# Patient Record
Sex: FEMALE | Race: WHITE | NOT HISPANIC OR LATINO | URBAN - NONMETROPOLITAN AREA
[De-identification: names, ages, dates, MRNs, and addresses within clinical notes are randomized per-mention and may not be internally consistent; named-entity substitution may affect disease eponyms.]

---

## 2017-04-17 ENCOUNTER — OFFICE (OUTPATIENT)
Dept: URBAN - NONMETROPOLITAN AREA CLINIC 13 | Facility: CLINIC | Age: 57
End: 2017-04-17
Payer: COMMERCIAL

## 2017-04-17 VITALS
DIASTOLIC BLOOD PRESSURE: 89 MMHG | SYSTOLIC BLOOD PRESSURE: 149 MMHG | WEIGHT: 153 LBS | HEIGHT: 61 IN | HEART RATE: 104 BPM

## 2017-04-17 VITALS — HEIGHT: 61 IN

## 2017-04-17 DIAGNOSIS — K21.9 GASTRO-ESOPHAGEAL REFLUX DISEASE WITHOUT ESOPHAGITIS: ICD-10-CM

## 2017-04-17 DIAGNOSIS — R14.3 FLATULENCE: ICD-10-CM

## 2017-04-17 DIAGNOSIS — R10.31 RIGHT LOWER QUADRANT PAIN: ICD-10-CM

## 2017-04-17 DIAGNOSIS — R10.11 RIGHT UPPER QUADRANT PAIN: ICD-10-CM

## 2017-04-17 DIAGNOSIS — R19.7 DIARRHEA, UNSPECIFIED: ICD-10-CM

## 2017-04-17 DIAGNOSIS — K51.90 ULCERATIVE COLITIS, UNSPECIFIED, WITHOUT COMPLICATIONS: ICD-10-CM

## 2017-04-17 DIAGNOSIS — R10.813 RIGHT LOWER QUADRANT ABDOMINAL TENDERNESS: ICD-10-CM

## 2017-04-17 DIAGNOSIS — R10.811 RIGHT UPPER QUADRANT ABDOMINAL TENDERNESS: ICD-10-CM

## 2017-04-17 DIAGNOSIS — R11.2 NAUSEA WITH VOMITING, UNSPECIFIED: ICD-10-CM

## 2017-04-17 DIAGNOSIS — R13.10 DYSPHAGIA, UNSPECIFIED: ICD-10-CM

## 2017-04-17 DIAGNOSIS — R14.0 ABDOMINAL DISTENSION (GASEOUS): ICD-10-CM

## 2017-04-17 LAB
AMYLASE: 45 U/L (ref 25–125)
CBC EMERALD: HEMATOCRIT: 39.1 % (ref 37–47)
CBC EMERALD: HEMOGLOBIN: 13.2 G/DL (ref 12–14)
CBC EMERALD: LYMPHS %: 28.7 % (ref 20.5–40.5)
CBC EMERALD: LYMPHS ABSOLUTE: 3.3 10*3U/L — HIGH (ref 1.3–2.9)
CBC EMERALD: MCH: 29.6 PG (ref 27–32)
CBC EMERALD: MCHC: 33.8 G/DL (ref 32–35)
CBC EMERALD: MCV: 87.7 FL (ref 84–100)
CBC EMERALD: MONOS %: 4.9 % (ref 2–10)
CBC EMERALD: MONOS ABSOLUTE: 0.6 10*3U/L (ref 0.3–3.8)
CBC EMERALD: MPV: 8.1 FL (ref 7.4–10.4)
CBC EMERALD: NEUT. %: 66.4 % — HIGH (ref 43–65)
CBC EMERALD: NEUT. ABSOLUTE: 7.6 10*3U/L — HIGH (ref 2.2–4.8)
CBC EMERALD: PLATELETS: 255 10*3U/L (ref 130–440)
CBC EMERALD: RBC: 4.5 10*6U/L (ref 4.2–5.4)
CBC EMERALD: RDW: 13.3 % (ref 11.5–15.5)
CBC EMERALD: WBC: 11.5 10*3U/L — HIGH (ref 4.5–10.5)
COMPLETE METABOLIC: ALBUMIN: 5.1 G/DL (ref 3.5–5.2)
COMPLETE METABOLIC: ALK. PHOS: 78 U/L (ref 40–150)
COMPLETE METABOLIC: ALT: 41 U/L (ref 0–55)
COMPLETE METABOLIC: AST: 42 U/L — HIGH (ref 5–34)
COMPLETE METABOLIC: BUN: 11 MG/DL (ref 7–26)
COMPLETE METABOLIC: CALCIUM: 9.8 MG/DL (ref 8.4–10.2)
COMPLETE METABOLIC: CHLORIDE: 102 MMOL/L (ref 98–107)
COMPLETE METABOLIC: CO2: 24 MEQ/L (ref 22–29)
COMPLETE METABOLIC: CREATININE: 0.7 MG/DL (ref 0.6–1.3)
COMPLETE METABOLIC: GLUCOSE: 112 MG/DL — HIGH (ref 70–99)
COMPLETE METABOLIC: POTASSIUM: 4.4 MMOL/L (ref 3.5–5.3)
COMPLETE METABOLIC: SODIUM: 139 MMOL/L (ref 136–145)
COMPLETE METABOLIC: TOTAL BILIRUBIN: 0.4 MG/DL (ref 0.2–1.2)
COMPLETE METABOLIC: TOTAL PROTEIN: 8 G/DL (ref 6.4–8.3)
LIPASE: 34 U/L (ref 8–78)

## 2017-04-17 PROCEDURE — 82150 ASSAY OF AMYLASE: CPT | Performed by: INTERNAL MEDICINE

## 2017-04-17 PROCEDURE — 99204 OFFICE O/P NEW MOD 45 MIN: CPT

## 2017-04-17 PROCEDURE — 85025 COMPLETE CBC W/AUTO DIFF WBC: CPT | Performed by: INTERNAL MEDICINE

## 2017-04-17 PROCEDURE — 36415 COLL VENOUS BLD VENIPUNCTURE: CPT | Performed by: INTERNAL MEDICINE

## 2017-04-17 PROCEDURE — 83690 ASSAY OF LIPASE: CPT | Performed by: INTERNAL MEDICINE

## 2017-04-17 PROCEDURE — 80053 COMPREHEN METABOLIC PANEL: CPT | Performed by: INTERNAL MEDICINE

## 2017-04-17 RX ORDER — POLYETHYLENE GLYCOL 3350, SODIUM SULFATE ANHYDROUS, SODIUM BICARBONATE, SODIUM CHLORIDE, POTASSIUM CHLORIDE 236; 22.74; 6.74; 5.86; 2.97 G/4L; G/4L; G/4L; G/4L; G/4L
POWDER, FOR SOLUTION ORAL
Qty: 1 | Refills: 0 | Status: COMPLETED
Start: 2017-04-17 | End: 2017-05-02

## 2017-04-17 RX ORDER — ONDANSETRON HYDROCHLORIDE 4 MG/1
TABLET, FILM COATED ORAL
Qty: 2 | Refills: 0 | Status: COMPLETED
Start: 2017-04-17 | End: 2017-05-02

## 2017-04-17 RX ORDER — BISACODYL 5 MG
TABLET, DELAYED RELEASE (ENTERIC COATED) ORAL
Qty: 8 | Refills: 0 | Status: COMPLETED
Start: 2017-04-17 | End: 2017-05-02

## 2017-04-27 ENCOUNTER — AMBULATORY SURGICAL CENTER (OUTPATIENT)
Dept: URBAN - NONMETROPOLITAN AREA SURGERY 2 | Facility: SURGERY | Age: 57
End: 2017-04-27
Payer: COMMERCIAL

## 2017-04-27 ENCOUNTER — OFFICE (OUTPATIENT)
Dept: URBAN - NONMETROPOLITAN AREA CLINIC 13 | Facility: CLINIC | Age: 57
End: 2017-04-27
Payer: COMMERCIAL

## 2017-04-27 VITALS
SYSTOLIC BLOOD PRESSURE: 116 MMHG | SYSTOLIC BLOOD PRESSURE: 138 MMHG | SYSTOLIC BLOOD PRESSURE: 152 MMHG | WEIGHT: 153 LBS | HEART RATE: 100 BPM | SYSTOLIC BLOOD PRESSURE: 128 MMHG | RESPIRATION RATE: 20 BRPM | DIASTOLIC BLOOD PRESSURE: 76 MMHG | DIASTOLIC BLOOD PRESSURE: 75 MMHG | DIASTOLIC BLOOD PRESSURE: 66 MMHG | DIASTOLIC BLOOD PRESSURE: 86 MMHG | OXYGEN SATURATION: 98 % | HEART RATE: 94 BPM | HEART RATE: 92 BPM | SYSTOLIC BLOOD PRESSURE: 135 MMHG | HEART RATE: 98 BPM | HEIGHT: 61 IN | OXYGEN SATURATION: 100 % | DIASTOLIC BLOOD PRESSURE: 108 MMHG | HEART RATE: 93 BPM | RESPIRATION RATE: 18 BRPM

## 2017-04-27 VITALS — HEIGHT: 61 IN

## 2017-04-27 DIAGNOSIS — R10.31 RIGHT LOWER QUADRANT PAIN: ICD-10-CM

## 2017-04-27 DIAGNOSIS — R13.10 DYSPHAGIA, UNSPECIFIED: ICD-10-CM

## 2017-04-27 DIAGNOSIS — K22.2 ESOPHAGEAL OBSTRUCTION: ICD-10-CM

## 2017-04-27 DIAGNOSIS — K29.30 CHRONIC SUPERFICIAL GASTRITIS WITHOUT BLEEDING: ICD-10-CM

## 2017-04-27 DIAGNOSIS — K51.90 ULCERATIVE COLITIS, UNSPECIFIED, WITHOUT COMPLICATIONS: ICD-10-CM

## 2017-04-27 DIAGNOSIS — K21.9 GASTRO-ESOPHAGEAL REFLUX DISEASE WITHOUT ESOPHAGITIS: ICD-10-CM

## 2017-04-27 DIAGNOSIS — R11.2 NAUSEA WITH VOMITING, UNSPECIFIED: ICD-10-CM

## 2017-04-27 DIAGNOSIS — R10.11 RIGHT UPPER QUADRANT PAIN: ICD-10-CM

## 2017-04-27 PROBLEM — K31.89 OTHER DISEASES OF STOMACH AND DUODENUM: Status: ACTIVE | Noted: 2017-04-27

## 2017-04-27 PROBLEM — Z79.899 LONG-TERM (CURRENT) USE OF OTHER MEDICATIONS (PPI): Status: ACTIVE | Noted: 2017-04-27

## 2017-04-27 PROCEDURE — 36415 COLL VENOUS BLD VENIPUNCTURE: CPT | Performed by: INTERNAL MEDICINE

## 2017-04-27 PROCEDURE — 00740: CPT | Mod: QS,QZ

## 2017-04-27 PROCEDURE — 43450 DILATE ESOPHAGUS 1/MULT PASS: CPT

## 2017-04-27 PROCEDURE — 74177 CT ABD & PELVIS W/CONTRAST: CPT | Mod: TC | Performed by: INTERNAL MEDICINE

## 2017-04-27 PROCEDURE — 43239 EGD BIOPSY SINGLE/MULTIPLE: CPT

## 2017-04-27 RX ORDER — OMEPRAZOLE 40 MG/1
CAPSULE, DELAYED RELEASE ORAL
Qty: 90 | Refills: 0 | Status: ACTIVE

## 2017-05-02 ENCOUNTER — AMBULATORY SURGICAL CENTER (OUTPATIENT)
Dept: URBAN - NONMETROPOLITAN AREA SURGERY 2 | Facility: SURGERY | Age: 57
End: 2017-05-02
Payer: COMMERCIAL

## 2017-05-02 VITALS
RESPIRATION RATE: 18 BRPM | OXYGEN SATURATION: 99 % | HEART RATE: 101 BPM | HEART RATE: 87 BPM | SYSTOLIC BLOOD PRESSURE: 93 MMHG | TEMPERATURE: 18 F | RESPIRATION RATE: 20 BRPM | DIASTOLIC BLOOD PRESSURE: 61 MMHG | SYSTOLIC BLOOD PRESSURE: 161 MMHG | WEIGHT: 153 LBS | DIASTOLIC BLOOD PRESSURE: 52 MMHG | DIASTOLIC BLOOD PRESSURE: 47 MMHG | HEART RATE: 96 BPM | DIASTOLIC BLOOD PRESSURE: 44 MMHG | OXYGEN SATURATION: 97 % | SYSTOLIC BLOOD PRESSURE: 128 MMHG | HEART RATE: 99 BPM | DIASTOLIC BLOOD PRESSURE: 87 MMHG | OXYGEN SATURATION: 93 % | SYSTOLIC BLOOD PRESSURE: 110 MMHG | HEIGHT: 61 IN | SYSTOLIC BLOOD PRESSURE: 137 MMHG | OXYGEN SATURATION: 94 % | HEART RATE: 105 BPM | SYSTOLIC BLOOD PRESSURE: 104 MMHG | SYSTOLIC BLOOD PRESSURE: 115 MMHG | DIASTOLIC BLOOD PRESSURE: 69 MMHG | SYSTOLIC BLOOD PRESSURE: 92 MMHG | HEART RATE: 56 BPM | HEART RATE: 77 BPM | DIASTOLIC BLOOD PRESSURE: 77 MMHG | OXYGEN SATURATION: 95 % | DIASTOLIC BLOOD PRESSURE: 79 MMHG

## 2017-05-02 DIAGNOSIS — K51.90 ULCERATIVE COLITIS, UNSPECIFIED, WITHOUT COMPLICATIONS: ICD-10-CM

## 2017-05-02 PROCEDURE — G0105 COLORECTAL SCRN; HI RISK IND: HCPCS

## 2017-05-02 RX ORDER — WHEAT DEXTRIN 3 G/3.8 G
POWDER (GRAM) ORAL
Qty: 2 | Refills: 5 | Status: ACTIVE
Start: 2017-05-02

## 2017-05-03 LAB — SURGICAL PROCEDURE: PDF REPORT: (no result)

## 2017-05-25 ENCOUNTER — OFFICE (OUTPATIENT)
Dept: URBAN - NONMETROPOLITAN AREA CLINIC 13 | Facility: CLINIC | Age: 57
End: 2017-05-25
Payer: COMMERCIAL

## 2017-05-25 VITALS
DIASTOLIC BLOOD PRESSURE: 86 MMHG | HEIGHT: 61 IN | SYSTOLIC BLOOD PRESSURE: 160 MMHG | HEART RATE: 100 BPM | WEIGHT: 158 LBS

## 2017-05-25 DIAGNOSIS — R13.10 DYSPHAGIA, UNSPECIFIED: ICD-10-CM

## 2017-05-25 DIAGNOSIS — K51.90 ULCERATIVE COLITIS, UNSPECIFIED, WITHOUT COMPLICATIONS: ICD-10-CM

## 2017-05-25 DIAGNOSIS — K21.9 GASTRO-ESOPHAGEAL REFLUX DISEASE WITHOUT ESOPHAGITIS: ICD-10-CM

## 2017-05-25 DIAGNOSIS — R11.0 NAUSEA: ICD-10-CM

## 2017-05-25 DIAGNOSIS — R74.0 NONSPECIFIC ELEVATION OF LEVELS OF TRANSAMINASE AND LACTIC A: ICD-10-CM

## 2017-05-25 DIAGNOSIS — R10.84 GENERALIZED ABDOMINAL PAIN: ICD-10-CM

## 2017-05-25 DIAGNOSIS — K76.0 FATTY (CHANGE OF) LIVER, NOT ELSEWHERE CLASSIFIED: ICD-10-CM

## 2017-05-25 DIAGNOSIS — R19.7 DIARRHEA, UNSPECIFIED: ICD-10-CM

## 2017-05-25 PROCEDURE — 99213 OFFICE O/P EST LOW 20 MIN: CPT

## 2017-05-25 RX ORDER — MESALAMINE 1.2 G/1
TABLET, DELAYED RELEASE ORAL
Qty: 120 | Refills: 0 | Status: ACTIVE
Start: 2017-05-25

## 2017-06-14 ENCOUNTER — OFFICE (OUTPATIENT)
Dept: URBAN - NONMETROPOLITAN AREA CLINIC 13 | Facility: CLINIC | Age: 57
End: 2017-06-14
Payer: COMMERCIAL

## 2017-06-14 VITALS
SYSTOLIC BLOOD PRESSURE: 139 MMHG | HEIGHT: 61 IN | DIASTOLIC BLOOD PRESSURE: 89 MMHG | WEIGHT: 168 LBS | HEART RATE: 96 BPM

## 2017-06-14 VITALS — HEIGHT: 61 IN

## 2017-06-14 DIAGNOSIS — K51.90 ULCERATIVE COLITIS, UNSPECIFIED, WITHOUT COMPLICATIONS: ICD-10-CM

## 2017-06-14 DIAGNOSIS — K50.90 CROHN'S DISEASE, UNSPECIFIED, WITHOUT COMPLICATIONS: ICD-10-CM

## 2017-06-14 DIAGNOSIS — R13.10 DYSPHAGIA, UNSPECIFIED: ICD-10-CM

## 2017-06-14 DIAGNOSIS — R10.84 GENERALIZED ABDOMINAL PAIN: ICD-10-CM

## 2017-06-14 DIAGNOSIS — K21.9 GASTRO-ESOPHAGEAL REFLUX DISEASE WITHOUT ESOPHAGITIS: ICD-10-CM

## 2017-06-14 PROCEDURE — 99214 OFFICE O/P EST MOD 30 MIN: CPT | Mod: 25

## 2017-06-14 PROCEDURE — 74177 CT ABD & PELVIS W/CONTRAST: CPT | Mod: TC

## 2017-07-26 ENCOUNTER — OFFICE (OUTPATIENT)
Dept: URBAN - NONMETROPOLITAN AREA CLINIC 13 | Facility: CLINIC | Age: 57
End: 2017-07-26
Payer: COMMERCIAL

## 2017-07-26 VITALS
HEART RATE: 93 BPM | SYSTOLIC BLOOD PRESSURE: 146 MMHG | HEIGHT: 61 IN | DIASTOLIC BLOOD PRESSURE: 89 MMHG | WEIGHT: 174 LBS

## 2017-07-26 DIAGNOSIS — R11.0 NAUSEA: ICD-10-CM

## 2017-07-26 DIAGNOSIS — R19.7 DIARRHEA, UNSPECIFIED: ICD-10-CM

## 2017-07-26 DIAGNOSIS — K58.9 IRRITABLE BOWEL SYNDROME WITHOUT DIARRHEA: ICD-10-CM

## 2017-07-26 DIAGNOSIS — K76.0 FATTY (CHANGE OF) LIVER, NOT ELSEWHERE CLASSIFIED: ICD-10-CM

## 2017-07-26 DIAGNOSIS — K21.9 GASTRO-ESOPHAGEAL REFLUX DISEASE WITHOUT ESOPHAGITIS: ICD-10-CM

## 2017-07-26 PROCEDURE — 99213 OFFICE O/P EST LOW 20 MIN: CPT

## 2017-07-26 RX ORDER — ONDANSETRON HYDROCHLORIDE 4 MG/1
TABLET, FILM COATED ORAL
Qty: 90 | Refills: 3 | Status: ACTIVE
Start: 2017-07-26

## 2017-08-28 ENCOUNTER — OFFICE (OUTPATIENT)
Dept: URBAN - NONMETROPOLITAN AREA CLINIC 13 | Facility: CLINIC | Age: 57
End: 2017-08-28
Payer: COMMERCIAL

## 2017-08-28 VITALS
SYSTOLIC BLOOD PRESSURE: 145 MMHG | WEIGHT: 177 LBS | HEART RATE: 109 BPM | HEIGHT: 61 IN | RESPIRATION RATE: 20 BRPM | DIASTOLIC BLOOD PRESSURE: 82 MMHG

## 2017-08-28 DIAGNOSIS — K58.9 IRRITABLE BOWEL SYNDROME WITHOUT DIARRHEA: ICD-10-CM

## 2017-08-28 DIAGNOSIS — K86.89 OTHER SPECIFIED DISEASES OF PANCREAS: ICD-10-CM

## 2017-08-28 DIAGNOSIS — R10.13 EPIGASTRIC PAIN: ICD-10-CM

## 2017-08-28 DIAGNOSIS — R10.84 GENERALIZED ABDOMINAL PAIN: ICD-10-CM

## 2017-08-28 DIAGNOSIS — R11.0 NAUSEA: ICD-10-CM

## 2017-08-28 DIAGNOSIS — K21.9 GASTRO-ESOPHAGEAL REFLUX DISEASE WITHOUT ESOPHAGITIS: ICD-10-CM

## 2017-08-28 PROCEDURE — 99213 OFFICE O/P EST LOW 20 MIN: CPT

## 2017-08-28 RX ORDER — PANCRELIPASE 40000; 136000; 218000 [USP'U]/1; [USP'U]/1; [USP'U]/1
CAPSULE, DELAYED RELEASE ORAL
Qty: 300 | Refills: 0 | Status: ACTIVE
Start: 2017-08-28

## 2023-03-06 LAB
ALANINE AMINOTRANSFERASE (SGPT) (U/L) IN SER/PLAS: 19 U/L (ref 7–45)
ALBUMIN (G/DL) IN SER/PLAS: 4.1 G/DL (ref 3.4–5)
ALKALINE PHOSPHATASE (U/L) IN SER/PLAS: 80 U/L (ref 33–136)
ASPARTATE AMINOTRANSFERASE (SGOT) (U/L) IN SER/PLAS: 26 U/L (ref 9–39)
BILIRUBIN DIRECT (MG/DL) IN SER/PLAS: 0.4 MG/DL (ref 0–0.3)
BILIRUBIN TOTAL (MG/DL) IN SER/PLAS: 1.2 MG/DL (ref 0–1.2)
C REACTIVE PROTEIN (MG/L) IN SER/PLAS: 0.22 MG/DL
ERYTHROCYTE DISTRIBUTION WIDTH (RATIO) BY AUTOMATED COUNT: 17.5 % (ref 11.5–14.5)
ERYTHROCYTE MEAN CORPUSCULAR HEMOGLOBIN CONCENTRATION (G/DL) BY AUTOMATED: 31.6 G/DL (ref 32–36)
ERYTHROCYTE MEAN CORPUSCULAR VOLUME (FL) BY AUTOMATED COUNT: 93 FL (ref 80–100)
ERYTHROCYTES (10*6/UL) IN BLOOD BY AUTOMATED COUNT: 4.13 X10E12/L (ref 4–5.2)
HEMATOCRIT (%) IN BLOOD BY AUTOMATED COUNT: 38.6 % (ref 36–46)
HEMOGLOBIN (G/DL) IN BLOOD: 12.2 G/DL (ref 12–16)
LEUKOCYTES (10*3/UL) IN BLOOD BY AUTOMATED COUNT: 5.6 X10E9/L (ref 4.4–11.3)
PLATELETS (10*3/UL) IN BLOOD AUTOMATED COUNT: 89 X10E9/L (ref 150–450)
PROTEIN TOTAL: 7 G/DL (ref 6.4–8.2)
SEDIMENTATION RATE, ERYTHROCYTE: 5 MM/H (ref 0–30)
URATE (MG/DL) IN SER/PLAS: 2.5 MG/DL (ref 2.3–6.7)

## 2023-03-10 LAB
ALANINE AMINOTRANSFERASE (SGPT) (U/L) IN SER/PLAS: 21 U/L (ref 7–45)
ALBUMIN (G/DL) IN SER/PLAS: 3.8 G/DL (ref 3.4–5)
ALKALINE PHOSPHATASE (U/L) IN SER/PLAS: 73 U/L (ref 33–136)
ANION GAP IN SER/PLAS: 12 MMOL/L (ref 10–20)
ASPARTATE AMINOTRANSFERASE (SGOT) (U/L) IN SER/PLAS: 29 U/L (ref 9–39)
BASOPHILS (10*3/UL) IN BLOOD BY AUTOMATED COUNT: NORMAL
BASOPHILS/100 LEUKOCYTES IN BLOOD BY AUTOMATED COUNT: NORMAL
BILIRUBIN DIRECT (MG/DL) IN SER/PLAS: 0.4 MG/DL (ref 0–0.3)
BILIRUBIN TOTAL (MG/DL) IN SER/PLAS: 1.3 MG/DL (ref 0–1.2)
CALCIUM (MG/DL) IN SER/PLAS: 8.9 MG/DL (ref 8.6–10.3)
CARBON DIOXIDE, TOTAL (MMOL/L) IN SER/PLAS: 32 MMOL/L (ref 21–32)
CHLORIDE (MMOL/L) IN SER/PLAS: 99 MMOL/L (ref 98–107)
CHOLESTEROL (MG/DL) IN SER/PLAS: 151 MG/DL (ref 0–199)
CHOLESTEROL IN HDL (MG/DL) IN SER/PLAS: 58.8 MG/DL
CHOLESTEROL/HDL RATIO: 2.6
CREATININE (MG/DL) IN SER/PLAS: 0.51 MG/DL (ref 0.5–1.05)
EOSINOPHILS (10*3/UL) IN BLOOD BY AUTOMATED COUNT: NORMAL
EOSINOPHILS/100 LEUKOCYTES IN BLOOD BY AUTOMATED COUNT: NORMAL
ERYTHROCYTE DISTRIBUTION WIDTH (RATIO) BY AUTOMATED COUNT: NORMAL
ERYTHROCYTE MEAN CORPUSCULAR HEMOGLOBIN CONCENTRATION (G/DL) BY AUTOMATED: NORMAL
ERYTHROCYTE MEAN CORPUSCULAR VOLUME (FL) BY AUTOMATED COUNT: NORMAL
ERYTHROCYTES (10*6/UL) IN BLOOD BY AUTOMATED COUNT: NORMAL
GFR FEMALE: >90 ML/MIN/1.73M2
GLUCOSE (MG/DL) IN SER/PLAS: 174 MG/DL (ref 74–99)
HEMATOCRIT (%) IN BLOOD BY AUTOMATED COUNT: NORMAL
HEMOGLOBIN (G/DL) IN BLOOD: NORMAL
IMMATURE GRANULOCYTES/100 LEUKOCYTES IN BLOOD BY AUTOMATED COUNT: NORMAL
LDL: 68 MG/DL (ref 0–99)
LEUKOCYTES (10*3/UL) IN BLOOD BY AUTOMATED COUNT: NORMAL
LYMPHOCYTES (10*3/UL) IN BLOOD BY AUTOMATED COUNT: NORMAL
LYMPHOCYTES/100 LEUKOCYTES IN BLOOD BY AUTOMATED COUNT: NORMAL
MANUAL DIFFERENTIAL Y/N: NORMAL
MONOCYTES (10*3/UL) IN BLOOD BY AUTOMATED COUNT: NORMAL
MONOCYTES/100 LEUKOCYTES IN BLOOD BY AUTOMATED COUNT: NORMAL
NEUTROPHILS (10*3/UL) IN BLOOD BY AUTOMATED COUNT: NORMAL
NEUTROPHILS/100 LEUKOCYTES IN BLOOD BY AUTOMATED COUNT: NORMAL
NRBC (PER 100 WBCS) BY AUTOMATED COUNT: NORMAL
PLATELETS (10*3/UL) IN BLOOD AUTOMATED COUNT: NORMAL
POTASSIUM (MMOL/L) IN SER/PLAS: 3 MMOL/L (ref 3.5–5.3)
PROTEIN TOTAL: 6.7 G/DL (ref 6.4–8.2)
SODIUM (MMOL/L) IN SER/PLAS: 140 MMOL/L (ref 136–145)
TRIGLYCERIDE (MG/DL) IN SER/PLAS: 123 MG/DL (ref 0–149)
UREA NITROGEN (MG/DL) IN SER/PLAS: 5 MG/DL (ref 6–23)
VLDL: 25 MG/DL (ref 0–40)

## 2023-03-11 LAB
HEPATITIS B VIRUS CORE IGM AB PRESENCE IN SER/PLAS BY IMMUNOASSY: NONREACTIVE
HEPATITIS B VIRUS SURFACE AB (MIU/ML) IN SERUM: <3.1 MIU/ML
HEPATITIS B VIRUS SURFACE AG PRESENCE IN SERUM: NONREACTIVE
HEPATITIS C VIRUS AB PRESENCE IN SERUM: NONREACTIVE

## 2023-03-13 LAB
NIL(NEG) CONTROL SPOT COUNT: NORMAL
PANEL A SPOT COUNT: 0
PANEL B SPOT COUNT: 0
POS CONTROL SPOT COUNT: NORMAL
T-SPOT. TB INTERPRETATION: NEGATIVE

## 2023-05-12 LAB
ALANINE AMINOTRANSFERASE (SGPT) (U/L) IN SER/PLAS: 10 U/L (ref 7–45)
ALBUMIN (G/DL) IN SER/PLAS: 3.7 G/DL (ref 3.4–5)
ALKALINE PHOSPHATASE (U/L) IN SER/PLAS: 110 U/L (ref 33–136)
ANION GAP IN SER/PLAS: 12 MMOL/L (ref 10–20)
ASPARTATE AMINOTRANSFERASE (SGOT) (U/L) IN SER/PLAS: 25 U/L (ref 9–39)
BILIRUBIN DIRECT (MG/DL) IN SER/PLAS: 0.4 MG/DL (ref 0–0.3)
BILIRUBIN TOTAL (MG/DL) IN SER/PLAS: 1.1 MG/DL (ref 0–1.2)
CALCIUM (MG/DL) IN SER/PLAS: 9.1 MG/DL (ref 8.6–10.3)
CARBON DIOXIDE, TOTAL (MMOL/L) IN SER/PLAS: 34 MMOL/L (ref 21–32)
CHLORIDE (MMOL/L) IN SER/PLAS: 98 MMOL/L (ref 98–107)
CREATININE (MG/DL) IN SER/PLAS: 0.66 MG/DL (ref 0.5–1.05)
ERYTHROCYTE DISTRIBUTION WIDTH (RATIO) BY AUTOMATED COUNT: 15.2 % (ref 11.5–14.5)
ERYTHROCYTE MEAN CORPUSCULAR HEMOGLOBIN CONCENTRATION (G/DL) BY AUTOMATED: 29.5 G/DL (ref 32–36)
ERYTHROCYTE MEAN CORPUSCULAR VOLUME (FL) BY AUTOMATED COUNT: 92 FL (ref 80–100)
ERYTHROCYTES (10*6/UL) IN BLOOD BY AUTOMATED COUNT: 3.43 X10E12/L (ref 4–5.2)
GFR FEMALE: >90 ML/MIN/1.73M2
GLUCOSE (MG/DL) IN SER/PLAS: 118 MG/DL (ref 74–99)
HEMATOCRIT (%) IN BLOOD BY AUTOMATED COUNT: 31.5 % (ref 36–46)
HEMOGLOBIN (G/DL) IN BLOOD: 9.3 G/DL (ref 12–16)
LEUKOCYTES (10*3/UL) IN BLOOD BY AUTOMATED COUNT: 4.1 X10E9/L (ref 4.4–11.3)
PLATELETS (10*3/UL) IN BLOOD AUTOMATED COUNT: 119 X10E9/L (ref 150–450)
POTASSIUM (MMOL/L) IN SER/PLAS: 2.7 MMOL/L (ref 3.5–5.3)
PROTEIN TOTAL: 7.5 G/DL (ref 6.4–8.2)
SODIUM (MMOL/L) IN SER/PLAS: 141 MMOL/L (ref 136–145)
THYROTROPIN (MIU/L) IN SER/PLAS BY DETECTION LIMIT <= 0.05 MIU/L: 1.53 MIU/L (ref 0.44–3.98)
UREA NITROGEN (MG/DL) IN SER/PLAS: 4 MG/DL (ref 6–23)

## 2023-06-22 LAB
BASOPHILS (10*3/UL) IN BLOOD BY AUTOMATED COUNT: 0.05 X10E9/L (ref 0–0.1)
BASOPHILS/100 LEUKOCYTES IN BLOOD BY AUTOMATED COUNT: 0.8 % (ref 0–2)
EOSINOPHILS (10*3/UL) IN BLOOD BY AUTOMATED COUNT: 0.15 X10E9/L (ref 0–0.7)
EOSINOPHILS/100 LEUKOCYTES IN BLOOD BY AUTOMATED COUNT: 2.5 % (ref 0–6)
ERYTHROCYTE DISTRIBUTION WIDTH (RATIO) BY AUTOMATED COUNT: 16.4 % (ref 11.5–14.5)
ERYTHROCYTE MEAN CORPUSCULAR HEMOGLOBIN CONCENTRATION (G/DL) BY AUTOMATED: 30.4 G/DL (ref 32–36)
ERYTHROCYTE MEAN CORPUSCULAR VOLUME (FL) BY AUTOMATED COUNT: 87 FL (ref 80–100)
ERYTHROCYTES (10*6/UL) IN BLOOD BY AUTOMATED COUNT: 3.71 X10E12/L (ref 4–5.2)
FERRITIN (UG/LL) IN SER/PLAS: 42 UG/L (ref 8–150)
HEMATOCRIT (%) IN BLOOD BY AUTOMATED COUNT: 32.2 % (ref 36–46)
HEMOGLOBIN (G/DL) IN BLOOD: 9.8 G/DL (ref 12–16)
HEMOGLOBIN (PG) IN RETICULOCYTES: 30 PG (ref 28–38)
IMMATURE GRANULOCYTES/100 LEUKOCYTES IN BLOOD BY AUTOMATED COUNT: 0.2 % (ref 0–0.9)
IMMATURE RETIC FRACTION: 10.4 % (ref 0–16)
IRON (UG/DL) IN SER/PLAS: 39 UG/DL (ref 35–150)
IRON BINDING CAPACITY (UG/DL) IN SER/PLAS: 320 UG/DL (ref 240–445)
IRON SATURATION (%) IN SER/PLAS: 12 % (ref 25–45)
LACTATE DEHYDROGENASE (U/L) IN SER/PLAS BY LAC->PYR RXN: 154 U/L (ref 84–246)
LEUKOCYTES (10*3/UL) IN BLOOD BY AUTOMATED COUNT: 5.9 X10E9/L (ref 4.4–11.3)
LYMPHOCYTES (10*3/UL) IN BLOOD BY AUTOMATED COUNT: 0.86 X10E9/L (ref 1.2–4.8)
LYMPHOCYTES/100 LEUKOCYTES IN BLOOD BY AUTOMATED COUNT: 14.6 % (ref 13–44)
MONOCYTES (10*3/UL) IN BLOOD BY AUTOMATED COUNT: 0.47 X10E9/L (ref 0.1–1)
MONOCYTES/100 LEUKOCYTES IN BLOOD BY AUTOMATED COUNT: 8 % (ref 2–10)
NEUTROPHILS (10*3/UL) IN BLOOD BY AUTOMATED COUNT: 4.37 X10E9/L (ref 1.2–7.7)
NEUTROPHILS/100 LEUKOCYTES IN BLOOD BY AUTOMATED COUNT: 73.9 % (ref 40–80)
PLATELETS (10*3/UL) IN BLOOD AUTOMATED COUNT: 91 X10E9/L (ref 150–450)
RBC MORPHOLOGY IN BLOOD: NORMAL
RETICULOCYTES (10*3/UL) IN BLOOD: 0.06 X10E12/L (ref 0.02–0.08)
RETICULOCYTES/100 ERYTHROCYTES IN BLOOD BY AUTOMATED COUNT: 1.6 % (ref 0.5–2)
SCHISTOCYTES (PRESENCE) IN BLOOD BY LIGHT MICROSCOPY: NORMAL

## 2023-06-23 LAB
COBALAMIN (VITAMIN B12) (PG/ML) IN SER/PLAS: 1055 PG/ML (ref 211–911)
FOLATE (NG/ML) IN SER/PLAS: >24 NG/ML
IGA (MG/DL) IN SER/PLAS: 451 MG/DL (ref 70–400)
IGG (MG/DL) IN SER/PLAS: 1050 MG/DL (ref 700–1600)
IGM (MG/DL) IN SER/PLAS: 227 MG/DL (ref 40–230)
IMMUNOGLOBULIN LIGHT CHAINS KAPPA/LAMBDA (MASS RATIO) IN SERUM: 1.88 (ref 0.26–1.65)
IMMUNOGLOBULIN LIGHT CHAINS.KAPPA (MG/DL) IN SERUM: 3.31 MG/DL (ref 0.33–1.94)
IMMUNOGLOBULIN LIGHT CHAINS.LAMBDA (MG/DL) IN SERUM: 1.76 MG/DL (ref 0.57–2.63)

## 2023-06-30 LAB
ALBUMIN ELP: 3.8 G/DL (ref 3.4–5)
ALPHA 1: 0.3 G/DL (ref 0.2–0.6)
ALPHA 2: 0.7 G/DL (ref 0.4–1.1)
BETA: 1 G/DL (ref 0.5–1.2)
GAMMA GLOBULIN: 1.2 G/DL (ref 0.5–1.4)
PATH REVIEW - SERUM IMMUNOFIXATION: NORMAL
PATH REVIEW-SERUM PROTEIN ELECTROPHORESIS: NORMAL
PROTEIN ELECTROPHORESIS INTERPRETATION: NORMAL
PROTEIN TOTAL: 7.1 G/DL (ref 6.4–8.2)
SERUM IMMUNOFIXATION INTERPRETATION: NORMAL

## 2023-07-06 LAB
ALANINE AMINOTRANSFERASE (SGPT) (U/L) IN SER/PLAS: 11 U/L (ref 7–45)
ALBUMIN (G/DL) IN SER/PLAS: 3.9 G/DL (ref 3.4–5)
ALKALINE PHOSPHATASE (U/L) IN SER/PLAS: 132 U/L (ref 33–136)
ANION GAP IN SER/PLAS: 9 MMOL/L (ref 10–20)
ASPARTATE AMINOTRANSFERASE (SGOT) (U/L) IN SER/PLAS: 24 U/L (ref 9–39)
BILIRUBIN TOTAL (MG/DL) IN SER/PLAS: 0.7 MG/DL (ref 0–1.2)
C REACTIVE PROTEIN (MG/L) IN SER/PLAS: 0.82 MG/DL
CALCIUM (MG/DL) IN SER/PLAS: 8.8 MG/DL (ref 8.6–10.3)
CARBON DIOXIDE, TOTAL (MMOL/L) IN SER/PLAS: 32 MMOL/L (ref 21–32)
CHLORIDE (MMOL/L) IN SER/PLAS: 102 MMOL/L (ref 98–107)
CREATININE (MG/DL) IN SER/PLAS: 0.59 MG/DL (ref 0.5–1.05)
ERYTHROCYTE DISTRIBUTION WIDTH (RATIO) BY AUTOMATED COUNT: 18.1 % (ref 11.5–14.5)
ERYTHROCYTE MEAN CORPUSCULAR HEMOGLOBIN CONCENTRATION (G/DL) BY AUTOMATED: 30.4 G/DL (ref 32–36)
ERYTHROCYTE MEAN CORPUSCULAR VOLUME (FL) BY AUTOMATED COUNT: 87 FL (ref 80–100)
ERYTHROCYTES (10*6/UL) IN BLOOD BY AUTOMATED COUNT: 3.58 X10E12/L (ref 4–5.2)
GFR FEMALE: >90 ML/MIN/1.73M2
GLUCOSE (MG/DL) IN SER/PLAS: 107 MG/DL (ref 74–99)
HEMATOCRIT (%) IN BLOOD BY AUTOMATED COUNT: 31.3 % (ref 36–46)
HEMOGLOBIN (G/DL) IN BLOOD: 9.5 G/DL (ref 12–16)
LEUKOCYTES (10*3/UL) IN BLOOD BY AUTOMATED COUNT: 3.4 X10E9/L (ref 4.4–11.3)
PLATELETS (10*3/UL) IN BLOOD AUTOMATED COUNT: 85 X10E9/L (ref 150–450)
POTASSIUM (MMOL/L) IN SER/PLAS: 3.9 MMOL/L (ref 3.5–5.3)
PROTEIN TOTAL: 6.9 G/DL (ref 6.4–8.2)
SEDIMENTATION RATE, ERYTHROCYTE: 19 MM/H (ref 0–30)
SODIUM (MMOL/L) IN SER/PLAS: 139 MMOL/L (ref 136–145)
URATE (MG/DL) IN SER/PLAS: 3.8 MG/DL (ref 2.3–6.7)
UREA NITROGEN (MG/DL) IN SER/PLAS: 5 MG/DL (ref 6–23)

## 2023-07-14 LAB
BASOPHILS (10*3/UL) IN BLOOD BY AUTOMATED COUNT: 0.02 X10E9/L (ref 0–0.1)
BASOPHILS/100 LEUKOCYTES IN BLOOD BY AUTOMATED COUNT: 0.7 % (ref 0–2)
EOSINOPHILS (10*3/UL) IN BLOOD BY AUTOMATED COUNT: 0.11 X10E9/L (ref 0–0.7)
EOSINOPHILS/100 LEUKOCYTES IN BLOOD BY AUTOMATED COUNT: 3.6 % (ref 0–6)
ERYTHROCYTE DISTRIBUTION WIDTH (RATIO) BY AUTOMATED COUNT: 19.6 % (ref 11.5–14.5)
ERYTHROCYTE MEAN CORPUSCULAR HEMOGLOBIN CONCENTRATION (G/DL) BY AUTOMATED: 30.5 G/DL (ref 32–36)
ERYTHROCYTE MEAN CORPUSCULAR VOLUME (FL) BY AUTOMATED COUNT: 89 FL (ref 80–100)
ERYTHROCYTES (10*6/UL) IN BLOOD BY AUTOMATED COUNT: 3.75 X10E12/L (ref 4–5.2)
FERRITIN (UG/LL) IN SER/PLAS: 586 UG/L (ref 8–150)
HEMATOCRIT (%) IN BLOOD BY AUTOMATED COUNT: 33.4 % (ref 36–46)
HEMOGLOBIN (G/DL) IN BLOOD: 10.2 G/DL (ref 12–16)
IMMATURE GRANULOCYTES/100 LEUKOCYTES IN BLOOD BY AUTOMATED COUNT: 0.3 % (ref 0–0.9)
IRON (UG/DL) IN SER/PLAS: 72 UG/DL (ref 35–150)
IRON BINDING CAPACITY (UG/DL) IN SER/PLAS: 243 UG/DL (ref 240–445)
IRON SATURATION (%) IN SER/PLAS: 30 % (ref 25–45)
LEUKOCYTES (10*3/UL) IN BLOOD BY AUTOMATED COUNT: 3 X10E9/L (ref 4.4–11.3)
LYMPHOCYTES (10*3/UL) IN BLOOD BY AUTOMATED COUNT: 1 X10E9/L (ref 1.2–4.8)
LYMPHOCYTES/100 LEUKOCYTES IN BLOOD BY AUTOMATED COUNT: 32.9 % (ref 13–44)
MONOCYTES (10*3/UL) IN BLOOD BY AUTOMATED COUNT: 0.31 X10E9/L (ref 0.1–1)
MONOCYTES/100 LEUKOCYTES IN BLOOD BY AUTOMATED COUNT: 10.2 % (ref 2–10)
NEUTROPHILS (10*3/UL) IN BLOOD BY AUTOMATED COUNT: 1.59 X10E9/L (ref 1.2–7.7)
NEUTROPHILS/100 LEUKOCYTES IN BLOOD BY AUTOMATED COUNT: 52.3 % (ref 40–80)
PLATELETS (10*3/UL) IN BLOOD AUTOMATED COUNT: 62 X10E9/L (ref 150–450)

## 2023-08-28 LAB
ALANINE AMINOTRANSFERASE (SGPT) (U/L) IN SER/PLAS: 21 U/L (ref 7–45)
ALBUMIN (G/DL) IN SER/PLAS: 4.3 G/DL (ref 3.4–5)
ALKALINE PHOSPHATASE (U/L) IN SER/PLAS: 101 U/L (ref 33–136)
ANION GAP IN SER/PLAS: 9 MMOL/L (ref 10–20)
ASPARTATE AMINOTRANSFERASE (SGOT) (U/L) IN SER/PLAS: 31 U/L (ref 9–39)
BASOPHILS (10*3/UL) IN BLOOD BY AUTOMATED COUNT: 0.05 X10E9/L (ref 0–0.1)
BASOPHILS/100 LEUKOCYTES IN BLOOD BY AUTOMATED COUNT: 1.2 % (ref 0–2)
BILIRUBIN DIRECT (MG/DL) IN SER/PLAS: 0.3 MG/DL (ref 0–0.3)
BILIRUBIN TOTAL (MG/DL) IN SER/PLAS: 1 MG/DL (ref 0–1.2)
CALCIUM (MG/DL) IN SER/PLAS: 9 MG/DL (ref 8.6–10.3)
CARBON DIOXIDE, TOTAL (MMOL/L) IN SER/PLAS: 33 MMOL/L (ref 21–32)
CHLORIDE (MMOL/L) IN SER/PLAS: 97 MMOL/L (ref 98–107)
CHOLESTEROL (MG/DL) IN SER/PLAS: 178 MG/DL (ref 0–199)
CHOLESTEROL IN HDL (MG/DL) IN SER/PLAS: 58.3 MG/DL
CHOLESTEROL/HDL RATIO: 3.1
CREATININE (MG/DL) IN SER/PLAS: 0.59 MG/DL (ref 0.5–1.05)
EOSINOPHILS (10*3/UL) IN BLOOD BY AUTOMATED COUNT: 0.2 X10E9/L (ref 0–0.7)
EOSINOPHILS/100 LEUKOCYTES IN BLOOD BY AUTOMATED COUNT: 4.6 % (ref 0–6)
ERYTHROCYTE DISTRIBUTION WIDTH (RATIO) BY AUTOMATED COUNT: 14.7 % (ref 11.5–14.5)
ERYTHROCYTE MEAN CORPUSCULAR HEMOGLOBIN CONCENTRATION (G/DL) BY AUTOMATED: 32.3 G/DL (ref 32–36)
ERYTHROCYTE MEAN CORPUSCULAR VOLUME (FL) BY AUTOMATED COUNT: 91 FL (ref 80–100)
ERYTHROCYTES (10*6/UL) IN BLOOD BY AUTOMATED COUNT: 4.21 X10E12/L (ref 4–5.2)
FERRITIN (UG/LL) IN SER/PLAS: 173 UG/L (ref 8–150)
GFR FEMALE: >90 ML/MIN/1.73M2
GLUCOSE (MG/DL) IN SER/PLAS: 83 MG/DL (ref 74–99)
HEMATOCRIT (%) IN BLOOD BY AUTOMATED COUNT: 38.4 % (ref 36–46)
HEMOGLOBIN (G/DL) IN BLOOD: 12.4 G/DL (ref 12–16)
IMMATURE GRANULOCYTES/100 LEUKOCYTES IN BLOOD BY AUTOMATED COUNT: 0.7 % (ref 0–0.9)
IRON (UG/DL) IN SER/PLAS: 113 UG/DL (ref 35–150)
IRON BINDING CAPACITY (UG/DL) IN SER/PLAS: 329 UG/DL (ref 240–445)
IRON SATURATION (%) IN SER/PLAS: 34 % (ref 25–45)
LDL: 89 MG/DL (ref 0–99)
LEUKOCYTES (10*3/UL) IN BLOOD BY AUTOMATED COUNT: 4.3 X10E9/L (ref 4.4–11.3)
LYMPHOCYTES (10*3/UL) IN BLOOD BY AUTOMATED COUNT: 1.26 X10E9/L (ref 1.2–4.8)
LYMPHOCYTES/100 LEUKOCYTES IN BLOOD BY AUTOMATED COUNT: 29.1 % (ref 13–44)
MONOCYTES (10*3/UL) IN BLOOD BY AUTOMATED COUNT: 0.41 X10E9/L (ref 0.1–1)
MONOCYTES/100 LEUKOCYTES IN BLOOD BY AUTOMATED COUNT: 9.5 % (ref 2–10)
NEUTROPHILS (10*3/UL) IN BLOOD BY AUTOMATED COUNT: 2.38 X10E9/L (ref 1.2–7.7)
NEUTROPHILS/100 LEUKOCYTES IN BLOOD BY AUTOMATED COUNT: 54.9 % (ref 40–80)
OVALOCYTES PRESENCE IN BLOOD BY LIGHT MICROSCOPY: NORMAL
PLATELETS (10*3/UL) IN BLOOD AUTOMATED COUNT: 80 X10E9/L (ref 150–450)
POTASSIUM (MMOL/L) IN SER/PLAS: 4.3 MMOL/L (ref 3.5–5.3)
PROTEIN TOTAL: 7.4 G/DL (ref 6.4–8.2)
RBC MORPHOLOGY IN BLOOD: NORMAL
SODIUM (MMOL/L) IN SER/PLAS: 135 MMOL/L (ref 136–145)
TRIGLYCERIDE (MG/DL) IN SER/PLAS: 153 MG/DL (ref 0–149)
UREA NITROGEN (MG/DL) IN SER/PLAS: 6 MG/DL (ref 6–23)
VLDL: 31 MG/DL (ref 0–40)

## 2023-08-29 LAB
HEPATITIS B VIRUS CORE AB (PRESENCE) IN SER/PLAS BY IMM: NONREACTIVE
HEPATITIS B VIRUS SURFACE AB (MIU/ML) IN SERUM: <3.1 MIU/ML
HEPATITIS B VIRUS SURFACE AG PRESENCE IN SERUM: NONREACTIVE
HEPATITIS C VIRUS AB PRESENCE IN SERUM: NONREACTIVE

## 2023-08-31 LAB
NIL(NEG) CONTROL SPOT COUNT: NORMAL
PANEL A SPOT COUNT: 0
PANEL B SPOT COUNT: 1
POS CONTROL SPOT COUNT: NORMAL
T-SPOT. TB INTERPRETATION: NEGATIVE

## 2023-09-21 PROBLEM — R77.9 HIGH SERUM PROTEIN LEVEL: Status: ACTIVE | Noted: 2023-09-21

## 2023-09-21 PROBLEM — E27.40 ADRENAL INSUFFICIENCY (MULTI): Status: ACTIVE | Noted: 2023-09-21

## 2023-09-21 PROBLEM — K29.30 CHRONIC SUPERFICIAL GASTRITIS WITHOUT BLEEDING: Status: ACTIVE | Noted: 2023-09-21

## 2023-09-21 PROBLEM — E11.9 TYPE 2 DIABETES MELLITUS WITHOUT COMPLICATIONS (MULTI): Status: ACTIVE | Noted: 2023-09-21

## 2023-09-21 PROBLEM — M54.50 LOW BACK PAIN: Status: ACTIVE | Noted: 2023-09-21

## 2023-09-21 PROBLEM — G89.29 CHRONIC NECK PAIN: Status: ACTIVE | Noted: 2023-09-21

## 2023-09-21 PROBLEM — M54.41 LUMBAGO WITH SCIATICA, RIGHT SIDE: Status: ACTIVE | Noted: 2023-09-21

## 2023-09-21 PROBLEM — Z79.899 MEDICATION MANAGEMENT: Status: ACTIVE | Noted: 2023-09-21

## 2023-09-21 PROBLEM — M43.10 SPONDYLOLISTHESIS: Status: ACTIVE | Noted: 2023-09-21

## 2023-09-21 PROBLEM — R42 DIZZINESS: Status: ACTIVE | Noted: 2023-09-21

## 2023-09-21 PROBLEM — M54.42 LUMBAGO WITH SCIATICA, LEFT SIDE: Status: ACTIVE | Noted: 2023-09-21

## 2023-09-21 PROBLEM — M79.18 MYOFASCIAL PAIN SYNDROME: Status: ACTIVE | Noted: 2023-09-21

## 2023-09-21 PROBLEM — J30.9 ALLERGIC RHINITIS: Status: ACTIVE | Noted: 2023-09-21

## 2023-09-21 PROBLEM — G47.09 OTHER INSOMNIA: Status: ACTIVE | Noted: 2023-09-21

## 2023-09-21 PROBLEM — I48.91 ATRIAL FIBRILLATION (MULTI): Status: ACTIVE | Noted: 2023-09-21

## 2023-09-21 PROBLEM — K31.84 GASTROPARESIS: Status: ACTIVE | Noted: 2023-09-21

## 2023-09-21 PROBLEM — K76.9 LIVER LESION: Status: ACTIVE | Noted: 2023-09-21

## 2023-09-21 PROBLEM — M62.81 MUSCLE WEAKNESS: Status: ACTIVE | Noted: 2023-09-21

## 2023-09-21 PROBLEM — G47.00 INSOMNIA: Status: ACTIVE | Noted: 2023-09-21

## 2023-09-21 PROBLEM — G60.9 IDIOPATHIC PERIPHERAL NEUROPATHY: Status: ACTIVE | Noted: 2023-09-21

## 2023-09-21 PROBLEM — M54.81 BILATERAL OCCIPITAL NEURALGIA: Status: ACTIVE | Noted: 2023-09-21

## 2023-09-21 PROBLEM — Z12.39 SCREENING FOR BREAST CANCER: Status: ACTIVE | Noted: 2023-09-21

## 2023-09-21 PROBLEM — J41.0 SIMPLE CHRONIC BRONCHITIS (MULTI): Status: ACTIVE | Noted: 2023-09-21

## 2023-09-21 PROBLEM — J45.30 MILD PERSISTENT ASTHMA WITHOUT COMPLICATION (HHS-HCC): Status: ACTIVE | Noted: 2023-09-21

## 2023-09-21 PROBLEM — R53.83 FATIGUE: Status: ACTIVE | Noted: 2023-09-21

## 2023-09-21 PROBLEM — I25.118 CORONARY ARTERY DISEASE OF NATIVE ARTERY OF NATIVE HEART WITH STABLE ANGINA PECTORIS (CMS-HCC): Status: ACTIVE | Noted: 2023-09-21

## 2023-09-21 PROBLEM — R26.89 BALANCE PROBLEM: Status: ACTIVE | Noted: 2023-09-21

## 2023-09-21 PROBLEM — G43.019 INTRACTABLE MIGRAINE WITHOUT AURA AND WITHOUT STATUS MIGRAINOSUS: Status: ACTIVE | Noted: 2023-09-21

## 2023-09-21 PROBLEM — D89.2 PARAPROTEINEMIA: Status: ACTIVE | Noted: 2023-09-21

## 2023-09-21 PROBLEM — K90.9 INTESTINAL MALABSORPTION, UNSPECIFIED (HHS-HCC): Status: ACTIVE | Noted: 2023-09-21

## 2023-09-21 PROBLEM — M48.062 LUMBAR STENOSIS WITH NEUROGENIC CLAUDICATION: Status: ACTIVE | Noted: 2023-09-21

## 2023-09-21 PROBLEM — I51.7 LEFT VENTRICULAR HYPERTROPHY: Status: ACTIVE | Noted: 2023-09-21

## 2023-09-21 PROBLEM — G89.29 OTHER CHRONIC PAIN: Status: ACTIVE | Noted: 2023-09-21

## 2023-09-21 PROBLEM — D50.9 IRON DEFICIENCY ANEMIA: Status: ACTIVE | Noted: 2023-09-21

## 2023-09-21 PROBLEM — H04.123 DRY EYES: Status: ACTIVE | Noted: 2023-09-21

## 2023-09-21 PROBLEM — K74.60 CIRRHOSIS OF LIVER WITHOUT ASCITES (MULTI): Status: ACTIVE | Noted: 2023-09-21

## 2023-09-21 PROBLEM — M06.9 RHEUMATOID ARTHRITIS INVOLVING MULTIPLE SITES (MULTI): Status: ACTIVE | Noted: 2023-09-21

## 2023-09-21 PROBLEM — E78.2 MIXED HYPERLIPIDEMIA: Status: ACTIVE | Noted: 2023-09-21

## 2023-09-21 PROBLEM — K51.90 ULCERATIVE COLITIS (MULTI): Status: ACTIVE | Noted: 2023-09-21

## 2023-09-21 PROBLEM — I10 ESSENTIAL HYPERTENSION: Status: ACTIVE | Noted: 2023-09-21

## 2023-09-21 PROBLEM — M79.7 FIBROMYALGIA: Status: ACTIVE | Noted: 2023-09-21

## 2023-09-21 PROBLEM — G62.9 PERIPHERAL NEUROPATHY: Status: ACTIVE | Noted: 2023-09-21

## 2023-09-21 PROBLEM — N95.2 ATROPHIC VAGINITIS: Status: ACTIVE | Noted: 2023-09-21

## 2023-09-21 PROBLEM — M25.562 LEFT KNEE PAIN: Status: ACTIVE | Noted: 2023-09-21

## 2023-09-21 PROBLEM — M17.12 ARTHRITIS OF LEFT KNEE: Status: ACTIVE | Noted: 2023-09-21

## 2023-09-21 PROBLEM — E55.9 VITAMIN D DEFICIENCY: Status: ACTIVE | Noted: 2023-09-21

## 2023-09-21 PROBLEM — I25.2 HISTORY OF MYOCARDIAL INFARCTION: Status: ACTIVE | Noted: 2023-09-21

## 2023-09-21 PROBLEM — R53.1 WEAKNESS: Status: ACTIVE | Noted: 2023-09-21

## 2023-09-21 PROBLEM — G43.909 MIGRAINE: Status: ACTIVE | Noted: 2023-09-21

## 2023-09-21 PROBLEM — F41.8 DEPRESSION WITH ANXIETY: Status: ACTIVE | Noted: 2023-09-21

## 2023-09-21 PROBLEM — E78.5 HYPERLIPIDEMIA: Status: ACTIVE | Noted: 2023-09-21

## 2023-09-21 PROBLEM — F11.20 OPIATE DEPENDENCE (MULTI): Status: ACTIVE | Noted: 2023-09-21

## 2023-09-21 PROBLEM — R41.3 MEMORY DEFICIT: Status: ACTIVE | Noted: 2023-09-21

## 2023-09-21 PROBLEM — G90.50 RSD (REFLEX SYMPATHETIC DYSTROPHY): Status: ACTIVE | Noted: 2023-09-21

## 2023-09-21 PROBLEM — R52 DIFFUSE PAIN: Status: ACTIVE | Noted: 2023-09-21

## 2023-09-21 PROBLEM — K86.1 CHRONIC PANCREATITIS (MULTI): Status: ACTIVE | Noted: 2023-09-21

## 2023-09-21 PROBLEM — G90.511 COMPLEX REGIONAL PAIN SYNDROME TYPE 1 OF RIGHT UPPER EXTREMITY: Status: ACTIVE | Noted: 2023-09-21

## 2023-09-21 PROBLEM — K21.9 GASTROESOPHAGEAL REFLUX DISEASE: Status: ACTIVE | Noted: 2023-09-21

## 2023-09-21 PROBLEM — E11.9 DIABETES (MULTI): Status: ACTIVE | Noted: 2023-09-21

## 2023-09-21 PROBLEM — M54.12 CERVICAL RADICULOPATHY: Status: ACTIVE | Noted: 2023-09-21

## 2023-09-21 PROBLEM — M79.10 MYALGIA: Status: ACTIVE | Noted: 2023-09-21

## 2023-09-21 PROBLEM — R11.2 NAUSEA AND VOMITING: Status: ACTIVE | Noted: 2023-09-21

## 2023-09-21 PROBLEM — E03.9 HYPOTHYROIDISM: Status: ACTIVE | Noted: 2023-09-21

## 2023-09-21 PROBLEM — A60.04 HERPES SIMPLEX VULVOVAGINITIS: Status: ACTIVE | Noted: 2023-09-21

## 2023-09-21 PROBLEM — F33.9 MAJOR DEPRESSION, RECURRENT, CHRONIC (CMS-HCC): Status: ACTIVE | Noted: 2023-09-21

## 2023-09-21 PROBLEM — D47.2 MONOCLONAL GAMMOPATHY: Status: ACTIVE | Noted: 2023-09-21

## 2023-09-21 PROBLEM — F41.9 ANXIETY: Status: ACTIVE | Noted: 2023-09-21

## 2023-09-21 PROBLEM — M54.2 CHRONIC NECK PAIN: Status: ACTIVE | Noted: 2023-09-21

## 2023-09-21 PROBLEM — G25.81 RESTLESS LEGS SYNDROME: Status: ACTIVE | Noted: 2023-09-21

## 2023-09-21 PROBLEM — B37.9 YEAST INFECTION: Status: ACTIVE | Noted: 2023-09-21

## 2023-09-21 PROBLEM — R25.1 TREMORS OF NERVOUS SYSTEM: Status: ACTIVE | Noted: 2023-09-21

## 2023-09-21 RX ORDER — LOSARTAN POTASSIUM 50 MG/1
50 TABLET ORAL DAILY
COMMUNITY
Start: 2023-08-16 | End: 2023-10-18 | Stop reason: SDUPTHER

## 2023-09-21 RX ORDER — TIOTROPIUM BROMIDE AND OLODATEROL 3.124; 2.736 UG/1; UG/1
SPRAY, METERED RESPIRATORY (INHALATION)
COMMUNITY
Start: 2023-04-25

## 2023-09-21 RX ORDER — DULOXETIN HYDROCHLORIDE 60 MG/1
60 CAPSULE, DELAYED RELEASE ORAL DAILY
COMMUNITY
End: 2023-10-23 | Stop reason: WASHOUT

## 2023-09-21 RX ORDER — PANTOPRAZOLE SODIUM 40 MG/1
40 TABLET, DELAYED RELEASE ORAL 2 TIMES DAILY
COMMUNITY
End: 2023-10-23 | Stop reason: WASHOUT

## 2023-09-21 RX ORDER — INSULIN DETEMIR 100 [IU]/ML
50 INJECTION, SOLUTION SUBCUTANEOUS NIGHTLY
COMMUNITY
End: 2023-10-23 | Stop reason: WASHOUT

## 2023-09-21 RX ORDER — BACLOFEN 10 MG/1
20 TABLET ORAL 2 TIMES DAILY
COMMUNITY

## 2023-09-21 RX ORDER — BUTALBITAL AND ACETAMINOPHEN 325; 50 MG/1; MG/1
TABLET ORAL
COMMUNITY
End: 2023-10-23 | Stop reason: WASHOUT

## 2023-09-21 RX ORDER — LEFLUNOMIDE 20 MG/1
TABLET ORAL
COMMUNITY
Start: 2022-08-03 | End: 2023-10-20 | Stop reason: SDUPTHER

## 2023-09-21 RX ORDER — LEVOTHYROXINE SODIUM 88 UG/1
TABLET ORAL
COMMUNITY
Start: 2021-02-08 | End: 2023-10-23 | Stop reason: SDUPTHER

## 2023-09-21 RX ORDER — CETIRIZINE HYDROCHLORIDE 10 MG/1
TABLET ORAL
COMMUNITY
End: 2023-10-23 | Stop reason: SDDI

## 2023-09-21 RX ORDER — AMITRIPTYLINE HYDROCHLORIDE 50 MG/1
50 TABLET, FILM COATED ORAL DAILY
COMMUNITY

## 2023-09-21 RX ORDER — LIDOCAINE 50 MG/G
PATCH TOPICAL
COMMUNITY
Start: 2020-05-04

## 2023-09-21 RX ORDER — BUMETANIDE 1 MG/1
1 TABLET ORAL DAILY PRN
COMMUNITY
End: 2023-12-27 | Stop reason: SDUPTHER

## 2023-09-21 RX ORDER — CLOPIDOGREL BISULFATE 75 MG/1
TABLET ORAL
COMMUNITY
End: 2023-12-05 | Stop reason: SDUPTHER

## 2023-09-21 RX ORDER — PYRIDOXINE HCL (VITAMIN B6) 100 MG
TABLET ORAL
COMMUNITY
End: 2024-01-08

## 2023-09-21 RX ORDER — ONDANSETRON 4 MG/1
TABLET, ORALLY DISINTEGRATING ORAL
COMMUNITY
Start: 2022-04-18 | End: 2023-10-23 | Stop reason: WASHOUT

## 2023-09-21 RX ORDER — LISINOPRIL 20 MG/1
20 TABLET ORAL DAILY
COMMUNITY
End: 2023-10-23 | Stop reason: WASHOUT

## 2023-09-21 RX ORDER — POTASSIUM CHLORIDE 20 MEQ/1
TABLET, EXTENDED RELEASE ORAL
COMMUNITY
End: 2023-10-23 | Stop reason: WASHOUT

## 2023-09-21 RX ORDER — ETODOLAC 400 MG/1
400 TABLET, FILM COATED ORAL 2 TIMES DAILY
COMMUNITY
End: 2023-10-23 | Stop reason: WASHOUT

## 2023-09-21 RX ORDER — NYSTATIN 100000 U/G
1 CREAM TOPICAL 2 TIMES DAILY
COMMUNITY
Start: 2020-02-14 | End: 2023-10-23 | Stop reason: WASHOUT

## 2023-09-21 RX ORDER — TRIAMCINOLONE ACETONIDE 1 MG/G
CREAM TOPICAL
COMMUNITY
Start: 2021-04-19 | End: 2024-04-10 | Stop reason: SDUPTHER

## 2023-09-21 RX ORDER — FOLIC ACID 1 MG/1
1 TABLET ORAL DAILY
COMMUNITY
Start: 2022-03-25 | End: 2024-01-08

## 2023-09-21 RX ORDER — SUMATRIPTAN SUCCINATE 25 MG/1
TABLET ORAL
COMMUNITY
End: 2024-03-21 | Stop reason: SDUPTHER

## 2023-09-21 RX ORDER — MORPHINE SULFATE 30 MG/1
30 CAPSULE, EXTENDED RELEASE ORAL DAILY
COMMUNITY
End: 2023-10-23 | Stop reason: WASHOUT

## 2023-09-21 RX ORDER — DOXYCYCLINE 100 MG/1
1 CAPSULE ORAL 2 TIMES DAILY
COMMUNITY
End: 2023-10-23 | Stop reason: WASHOUT

## 2023-09-21 RX ORDER — PANTOPRAZOLE SODIUM 40 MG/1
40 TABLET, DELAYED RELEASE ORAL
COMMUNITY
Start: 2023-09-06

## 2023-09-21 RX ORDER — FAMOTIDINE 20 MG/1
20 TABLET, FILM COATED ORAL 2 TIMES DAILY
COMMUNITY

## 2023-09-21 RX ORDER — DIPHENOXYLATE HYDROCHLORIDE AND ATROPINE SULFATE 2.5; .025 MG/1; MG/1
TABLET ORAL
COMMUNITY
End: 2023-10-23 | Stop reason: WASHOUT

## 2023-09-21 RX ORDER — GUAIFENESIN AND DEXTROMETHORPHAN HYDROBROMIDE 600; 30 MG/1; MG/1
TABLET, EXTENDED RELEASE ORAL
COMMUNITY
End: 2024-01-08

## 2023-09-21 RX ORDER — CARBOXYMETHYLCELLULOSE SODIUM 5 MG/ML
SOLUTION/ DROPS OPHTHALMIC
COMMUNITY
Start: 2023-05-05

## 2023-09-21 RX ORDER — ACETAMINOPHEN 325 MG/1
975 TABLET ORAL EVERY 4 HOURS PRN
COMMUNITY
Start: 2021-05-13

## 2023-09-21 RX ORDER — LOSARTAN POTASSIUM 25 MG/1
25 TABLET ORAL DAILY
COMMUNITY
End: 2023-10-18 | Stop reason: ALTCHOICE

## 2023-09-21 RX ORDER — LEFLUNOMIDE 10 MG/1
1 TABLET ORAL DAILY
COMMUNITY
End: 2023-10-20

## 2023-09-21 RX ORDER — QUETIAPINE FUMARATE 50 MG/1
TABLET, FILM COATED ORAL
COMMUNITY
End: 2023-10-23 | Stop reason: WASHOUT

## 2023-09-21 RX ORDER — PRUCALOPRIDE 2 MG/1
TABLET, FILM COATED ORAL
COMMUNITY
End: 2023-10-23 | Stop reason: WASHOUT

## 2023-09-21 RX ORDER — MINERAL OIL
ENEMA (ML) RECTAL
COMMUNITY
End: 2024-01-08

## 2023-09-21 RX ORDER — SELENIUM 50 MCG
TABLET ORAL
COMMUNITY
End: 2023-10-23 | Stop reason: WASHOUT

## 2023-09-21 RX ORDER — CARVEDILOL 6.25 MG/1
TABLET ORAL
COMMUNITY
Start: 2023-01-13 | End: 2023-10-18 | Stop reason: SDUPTHER

## 2023-09-21 RX ORDER — MORPHINE SULFATE 15 MG/1
1 TABLET, FILM COATED, EXTENDED RELEASE ORAL 2 TIMES DAILY
COMMUNITY
Start: 2022-04-13

## 2023-09-21 RX ORDER — MORPHINE SULFATE 30 MG/1
30 TABLET ORAL 2 TIMES DAILY
COMMUNITY

## 2023-09-21 RX ORDER — HYDROCORTISONE 10 MG/1
1 TABLET ORAL DAILY
COMMUNITY
Start: 2022-09-25 | End: 2023-10-23 | Stop reason: WASHOUT

## 2023-09-21 RX ORDER — MORPHINE SULFATE 30 MG/1
30 TABLET, FILM COATED, EXTENDED RELEASE ORAL 2 TIMES DAILY
COMMUNITY
End: 2023-10-23 | Stop reason: WASHOUT

## 2023-09-21 RX ORDER — LOVASTATIN 10 MG/1
10 TABLET ORAL DAILY
COMMUNITY
Start: 2020-03-01 | End: 2023-10-18 | Stop reason: SDUPTHER

## 2023-09-21 RX ORDER — LIDOCAINE 560 MG/1
PATCH PERCUTANEOUS; TOPICAL; TRANSDERMAL
COMMUNITY
Start: 2020-05-04 | End: 2023-10-23 | Stop reason: WASHOUT

## 2023-09-21 RX ORDER — POLYETHYLENE GLYCOL 3350, SODIUM SULFATE, SODIUM CHLORIDE, POTASSIUM CHLORIDE, ASCORBIC ACID, SODIUM ASCORBATE 140-9-5.2G
KIT ORAL
COMMUNITY
Start: 2023-07-14 | End: 2023-10-23 | Stop reason: WASHOUT

## 2023-09-21 RX ORDER — PROMETHAZINE HYDROCHLORIDE 12.5 MG/1
12.5 TABLET ORAL EVERY 6 HOURS
COMMUNITY
End: 2023-10-23 | Stop reason: WASHOUT

## 2023-09-21 RX ORDER — PANCRELIPASE 36000; 180000; 114000 [USP'U]/1; [USP'U]/1; [USP'U]/1
1 CAPSULE, DELAYED RELEASE PELLETS ORAL 2 TIMES DAILY
COMMUNITY
Start: 2019-12-20

## 2023-09-21 RX ORDER — FLUTICASONE FUROATE 27.5 UG/1
2 SPRAY, METERED NASAL NIGHTLY
COMMUNITY
Start: 2023-02-07 | End: 2023-12-11 | Stop reason: SDUPTHER

## 2023-09-21 RX ORDER — ALBUTEROL SULFATE 90 UG/1
2 AEROSOL, METERED RESPIRATORY (INHALATION) AS NEEDED
COMMUNITY
Start: 2020-02-15

## 2023-09-21 RX ORDER — BLOOD-GLUCOSE METER
EACH MISCELLANEOUS
COMMUNITY
Start: 2021-02-02

## 2023-09-21 RX ORDER — ROPINIROLE 0.25 MG/1
TABLET, FILM COATED ORAL
COMMUNITY
End: 2023-10-23 | Stop reason: WASHOUT

## 2023-09-21 RX ORDER — LEVOCETIRIZINE DIHYDROCHLORIDE 5 MG/1
TABLET, FILM COATED ORAL
COMMUNITY
Start: 2023-06-15

## 2023-09-21 RX ORDER — HYOSCYAMINE SULFATE 0.12 MG/1
0.12 TABLET, ORALLY DISINTEGRATING ORAL DAILY
COMMUNITY
End: 2023-10-23 | Stop reason: WASHOUT

## 2023-09-21 RX ORDER — ALLOPURINOL 100 MG/1
TABLET ORAL
COMMUNITY
Start: 2022-07-01 | End: 2023-10-04 | Stop reason: SDUPTHER

## 2023-09-21 RX ORDER — SUCRALFATE 1 G/1
1 TABLET ORAL
COMMUNITY
Start: 2020-05-22

## 2023-09-21 RX ORDER — METOLAZONE 2.5 MG/1
1 TABLET ORAL DAILY
COMMUNITY
End: 2023-10-23 | Stop reason: WASHOUT

## 2023-09-21 RX ORDER — FUROSEMIDE 40 MG/1
60 TABLET ORAL DAILY
COMMUNITY
End: 2024-01-08

## 2023-09-21 RX ORDER — HYDROXYCHLOROQUINE SULFATE 200 MG/1
TABLET, FILM COATED ORAL
COMMUNITY
Start: 2019-12-03 | End: 2023-10-06

## 2023-09-21 RX ORDER — HYDROCORTISONE 5 MG/1
TABLET ORAL
COMMUNITY
Start: 2022-04-15 | End: 2023-10-23 | Stop reason: SDUPTHER

## 2023-09-21 RX ORDER — FLUTICASONE PROPIONATE AND SALMETEROL XINAFOATE 115; 21 UG/1; UG/1
AEROSOL, METERED RESPIRATORY (INHALATION)
COMMUNITY
End: 2023-10-23 | Stop reason: WASHOUT

## 2023-09-21 RX ORDER — QUETIAPINE FUMARATE 100 MG/1
TABLET, FILM COATED ORAL
COMMUNITY
End: 2023-10-23 | Stop reason: WASHOUT

## 2023-09-21 RX ORDER — BUTALBITAL, ACETAMINOPHEN AND CAFFEINE 300; 40; 50 MG/1; MG/1; MG/1
1 CAPSULE ORAL EVERY 6 HOURS PRN
COMMUNITY
End: 2024-03-15 | Stop reason: SDUPTHER

## 2023-09-21 RX ORDER — POLYETHYLENE GLYCOL 3350 17 G/17G
POWDER, FOR SOLUTION ORAL
COMMUNITY

## 2023-09-21 RX ORDER — MICONAZOLE NITRATE 2 %
POWDER (GRAM) TOPICAL
COMMUNITY

## 2023-10-04 DIAGNOSIS — M10.09 IDIOPATHIC GOUT OF MULTIPLE SITES, UNSPECIFIED CHRONICITY: Primary | ICD-10-CM

## 2023-10-04 RX ORDER — ALLOPURINOL 100 MG/1
100 TABLET ORAL DAILY
Qty: 90 TABLET | Refills: 0 | Status: SHIPPED | OUTPATIENT
Start: 2023-10-04 | End: 2023-10-20 | Stop reason: SDUPTHER

## 2023-10-06 DIAGNOSIS — M05.79 RHEUMATOID ARTHRITIS INVOLVING MULTIPLE SITES WITH POSITIVE RHEUMATOID FACTOR (MULTI): Primary | ICD-10-CM

## 2023-10-06 RX ORDER — HYDROXYCHLOROQUINE SULFATE 200 MG/1
TABLET, FILM COATED ORAL DAILY
Qty: 135 TABLET | Refills: 0 | Status: SHIPPED | OUTPATIENT
Start: 2023-10-06 | End: 2023-10-20 | Stop reason: SDUPTHER

## 2023-10-11 ENCOUNTER — APPOINTMENT (OUTPATIENT)
Dept: PRIMARY CARE | Facility: CLINIC | Age: 63
End: 2023-10-11
Payer: MEDICAID

## 2023-10-16 ENCOUNTER — APPOINTMENT (OUTPATIENT)
Dept: RHEUMATOLOGY | Facility: CLINIC | Age: 63
End: 2023-10-16
Payer: MEDICAID

## 2023-10-18 DIAGNOSIS — I10 PRIMARY HYPERTENSION: Primary | ICD-10-CM

## 2023-10-18 RX ORDER — LOSARTAN POTASSIUM 50 MG/1
50 TABLET ORAL DAILY
Qty: 90 TABLET | Refills: 3 | Status: SHIPPED | OUTPATIENT
Start: 2023-10-18 | End: 2024-04-10 | Stop reason: SDUPTHER

## 2023-10-18 RX ORDER — CARVEDILOL 6.25 MG/1
TABLET ORAL
Qty: 180 TABLET | Refills: 3 | Status: SHIPPED | OUTPATIENT
Start: 2023-10-18 | End: 2024-01-08 | Stop reason: DRUGHIGH

## 2023-10-18 RX ORDER — LOVASTATIN 10 MG/1
10 TABLET ORAL DAILY
Qty: 90 TABLET | Refills: 3 | Status: SHIPPED | OUTPATIENT
Start: 2023-10-18 | End: 2024-01-08

## 2023-10-20 ENCOUNTER — OFFICE VISIT (OUTPATIENT)
Dept: RHEUMATOLOGY | Facility: CLINIC | Age: 63
End: 2023-10-20
Payer: MEDICAID

## 2023-10-20 ENCOUNTER — LAB (OUTPATIENT)
Dept: LAB | Facility: LAB | Age: 63
End: 2023-10-20
Payer: MEDICAID

## 2023-10-20 VITALS — SYSTOLIC BLOOD PRESSURE: 143 MMHG | WEIGHT: 148 LBS | BODY MASS INDEX: 29.21 KG/M2 | DIASTOLIC BLOOD PRESSURE: 75 MMHG

## 2023-10-20 DIAGNOSIS — M10.09 IDIOPATHIC GOUT OF MULTIPLE SITES, UNSPECIFIED CHRONICITY: ICD-10-CM

## 2023-10-20 DIAGNOSIS — M06.9 RHEUMATOID ARTHRITIS INVOLVING MULTIPLE SITES, UNSPECIFIED WHETHER RHEUMATOID FACTOR PRESENT (MULTI): ICD-10-CM

## 2023-10-20 DIAGNOSIS — M06.9 RHEUMATOID ARTHRITIS INVOLVING MULTIPLE SITES, UNSPECIFIED WHETHER RHEUMATOID FACTOR PRESENT (MULTI): Primary | ICD-10-CM

## 2023-10-20 DIAGNOSIS — M05.79 RHEUMATOID ARTHRITIS INVOLVING MULTIPLE SITES WITH POSITIVE RHEUMATOID FACTOR (MULTI): ICD-10-CM

## 2023-10-20 LAB
ALBUMIN SERPL BCP-MCNC: 4.2 G/DL (ref 3.4–5)
ALP SERPL-CCNC: 94 U/L (ref 33–136)
ALT SERPL W P-5'-P-CCNC: 17 U/L (ref 7–45)
AST SERPL W P-5'-P-CCNC: 27 U/L (ref 9–39)
BILIRUB DIRECT SERPL-MCNC: 0.3 MG/DL (ref 0–0.3)
BILIRUB SERPL-MCNC: 1 MG/DL (ref 0–1.2)
CRP SERPL-MCNC: 0.26 MG/DL
ERYTHROCYTE [DISTWIDTH] IN BLOOD BY AUTOMATED COUNT: 14.3 % (ref 11.5–14.5)
ERYTHROCYTE [SEDIMENTATION RATE] IN BLOOD BY WESTERGREN METHOD: 24 MM/H (ref 0–30)
HCT VFR BLD AUTO: 37.4 % (ref 36–46)
HGB BLD-MCNC: 11.8 G/DL (ref 12–16)
MCH RBC QN AUTO: 28.7 PG (ref 26–34)
MCHC RBC AUTO-ENTMCNC: 31.6 G/DL (ref 32–36)
MCV RBC AUTO: 91 FL (ref 80–100)
NRBC BLD-RTO: 0 /100 WBCS (ref 0–0)
PLATELET # BLD AUTO: 74 X10*3/UL (ref 150–450)
PMV BLD AUTO: 9.7 FL (ref 7.5–11.5)
PROT SERPL-MCNC: 7.1 G/DL (ref 6.4–8.2)
RBC # BLD AUTO: 4.11 X10*6/UL (ref 4–5.2)
RHEUMATOID FACT SER NEPH-ACNC: <10 IU/ML (ref 0–15)
URATE SERPL-MCNC: 2.2 MG/DL (ref 2.3–6.7)
WBC # BLD AUTO: 5.2 X10*3/UL (ref 4.4–11.3)

## 2023-10-20 PROCEDURE — 99213 OFFICE O/P EST LOW 20 MIN: CPT | Performed by: INTERNAL MEDICINE

## 2023-10-20 PROCEDURE — 80076 HEPATIC FUNCTION PANEL: CPT

## 2023-10-20 PROCEDURE — 85652 RBC SED RATE AUTOMATED: CPT

## 2023-10-20 PROCEDURE — 85027 COMPLETE CBC AUTOMATED: CPT

## 2023-10-20 PROCEDURE — 4010F ACE/ARB THERAPY RXD/TAKEN: CPT | Performed by: INTERNAL MEDICINE

## 2023-10-20 PROCEDURE — 3078F DIAST BP <80 MM HG: CPT | Performed by: INTERNAL MEDICINE

## 2023-10-20 PROCEDURE — 86431 RHEUMATOID FACTOR QUANT: CPT

## 2023-10-20 PROCEDURE — 86200 CCP ANTIBODY: CPT

## 2023-10-20 PROCEDURE — 86140 C-REACTIVE PROTEIN: CPT

## 2023-10-20 PROCEDURE — 84550 ASSAY OF BLOOD/URIC ACID: CPT

## 2023-10-20 PROCEDURE — 36415 COLL VENOUS BLD VENIPUNCTURE: CPT

## 2023-10-20 PROCEDURE — 3077F SYST BP >= 140 MM HG: CPT | Performed by: INTERNAL MEDICINE

## 2023-10-20 RX ORDER — HYDROXYCHLOROQUINE SULFATE 200 MG/1
300 TABLET, FILM COATED ORAL DAILY
Qty: 135 TABLET | Refills: 0 | Status: SHIPPED | OUTPATIENT
Start: 2023-10-20 | End: 2024-03-12

## 2023-10-20 RX ORDER — ALLOPURINOL 100 MG/1
100 TABLET ORAL DAILY
Qty: 90 TABLET | Refills: 0 | Status: SHIPPED | OUTPATIENT
Start: 2023-10-20 | End: 2024-03-21 | Stop reason: SDUPTHER

## 2023-10-20 RX ORDER — LEFLUNOMIDE 20 MG/1
20 TABLET ORAL DAILY
Qty: 90 TABLET | Refills: 0 | Status: SHIPPED | OUTPATIENT
Start: 2023-10-20 | End: 2024-01-10

## 2023-10-20 RX ORDER — ALLOPURINOL 100 MG/1
100 TABLET ORAL DAILY
Qty: 90 TABLET | Refills: 0 | Status: SHIPPED | OUTPATIENT
Start: 2023-10-20 | End: 2023-10-20 | Stop reason: SDUPTHER

## 2023-10-20 NOTE — PROGRESS NOTES
"Subjective   Patient ID: Daniela Saez is a 63 y.o. female who presents for Follow-up.    HPI  63-year-old female with history of seronegative RA, gout, psoriatic arthritis, left knee OA, FMS, RSD, chronic pain syndrome, hypothyroidism, adrenal insufficiency, diabetes, peripheral neuropathy, anxiety and depression presented for follow-up.    Patient stated that she was not feeling good.  She was tired and sleepy and has had pain in all the joints few weeks ago.  She increase prednisone to 25 mg daily.  She states she has no more sleepy and tired.  Joint pain has improved.  However left knee hurts upon getting up and walking.    She reports Skyrizi was denied.  She is working with pharmaceutical company to register for assistance program.    Immunosuppression: Leflunomide and hydroxychloroquine.    Past immunosuppressive therapy:  1. Humira. Discontinued due to sinus infection/bronchitis.  2. SSZ. Abd.pain and Nausea.  3. Enbrel. Nausea.  4. MTX. Nausea    Review of Systems   All other systems reviewed and are negative.    Objective .      4/24/2023     1:53 PM 4/25/2023    12:00 AM 5/17/2023    11:29 AM 5/17/2023    11:32 AM 6/8/2023    12:00 AM 6/29/2023     1:19 PM 10/20/2023    11:54 AM   Vitals   Systolic 132 140  151 118 151 143   Diastolic 61 66  84 62 71 75   Heart Rate 67 66   67 72    Temp  36.9 °C (98.4 °F)    36.8 °C (98.2 °F)    Resp      16    Height (in)  1.549 m (5' 1\") 1.549 m (5' 1\") 1.549 m (5' 1\") 1.041 m (3' 5\") 1.516 m (4' 11.69\")    Weight (lb) 132 133 132 133 123 130.51 148   BMI 26.05 kg/m2 25.13 kg/m2 24.94 kg/m2 25.13 kg/m2 51.44 kg/m2 25.76 kg/m2 29.21 kg/m2   BSA (m2) 1.59 m2 1.61 m2 1.61 m2 1.61 m2 1.27 m2 1.58 m2 1.68 m2   Visit Report       Report      Physical Exam.  Gen. AAO x3, NAD.  HEENT: No pallor or icterus, PERRLA, EOMI.   Skin: No rashes.  Heart: S1, S2/ RRR.   Lungs: CTA B.  Abdomen: Soft, NT/ND, BS regular.  MSK: No.swelling or tenderness of the  upper or lower extremity " joints left knee with patellofemoral crepitation.    Neuro: Sensation to touch intact.Strength 5/5 throughout.   Psych:Appropriate mood and behavior  EXT: No edema    Assessment/Plan   63-year-old female with history of seronegative RA, gout, psoriatic arthritis, left knee OA, FMS, RSD, chronic pain syndrome, hypothyroidism, adrenal insufficiency, diabetes, peripheral neuropathy, anxiety and depression presented for follow-up.    #1: Seropositive RA.  No swollen or tender joint.  She has left knee OA.  She is considering knee replacement surgery.  -Continue leflunomide 20 mg daily.  -Continue hydroxychloroquine 300 mg daily.  -Labs.    #2: Gout.  No flares.  -Continue allopurinol 100 mg daily.    Follow-up in 3 months.     This note was partially generated using the Dragon Voice recognition system. There may be some incorrect wording, spelling and/or spelling errors or punctuation errors that were not corrected prior to committing the note to the medical record.    Patient Active Problem List   Diagnosis    Adrenal insufficiency (CMS/HCC)    Anxiety    Arthritis of left knee    Atrial fibrillation (CMS/HCC)    Balance problem    Cervical radiculopathy    Chronic pancreatitis (CMS/HCC)    Chronic superficial gastritis without bleeding    Cirrhosis of liver without ascites (CMS/HCC)    Complex regional pain syndrome type 1 of right upper extremity    Coronary artery disease of native artery of native heart with stable angina pectoris (CMS/HCC)    Depression with anxiety    Diabetes (CMS/HCC)    Dry eyes    Essential hypertension    Fatigue    Gastroesophageal reflux disease    Gastroparesis    Herpes simplex vulvovaginitis    High serum protein level    Weakness    History of myocardial infarction    Hyperlipidemia    Mixed hyperlipidemia    Hypothyroidism    Insomnia    Other insomnia    Intestinal malabsorption, unspecified    Intractable migraine without aura and without status migrainosus    Iron deficiency anemia     Left knee pain    Left ventricular hypertrophy    Liver lesion    Low back pain    Lumbago with sciatica, left side    Lumbago with sciatica, right side    Lumbar stenosis with neurogenic claudication    Major depression, recurrent, chronic (CMS/HCC)    Memory deficit    Migraine    Mild persistent asthma without complication    Muscle weakness    Myalgia    Myofascial pain syndrome    Opiate dependence (CMS/HCC)    Monoclonal gammopathy    Paraproteinemia    Bilateral occipital neuralgia    Chronic neck pain    Diffuse pain    Fibromyalgia    Idiopathic peripheral neuropathy    Other chronic pain    Peripheral neuropathy    RSD (reflex sympathetic dystrophy)    Restless legs syndrome    Rheumatoid arthritis involving multiple sites (CMS/HCC)    Simple chronic bronchitis (CMS/HCC)    Spondylolisthesis    Tremors of nervous system    Type 2 diabetes mellitus without complications (CMS/HCC)    Ulcerative colitis (CMS/HCC)    Vitamin D deficiency    Allergic rhinitis    Atrophic vaginitis    Dizziness    Medication management    Nausea and vomiting    Screening for breast cancer    Yeast infection      Past Surgical History:   Procedure Laterality Date    CT GUIDED PERCUTANEOUS BIOPSY BONE DEEP  3/9/2022    CT GUIDED PERCUTANEOUS BIOPSY BONE DEEP 3/9/2022 [facility]    OTHER SURGICAL HISTORY  08/16/2019    Tonsillectomy with adenoidectomy    OTHER SURGICAL HISTORY  08/16/2019    Bunionectomy    OTHER SURGICAL HISTORY  08/16/2019    Hernia repair    OTHER SURGICAL HISTORY  08/16/2019    Cholecystectomy    OTHER SURGICAL HISTORY  08/16/2019    Uterine nerve ablation    OTHER SURGICAL HISTORY  08/16/2019    Ovarian torsion repair    OTHER SURGICAL HISTORY  08/16/2019    Tubal ligation      Social History     Tobacco Use    Smoking status: Not on file    Smokeless tobacco: Not on file   Substance Use Topics    Alcohol use: Not on file      Family History   Problem Relation Name Age of Onset    Heart disease Mother       Hypertension Mother      Arthritis Mother      Diabetes Mother      Hyperlipidemia Mother      Hypertension Father      Brain cancer Father      Diabetes Sister      Diabetes Brother        Allergies   Allergen Reactions    Metoclopramide Anaphylaxis and Other     Lockjaw    Fentanyl Unknown    Gabapentin Other and Unknown     SUICIDAL THOUGHTS    Levetiracetam Unknown    Metformin Unknown    Morphine-Naltrexone Unknown    Torsemide Unknown      Current Outpatient Medications   Medication Instructions    acetaminophen (TYLENOL) 975 mg, oral, Every 4 hours PRN    albuterol 90 mcg/actuation inhaler inhale 2 puffs by mouth four times a day    allopurinol (ZYLOPRIM) 100 mg, oral, Daily    amitriptyline (ELAVIL) 150 mg, oral, Daily    ascorbic acid, vitamin C, 1,000 mg ER tablet 1 tablet, oral, Daily    baclofen (Lioresal) 10 mg tablet 1 tablet as needed Orally Twice a day    blood sugar diagnostic (OneTouch Verio test strips) strip TEST 4 TIMES A DAY. MAY SUBSTITUTE WITH INSURANCE COVERED STRIPS.    bumetanide (Bumex) 1 mg tablet 1 tablet, oral, Daily PRN    butalbitaL-acetaminophen  mg tablet 1 tab(s) orally every 4 hours    butalbital-acetaminophen-caff (Fioricet) -40 mg capsule 1 capsule, oral, Every 6 hours PRN    carboxymethylcellulose (Refresh Tears) 0.5 % ophthalmic solution 1-2 drops each eye 3 times a day for 30 days    carvedilol (Coreg) 6.25 mg tablet 1 tab Orally Twice a day for 30 days    cetirizine (ZyrTEC) 10 mg tablet 1 tablet Orally Once a day    clopidogrel (Plavix) 75 mg tablet TAKE 1 TABLET BY MOUTH EVERY DAY for 90    dextromethorphan-guaifenesin (Mucinex DM)  mg 12 hr tablet      diclofenac sodium 1 % kit APPLY SPARINGLY TO AFFECTED AREA(S) ONCE DAILY    diphenoxylate-atropine (Lomotil) 2.5-0.025 mg tablet 1 tablet as needed Orally Four times a day    doxycycline (Vibramycin) 100 mg capsule 1 capsule, oral, 2 times daily    DULoxetine (CYMBALTA) 60 mg, oral, Daily    etodolac  (LODINE) 400 mg, oral, 2 times daily    famotidine (PEPCID) 20 mg, oral, 2 times daily    fexofenadine (Allegra) 180 mg tablet No dose, route, or frequency recorded.    fluticasone (Flonase Sensimist) 27.5 mcg/actuation nasal spray 2 sprays, Each Nostril, Nightly    fluticasone propion-salmeteroL (Advair) 115-21 mcg/actuation inhaler Rinse mouth with water after use to reduce aftertaste and incidence of candidiasis. Do not swallow.    folic acid (Folvite) 1 mg tablet 1 tablet, oral, Daily    furosemide (LASIX) 60 mg, oral, Daily    hydrocortisone (Cortef) 10 mg tablet 1 tablet, oral, Daily    hydrocortisone (Cortef) 5 mg tablet 1 tablet with food or milk Orally daily    hydroxychloroquine (PLAQUENIL) 300 mg, oral, Daily, Take 1 and 1/2 (one and one-half) tablets by mouth daily.    hyoscyamine (ANASPAZ) 0.125 mg, oral, Daily    L. acidophilus/Bifid. animalis 32 billion cell capsule USE AS DIRECTED.    lactobacillus acidophilus capsule 1 cap(s) orally once a day, As Needed    leflunomide (ARAVA) 20 mg, oral, Daily    Levemir U-100 Insulin 50 Units, subcutaneous, Nightly    levocetirizine (Xyzal) 5 mg tablet 1 tablet in the evening Orally Once a day for 30 day(s)    levothyroxine (Synthroid, Levoxyl) 88 mcg tablet TAKE 1 TABLET BY MOUTH ON AN EMPTY STOMACH MONDAY THROUGH SATURDAY AND 2 TABLETS ON SUNDAY for 90 days    lidocaine (Lidoderm) 5 % patch apply up to 3 patches to skin once a day, remove after 12 hours for 30 days    lidocaine 4 % patch APPLY ONE PATCH TOPICALLY TO CLEAN DRY SKIN. LEAVE ON FOR 12 HOURS    lisinopril 20 mg, oral, Daily    losartan (COZAAR) 50 mg, oral, Daily    lovastatin (MEVACOR) 10 mg, oral, Daily, WITH THE EVENING MEAL for 90<BR>    metOLazone (Zaroxolyn) 2.5 mg tablet 1 tablet, oral, Daily    miconazole (Micotin) 2 % powder      morphine (MSIR) 30 mg, oral, 2 times daily    morphine CR (MS Contin) 15 mg 12 hr tablet 1 tablet, oral, 3 times daily    morphine CR (MS CONTIN) 30 mg, oral, 2  times daily    morphine ER (ALYSON) 30 mg, oral, Daily    multivit-min/iron/FA/vit K/lut (MULTIVITAMIN WOMEN 50 PLUS ORAL) as directed Orally    nystatin (Mycostatin) cream 1 Application, Topical, 2 times daily    ondansetron ODT (Zofran-ODT) 4 mg disintegrating tablet 1 tablet on the tongue and allow to dissolve Orally every 8hrs as needed for nausea    oxyCODONE myristate (Xtampza ER) 9 mg 12 hr abuse-deterrent capsule 1 capsule, oral, Every 12 hours    pancrelipase, Lip-Prot-Amyl, (Creon) 36,000-114,000- 180,000 unit capsule,delayed release(DR/EC) capsule 1 capsule, oral, 2 times daily    pantoprazole (PROTONIX) 40 mg, oral, Daily RT    pantoprazole (PROTONIX) 40 mg, oral, 2 times daily    peg3350-sod sul-NaCl-KCl-asb-C (Plenvu) 140-9-5.2 gram powder in packet, sequential Take as directed.    polyethylene glycol (Miralax) 17 gram/dose powder      potassium chloride CR (Klor-Con M20) 20 mEq ER tablet      promethazine (PHENERGAN) 12.5 mg, oral, Every 6 hours    prucalopride (Motegrity) 2 mg tablet 1/2 tablet Orally Once a day as needed    pyridoxine (Vitamin B-6) 100 mg tablet      QUEtiapine (Seroquel) 100 mg tablet 1 tablet at bedtime Orally Once a day    QUEtiapine (Seroquel) 50 mg tablet 1 tab(s) orally once a day (at bedtime)    rOPINIRole (Requip) 0.25 mg tablet 1 tablet oral daily before sleeping<BR>    sucralfate (Carafate) 1 gram tablet 1 tablet on an empty stomach Orally Twice a day    SUMAtriptan (Imitrex) 25 mg tablet TAKE 1 TABLET BY MOUTH AS NEEDED FOR MIGRAINE. MAY REPEAT IN 2 HOURS IF NEEDED.    tiotropium-olodateroL (Stiolto Respimat) 2.5-2.5 mcg/actuation mist inhaler 2 puffs Inhalation Once a day for 30 days    triamcinolone (Kenalog) 0.1 % cream 1 application as needed Externally Twice a day as needed    VITAMIN B COMPLEX ORAL as directed Orally

## 2023-10-20 NOTE — PATIENT INSTRUCTIONS
Continue hydroxychloroquine and leflunomide.  Continue allopurinol.  Call me if any question.  Follow-up in 3 months.

## 2023-10-21 LAB — CCP IGG SERPL-ACNC: <1 U/ML

## 2023-10-23 ENCOUNTER — OFFICE VISIT (OUTPATIENT)
Dept: ENDOCRINOLOGY | Facility: CLINIC | Age: 63
End: 2023-10-23
Payer: MEDICAID

## 2023-10-23 VITALS
SYSTOLIC BLOOD PRESSURE: 178 MMHG | BODY MASS INDEX: 29.6 KG/M2 | DIASTOLIC BLOOD PRESSURE: 88 MMHG | HEART RATE: 92 BPM | WEIGHT: 150 LBS

## 2023-10-23 DIAGNOSIS — E03.9 HYPOTHYROIDISM, UNSPECIFIED TYPE: Primary | ICD-10-CM

## 2023-10-23 DIAGNOSIS — E11.9 TYPE 2 DIABETES MELLITUS WITHOUT COMPLICATION, UNSPECIFIED WHETHER LONG TERM INSULIN USE (MULTI): ICD-10-CM

## 2023-10-23 DIAGNOSIS — E27.40 ADRENAL INSUFFICIENCY (MULTI): ICD-10-CM

## 2023-10-23 LAB — POC HEMOGLOBIN A1C: 6 % (ref 4.2–6.5)

## 2023-10-23 PROCEDURE — 99214 OFFICE O/P EST MOD 30 MIN: CPT | Performed by: INTERNAL MEDICINE

## 2023-10-23 PROCEDURE — 3079F DIAST BP 80-89 MM HG: CPT | Performed by: INTERNAL MEDICINE

## 2023-10-23 PROCEDURE — 4010F ACE/ARB THERAPY RXD/TAKEN: CPT | Performed by: INTERNAL MEDICINE

## 2023-10-23 PROCEDURE — 3077F SYST BP >= 140 MM HG: CPT | Performed by: INTERNAL MEDICINE

## 2023-10-23 PROCEDURE — 83036 HEMOGLOBIN GLYCOSYLATED A1C: CPT | Performed by: INTERNAL MEDICINE

## 2023-10-23 RX ORDER — LEVOTHYROXINE SODIUM 88 UG/1
TABLET ORAL
Qty: 105 TABLET | Refills: 3 | Status: SHIPPED | OUTPATIENT
Start: 2023-10-23

## 2023-10-23 RX ORDER — HYDROCORTISONE 5 MG/1
5-10 TABLET ORAL 3 TIMES DAILY
Qty: 540 TABLET | Refills: 3 | Status: SHIPPED | OUTPATIENT
Start: 2023-10-23 | End: 2024-03-20 | Stop reason: WASHOUT

## 2023-10-23 ASSESSMENT — ENCOUNTER SYMPTOMS
FATIGUE: 1
ENDOCRINE COMMENTS: AS ABOVE
ARTHRALGIAS: 1
UNEXPECTED WEIGHT CHANGE: 0

## 2023-10-23 NOTE — PATIENT INSTRUCTIONS
RECOMMENDATIONS  Continue current program    Follow up 6 months  Draw BMP, A1c, TSH before next appointment if not already done.

## 2023-10-23 NOTE — PROGRESS NOTES
History Of Present Illness  Daniela Saez is a 63 y.o. female   Adrenal insufficiency  Hydrocortisone 10 mg QAM, 10 mg afternoon, 5 mg evening    She increased dose from 15 mg total daily dose, because she had decreased alertness    Interval diagnosis of psoriatic arthritis  Following with TORIE Griffin at [facility]  Trying to qualify for Skyrizi    Duration of type 2 diabetes mellitus:  8 years  Complications:  peripheral neuropathy    Patient is testing glucose occasionally    Last eye exam:  2021    Hypothyroidism   Levothyroxine 88 mcg Mon-Sat, 176 mcg on Sunday      Past Medical History  She has a past medical history of Gastroparesis.  TBI x2    Surgical History  She has a past surgical history that includes Other surgical history (08/16/2019); Other surgical history (08/16/2019); Other surgical history (08/16/2019); Other surgical history (08/16/2019); Other surgical history (08/16/2019); Other surgical history (08/16/2019); Other surgical history (08/16/2019); and CT guided percutaneous biopsy bone deep (3/9/2022).     Social History  She has no history on file for tobacco use, alcohol use, and drug use.    Family History  Family History   Problem Relation Name Age of Onset    Heart disease Mother      Hypertension Mother      Arthritis Mother      Diabetes Mother      Hyperlipidemia Mother      Hypertension Father      Brain cancer Father      Diabetes Sister      Diabetes Brother         Allergies  Metoclopramide, Fentanyl, Gabapentin, Levetiracetam, Metformin, Morphine-naltrexone, and Torsemide    Review of Systems   Constitutional:  Positive for fatigue. Negative for unexpected weight change.   Endocrine:        As above   Musculoskeletal:  Positive for arthralgias.         Last Recorded Vitals  There were no vitals taken for this visit.    Physical Exam  Constitutional:       General: She is not in acute distress.  HENT:      Head: Normocephalic.   Eyes:      Extraocular Movements: Extraocular  "movements intact.   Neck:      Thyroid: No thyromegaly.   Cardiovascular:      Pulses:           Radial pulses are 2+ on the right side and 2+ on the left side.   Lymphadenopathy:      Cervical: No cervical adenopathy.   Neurological:      Mental Status: She is alert.      Motor: No tremor.   Psychiatric:         Mood and Affect: Affect normal.          Relevant Results  Glucose (mg/dL)   Date Value   08/28/2023 83   07/24/2023 85   07/06/2023 107 (H)     Hemoglobin A1C (%)   Date Value   04/04/2023 4.8   01/17/2023 5.4   06/17/2021 6.6 (H)     Bicarbonate (mmol/L)   Date Value   08/28/2023 33 (H)   07/24/2023 34 (H)   07/06/2023 32     Urea Nitrogen (mg/dL)   Date Value   08/28/2023 6   07/24/2023 7   07/06/2023 5 (L)     Creatinine (mg/dL)   Date Value   08/28/2023 0.59   07/24/2023 0.67   07/06/2023 0.59     No components found for: \"[id number]\"  Lab Results   Component Value Date    CHOL 178 08/28/2023    CHOL 151 03/10/2023    CHOL 124 08/20/2021     Lab Results   Component Value Date    HDL 58.3 08/28/2023    HDL 58.8 03/10/2023    HDL 28.1 (A) 08/20/2021     No results found for: \"LDLCALC\"  Lab Results   Component Value Date    TRIG 153 (H) 08/28/2023    TRIG 123 03/10/2023    TRIG 153 (H) 08/20/2021     No components found for: \"CHOLHDL\"   Lab Results   Component Value Date    TSH 0.47 06/08/2023     No results found for: \"ALBUR\", \"[id number]\"       IMPRESSION  ADRENAL INSUFFICIENCY  Symptomatic response to increased hydrocortisone, total 25 mg/day  Noted BP and A1c are rising    TYPE 2 DIABETES MELLITUS  Controlled on no medication    HYPOTHYROIDISM      RECOMMENDATIONS  Continue current program    Follow up 6 months  Draw BMP, A1c, TSH before next appointment if not already done.     "

## 2023-11-10 ENCOUNTER — HOSPITAL ENCOUNTER (OUTPATIENT)
Dept: RADIOLOGY | Facility: HOSPITAL | Age: 63
Discharge: HOME | End: 2023-11-10
Payer: MEDICAID

## 2023-11-10 DIAGNOSIS — M48.062 SPINAL STENOSIS, LUMBAR REGION WITH NEUROGENIC CLAUDICATION: ICD-10-CM

## 2023-11-10 PROCEDURE — 72148 MRI LUMBAR SPINE W/O DYE: CPT

## 2023-11-10 PROCEDURE — 72131 CT LUMBAR SPINE W/O DYE: CPT

## 2023-11-10 PROCEDURE — 72131 CT LUMBAR SPINE W/O DYE: CPT | Performed by: RADIOLOGY

## 2023-11-10 PROCEDURE — 72148 MRI LUMBAR SPINE W/O DYE: CPT | Performed by: RADIOLOGY

## 2023-11-20 ENCOUNTER — APPOINTMENT (OUTPATIENT)
Dept: HEMATOLOGY/ONCOLOGY | Facility: HOSPITAL | Age: 63
End: 2023-11-20
Payer: MEDICAID

## 2023-11-27 ENCOUNTER — TELEPHONE (OUTPATIENT)
Dept: HEMATOLOGY/ONCOLOGY | Facility: HOSPITAL | Age: 63
End: 2023-11-27
Payer: MEDICAID

## 2023-11-27 ENCOUNTER — APPOINTMENT (OUTPATIENT)
Dept: HEMATOLOGY/ONCOLOGY | Facility: HOSPITAL | Age: 63
End: 2023-11-27
Payer: MEDICAID

## 2023-11-27 NOTE — TELEPHONE ENCOUNTER
Patient called to cancel appointment with YASSINE Browne today due to falling in her garage last night. Patient doesn't want to reschedule at this time, and will call back another day.

## 2023-12-05 ENCOUNTER — OFFICE VISIT (OUTPATIENT)
Dept: PRIMARY CARE | Facility: CLINIC | Age: 63
End: 2023-12-05
Payer: MEDICAID

## 2023-12-05 VITALS
HEIGHT: 61 IN | WEIGHT: 151 LBS | RESPIRATION RATE: 18 BRPM | BODY MASS INDEX: 28.51 KG/M2 | OXYGEN SATURATION: 94 % | SYSTOLIC BLOOD PRESSURE: 122 MMHG | DIASTOLIC BLOOD PRESSURE: 74 MMHG | HEART RATE: 76 BPM

## 2023-12-05 DIAGNOSIS — K74.60 CIRRHOSIS OF LIVER WITHOUT ASCITES, UNSPECIFIED HEPATIC CIRRHOSIS TYPE (MULTI): ICD-10-CM

## 2023-12-05 DIAGNOSIS — R53.1 WEAKNESS: ICD-10-CM

## 2023-12-05 DIAGNOSIS — E11.10 TYPE 2 DIABETES MELLITUS WITH KETOACIDOSIS WITHOUT COMA, WITHOUT LONG-TERM CURRENT USE OF INSULIN (MULTI): ICD-10-CM

## 2023-12-05 DIAGNOSIS — E03.9 HYPOTHYROIDISM, UNSPECIFIED TYPE: ICD-10-CM

## 2023-12-05 DIAGNOSIS — K86.1 CHRONIC PANCREATITIS, UNSPECIFIED PANCREATITIS TYPE (MULTI): ICD-10-CM

## 2023-12-05 DIAGNOSIS — K51.919 ULCERATIVE COLITIS WITH COMPLICATION, UNSPECIFIED LOCATION (MULTI): ICD-10-CM

## 2023-12-05 DIAGNOSIS — H04.123 DRY EYES: ICD-10-CM

## 2023-12-05 DIAGNOSIS — T14.8XXA WOUND DISCHARGE: ICD-10-CM

## 2023-12-05 DIAGNOSIS — B37.31 YEAST VAGINITIS: ICD-10-CM

## 2023-12-05 DIAGNOSIS — E78.5 HYPERLIPIDEMIA, UNSPECIFIED HYPERLIPIDEMIA TYPE: ICD-10-CM

## 2023-12-05 DIAGNOSIS — F11.282: ICD-10-CM

## 2023-12-05 DIAGNOSIS — S22.088A OTHER FRACTURE OF T11-T12 VERTEBRA, INITIAL ENCOUNTER FOR CLOSED FRACTURE (MULTI): ICD-10-CM

## 2023-12-05 DIAGNOSIS — Z78.0 POST-MENOPAUSAL: ICD-10-CM

## 2023-12-05 DIAGNOSIS — I10 ESSENTIAL HYPERTENSION: ICD-10-CM

## 2023-12-05 DIAGNOSIS — E11.69 TYPE 2 DIABETES MELLITUS WITH OTHER SPECIFIED COMPLICATION, WITHOUT LONG-TERM CURRENT USE OF INSULIN (MULTI): ICD-10-CM

## 2023-12-05 DIAGNOSIS — R26.2 AMBULATORY DYSFUNCTION: ICD-10-CM

## 2023-12-05 DIAGNOSIS — I48.0 PAROXYSMAL ATRIAL FIBRILLATION (MULTI): Primary | ICD-10-CM

## 2023-12-05 PROCEDURE — 3074F SYST BP LT 130 MM HG: CPT | Performed by: STUDENT IN AN ORGANIZED HEALTH CARE EDUCATION/TRAINING PROGRAM

## 2023-12-05 PROCEDURE — 99214 OFFICE O/P EST MOD 30 MIN: CPT | Performed by: STUDENT IN AN ORGANIZED HEALTH CARE EDUCATION/TRAINING PROGRAM

## 2023-12-05 PROCEDURE — 87075 CULTR BACTERIA EXCEPT BLOOD: CPT

## 2023-12-05 PROCEDURE — 1036F TOBACCO NON-USER: CPT | Performed by: STUDENT IN AN ORGANIZED HEALTH CARE EDUCATION/TRAINING PROGRAM

## 2023-12-05 PROCEDURE — 3078F DIAST BP <80 MM HG: CPT | Performed by: STUDENT IN AN ORGANIZED HEALTH CARE EDUCATION/TRAINING PROGRAM

## 2023-12-05 PROCEDURE — 4010F ACE/ARB THERAPY RXD/TAKEN: CPT | Performed by: STUDENT IN AN ORGANIZED HEALTH CARE EDUCATION/TRAINING PROGRAM

## 2023-12-05 PROCEDURE — 87070 CULTURE OTHR SPECIMN AEROBIC: CPT

## 2023-12-05 RX ORDER — CLOPIDOGREL BISULFATE 75 MG/1
TABLET ORAL
Qty: 90 TABLET | Refills: 1 | Status: SHIPPED | OUTPATIENT
Start: 2023-12-05 | End: 2024-05-08

## 2023-12-05 RX ORDER — FLUOCINONIDE 0.5 MG/G
CREAM TOPICAL 2 TIMES DAILY
COMMUNITY

## 2023-12-05 RX ORDER — NYSTATIN 100000 U/G
CREAM TOPICAL 2 TIMES DAILY
COMMUNITY
End: 2024-04-10 | Stop reason: SDUPTHER

## 2023-12-05 RX ORDER — MUPIROCIN 20 MG/G
OINTMENT TOPICAL
COMMUNITY

## 2023-12-05 RX ORDER — FLUCONAZOLE 150 MG/1
150 TABLET ORAL ONCE
Qty: 3 TABLET | Refills: 0 | Status: SHIPPED | OUTPATIENT
Start: 2023-12-05 | End: 2023-12-05

## 2023-12-05 RX ORDER — AZELASTINE 1 MG/ML
1 SPRAY, METERED NASAL 2 TIMES DAILY
COMMUNITY
End: 2024-01-08

## 2023-12-05 RX ORDER — ONDANSETRON 4 MG/1
4 TABLET, FILM COATED ORAL EVERY 8 HOURS PRN
COMMUNITY

## 2023-12-05 RX ORDER — DIPHENOXYLATE HYDROCHLORIDE AND ATROPINE SULFATE 2.5; .025 MG/1; MG/1
1 TABLET ORAL 4 TIMES DAILY PRN
COMMUNITY
End: 2024-01-08

## 2023-12-05 RX ORDER — KETOTIFEN FUMARATE 0.35 MG/ML
1 SOLUTION/ DROPS OPHTHALMIC 2 TIMES DAILY
Qty: 10 ML | Refills: 1 | Status: SHIPPED | OUTPATIENT
Start: 2023-12-05

## 2023-12-05 ASSESSMENT — PATIENT HEALTH QUESTIONNAIRE - PHQ9
1. LITTLE INTEREST OR PLEASURE IN DOING THINGS: NOT AT ALL
SUM OF ALL RESPONSES TO PHQ9 QUESTIONS 1 AND 2: 0
2. FEELING DOWN, DEPRESSED OR HOPELESS: NOT AT ALL

## 2023-12-05 NOTE — PROGRESS NOTES
History Of Present Illness  Daniela Saez is a 63 y.o. female presents for establishment of care.     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 Physical therapy     Dry eyes      Hernia surgery 1.5 year ago   Having trouble with smell and discharge from umbilical incision         Past Medical History  She has a past medical history of Gastroparesis.    Surgical History  She has a past surgical history that includes Other surgical history (08/16/2019); Other surgical history (08/16/2019); Other surgical history (08/16/2019); Other surgical history (08/16/2019); Other surgical history (08/16/2019); Other surgical history (08/16/2019); Other surgical history (08/16/2019); and CT guided percutaneous biopsy bone deep (3/9/2022).     Social History  She reports that she quit smoking about 19 years ago. Her smoking use included cigarettes. She has never used smokeless tobacco. She reports that she does not currently use alcohol. No history on file for drug use.    Family History  Family History   Problem Relation Name Age of Onset    Heart disease Mother      Hypertension Mother       Arthritis Mother      Diabetes Mother      Hyperlipidemia Mother      Hypertension Father      Brain cancer Father      Diabetes Sister      Diabetes Brother          Allergies  Metoclopramide, Fentanyl, Gabapentin, Levetiracetam, Metformin, Morphine-naltrexone, and Torsemide       Physical Exam  Vitals reviewed.   Constitutional:       Appearance: Normal appearance.   Cardiovascular:      Rate and Rhythm: Normal rate and regular rhythm.      Heart sounds: No murmur heard.  Pulmonary:      Effort: Pulmonary effort is normal. No respiratory distress.      Breath sounds: Normal breath sounds. No wheezing.   Musculoskeletal:      Cervical back: Neck supple.      Left lower leg: No edema.   Neurological:      Mental Status: She is alert.          Last Recorded Vitals  /74   Pulse 76   Resp 18   Wt 68.5 kg (151 lb)   SpO2 94%       Current Outpatient Medications:     acetaminophen (Tylenol) 325 mg tablet, Take 3 tablets (975 mg) by mouth every 4 hours if needed for mild pain (1 - 3)., Disp: , Rfl:     allopurinol (Zyloprim) 100 mg tablet, Take 1 tablet (100 mg) by mouth once daily., Disp: 90 tablet, Rfl: 0    amitriptyline (Elavil) 150 mg tablet, Take 0.5 tablets (75 mg) by mouth once daily., Disp: , Rfl:     azelastine (Astelin) 137 mcg (0.1 %) nasal spray, Administer 1 spray into each nostril 2 times a day. Use in each nostril as directed, Disp: , Rfl:     baclofen (Lioresal) 10 mg tablet, Take 2 tablets (20 mg) by mouth 2 times a day., Disp: , Rfl:     butalbital-acetaminophen-caff (Fioricet) -40 mg capsule, Take 1 capsule by mouth every 6 hours if needed for headaches., Disp: , Rfl:     carvedilol (Coreg) 6.25 mg tablet, 1 tab Orally Twice a day for 30 days, Disp: 180 tablet, Rfl: 3    diclofenac sodium 1 % kit, APPLY SPARINGLY TO AFFECTED AREA(S) ONCE DAILY, Disp: , Rfl:     diphenoxylate-atropine (Lomotil) 2.5-0.025 mg tablet, Take 1 tablet by mouth 4 times a day as needed for diarrhea., Disp: ,  Rfl:     famotidine (Pepcid) 20 mg tablet, Take 1 tablet (20 mg) by mouth 2 times a day., Disp: , Rfl:     fluocinonide (Lidex) 0.05 % cream, Apply topically 2 times a day., Disp: , Rfl:     hydrocortisone (Cortef) 5 mg tablet, Take 1-2 tablets (5-10 mg) by mouth 3 times a day., Disp: 540 tablet, Rfl: 3    hydroxychloroquine (Plaquenil) 200 mg tablet, Take 1.5 tablets (300 mg) by mouth once daily. Take 1 and 1/2 (one and one-half) tablets by mouth daily., Disp: 135 tablet, Rfl: 0    leflunomide (Arava) 20 mg tablet, Take 1 tablet (20 mg) by mouth once daily., Disp: 90 tablet, Rfl: 0    levocetirizine (Xyzal) 5 mg tablet, 1 tablet in the evening Orally Once a day for 30 day(s), Disp: , Rfl:     levothyroxine (Synthroid, Levoxyl) 88 mcg tablet, 1 tablet daily 6 days per week, 2 tablets one day per week, Disp: 105 tablet, Rfl: 3    lidocaine (Lidoderm) 5 % patch, apply up to 3 patches to skin once a day, remove after 12 hours for 30 days, Disp: , Rfl:     losartan (Cozaar) 50 mg tablet, Take 1 tablet (50 mg) by mouth once daily., Disp: 90 tablet, Rfl: 3    lovastatin (Mevacor) 10 mg tablet, Take 1 tablet (10 mg) by mouth once daily. WITH THE EVENING MEAL for 90, Disp: 90 tablet, Rfl: 3    miconazole (Micotin) 2 % powder,  , Disp: , Rfl:     morphine (MSIR) 30 mg tablet, Take 1 tablet (30 mg) by mouth 2 times a day., Disp: , Rfl:     morphine CR (MS Contin) 15 mg 12 hr tablet, Take 1 tablet (15 mg) by mouth 3 times a day., Disp: , Rfl:     mupirocin (Bactroban) 2 % ointment, Apply topically 3 times a day., Disp: , Rfl:     nystatin (Mycostatin) cream, Apply topically 2 times a day., Disp: , Rfl:     ondansetron (Zofran) 4 mg tablet, Take 1 tablet (4 mg) by mouth every 8 hours if needed for nausea or vomiting., Disp: , Rfl:     pancrelipase, Lip-Prot-Amyl, (Creon) 36,000-114,000- 180,000 unit capsule,delayed release(DR/EC) capsule, Take 1 capsule by mouth 2 times a day., Disp: , Rfl:     pantoprazole (ProtoNix) 40 mg EC  tablet, Take 1 tablet (40 mg) by mouth once daily., Disp: , Rfl:     polyethylene glycol (Miralax) 17 gram/dose powder,  , Disp: , Rfl:     pyridoxine (Vitamin B-6) 100 mg tablet,  , Disp: , Rfl:     sucralfate (Carafate) 1 gram tablet, 1 tablet on an empty stomach Orally Twice a day, Disp: , Rfl:     SUMAtriptan (Imitrex) 25 mg tablet, TAKE 1 TABLET BY MOUTH AS NEEDED FOR MIGRAINE. MAY REPEAT IN 2 HOURS IF NEEDED., Disp: , Rfl:     tiotropium-olodateroL (Stiolto Respimat) 2.5-2.5 mcg/actuation mist inhaler, 2 puffs Inhalation Once a day for 30 days, Disp: , Rfl:     triamcinolone (Kenalog) 0.1 % cream, 1 application as needed Externally Twice a day as needed, Disp: , Rfl:     albuterol 90 mcg/actuation inhaler, inhale 2 puffs by mouth four times a day, Disp: , Rfl:     atorvastatin (Lipitor) 40 mg tablet, Take 1 tablet (40 mg) by mouth once daily., Disp: 90 tablet, Rfl: 1    blood sugar diagnostic (OneTouch Verio test strips) strip, TEST 4 TIMES A DAY. MAY SUBSTITUTE WITH INSURANCE COVERED STRIPS., Disp: , Rfl:     bumetanide (Bumex) 1 mg tablet, Take 1 tablet (1 mg) by mouth once daily as needed (as needed)., Disp: 90 tablet, Rfl: 3    carboxymethylcellulose (Refresh Tears) 0.5 % ophthalmic solution, 1-2 drops each eye 3 times a day for 30 days, Disp: , Rfl:     clopidogrel (Plavix) 75 mg tablet, TAKE 1 TABLET BY MOUTH EVERY DAY for 90, Disp: 90 tablet, Rfl: 1    dextromethorphan-guaifenesin (Mucinex DM)  mg 12 hr tablet,  , Disp: , Rfl:     fexofenadine (Allegra) 180 mg tablet, , Disp: , Rfl:     fluticasone (Flonase Sensimist) 27.5 mcg/actuation nasal spray, Administer 2 sprays into each nostril once daily at bedtime., Disp: 10 g, Rfl: 1    folic acid (Folvite) 1 mg tablet, Take 1 tablet (1 mg) by mouth once daily., Disp: , Rfl:     furosemide (Lasix) 40 mg tablet, Take 1.5 tablets (60 mg) by mouth once daily., Disp: , Rfl:     ketotifen (Zaditor) 0.025 % (0.035 %) ophthalmic solution, Administer 1 drop  into both eyes 2 times a day., Disp: 10 mL, Rfl: 1    L. acidophilus/Bifid. animalis 32 billion cell capsule, USE AS DIRECTED., Disp: , Rfl:     loratadine (Claritin) 10 mg tablet, Take 1 tablet (10 mg) by mouth once daily., Disp: 30 tablet, Rfl: 11    oxyCODONE myristate (Xtampza ER) 9 mg 12 hr abuse-deterrent capsule, Take 1 capsule (9 mg) by mouth every 12 hours., Disp: , Rfl:     VITAMIN B COMPLEX ORAL, as directed Orally, Disp: , Rfl:           Assessment/Plan   Problem List Items Addressed This Visit             ICD-10-CM    Atrial fibrillation (CMS/[facility]) - Primary I48.91    Relevant Medications    clopidogrel (Plavix) 75 mg tablet    Diabetes (CMS/[facility]) E11.9    Relevant Orders    CT cardiac scoring wo IV contrast (Completed)    Dry eyes H04.123    Relevant Medications    ketotifen (Zaditor) 0.025 % (0.035 %) ophthalmic solution    Essential hypertension I10    Relevant Orders    CT cardiac scoring wo IV contrast (Completed)    Hypothyroidism E03.9    Weakness R53.1    Relevant Orders    Referral to Physical Therapy     Other Visit Diagnoses         Codes    Post-menopausal     Z78.0    Relevant Orders    XR DEXA bone density (Completed)    Ambulatory dysfunction     R26.2    Relevant Orders    Referral to Physical Therapy    Yeast vaginitis     B37.31    Wound discharge     T14.8XXA    Relevant Orders    Tissue/Wound Culture/Smear (Completed)        Past medical history of hypertension, CAD, A-fib, adrenal insufficiency, diabetes, MGUS, seronegative rheumatoid arthritis, psoriatic arthritis, thrombocytopenia, migraines, chronic sinus infections, ulcerative colitis, anxiety/depression, insomnia    HTN  Continue carvedilol 6.25 mg daily  Continue Lasix 60 mg daily  Continue losartan 50 mg daily  Physical exam was stable. Discussed maintaining diets such as DASH to help maintain normal Blood pressure. Encouraged regular exercise for a total of 120 min weekly. We will fu labs in 1-3 days. For now there will be no  change in medical management. All questions and concerns were addressed. Pt will follow up in 4-6 months or sooner if needed.     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  Followed by hematology  Currently observation  Concerns for autoimmune versus inflammatory versus infection versus malignancy causes s/p bone marrow biopsy 3/22 no evidence of plasma cell dyscrasia    Thrombocytopenia  Platelets at 76  currently under observation  Possibly secondary to liver disease    Ulcerative Colitis   EGD and colonoscopy completed 10/12/2023 Bleeding arteriovenous malformations (AVMs) of the distal esophagus and duodenal bulb    Compression fracture  CT angio chest 7/23  T11 compression fracture was seen of uncertain age  Hyperlipidemia  Continue statin therapy    Migraines   Currently on imitrex and fioracet PRN  Not happening monthly  But having multiple headaches daily due to chronic sinusitis   Currently using amitriptyline   Role of CRP nurtec and ubrevely    Levothyroxine 88 mcg 6 days a week with 2 tabs on Sunday   TSH 0.47 6/8/23    Anxiety/depression  Doing better   Mostly gets down due to her health              Marjorie Saha, DO

## 2023-12-06 ENCOUNTER — LAB REQUISITION (OUTPATIENT)
Dept: LAB | Facility: HOSPITAL | Age: 63
End: 2023-12-06
Payer: MEDICAID

## 2023-12-06 DIAGNOSIS — T14.8XXA OTHER INJURY OF UNSPECIFIED BODY REGION, INITIAL ENCOUNTER: ICD-10-CM

## 2023-12-11 ENCOUNTER — OFFICE VISIT (OUTPATIENT)
Dept: HEMATOLOGY/ONCOLOGY | Facility: HOSPITAL | Age: 63
End: 2023-12-11
Payer: MEDICAID

## 2023-12-11 ENCOUNTER — LAB (OUTPATIENT)
Dept: LAB | Facility: LAB | Age: 63
End: 2023-12-11
Payer: MEDICAID

## 2023-12-11 VITALS
HEART RATE: 87 BPM | TEMPERATURE: 96.8 F | RESPIRATION RATE: 17 BRPM | BODY MASS INDEX: 28.74 KG/M2 | SYSTOLIC BLOOD PRESSURE: 175 MMHG | WEIGHT: 152.1 LBS | OXYGEN SATURATION: 93 % | DIASTOLIC BLOOD PRESSURE: 81 MMHG

## 2023-12-11 DIAGNOSIS — K90.89 OTHER SPECIFIED INTESTINAL MALABSORPTION (HHS-HCC): ICD-10-CM

## 2023-12-11 DIAGNOSIS — D69.6 THROMBOCYTOPENIA (CMS-HCC): ICD-10-CM

## 2023-12-11 DIAGNOSIS — R53.82 CHRONIC FATIGUE: ICD-10-CM

## 2023-12-11 DIAGNOSIS — D50.8 OTHER IRON DEFICIENCY ANEMIA: ICD-10-CM

## 2023-12-11 DIAGNOSIS — H60.313 ACUTE DIFFUSE OTITIS EXTERNA OF BOTH EARS: ICD-10-CM

## 2023-12-11 DIAGNOSIS — K90.89 OTHER SPECIFIED INTESTINAL MALABSORPTION (HHS-HCC): Primary | ICD-10-CM

## 2023-12-11 DIAGNOSIS — J32.0 MAXILLARY SINUSITIS, UNSPECIFIED CHRONICITY: ICD-10-CM

## 2023-12-11 LAB
ALBUMIN SERPL BCP-MCNC: 4.2 G/DL (ref 3.4–5)
ALP SERPL-CCNC: 106 U/L (ref 33–136)
ALT SERPL W P-5'-P-CCNC: 21 U/L (ref 7–45)
ANION GAP SERPL CALC-SCNC: 9 MMOL/L (ref 10–20)
AST SERPL W P-5'-P-CCNC: 27 U/L (ref 9–39)
BASOPHILS # BLD AUTO: 0.04 X10*3/UL (ref 0–0.1)
BASOPHILS NFR BLD AUTO: 0.8 %
BILIRUB SERPL-MCNC: 1 MG/DL (ref 0–1.2)
BUN SERPL-MCNC: 8 MG/DL (ref 6–23)
CALCIUM SERPL-MCNC: 9 MG/DL (ref 8.6–10.3)
CHLORIDE SERPL-SCNC: 95 MMOL/L (ref 98–107)
CO2 SERPL-SCNC: 35 MMOL/L (ref 21–32)
CREAT SERPL-MCNC: 0.61 MG/DL (ref 0.5–1.05)
EOSINOPHIL # BLD AUTO: 0.22 X10*3/UL (ref 0–0.7)
EOSINOPHIL NFR BLD AUTO: 4.3 %
ERYTHROCYTE [DISTWIDTH] IN BLOOD BY AUTOMATED COUNT: 14.6 % (ref 11.5–14.5)
FERRITIN SERPL-MCNC: 124 NG/ML (ref 8–150)
GFR SERPL CREATININE-BSD FRML MDRD: >90 ML/MIN/1.73M*2
GLUCOSE SERPL-MCNC: 277 MG/DL (ref 74–99)
HCT VFR BLD AUTO: 38.6 % (ref 36–46)
HGB BLD-MCNC: 12.3 G/DL (ref 12–16)
HGB RETIC QN: 33 PG (ref 28–38)
IMM GRANULOCYTES # BLD AUTO: 0.01 X10*3/UL (ref 0–0.7)
IMM GRANULOCYTES NFR BLD AUTO: 0.2 % (ref 0–0.9)
IMMATURE RETIC FRACTION: 8.3 %
IRON SATN MFR SERPL: 33 % (ref 25–45)
IRON SERPL-MCNC: 118 UG/DL (ref 35–150)
LYMPHOCYTES # BLD AUTO: 0.72 X10*3/UL (ref 1.2–4.8)
LYMPHOCYTES NFR BLD AUTO: 14.1 %
MCH RBC QN AUTO: 28.5 PG (ref 26–34)
MCHC RBC AUTO-ENTMCNC: 31.9 G/DL (ref 32–36)
MCV RBC AUTO: 90 FL (ref 80–100)
MONOCYTES # BLD AUTO: 0.35 X10*3/UL (ref 0.1–1)
MONOCYTES NFR BLD AUTO: 6.9 %
NEUTROPHILS # BLD AUTO: 3.75 X10*3/UL (ref 1.2–7.7)
NEUTROPHILS NFR BLD AUTO: 73.7 %
NRBC BLD-RTO: 0 /100 WBCS (ref 0–0)
PLATELET # BLD AUTO: 68 X10*3/UL (ref 150–450)
POTASSIUM SERPL-SCNC: 3.9 MMOL/L (ref 3.5–5.3)
PROT SERPL-MCNC: 7.4 G/DL (ref 6.4–8.2)
RBC # BLD AUTO: 4.31 X10*6/UL (ref 4–5.2)
RETICS #: 0.11 X10*6/UL (ref 0.02–0.08)
RETICS/RBC NFR AUTO: 2.6 % (ref 0.5–2)
SODIUM SERPL-SCNC: 135 MMOL/L (ref 136–145)
TIBC SERPL-MCNC: 359 UG/DL (ref 240–445)
UIBC SERPL-MCNC: 241 UG/DL (ref 110–370)
WBC # BLD AUTO: 5.1 X10*3/UL (ref 4.4–11.3)

## 2023-12-11 PROCEDURE — 99215 OFFICE O/P EST HI 40 MIN: CPT

## 2023-12-11 PROCEDURE — 82668 ASSAY OF ERYTHROPOIETIN: CPT

## 2023-12-11 PROCEDURE — 83010 ASSAY OF HAPTOGLOBIN QUANT: CPT

## 2023-12-11 PROCEDURE — 84165 PROTEIN E-PHORESIS SERUM: CPT

## 2023-12-11 PROCEDURE — 80053 COMPREHEN METABOLIC PANEL: CPT

## 2023-12-11 PROCEDURE — 81450 HL NEO GSAP 5-50DNA/DNA&RNA: CPT

## 2023-12-11 PROCEDURE — 88184 FLOWCYTOMETRY/ TC 1 MARKER: CPT

## 2023-12-11 PROCEDURE — 83521 IG LIGHT CHAINS FREE EACH: CPT

## 2023-12-11 PROCEDURE — 82306 VITAMIN D 25 HYDROXY: CPT

## 2023-12-11 PROCEDURE — 88189 FLOWCYTOMETRY/READ 16 & >: CPT | Performed by: PATHOLOGY

## 2023-12-11 PROCEDURE — 82728 ASSAY OF FERRITIN: CPT

## 2023-12-11 PROCEDURE — 85025 COMPLETE CBC W/AUTO DIFF WBC: CPT

## 2023-12-11 PROCEDURE — 4010F ACE/ARB THERAPY RXD/TAKEN: CPT

## 2023-12-11 PROCEDURE — 88237 TISSUE CULTURE BONE MARROW: CPT

## 2023-12-11 PROCEDURE — 3077F SYST BP >= 140 MM HG: CPT

## 2023-12-11 PROCEDURE — 85045 AUTOMATED RETICULOCYTE COUNT: CPT

## 2023-12-11 PROCEDURE — 36415 COLL VENOUS BLD VENIPUNCTURE: CPT

## 2023-12-11 PROCEDURE — 1036F TOBACCO NON-USER: CPT

## 2023-12-11 PROCEDURE — 88185 FLOWCYTOMETRY/TC ADD-ON: CPT

## 2023-12-11 PROCEDURE — 82784 ASSAY IGA/IGD/IGG/IGM EACH: CPT

## 2023-12-11 PROCEDURE — 3079F DIAST BP 80-89 MM HG: CPT

## 2023-12-11 PROCEDURE — G0452 MOLECULAR PATHOLOGY INTERPR: HCPCS

## 2023-12-11 PROCEDURE — 83550 IRON BINDING TEST: CPT

## 2023-12-11 PROCEDURE — 84155 ASSAY OF PROTEIN SERUM: CPT

## 2023-12-11 PROCEDURE — 83540 ASSAY OF IRON: CPT

## 2023-12-11 PROCEDURE — 82607 VITAMIN B-12: CPT

## 2023-12-11 RX ORDER — NEOMYCIN SULFATE, POLYMYXIN B SULFATE, HYDROCORTISONE 3.5; 10000; 1 MG/ML; [USP'U]/ML; MG/ML
3 SOLUTION/ DROPS AURICULAR (OTIC) 4 TIMES DAILY
Qty: 6 ML | Refills: 0 | Status: SHIPPED | OUTPATIENT
Start: 2023-12-11 | End: 2023-12-21

## 2023-12-11 RX ORDER — FLUTICASONE FUROATE 27.5 UG/1
2 SPRAY, METERED NASAL NIGHTLY
Qty: 10 G | Refills: 1 | Status: SHIPPED | OUTPATIENT
Start: 2023-12-11 | End: 2024-03-20

## 2023-12-11 RX ORDER — LORATADINE 10 MG/1
10 TABLET ORAL DAILY
Qty: 30 TABLET | Refills: 11 | Status: SHIPPED | OUTPATIENT
Start: 2023-12-11 | End: 2024-01-08

## 2023-12-11 ASSESSMENT — COLUMBIA-SUICIDE SEVERITY RATING SCALE - C-SSRS
2. HAVE YOU ACTUALLY HAD ANY THOUGHTS OF KILLING YOURSELF?: NO
1. IN THE PAST MONTH, HAVE YOU WISHED YOU WERE DEAD OR WISHED YOU COULD GO TO SLEEP AND NOT WAKE UP?: NO
6. HAVE YOU EVER DONE ANYTHING, STARTED TO DO ANYTHING, OR PREPARED TO DO ANYTHING TO END YOUR LIFE?: NO

## 2023-12-11 ASSESSMENT — PATIENT HEALTH QUESTIONNAIRE - PHQ9
2. FEELING DOWN, DEPRESSED OR HOPELESS: NOT AT ALL
1. LITTLE INTEREST OR PLEASURE IN DOING THINGS: NOT AT ALL
SUM OF ALL RESPONSES TO PHQ9 QUESTIONS 1 & 2: 0

## 2023-12-11 ASSESSMENT — PAIN SCALES - GENERAL: PAINLEVEL: 7

## 2023-12-11 NOTE — PROGRESS NOTES
Patient Visit Information:     Visit Type: Follow up visit: New Provider    History of Present Illness:     Hematology History:   Patient is a 63-year-old white female referred for monoclonal gammopathy which so far appears to be MGUS (Dr. Costello agreed diagnosis of MGUS likely autoimmune disease  related), bone marrow biopsy with no evidence of myeloma 3/9/2022, found during neurosurgery evaluation for neuropathy.  She also has anemia with a history of iron infusions due to inadequate response on oral iron with iron deficiency unclear etiology,  pancytopenia at least in part due to her liver issues. Previously followed by Dr. OLYA Uriarte and Dr. Mei.     She has numerous medical issues including migraines, anxiety/depression/insomnia, chronic pain with RSD seen by pain management, type 2 diabetes although later off diabetic medications, adrenal insufficiency treated with hydrocortisone, rheumatoid vs  psoriatic arthritis, hypothyroidism, hyperlipidemia, hypertension, atrial fibrillation, gastroparesis/colitis/gastritis, neuropathy.   9/2022 she had a moderate-high-grade T11 burst compression fracture, surgery delayed due to thrombocytopenia, was seen by hematology-oncology at [facility] where she was going to have her  spine surgery done with thrombocytopenia due to cirrhosis recommending platelet transfusions preoperatively if needed, 3/14/2023 had spine surgery according to notes lumbar laminectomy complicated by infection with mental status changes transferred to  [place] with revision 4/2023.     EGD and colonoscopy completed 10/12/2023 Bleeding arteriovenous malformations (AVMs) of the distal esophagus and duodenal bulb. Small AVMs of the rectosigmoid colon, internal hemorrhoids. EGD with biopsy and hemostasis of bleeding AVMs of the esophagus and duodenum with argon plasma coagulation (APC). Colonoscopy with treatment of AVMs in the rectosigmoid colon with APC.    Follows Dr. Phillips, Endocrinologist   Follows   Abhinav Willis, Rheumatologist  Follows Dr. Gomez, Gastroenterologist    Follows Pain Management in [place]   Recommend she follow up with hepatology for fatty liver/cirrhosis and enlarged spleen  Follows Dermatology     ID Statement:      Daniela Saez is a 63 year old female       Chief Complaint: Follow up MGUS     Interval History:    Daniela Saez presents today for follow up of MGUS and thrombocytopenia.  Following evaluation patient has been sent to the laboratory.  Patient has a significant history as stated above with multiple comorbidities.  Bone marrow biopsy completed March 2022 with no findings of dysplasia.  Normal bone marrow and plasma cell findings.  She endorses fatigue.  She endorses maxillary and frontal sinus pressure, ear pain recently on antibiotics for recurrent sinusitis.  She is supposed to have maxillary sinus surgery.  No fevers, sore throat unless she has sinus issues, no shortness of breath, chest pain, abdominal pain, nausea, vomiting, constipation, she has IBS-d, no blood in stool, melena, dysuria or hematuria.  She does note occasional frequency and inability to urinate at times.  Stable appetite stable weight.  She does note bone pain, arthralgia.     Medical History:  -Autoimmune   -ACI 2016  -Acute Gout   -Acute blood loss  -asthma w/out complications with seasonal changes   -RA  -RSD  -SNHL  -Syncope and collapse   -Vitamin D def  -Psoriatic arthrosis  -MTHR: methylene   -thrombocytopenia  -recurrent maxillary sinusitis   -IBSD  -Fibromyalgia   -neuropathic pain  -lymphocytic colitis   -lactose intolerance   -insomnia   -hypothyroidism  -A-fib  -cervical spondylosis  -KL MGUS  -Gastritis  -gastroparesis  -HCC hepatic cirrhosis       -essential HTN  -JAMES  -CRPS   -Mixed hyperlipidemia   -Migraines  -Depression     Social History:  -Live with family in [place]   -Stay at home Mom, 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   -Brother- colectomy Gi bleeding   -No other genetic, hematologic, autoimmune, or cancer than stated     Review of Systems:   A review of systems has been completed and are negative for complaints except what is stated in the assessment, HPI, IH, and/or past medical history.      Allergies and Intolerances:  Allergies   Allergen Reactions    Metoclopramide Anaphylaxis and Other     Lockjaw    Fentanyl Unknown    Gabapentin Other and Unknown     SUICIDAL THOUGHTS    Levetiracetam Unknown    Metformin Unknown    Morphine-Naltrexone Unknown    Torsemide Unknown         Outpatient Medication Profile:     Current Outpatient Medications   Medication Instructions    acetaminophen (TYLENOL) 975 mg, oral, Every 4 hours PRN    albuterol 90 mcg/actuation inhaler inhale 2 puffs by mouth four times a day    allopurinol (ZYLOPRIM) 100 mg, oral, Daily    amitriptyline (ELAVIL) 75 mg, oral, Daily    azelastine (Astelin) 137 mcg (0.1 %) nasal spray 1 spray, Each Nostril, 2 times daily, Use in each nostril as directed    baclofen (Lioresal) 10 mg tablet Take 2 tablets (20 mg) by mouth 2 times a day.    blood sugar diagnostic (OneTouch Verio test strips) strip TEST 4 TIMES A DAY. MAY SUBSTITUTE WITH INSURANCE COVERED STRIPS.    bumetanide (Bumex) 1 mg tablet 1 tablet, oral, Daily PRN    butalbital-acetaminophen-caff (Fioricet) -40 mg capsule 1 capsule, oral, Every 6 hours PRN    carboxymethylcellulose (Refresh Tears) 0.5 % ophthalmic solution 1-2 drops each eye 3 times a day for 30 days    carvedilol (Coreg) 6.25 mg tablet 1 tab Orally Twice a day for 30 days    clopidogrel (Plavix) 75 mg tablet TAKE 1 TABLET BY MOUTH EVERY DAY for 90     dextromethorphan-guaifenesin (Mucinex DM)  mg 12 hr tablet      diclofenac sodium 1 % kit APPLY SPARINGLY TO AFFECTED AREA(S) ONCE DAILY    diphenoxylate-atropine (Lomotil) 2.5-0.025 mg tablet 1 tablet, oral, 4 times daily PRN    famotidine (PEPCID) 20 mg, oral, 2 times daily    fexofenadine (Allegra) 180 mg tablet No dose, route, or frequency recorded.    fluocinonide (Lidex) 0.05 % cream Topical, 2 times daily    fluticasone (Flonase Sensimist) 27.5 mcg/actuation nasal spray 2 sprays, Each Nostril, Nightly    folic acid (Folvite) 1 mg tablet 1 tablet, oral, Daily    furosemide (LASIX) 60 mg, oral, Daily    hydrocortisone (CORTEF) 5-10 mg, oral, 3 times daily    hydroxychloroquine (PLAQUENIL) 300 mg, oral, Daily, Take 1 and 1/2 (one and one-half) tablets by mouth daily.    ketotifen (Zaditor) 0.025 % (0.035 %) ophthalmic solution 1 drop, Both Eyes, 2 times daily    L. acidophilus/Bifid. animalis 32 billion cell capsule USE AS DIRECTED.    leflunomide (ARAVA) 20 mg, oral, Daily    levocetirizine (Xyzal) 5 mg tablet 1 tablet in the evening Orally Once a day for 30 day(s)    levothyroxine (Synthroid, Levoxyl) 88 mcg tablet 1 tablet daily 6 days per week, 2 tablets one day per week    lidocaine (Lidoderm) 5 % patch apply up to 3 patches to skin once a day, remove after 12 hours for 30 days    losartan (COZAAR) 50 mg, oral, Daily    lovastatin (MEVACOR) 10 mg, oral, Daily, WITH THE EVENING MEAL for 90<BR>    miconazole (Micotin) 2 % powder      morphine (MSIR) 30 mg, oral, 2 times daily    morphine CR (MS Contin) 15 mg 12 hr tablet 1 tablet, oral, 3 times daily    mupirocin (Bactroban) 2 % ointment Topical, 3 times daily RT    nystatin (Mycostatin) cream Topical, 2 times daily    ondansetron (ZOFRAN) 4 mg, oral, Every 8 hours PRN    oxyCODONE myristate (Xtampza ER) 9 mg 12 hr abuse-deterrent capsule 1 capsule, oral, Every 12 hours    pancrelipase, Lip-Prot-Amyl, (Creon) 36,000-114,000- 180,000 unit  capsule,delayed release(DR/EC) capsule 1 capsule, oral, 2 times daily    pantoprazole (PROTONIX) 40 mg, oral, Daily RT    polyethylene glycol (Miralax) 17 gram/dose powder      pyridoxine (Vitamin B-6) 100 mg tablet      sucralfate (Carafate) 1 gram tablet 1 tablet on an empty stomach Orally Twice a day    SUMAtriptan (Imitrex) 25 mg tablet TAKE 1 TABLET BY MOUTH AS NEEDED FOR MIGRAINE. MAY REPEAT IN 2 HOURS IF NEEDED.    tiotropium-olodateroL (Stiolto Respimat) 2.5-2.5 mcg/actuation mist inhaler 2 puffs Inhalation Once a day for 30 days    triamcinolone (Kenalog) 0.1 % cream 1 application as needed Externally Twice a day as needed    VITAMIN B COMPLEX ORAL as directed Orally      Past Medical History:   Diagnosis Date    Gastroparesis     Gastroparesis      Past Surgical History:   Procedure Laterality Date    CT GUIDED PERCUTANEOUS BIOPSY BONE DEEP  3/9/2022    CT GUIDED PERCUTANEOUS BIOPSY BONE DEEP 3/9/2022 [facility]    OTHER SURGICAL HISTORY  08/16/2019    Tonsillectomy with adenoidectomy    OTHER SURGICAL HISTORY  08/16/2019    Bunionectomy    OTHER SURGICAL HISTORY  08/16/2019    Hernia repair    OTHER SURGICAL HISTORY  08/16/2019    Cholecystectomy    OTHER SURGICAL HISTORY  08/16/2019    Uterine nerve ablation    OTHER SURGICAL HISTORY  08/16/2019    Ovarian torsion repair    OTHER SURGICAL HISTORY  08/16/2019    Tubal ligation         Performance:   ECOG Performance Status: 1   0 Fully active, able to carry on all pre-disease performance without restriction  1 Restricted in physically strenuous activity but ambulatory and able to carry out work of a light or sedentary nature, e.g., light house work, office work  2 Ambulatory and capable of all selfcare but unable to carry out any work activities; up and about more than 50% of waking hours  3 Capable of only limited selfcare; confined to bed or chair more than 50% of waking hours  4 Completely disabled; cannot carry on any selfcare; totally confined to  bed or chair  5 Dead     Vitals and Measurements:   Visit Vitals  /81 (BP Location: Left arm, Patient Position: Sitting, BP Cuff Size: Adult)   Pulse 87   Temp 36 °C (96.8 °F)   Resp 17      Vitals:    12/11/23 1346   Weight: 69 kg (152 lb 1.6 oz)         Physical Exam:      Constitutional: Patient is awake/alert/oriented  x3, No distress, Nourished, Hydrated, Alert and Cooperative   Eyes: PERRL, EOMI, clear sclera   ENMT: Mucous membranes moist, no oral lesions or sores.  Bilateral ears canals red with effusion, likely from sinusitis, maxillary and frontal tenderness with palpation.   Head/Neck: Neck supple, no apparent injury, thyroid  without mass or tenderness, No JVD, trachea midline, no bruits   Respiratory/Thorax: Patent airways, CTAB, chest symmetry, normal inspiratory and expiratory effort    Cardiovascular: Regular, rate and rhythm, Positive for murmur, no carotid bruit   Gastrointestinal: Non-distended, soft, non-tender, no masses or organomegaly appreciated    Genitourinary: deferred   Musculoskeletal: no pain on palpation of spine, normal strength for baseline    Extremities: Pulses two pulses, warm, dry. Atraumatic in appearance without tenderness or deformity. Extremities are without swelling or erythema. Full range of motion is noted to all joints. No sores or lesions.   Neurological: Alert and oriented x3, intact senses,  motor, response and reflexes, normal strength, cranial nerves normal   Breast: deferred   Lymphatic: No significant lymphadenopathy   Psychological: Appropriate mood and behavior   Skin: Dry, no lesions, no rashes      Lab Results:  -Pending     Radiology Results:  MRI PANC/[place]LAND WO/W IVCON 05/20/2022  Order: [id number]  IMPRESSION  Stable 1.2 cm pancreatic body cystic lesion, likely a sidebranch IPMN.  Cirrhosis with portal hypertension including small volume ascites.  No  OPTN 5 lesion.    CT SINUS STEREO WO IVCON 11/22/2023  Order: [id number]  IMPRESSION:  Extensive postop  changes with opacification of several residual left  posterior ethmoid air cells.    MRI Lumbar spine 11/10/2023  There is multilevel spondylosis. There are varying degrees of spinal  canal and neural foraminal narrowing as described above.    CT Angio Chest 07/24/2023  IMPRESSION:  No CT evidence of pulmonary embolism in the current exam.     No mass or pneumonia evident in either lung. Mild scattered stable  bilateral scarring.     Mild nonspecific stable mediastinal and bilateral hilar adenopathy,  most likely reactive..     Splenomegaly. Findings worrisome for cirrhosis of the liver. Mild  ascites along the anterior aspect of the left hepatic lobe.     Stable moderate T11 compression fracture. Progression of now moderate  loss of height with anterior wedging at T8, exact age uncertain but  still probably remote.    XR BONE SURVEY 03/02/2021  Narrative & Impression   MRN: [id number]  Patient Name: RUKHSANA SAEZ     STUDY:  OSSEOUS SURVEY, COMPLETE; ;  3/2/2021 2:57 pm     INDICATION:  MGUS - c/o increased bone pain.     COMPARISON:  None.     ACCESSION NUMBER(S):  [id number]     ORDERING CLINICIAN:  AYSE LEDEZMA     FINDINGS:  No acute fracture or dislocation is seen. No definite lytic or  blastic lesions are seen.     Degenerative changes are noted in the bilateral acromioclavicular  joints. Degenerative changes are seen in cervical spine, thoracic  spine and lumbar spine. The most severely affected level in the  lumbar spine is L4-5 with grade 1 anterolisthesis of L4 on L5.     Degenerative changes are noted in bilateral hips and SI joints.     Numerous metallic coils are seen in the abdomen and pelvis.     Degenerative changes are noted in the bilateral knees.     IMPRESSION:  No definite lytic or blastic lesions.     Degenerative changes, as described above.     Assessment and Plan:   1. IgG kappa light chain MGUS  Rukhsana Saez is a 63-year-old female that presents for follow-up of monoclonal gammopathy of  undetermined significance.  Upon review of her labs from August 2023 it appears she has IgG kappa light chain MGUS with elevation in the kappa light chain and ratio.  She has slight elevation in IgA.  She has no elevated M protein on SPEP.  New workup in process and I will call her with results.  We did discuss the importance of monitoring, slim crab criteria and understanding how kidney function, elevated calcium levels, and anemia are signs and symptoms.  Patient has many comorbidities and autoimmune disease.  Discussed her kappa light chain can be elevated in autoimmune versus inflammatory versus infection versus malignancy.  She had a bone marrow biopsy March 9, 2022, there was no evidence of plasma cell dyscrasia. Cytogenetics negative for intraepithelial lesions or malignancy. Cellular change associated with atrophy and acute inflammation.      2.  Iron deficiency anemia  Iron deficiency anemia noted 5 months ago in June 2023, since then there has not been any deficiency.  We will redraw her labs today and assess.  If needed we will give her IV iron Feraheme.  She is likely iron deficient due to multiple chronic disease and inflammation.    3.  Thrombocytopenia  Platelets from August 2023 were 80 show a decreased platelet count dating back to May 2023.  The lowest 62.  We will continue to monitor.  We discussed liver disease and ITP as probable cause.  Discussed the importance of monitoring platelet count, bleeding and practicing safety to prevent injury and bleeding.     4.  Recurrent sinus infections  Upon assessment is clear the patient has otitis externa bilateral and maxillary and frontal sinus tenderness with drainage.  Purulent mucus secretions in bilateral nares.  Patient just received Keflex and azithromycin previously.  Patient has an appointment in January 2024.  I prescribed her Flonase, Claritin, neomycin polymyxin hydrocortisone otic drops.    **I will call her with results of her labs to  [phone] her home number**    Follow-up:    RTC:  --3 months with labs one week before     Medications:  --Neomycin polymyxin hydrocortisone eardrops 3 drops bilateral ears 4/day   --Flonase, refilled prescription   --Loratadine 10 mg daily new prescription sent to pharmacy   --IV iron as needed  -Continue B-12 supplement three times a week     Imaging:  -no imaging ordered today    Referrals:  -ENT as scheduled for recurrent sinus infections  -Specialists as scheduled           Patient Instructions Summary:   Reviewed monoclonal gammopathy of undetermined significance.  Discussed monitoring lab values and significance.  Reviewed thrombocytopenia, the purpose of our platelets and monitoring for bleeding.  We reviewed slim crab criteria.  Patient's questions answered to her liking.  She will call with any questions or concerns.    Thank you for allowing me to care for you today. It was a pleasure meeting you.    Sincerely,  Arlet Monte, APRN-CNP

## 2023-12-12 LAB
25(OH)D3 SERPL-MCNC: 35 NG/ML (ref 30–100)
ALBUMIN: 4.2 G/DL (ref 3.4–5)
ALPHA 1 GLOBULIN: 0.3 G/DL (ref 0.2–0.6)
ALPHA 2 GLOBULIN: 0.7 G/DL (ref 0.4–1.1)
BACTERIA SPEC CULT: ABNORMAL
BETA GLOBULIN: 1.1 G/DL (ref 0.5–1.2)
GAMMA GLOBULIN: 1.1 G/DL (ref 0.5–1.4)
GRAM STN SPEC: ABNORMAL
GRAM STN SPEC: ABNORMAL
HAPTOGLOB SERPL-MCNC: 59 MG/DL (ref 37–246)
IGA SERPL-MCNC: 523 MG/DL (ref 70–400)
IGG SERPL-MCNC: 1090 MG/DL (ref 700–1600)
IGM SERPL-MCNC: 272 MG/DL (ref 40–230)
KAPPA LC SERPL-MCNC: 3.32 MG/DL (ref 0.33–1.94)
KAPPA LC/LAMBDA SER: 2.24 {RATIO} (ref 0.26–1.65)
LAMBDA LC SERPL-MCNC: 1.48 MG/DL (ref 0.57–2.63)
PATH REVIEW-SERUM PROTEIN ELECTROPHORESIS: NORMAL
PROT SERPL-MCNC: 7.5 G/DL (ref 6.4–8.2)
PROTEIN ELECTROPHORESIS COMMENT: NORMAL
VIT B12 SERPL-MCNC: 964 PG/ML (ref 211–911)

## 2023-12-13 LAB — EPO SERPL-ACNC: 25 MU/ML (ref 4–27)

## 2023-12-14 ENCOUNTER — HOSPITAL ENCOUNTER (OUTPATIENT)
Dept: RADIOLOGY | Facility: HOSPITAL | Age: 63
Discharge: HOME | End: 2023-12-14
Payer: MEDICAID

## 2023-12-14 DIAGNOSIS — Z78.0 POST-MENOPAUSAL: ICD-10-CM

## 2023-12-14 DIAGNOSIS — I10 ESSENTIAL HYPERTENSION: ICD-10-CM

## 2023-12-14 DIAGNOSIS — E11.69 TYPE 2 DIABETES MELLITUS WITH OTHER SPECIFIED COMPLICATION, WITHOUT LONG-TERM CURRENT USE OF INSULIN (MULTI): ICD-10-CM

## 2023-12-14 PROCEDURE — 75571 CT HRT W/O DYE W/CA TEST: CPT

## 2023-12-14 PROCEDURE — 77080 DXA BONE DENSITY AXIAL: CPT | Performed by: RADIOLOGY

## 2023-12-14 PROCEDURE — 77080 DXA BONE DENSITY AXIAL: CPT

## 2023-12-15 ENCOUNTER — TELEPHONE (OUTPATIENT)
Dept: HEMATOLOGY/ONCOLOGY | Facility: HOSPITAL | Age: 63
End: 2023-12-15
Payer: MEDICAID

## 2023-12-15 LAB
CELL COUNT (BLOOD): 5.1 X10*3/UL
CELL POPULATIONS: NORMAL
CYTOGENETICS/MOLECULAR TEST ORDERED: NORMAL
DIAGNOSIS: NORMAL
ELECTRONICALLY SIGNED BY: NORMAL
FLOW DIFFERENTIAL: NORMAL
FLOW TEST ORDERED: NORMAL
LAB TEST METHOD: NORMAL
MYELOID NGS RESULTS: NORMAL
NUMBER OF CELLS COLLECTED: NORMAL PER TUBE
PATH REPORT.TOTAL CANCER: NORMAL
RBC MORPH BLD: NORMAL
SIGNATURE COMMENT: NORMAL
SPECIMEN VIABILITY: NORMAL
WBC MORPH BLD: NORMAL

## 2023-12-15 NOTE — TELEPHONE ENCOUNTER
Reached out to patient regarding her upcoming appointment with YASSINE Browne, Let the patient know the providers clinic days have changed. Rescheduled patients appointment for Friday March 15th, 2024 @ 2:00 pm. Patient verbalized and agreed to appointment.

## 2023-12-18 LAB
CHROM ANALY OVERALL INTERP-IMP: NORMAL
ELECTRONICALLY SIGNED BY CYTOGENETICS: NORMAL

## 2023-12-21 DIAGNOSIS — E11.69 TYPE 2 DIABETES MELLITUS WITH OTHER SPECIFIED COMPLICATION, WITHOUT LONG-TERM CURRENT USE OF INSULIN (MULTI): Primary | ICD-10-CM

## 2023-12-21 RX ORDER — ATORVASTATIN CALCIUM 40 MG/1
40 TABLET, FILM COATED ORAL DAILY
Qty: 90 TABLET | Refills: 1 | Status: SHIPPED | OUTPATIENT
Start: 2023-12-21 | End: 2024-05-30

## 2023-12-27 DIAGNOSIS — I10 ESSENTIAL HYPERTENSION: Primary | ICD-10-CM

## 2023-12-28 RX ORDER — BUMETANIDE 1 MG/1
1 TABLET ORAL DAILY PRN
Qty: 90 TABLET | Refills: 3 | Status: SHIPPED | OUTPATIENT
Start: 2023-12-28 | End: 2024-01-08 | Stop reason: SDUPTHER

## 2023-12-29 ENCOUNTER — APPOINTMENT (OUTPATIENT)
Dept: PRIMARY CARE | Facility: CLINIC | Age: 63
End: 2023-12-29
Payer: MEDICAID

## 2023-12-31 PROBLEM — S22.088A: Status: ACTIVE | Noted: 2023-12-31

## 2023-12-31 PROBLEM — J41.0 SIMPLE CHRONIC BRONCHITIS (MULTI): Status: RESOLVED | Noted: 2023-09-21 | Resolved: 2023-12-31

## 2023-12-31 PROBLEM — E11.10 TYPE 2 DIABETES MELLITUS WITH KETOACIDOSIS WITHOUT COMA, WITHOUT LONG-TERM CURRENT USE OF INSULIN (MULTI): Status: ACTIVE | Noted: 2023-09-21

## 2024-01-07 ENCOUNTER — APPOINTMENT (OUTPATIENT)
Dept: CARDIOLOGY | Facility: HOSPITAL | Age: 64
End: 2024-01-07
Payer: MEDICAID

## 2024-01-07 ENCOUNTER — HOSPITAL ENCOUNTER (EMERGENCY)
Facility: HOSPITAL | Age: 64
Discharge: HOME | End: 2024-01-07
Attending: EMERGENCY MEDICINE
Payer: MEDICAID

## 2024-01-07 ENCOUNTER — APPOINTMENT (OUTPATIENT)
Dept: RADIOLOGY | Facility: HOSPITAL | Age: 64
End: 2024-01-07
Payer: MEDICAID

## 2024-01-07 VITALS
OXYGEN SATURATION: 95 % | TEMPERATURE: 97.4 F | DIASTOLIC BLOOD PRESSURE: 70 MMHG | HEART RATE: 80 BPM | BODY MASS INDEX: 28.64 KG/M2 | RESPIRATION RATE: 19 BRPM | WEIGHT: 155.65 LBS | SYSTOLIC BLOOD PRESSURE: 164 MMHG | HEIGHT: 62 IN

## 2024-01-07 DIAGNOSIS — I10 UNCONTROLLED HYPERTENSION: Primary | ICD-10-CM

## 2024-01-07 LAB
ALBUMIN SERPL BCP-MCNC: 4.1 G/DL (ref 3.4–5)
ALP SERPL-CCNC: 88 U/L (ref 33–136)
ALT SERPL W P-5'-P-CCNC: 14 U/L (ref 7–45)
ANION GAP SERPL CALC-SCNC: 12 MMOL/L (ref 10–20)
AST SERPL W P-5'-P-CCNC: 20 U/L (ref 9–39)
BASOPHILS # BLD AUTO: 0.04 X10*3/UL (ref 0–0.1)
BASOPHILS NFR BLD AUTO: 0.8 %
BILIRUB SERPL-MCNC: 1 MG/DL (ref 0–1.2)
BNP SERPL-MCNC: 45 PG/ML (ref 0–99)
BUN SERPL-MCNC: 9 MG/DL (ref 6–23)
CALCIUM SERPL-MCNC: 8.8 MG/DL (ref 8.6–10.3)
CARDIAC TROPONIN I PNL SERPL HS: 8 NG/L (ref 0–13)
CHLORIDE SERPL-SCNC: 98 MMOL/L (ref 98–107)
CO2 SERPL-SCNC: 33 MMOL/L (ref 21–32)
CREAT SERPL-MCNC: 0.56 MG/DL (ref 0.5–1.05)
EOSINOPHIL # BLD AUTO: 0.23 X10*3/UL (ref 0–0.7)
EOSINOPHIL NFR BLD AUTO: 4.5 %
ERYTHROCYTE [DISTWIDTH] IN BLOOD BY AUTOMATED COUNT: 15.1 % (ref 11.5–14.5)
GFR SERPL CREATININE-BSD FRML MDRD: >90 ML/MIN/1.73M*2
GLUCOSE SERPL-MCNC: 167 MG/DL (ref 74–99)
HCT VFR BLD AUTO: 36.9 % (ref 36–46)
HGB BLD-MCNC: 11.7 G/DL (ref 12–16)
HOLD SPECIMEN: NORMAL
HOLD SPECIMEN: NORMAL
IMM GRANULOCYTES # BLD AUTO: 0.06 X10*3/UL (ref 0–0.7)
IMM GRANULOCYTES NFR BLD AUTO: 1.2 % (ref 0–0.9)
INR PPP: 1.2 (ref 0.9–1.1)
LYMPHOCYTES # BLD AUTO: 0.68 X10*3/UL (ref 1.2–4.8)
LYMPHOCYTES NFR BLD AUTO: 13.4 %
MAGNESIUM SERPL-MCNC: 2 MG/DL (ref 1.6–2.4)
MCH RBC QN AUTO: 28.3 PG (ref 26–34)
MCHC RBC AUTO-ENTMCNC: 31.7 G/DL (ref 32–36)
MCV RBC AUTO: 89 FL (ref 80–100)
MONOCYTES # BLD AUTO: 0.3 X10*3/UL (ref 0.1–1)
MONOCYTES NFR BLD AUTO: 5.9 %
NEUTROPHILS # BLD AUTO: 3.75 X10*3/UL (ref 1.2–7.7)
NEUTROPHILS NFR BLD AUTO: 74.2 %
NRBC BLD-RTO: 0 /100 WBCS (ref 0–0)
PLATELET # BLD AUTO: 60 X10*3/UL (ref 150–450)
POTASSIUM SERPL-SCNC: 3.5 MMOL/L (ref 3.5–5.3)
PROT SERPL-MCNC: 7 G/DL (ref 6.4–8.2)
PROTHROMBIN TIME: 13.6 SECONDS (ref 9.8–12.8)
RBC # BLD AUTO: 4.14 X10*6/UL (ref 4–5.2)
SODIUM SERPL-SCNC: 139 MMOL/L (ref 136–145)
WBC # BLD AUTO: 5.1 X10*3/UL (ref 4.4–11.3)

## 2024-01-07 PROCEDURE — 71045 X-RAY EXAM CHEST 1 VIEW: CPT | Mod: FOREIGN READ | Performed by: RADIOLOGY

## 2024-01-07 PROCEDURE — 93005 ELECTROCARDIOGRAM TRACING: CPT

## 2024-01-07 PROCEDURE — 80053 COMPREHEN METABOLIC PANEL: CPT | Performed by: EMERGENCY MEDICINE

## 2024-01-07 PROCEDURE — 99283 EMERGENCY DEPT VISIT LOW MDM: CPT

## 2024-01-07 PROCEDURE — 85610 PROTHROMBIN TIME: CPT | Performed by: EMERGENCY MEDICINE

## 2024-01-07 PROCEDURE — 71045 X-RAY EXAM CHEST 1 VIEW: CPT | Mod: FR

## 2024-01-07 PROCEDURE — 36415 COLL VENOUS BLD VENIPUNCTURE: CPT | Performed by: EMERGENCY MEDICINE

## 2024-01-07 PROCEDURE — 85025 COMPLETE CBC W/AUTO DIFF WBC: CPT | Performed by: EMERGENCY MEDICINE

## 2024-01-07 PROCEDURE — 99284 EMERGENCY DEPT VISIT MOD MDM: CPT | Performed by: EMERGENCY MEDICINE

## 2024-01-07 PROCEDURE — 2500000001 HC RX 250 WO HCPCS SELF ADMINISTERED DRUGS (ALT 637 FOR MEDICARE OP): Mod: SE | Performed by: EMERGENCY MEDICINE

## 2024-01-07 PROCEDURE — 84484 ASSAY OF TROPONIN QUANT: CPT | Performed by: EMERGENCY MEDICINE

## 2024-01-07 PROCEDURE — 83735 ASSAY OF MAGNESIUM: CPT | Performed by: EMERGENCY MEDICINE

## 2024-01-07 PROCEDURE — 83880 ASSAY OF NATRIURETIC PEPTIDE: CPT | Performed by: EMERGENCY MEDICINE

## 2024-01-07 RX ORDER — CARVEDILOL 6.25 MG/1
6.25 TABLET ORAL 2 TIMES DAILY
Status: DISCONTINUED | OUTPATIENT
Start: 2024-01-07 | End: 2024-01-07 | Stop reason: HOSPADM

## 2024-01-07 RX ORDER — LOSARTAN POTASSIUM 50 MG/1
50 TABLET ORAL ONCE
Status: COMPLETED | OUTPATIENT
Start: 2024-01-07 | End: 2024-01-07

## 2024-01-07 RX ADMIN — CARVEDILOL 6.25 MG: 6.25 TABLET, FILM COATED ORAL at 11:07

## 2024-01-07 RX ADMIN — LOSARTAN POTASSIUM 50 MG: 50 TABLET, FILM COATED ORAL at 11:07

## 2024-01-07 ASSESSMENT — COLUMBIA-SUICIDE SEVERITY RATING SCALE - C-SSRS
2. HAVE YOU ACTUALLY HAD ANY THOUGHTS OF KILLING YOURSELF?: NO
6. HAVE YOU EVER DONE ANYTHING, STARTED TO DO ANYTHING, OR PREPARED TO DO ANYTHING TO END YOUR LIFE?: NO
1. IN THE PAST MONTH, HAVE YOU WISHED YOU WERE DEAD OR WISHED YOU COULD GO TO SLEEP AND NOT WAKE UP?: NO

## 2024-01-07 ASSESSMENT — PAIN SCALES - GENERAL
PAINLEVEL_OUTOF10: 0 - NO PAIN
PAINLEVEL_OUTOF10: 0 - NO PAIN

## 2024-01-07 ASSESSMENT — PAIN - FUNCTIONAL ASSESSMENT
PAIN_FUNCTIONAL_ASSESSMENT: 0-10
PAIN_FUNCTIONAL_ASSESSMENT: 0-10

## 2024-01-07 NOTE — ED PROVIDER NOTES
HPI   Chief Complaint   Patient presents with    Hypertension       Chief complaint elevated blood pressure  History of present illness this patient states that she has forgotten to take her 2 blood pressure pills the losartan 50 mg nightly as well as her Coreg 6.25 mg twice daily.  The patient mistakenly thought her cholesterol medication newly prescribed was in fact her antihypertensive medication intending to replace both other meds she now realizes that this was an incorrect assumption.  Patient states that she noticed her blood pressure was up and had a mild headache however the headache is very mild unassociated with any stroke type symptoms no eye issues, no facial numbness no facial droop no difficulty with verbal expression no weakness or numbness to the hand arm or legs no difficulty with balance no chest pain no chest pressure no shortness of breath no abdominal pain no nausea no vomiting no difficulty with gait the patient walks unassisted and lives at home.    Past medical history multiple medical problems including anxiety atrial fibrillation coronary artery disease and hypertension and hypercholesterolemia as well as others  The patient does not smoke cigarettes     physical exam: Initial blood pressure here 214/77    General: Vitals noted, no distress. Afebrile. Alert and oriented  x 4 .  Pupils equal and reactive bilaterally    EENT: TMs clear. Posterior oropharynx unremarkable. No meningismus. No LAD.     Cardiac: Regular, rate, rhythm, no murmurs rubs or gallops.     Pulmonary: Lungs clear bilaterally with good aeration. No adventitious breath sounds. No wheezes rales or rhonchi.     Abdomen: Soft, nonsurgical. Nontender. No peritoneal signs. Normoactive bowel sounds. No pulsatile masses.     Extremities: No peripheral edema. Negative Homans bilaterally, no cords.    Skin: No rash. Intact.     Neuro: No focal neurologic deficits, NIH score of 0. Cranial nerves normal as tested from II through XII.                                [place] Coma Scale Score: 15                  Patient History   Past Medical History:   Diagnosis Date    Anemia     Anxiety     Atrial fibrillation (CMS/HCC)     Coronary artery disease     Depression     Fibromyalgia     Gastroparesis     Gastroparesis    Gout     Hypertension     Hypothyroidism     Thrombocythemia      Past Surgical History:   Procedure Laterality Date    CT GUIDED PERCUTANEOUS BIOPSY BONE DEEP  3/9/2022    CT GUIDED PERCUTANEOUS BIOPSY BONE DEEP 3/9/2022 [facility]    OTHER SURGICAL HISTORY  2019    Tonsillectomy with adenoidectomy    OTHER SURGICAL HISTORY  2019    Bunionectomy    OTHER SURGICAL HISTORY  2019    Hernia repair    OTHER SURGICAL HISTORY  2019    Cholecystectomy    OTHER SURGICAL HISTORY  2019    Uterine nerve ablation    OTHER SURGICAL HISTORY  2019    Ovarian torsion repair    OTHER SURGICAL HISTORY  2019    Tubal ligation     Family History   Problem Relation Name Age of Onset    Heart disease Mother      Hypertension Mother      Arthritis Mother      Diabetes Mother      Hyperlipidemia Mother      Hypertension Father      Brain cancer Father      Diabetes Sister      Diabetes Brother       Social History     Tobacco Use    Smoking status: Former     Types: Cigarettes     Quit date: 2004     Years since quittin.5    Smokeless tobacco: Never   Substance Use Topics    Alcohol use: Not Currently    Drug use: Not on file       Physical Exam   ED Triage Vitals [24 1050]   Temp Heart Rate Resp BP   36.3 °C (97.4 °F) 89 17 (!) 214/77      SpO2 Temp src Heart Rate Source Patient Position   94 % -- -- --      BP Location FiO2 (%)     -- --       Physical Exam done    ED Course & [facility]   ED Course as of 24 1305   Sun 2024   1059 The patient states she has not had her blood pressure medication probably for a week thinking that her cholesterol medicine was in fact her blood pressure  medication.  We will provide her with Coreg 6.25 mg p.o. now as well as losartan 50 mg now we will continue to monitor this patient.  She is feeling better and has no headache at present [AG]   1100 ECG 12 lead  Twelve-lead EKG interpreted by me.  Ventricular rate 83 normal sinus rhythm poor R wave progression anteriorly axis no acute ST-T wave changes no STEMI [AG]   1126 XR chest 1 view  No acute changes  atelectasis left base [AG]   1149 Comprehensive metabolic panel(!)  Glucose elevated at 167 the remainder of the chemistries are unremarkable.  Magnesium is 2.2 which is normal INR 1.2 which is normal white blood count 5100 which is normal hemoglobin slightly low at 11.7. [AG]   1150 Troponin I, High Sensitivity [AG]   1150 Troponin I, High Sensitivity: 8  High-sensitivity troponin is normal at 8 [AG]   1245 B-Type Natriuretic Peptide  BNP is 45 which is unremarkable troponin is 8 which is normal [AG]   1246 Current blood pressure with 167/90.  The patient states she feels fine no dizziness no headache no other symptoms no chest discomfort the patient states she now understands how she is supposed to take her to blood pressure medications and asks me to call in to the pharmacy both of her blood pressure pills.  She is scheduled to see her physician tomorrow for follow-up and she is asymptomatic at this time and much improved [AG]      ED Course User Index  [AG] David Goodwin MD         Diagnoses as of 01/07/24 1305   Uncontrolled hypertension       Medical Decision Making  I considered serious life-threatening and not life-threatening causes for this patient's accelerated hypertension.  The patient readily admits she has not been taking her 2 blood pressure pills for the past week and has only a mild headache with a normal neurological exam and an EKG which is stable revealing sinus rhythm with no acute ST-T changes we will provide the patient with her usual doses of her blood pressure medications both Coreg  and losartan and continue to monitor her closely the patient is quite stable at this time workup is pending    Procedure  Procedures  none     David Goodwin MD  01/07/24 1147       David Goodwin MD  01/07/24 1151       David Goodwin MD  01/07/24 1251       David Goodwin MD  01/07/24 1301

## 2024-01-08 ENCOUNTER — OFFICE VISIT (OUTPATIENT)
Dept: CARDIOLOGY | Facility: CLINIC | Age: 64
End: 2024-01-08
Payer: MEDICAID

## 2024-01-08 VITALS
BODY MASS INDEX: 27.98 KG/M2 | OXYGEN SATURATION: 92 % | DIASTOLIC BLOOD PRESSURE: 106 MMHG | HEART RATE: 90 BPM | SYSTOLIC BLOOD PRESSURE: 192 MMHG | WEIGHT: 153 LBS

## 2024-01-08 DIAGNOSIS — I50.32 CHRONIC DIASTOLIC HEART FAILURE (MULTI): Primary | ICD-10-CM

## 2024-01-08 DIAGNOSIS — I10 PRIMARY HYPERTENSION: ICD-10-CM

## 2024-01-08 DIAGNOSIS — I10 ESSENTIAL HYPERTENSION: ICD-10-CM

## 2024-01-08 PROCEDURE — 99214 OFFICE O/P EST MOD 30 MIN: CPT | Performed by: INTERNAL MEDICINE

## 2024-01-08 PROCEDURE — 3077F SYST BP >= 140 MM HG: CPT | Performed by: INTERNAL MEDICINE

## 2024-01-08 PROCEDURE — 3080F DIAST BP >= 90 MM HG: CPT | Performed by: INTERNAL MEDICINE

## 2024-01-08 PROCEDURE — 1036F TOBACCO NON-USER: CPT | Performed by: INTERNAL MEDICINE

## 2024-01-08 PROCEDURE — 4010F ACE/ARB THERAPY RXD/TAKEN: CPT | Performed by: INTERNAL MEDICINE

## 2024-01-08 RX ORDER — CARVEDILOL 6.25 MG/1
TABLET ORAL
Qty: 180 TABLET | Refills: 3 | Status: SHIPPED | OUTPATIENT
Start: 2024-01-08 | End: 2024-01-10 | Stop reason: SDUPTHER

## 2024-01-08 RX ORDER — POTASSIUM CHLORIDE 20 MEQ/1
20 TABLET, EXTENDED RELEASE ORAL DAILY
Qty: 30 TABLET | Refills: 3 | Status: SHIPPED | OUTPATIENT
Start: 2024-01-08 | End: 2024-04-12 | Stop reason: SDUPTHER

## 2024-01-08 RX ORDER — BUMETANIDE 1 MG/1
1 TABLET ORAL DAILY
Qty: 90 TABLET | Refills: 3 | Status: SHIPPED | OUTPATIENT
Start: 2024-01-08 | End: 2025-01-07

## 2024-01-08 RX ORDER — CARVEDILOL 12.5 MG/1
TABLET ORAL
Qty: 60 TABLET | Refills: 11 | Status: SHIPPED | OUTPATIENT
Start: 2024-01-08 | End: 2024-01-08 | Stop reason: DRUGHIGH

## 2024-01-08 ASSESSMENT — PAIN SCALES - GENERAL: PAINLEVEL: 7

## 2024-01-08 NOTE — ASSESSMENT & PLAN NOTE
Heart failure continues to be well compensated and her chronic leg edema is more on the basis of venous insufficiency and cirrhosis.  Diuretic therapy with Bumex on a daily basis for hypertension and leg edema, potassium on a daily basis.  It turns out she is only taking her carvedilol 6.25 mg once daily which is why her blood pressure is poorly controlled so we will increase the dose to twice daily and follow-up in 3 months for reassessment

## 2024-01-08 NOTE — PATIENT INSTRUCTIONS
Chronic diastolic heart failure (CMS/HCC)  Heart failure continues to be well compensated and her chronic leg edema is more on the basis of venous insufficiency and cirrhosis

## 2024-01-08 NOTE — PROGRESS NOTES
Subjective      Chief Complaint   Patient presents with    high BP         Here to reassess her diastolic heart failure in the setting of cirrhosis and chronic leg edema.      Previous: Echocardiogram April 2022 with nonobstructive left ventricular septal hypertrophy and LVEF 60% and impaired left ventricular diastolic relaxation.  She is intolerant to metolazone previously used for leg edema and uses furosemide.    She had an equivocal history of previous myocardial infarction that was actually mentioned in the setting of an emergency room visit where her EKG was apparently abnormal but she does not have a definitive history of myocardial infarction no previous cardiac catheterization.  Prior EKG July 2021 did show mild QT prolongation with a QTc of 509.  She has chronic leg edema from her chronic cirrhosis         Review of Systems   All other systems reviewed and are negative.       Objective   Physical Exam  Constitutional:       Appearance: Normal appearance.   HENT:      Head: Normocephalic and atraumatic.   Eyes:      Pupils: Pupils are equal, round, and reactive to light.   Cardiovascular:      Rate and Rhythm: Normal rate and regular rhythm.      Pulses: Normal pulses.      Heart sounds: Normal heart sounds.   Pulmonary:      Effort: Pulmonary effort is normal.      Breath sounds: Normal breath sounds.   Abdominal:      General: Abdomen is flat. Bowel sounds are normal.      Palpations: Abdomen is soft.   Musculoskeletal:         General: Normal range of motion.      Cervical back: Normal range of motion.   Skin:     General: Skin is warm and dry.   Neurological:      General: No focal deficit present.   Psychiatric:         Mood and Affect: Mood normal.         Judgment: Judgment normal.          Lab Review:   Not applicable    Chronic diastolic heart failure (CMS/HCC)  Heart failure continues to be well compensated and her chronic leg edema is more on the basis of venous insufficiency and cirrhosis

## 2024-01-08 NOTE — ASSESSMENT & PLAN NOTE
Increase her carvedilol to 12.5 mg twice daily for better control of her hypertension.  So she is not on any diuretics because she was on furosemide analysis that was crossed while it is it is going and asked her because I do not understand how lady with leg edema and cirrhosis

## 2024-01-10 DIAGNOSIS — M06.9 RHEUMATOID ARTHRITIS INVOLVING MULTIPLE SITES, UNSPECIFIED WHETHER RHEUMATOID FACTOR PRESENT (MULTI): ICD-10-CM

## 2024-01-10 DIAGNOSIS — H60.313 ACUTE DIFFUSE OTITIS EXTERNA OF BOTH EARS: ICD-10-CM

## 2024-01-10 DIAGNOSIS — I10 PRIMARY HYPERTENSION: ICD-10-CM

## 2024-01-10 DIAGNOSIS — J32.0 MAXILLARY SINUSITIS, UNSPECIFIED CHRONICITY: ICD-10-CM

## 2024-01-10 LAB
ATRIAL RATE: 83 BPM
P AXIS: 54 DEGREES
P OFFSET: 190 MS
P ONSET: 131 MS
PR INTERVAL: 156 MS
Q ONSET: 209 MS
QRS COUNT: 13 BEATS
QRS DURATION: 88 MS
QT INTERVAL: 400 MS
QTC CALCULATION(BAZETT): 470 MS
QTC FREDERICIA: 446 MS
R AXIS: -22 DEGREES
T AXIS: 92 DEGREES
T OFFSET: 409 MS
VENTRICULAR RATE: 83 BPM

## 2024-01-10 RX ORDER — LORATADINE 10 MG/1
10 TABLET ORAL DAILY
Qty: 30 TABLET | Refills: 11 | Status: SHIPPED | OUTPATIENT
Start: 2024-01-10 | End: 2024-01-29 | Stop reason: WASHOUT

## 2024-01-10 RX ORDER — CARVEDILOL 6.25 MG/1
TABLET ORAL
Qty: 180 TABLET | Refills: 3 | Status: SHIPPED | OUTPATIENT
Start: 2024-01-10 | End: 2024-01-29 | Stop reason: SDUPTHER

## 2024-01-10 RX ORDER — LEFLUNOMIDE 20 MG/1
20 TABLET ORAL DAILY
Qty: 90 TABLET | Refills: 1 | Status: SHIPPED | OUTPATIENT
Start: 2024-01-10

## 2024-01-15 ENCOUNTER — APPOINTMENT (OUTPATIENT)
Dept: RHEUMATOLOGY | Facility: CLINIC | Age: 64
End: 2024-01-15
Payer: MEDICAID

## 2024-01-15 RX ORDER — CARVEDILOL 6.25 MG/1
TABLET ORAL
Qty: 180 TABLET | Refills: 3 | Status: SHIPPED | OUTPATIENT
Start: 2024-01-15

## 2024-01-18 ENCOUNTER — APPOINTMENT (OUTPATIENT)
Dept: RADIOLOGY | Facility: HOSPITAL | Age: 64
End: 2024-01-18
Payer: MEDICAID

## 2024-01-18 ENCOUNTER — HOSPITAL ENCOUNTER (EMERGENCY)
Facility: HOSPITAL | Age: 64
Discharge: HOME | End: 2024-01-18
Attending: FAMILY MEDICINE
Payer: MEDICAID

## 2024-01-18 VITALS
OXYGEN SATURATION: 98 % | RESPIRATION RATE: 18 BRPM | SYSTOLIC BLOOD PRESSURE: 162 MMHG | BODY MASS INDEX: 28.52 KG/M2 | HEIGHT: 62 IN | WEIGHT: 155 LBS | TEMPERATURE: 98.1 F | DIASTOLIC BLOOD PRESSURE: 71 MMHG | HEART RATE: 91 BPM

## 2024-01-18 DIAGNOSIS — J01.00 ACUTE MAXILLARY SINUSITIS, RECURRENCE NOT SPECIFIED: Primary | ICD-10-CM

## 2024-01-18 DIAGNOSIS — J20.9 ACUTE BRONCHITIS, UNSPECIFIED ORGANISM: ICD-10-CM

## 2024-01-18 LAB
FLUAV RNA RESP QL NAA+PROBE: NOT DETECTED
FLUBV RNA RESP QL NAA+PROBE: NOT DETECTED
SARS-COV-2 RNA RESP QL NAA+PROBE: NOT DETECTED

## 2024-01-18 PROCEDURE — 71046 X-RAY EXAM CHEST 2 VIEWS: CPT

## 2024-01-18 PROCEDURE — 71046 X-RAY EXAM CHEST 2 VIEWS: CPT | Performed by: RADIOLOGY

## 2024-01-18 PROCEDURE — 2500000002 HC RX 250 W HCPCS SELF ADMINISTERED DRUGS (ALT 637 FOR MEDICARE OP, ALT 636 FOR OP/ED): Mod: SE | Performed by: FAMILY MEDICINE

## 2024-01-18 PROCEDURE — 87636 SARSCOV2 & INF A&B AMP PRB: CPT | Performed by: FAMILY MEDICINE

## 2024-01-18 PROCEDURE — 2500000004 HC RX 250 GENERAL PHARMACY W/ HCPCS (ALT 636 FOR OP/ED): Mod: SE

## 2024-01-18 PROCEDURE — 99283 EMERGENCY DEPT VISIT LOW MDM: CPT | Performed by: FAMILY MEDICINE

## 2024-01-18 RX ORDER — IPRATROPIUM BROMIDE AND ALBUTEROL SULFATE 2.5; .5 MG/3ML; MG/3ML
3 SOLUTION RESPIRATORY (INHALATION)
Status: DISCONTINUED | OUTPATIENT
Start: 2024-01-18 | End: 2024-01-18 | Stop reason: HOSPADM

## 2024-01-18 RX ORDER — AMOXICILLIN AND CLAVULANATE POTASSIUM 875; 125 MG/1; MG/1
1 TABLET, FILM COATED ORAL EVERY 12 HOURS
Qty: 20 TABLET | Refills: 0 | Status: SHIPPED | OUTPATIENT
Start: 2024-01-18 | End: 2024-01-29 | Stop reason: WASHOUT

## 2024-01-18 RX ORDER — DEXAMETHASONE 4 MG/1
TABLET ORAL
Status: COMPLETED
Start: 2024-01-18 | End: 2024-01-18

## 2024-01-18 RX ORDER — DEXAMETHASONE 4 MG/1
10 TABLET ORAL ONCE
Status: COMPLETED | OUTPATIENT
Start: 2024-01-18 | End: 2024-01-18

## 2024-01-18 RX ORDER — GUAIFENESIN, PSEUDOEPHEDRINE HYDROCHLORIDE 600; 60 MG/1; MG/1
1 TABLET, EXTENDED RELEASE ORAL EVERY 12 HOURS
Qty: 60 TABLET | Refills: 0 | Status: SHIPPED | OUTPATIENT
Start: 2024-01-18 | End: 2024-04-10 | Stop reason: WASHOUT

## 2024-01-18 RX ORDER — ALBUTEROL SULFATE 90 UG/1
1-2 AEROSOL, METERED RESPIRATORY (INHALATION) EVERY 6 HOURS PRN
Qty: 18 G | Refills: 0 | Status: SHIPPED | OUTPATIENT
Start: 2024-01-18 | End: 2024-03-20 | Stop reason: WASHOUT

## 2024-01-18 RX ADMIN — DEXAMETHASONE 10 MG: 4 TABLET ORAL at 14:08

## 2024-01-18 RX ADMIN — IPRATROPIUM BROMIDE AND ALBUTEROL SULFATE 3 ML: .5; 3 SOLUTION RESPIRATORY (INHALATION) at 13:32

## 2024-01-18 ASSESSMENT — PAIN SCALES - GENERAL: PAINLEVEL_OUTOF10: 0 - NO PAIN

## 2024-01-18 ASSESSMENT — COLUMBIA-SUICIDE SEVERITY RATING SCALE - C-SSRS
6. HAVE YOU EVER DONE ANYTHING, STARTED TO DO ANYTHING, OR PREPARED TO DO ANYTHING TO END YOUR LIFE?: NO
1. IN THE PAST MONTH, HAVE YOU WISHED YOU WERE DEAD OR WISHED YOU COULD GO TO SLEEP AND NOT WAKE UP?: NO
2. HAVE YOU ACTUALLY HAD ANY THOUGHTS OF KILLING YOURSELF?: NO

## 2024-01-18 ASSESSMENT — PAIN - FUNCTIONAL ASSESSMENT: PAIN_FUNCTIONAL_ASSESSMENT: 0-10

## 2024-01-18 NOTE — DISCHARGE INSTRUCTIONS
Your COVID-19 test was negative chest x-ray showed no pneumonia and there was no evidence of otitis media or ear infection however he had sinusitis and cough and congestion bronchitis you been put on antibiotic, use antibiotic as prescribed.  Also you are recommended to use inhaler.  They started with dose given in the ER should work for next few days.  If any problem concern return to ER.  If any chest pain or short of breath please return to ER follow-up with your primary care physician.

## 2024-01-18 NOTE — ED PROVIDER NOTES
HPI   Chief Complaint   Patient presents with    URI       HPI  This is a 63-year-old female patient came to the emergency room with a complaint of upper respiratory infection symptoms for last several days symptoms started with upper respiratory symptoms congestion with sinus drainage she has been in cough and congestion off-and-on she also ear infection was treated with eardrops for ear symptoms resolved she still has sinus congestion stuffy nose now she has been in cough and coughing up some sputum.  She denies any chest pain or chest tightness or difficulty breathing.  Denies any pain or swelling legs.  Denies abdominal pain vomiting or diarrhea.  She has used inhaler in the past for allergy induced bronchospasm.  She thought she was having wheezing.  Denies chest tightness hemoptysis pain or swelling legs or difficulty breathing.    Family history: Reviewed  Social history: Reviewed, denies substance abuse.    Review of system: 10 review of system obtained review of system as described in HPI otherwise unremarkable                  [place] Coma Scale Score: 15                  Patient History   Past Medical History:   Diagnosis Date    Anemia     Anxiety     Atrial fibrillation (CMS/HCC)     Coronary artery disease     Depression     Fibromyalgia     Gastroparesis     Gastroparesis    Gout     Hypertension     Hypothyroidism     Thrombocythemia      Past Surgical History:   Procedure Laterality Date    CT GUIDED PERCUTANEOUS BIOPSY BONE DEEP  3/9/2022    CT GUIDED PERCUTANEOUS BIOPSY BONE DEEP 3/9/2022 [facility]    OTHER SURGICAL HISTORY  08/16/2019    Tonsillectomy with adenoidectomy    OTHER SURGICAL HISTORY  08/16/2019    Bunionectomy    OTHER SURGICAL HISTORY  08/16/2019    Hernia repair    OTHER SURGICAL HISTORY  08/16/2019    Cholecystectomy    OTHER SURGICAL HISTORY  08/16/2019    Uterine nerve ablation    OTHER SURGICAL HISTORY  08/16/2019    Ovarian torsion repair    OTHER SURGICAL HISTORY   2019    Tubal ligation     Family History   Problem Relation Name Age of Onset    Heart disease Mother      Hypertension Mother      Arthritis Mother      Diabetes Mother      Hyperlipidemia Mother      Hypertension Father      Brain cancer Father      Diabetes Sister      Diabetes Brother       Social History     Tobacco Use    Smoking status: Former     Types: Cigarettes     Quit date: 2004     Years since quittin.5    Smokeless tobacco: Never   Substance Use Topics    Alcohol use: Not Currently    Drug use: Never       Physical Exam   ED Triage Vitals [24 1155]   Temp Heart Rate Resp BP   36.7 °C (98.1 °F) 84 19 172/82      SpO2 Temp src Heart Rate Source Patient Position   97 % -- -- --      BP Location FiO2 (%)     -- --       Physical Exam    CONSTITUTIONAL: Mucinex DElderly female patient was awake and alert noted some upper congestion no tachypnea hypoxemia respite distress noted breathing comfortably.  No drooling stridor noted.  She did not look sick toxic distress  HENMT: Ear canal patent TM appeared gray following, prior throat without any edema x-ray discharge she will did have some postnasal drainage.  No drooling or stridor noted neck was supple     CARDIOVASCULAR: Regular rate and rhythm. No friction rub or murmur good peripheral pulses no peripheral edema. Nontender Homans sign negative. RESPIRATORY: Patient noted a few scattered expiratory wheezing no tachypnea hypoxemia respite distress noted chest wall retraction good skin perfusion did not have any significant cough and I examined her how she admitted some cough at home.  Denies any vomiting and no tachypnea hypoxemia respite distress noted good skin perfusion.  Calf ulcer nontender Homans' sign negative  GASTROINTESTINAL: Abdomen soft positive bowel sound nontender no guarding rebound surgery.      GENITOURINARY:  No costovertebral angle tenderness.     MUSCULOSKELETAL: Head was normocephalic atraumatic cervical thoracic  lumbar spine nontender.  Chest was nontender  Both upper extremity good motion nontender intact distal pulse intact sensation.     NEUROLOGICAL: Awake alert pleasant and cooperative. No motor or sensory deficit no arms selective noted. Intact neurovascular function and motor function. No facial drooping or drooling. Talking and breathing comfortably.  Cranial nerves II to XII grossly intact. No arms selective noted. No nystagmus.      SKIN: Skin normal color for race, warm, dry and intact. No evidence of trauma or lesions.     PSYCHIATRIC: Awake alert and without acute distress. No obvious depression, no suicidal thoughts or ideation. Appropriate mood. Talking and normal tone. HEME/LYMPH: No adenopathy or splenomegaly.        CRITICAL CARE    VITAL SIGNS:       heart rate 60 respiratory 19 blood pressure 152/71 pulse ox 92% on O2 through nasal cannula           DISPOSITION      ED Course & MDM   Diagnoses as of 01/18/24 1352   Acute maxillary sinusitis, recurrence not specified   Acute bronchitis, unspecified organism       Medical Decision Making  Complete history and physical as described above patient reported emergency room with upper airway congestion stuffy nose sinus congestion cough and coughing up some sputum without any chest pain abdominal pain vomiting or diarrhea.  Was recently treated with eardrops for ear infection she denies any respiratory symptoms off-and-on for almost 1 month.  Denies history of chronic asthma or chronic cardiopulmonary problems.  Denies any pain or swelling legs.  Denies any chest pain abdominal pain vomiting or diarrhea.    Upon examination she did not look sick toxic distress noted hyperattenuation throat with postnasal drainage no edema x-ray discharge intact canal patent TM appeared gray no history of otitis media neck was supple she did upper congestion.  On auscultation Few scattered expiratory wheezing but no other requires any tachypnea hypoxemia respite distress moving  air well and breathing comfortably no peripheral edema.  Heart regular rate and rhythm lungs auscultated abdomen soft nontender neck was supple no extremity dyspnea no CVA tenderness noted calf is nontender Homans' sign negative neurovascular examination within normal range.  Patient COVID-19 test influenza A influenza B was negative.  Chest x-ray showed no acute infiltrate effusion or pneumothorax.  Her vital signs are stable.  She was afebrile.    She was given DuoNeb aerosol treatment with marked improvement and subsequently also give her a dose of dexamethasone.  Patient is being discharged home with prescription for Augmentin to cover both sinusitis and bronchitis and respiratory infection symptoms in addition she has been put on inhaler and Mucinex D.  If any problem concern she was advised to return to ER follow-up primary care physician.  Discharged home in stable condition.  I answered all question she understood instruction well and discharged in stable condition.,  No chest pain or short of breath.      Procedure  Procedures     Son Roman MD  01/18/24 6521       Son Roman MD  01/18/24 3020

## 2024-01-22 ENCOUNTER — APPOINTMENT (OUTPATIENT)
Dept: RHEUMATOLOGY | Facility: CLINIC | Age: 64
End: 2024-01-22
Payer: MEDICAID

## 2024-01-29 ENCOUNTER — TELEPHONE (OUTPATIENT)
Dept: PRIMARY CARE | Facility: CLINIC | Age: 64
End: 2024-01-29

## 2024-01-29 ENCOUNTER — LAB (OUTPATIENT)
Dept: LAB | Facility: LAB | Age: 64
End: 2024-01-29
Payer: MEDICAID

## 2024-01-29 ENCOUNTER — OFFICE VISIT (OUTPATIENT)
Dept: PRIMARY CARE | Facility: CLINIC | Age: 64
End: 2024-01-29
Payer: MEDICAID

## 2024-01-29 VITALS
DIASTOLIC BLOOD PRESSURE: 64 MMHG | RESPIRATION RATE: 17 BRPM | HEART RATE: 90 BPM | BODY MASS INDEX: 28.66 KG/M2 | OXYGEN SATURATION: 93 % | SYSTOLIC BLOOD PRESSURE: 108 MMHG | WEIGHT: 151.8 LBS | HEIGHT: 61 IN

## 2024-01-29 DIAGNOSIS — E11.9 TYPE 2 DIABETES MELLITUS WITHOUT COMPLICATION, WITHOUT LONG-TERM CURRENT USE OF INSULIN (MULTI): ICD-10-CM

## 2024-01-29 DIAGNOSIS — G43.709 CHRONIC MIGRAINE WITHOUT AURA WITHOUT STATUS MIGRAINOSUS, NOT INTRACTABLE: ICD-10-CM

## 2024-01-29 DIAGNOSIS — M81.8 ADULT IDIOPATHIC GENERALIZED OSTEOPOROSIS: Primary | ICD-10-CM

## 2024-01-29 PROBLEM — J01.00 ACUTE MAXILLARY SINUSITIS: Status: RESOLVED | Noted: 2024-01-18 | Resolved: 2024-01-29

## 2024-01-29 PROCEDURE — 3078F DIAST BP <80 MM HG: CPT | Performed by: STUDENT IN AN ORGANIZED HEALTH CARE EDUCATION/TRAINING PROGRAM

## 2024-01-29 PROCEDURE — 83036 HEMOGLOBIN GLYCOSYLATED A1C: CPT

## 2024-01-29 PROCEDURE — 3074F SYST BP LT 130 MM HG: CPT | Performed by: STUDENT IN AN ORGANIZED HEALTH CARE EDUCATION/TRAINING PROGRAM

## 2024-01-29 PROCEDURE — 4010F ACE/ARB THERAPY RXD/TAKEN: CPT | Performed by: STUDENT IN AN ORGANIZED HEALTH CARE EDUCATION/TRAINING PROGRAM

## 2024-01-29 PROCEDURE — 3052F HG A1C>EQUAL 8.0%<EQUAL 9.0%: CPT | Performed by: STUDENT IN AN ORGANIZED HEALTH CARE EDUCATION/TRAINING PROGRAM

## 2024-01-29 PROCEDURE — 1036F TOBACCO NON-USER: CPT | Performed by: STUDENT IN AN ORGANIZED HEALTH CARE EDUCATION/TRAINING PROGRAM

## 2024-01-29 PROCEDURE — 36415 COLL VENOUS BLD VENIPUNCTURE: CPT

## 2024-01-29 PROCEDURE — 99215 OFFICE O/P EST HI 40 MIN: CPT | Performed by: STUDENT IN AN ORGANIZED HEALTH CARE EDUCATION/TRAINING PROGRAM

## 2024-01-29 RX ORDER — ALENDRONATE SODIUM 70 MG/1
70 TABLET ORAL
Qty: 12 TABLET | Refills: 1 | Status: SHIPPED | OUTPATIENT
Start: 2024-01-29 | End: 2024-04-10 | Stop reason: SDUPTHER

## 2024-01-29 RX ORDER — SEMAGLUTIDE 0.68 MG/ML
0.25 INJECTION, SOLUTION SUBCUTANEOUS
Qty: 3 ML | Refills: 1 | Status: SHIPPED | OUTPATIENT
Start: 2024-01-29 | End: 2024-01-29 | Stop reason: WASHOUT

## 2024-01-29 ASSESSMENT — PATIENT HEALTH QUESTIONNAIRE - PHQ9
2. FEELING DOWN, DEPRESSED OR HOPELESS: SEVERAL DAYS
SUM OF ALL RESPONSES TO PHQ9 QUESTIONS 1 AND 2: 1
10. IF YOU CHECKED OFF ANY PROBLEMS, HOW DIFFICULT HAVE THESE PROBLEMS MADE IT FOR YOU TO DO YOUR WORK, TAKE CARE OF THINGS AT HOME, OR GET ALONG WITH OTHER PEOPLE: SOMEWHAT DIFFICULT
1. LITTLE INTEREST OR PLEASURE IN DOING THINGS: NOT AT ALL

## 2024-01-29 NOTE — PROGRESS NOTES
History Of Present Illness  Daniela Saez is a 63 y.o. female presents to the office for 1 month follow up.    Interval Hx:  Two ED visits: HTN and URI  She has been experiencing   Continues to have mucous  Feels she need to improve her immune system       Prolia concerns     Patient feels that she needs to lose weight to be able to have surgery  Patient is adamant that she would like to try a GLP-1 agonist such as Ozempic to help her jumpstart her metabolism         Past Medical History  She has a past medical history of Anemia, Anxiety, Atrial fibrillation (CMS/HCC), Coronary artery disease, Depression, Fibromyalgia, Gastroparesis, Gout, Hypertension, Hypothyroidism, and Thrombocythemia.    Surgical History  She has a past surgical history that includes Other surgical history (08/16/2019); Other surgical history (08/16/2019); Other surgical history (08/16/2019); Other surgical history (08/16/2019); Other surgical history (08/16/2019); Other surgical history (08/16/2019); Other surgical history (08/16/2019); and CT guided percutaneous biopsy bone deep (3/9/2022).     Social History  She reports that she quit smoking about 19 years ago. Her smoking use included cigarettes. She has never used smokeless tobacco. She reports that she does not currently use alcohol. She reports that she does not use drugs.    Family History  Family History   Problem Relation Name Age of Onset    Heart disease Mother      Hypertension Mother      Arthritis Mother      Diabetes Mother      Hyperlipidemia Mother      Hypertension Father      Brain cancer Father      Diabetes Sister      Diabetes Brother          Allergies  Metoclopramide, Fentanyl, Gabapentin, Levetiracetam, Metformin, Morphine-naltrexone, and Torsemide       Physical Exam  Vitals reviewed.   Constitutional:       Appearance: Normal appearance.   Cardiovascular:      Rate and Rhythm: Normal rate and regular rhythm.      Heart sounds: No murmur heard.  Pulmonary:       Effort: Pulmonary effort is normal. No respiratory distress.      Breath sounds: Normal breath sounds. No wheezing.   Musculoskeletal:      Cervical back: Neck supple.      Left lower leg: No edema.   Neurological:      Mental Status: She is alert.          Last Recorded Vitals  /64   Pulse 90   Resp 17   Wt 68.9 kg (151 lb 12.8 oz)   SpO2 93%     Relevant Results    Current Outpatient Medications:     acetaminophen (Tylenol) 325 mg tablet, Take 3 tablets (975 mg) by mouth every 4 hours if needed for mild pain (1 - 3)., Disp: , Rfl:     albuterol 90 mcg/actuation inhaler, Inhale 1-2 puffs every 6 hours if needed for wheezing., Disp: 18 g, Rfl: 0    amitriptyline (Elavil) 50 mg tablet, Take 1 tablet (50 mg) by mouth once daily., Disp: , Rfl:     atorvastatin (Lipitor) 40 mg tablet, Take 1 tablet (40 mg) by mouth once daily., Disp: 90 tablet, Rfl: 1    baclofen (Lioresal) 10 mg tablet, Take 2 tablets (20 mg) by mouth 2 times a day., Disp: , Rfl:     blood sugar diagnostic (OneTouch Verio test strips) strip, TEST 4 TIMES A DAY. MAY SUBSTITUTE WITH INSURANCE COVERED STRIPS., Disp: , Rfl:     bumetanide (Bumex) 1 mg tablet, Take 1 tablet (1 mg) by mouth once daily., Disp: 90 tablet, Rfl: 3    butalbital-acetaminophen-caff (Fioricet) -40 mg capsule, Take 1 capsule by mouth every 6 hours if needed for headaches., Disp: , Rfl:     carboxymethylcellulose (Refresh Tears) 0.5 % ophthalmic solution, 1-2 drops each eye 3 times a day for 30 days, Disp: , Rfl:     carvedilol (Coreg) 6.25 mg tablet, TAKE ONE TABLET BY MOUTH TWICE DAILY, Disp: 180 tablet, Rfl: 3    clopidogrel (Plavix) 75 mg tablet, TAKE 1 TABLET BY MOUTH EVERY DAY for 90, Disp: 90 tablet, Rfl: 1    famotidine (Pepcid) 20 mg tablet, Take 1 tablet (20 mg) by mouth 2 times a day., Disp: , Rfl:     fluocinonide (Lidex) 0.05 % cream, Apply topically 2 times a day., Disp: , Rfl:     hydrocortisone (Cortef) 5 mg tablet, Take 1-2 tablets (5-10 mg) by mouth  3 times a day., Disp: 540 tablet, Rfl: 3    ketotifen (Zaditor) 0.025 % (0.035 %) ophthalmic solution, Administer 1 drop into both eyes 2 times a day., Disp: 10 mL, Rfl: 1    leflunomide (Arava) 20 mg tablet, TAKE ONE TABLET BY MOUTH EVERY DAY, Disp: 90 tablet, Rfl: 1    levocetirizine (Xyzal) 5 mg tablet, 1 tablet in the evening Orally Once a day for 30 day(s), Disp: , Rfl:     levothyroxine (Synthroid, Levoxyl) 88 mcg tablet, 1 tablet daily 6 days per week, 2 tablets one day per week, Disp: 105 tablet, Rfl: 3    lidocaine (Lidoderm) 5 % patch, apply up to 3 patches to skin once a day, remove after 12 hours for 30 days, Disp: , Rfl:     losartan (Cozaar) 50 mg tablet, Take 1 tablet (50 mg) by mouth once daily., Disp: 90 tablet, Rfl: 3    miconazole (Micotin) 2 % powder,  , Disp: , Rfl:     morphine (MSIR) 30 mg tablet, Take 1 tablet (30 mg) by mouth 2 times a day., Disp: , Rfl:     morphine CR (MS Contin) 15 mg 12 hr tablet, Take 1 tablet (15 mg) by mouth 3 times a day., Disp: , Rfl:     mupirocin (Bactroban) 2 % ointment, Apply topically 3 times a day., Disp: , Rfl:     nystatin (Mycostatin) cream, Apply topically 2 times a day., Disp: , Rfl:     ondansetron (Zofran) 4 mg tablet, Take 1 tablet (4 mg) by mouth every 8 hours if needed for nausea or vomiting., Disp: , Rfl:     pancrelipase, Lip-Prot-Amyl, (Creon) 36,000-114,000- 180,000 unit capsule,delayed release(DR/EC) capsule, Take 1 capsule by mouth 2 times a day., Disp: , Rfl:     pantoprazole (ProtoNix) 40 mg EC tablet, Take 1 tablet (40 mg) by mouth once daily., Disp: , Rfl:     polyethylene glycol (Miralax) 17 gram/dose powder,  , Disp: , Rfl:     potassium chloride CR (Klor-Con M20) 20 mEq ER tablet, Take 1 tablet (20 mEq) by mouth once daily. Do not crush or chew., Disp: 30 tablet, Rfl: 3    pseudoephedrine-guaifenesin (Mucinex D)  mg 12 hr tablet, Take 1 tablet by mouth every 12 hours. Do not crush, chew, or split., Disp: 60 tablet, Rfl: 0     sucralfate (Carafate) 1 gram tablet, 1 tablet on an empty stomach Orally Twice a day, Disp: , Rfl:     SUMAtriptan (Imitrex) 25 mg tablet, TAKE 1 TABLET BY MOUTH AS NEEDED FOR MIGRAINE. MAY REPEAT IN 2 HOURS IF NEEDED., Disp: , Rfl:     tiotropium-olodateroL (Stiolto Respimat) 2.5-2.5 mcg/actuation mist inhaler, 2 puffs Inhalation Once a day for 30 days, Disp: , Rfl:     triamcinolone (Kenalog) 0.1 % cream, 1 application as needed Externally Twice a day as needed, Disp: , Rfl:     albuterol 90 mcg/actuation inhaler, inhale 2 puffs by mouth four times a day, Disp: , Rfl:     allopurinol (Zyloprim) 100 mg tablet, Take 1 tablet (100 mg) by mouth once daily., Disp: 90 tablet, Rfl: 0    carvedilol (Coreg) 6.25 mg tablet, 1 tab Orally Twice a day for 30 days (Patient not taking: Reported on 1/29/2024), Disp: 180 tablet, Rfl: 3    fluticasone (Flonase Sensimist) 27.5 mcg/actuation nasal spray, Administer 2 sprays into each nostril once daily at bedtime., Disp: 10 g, Rfl: 1    hydroxychloroquine (Plaquenil) 200 mg tablet, Take 1.5 tablets (300 mg) by mouth once daily. Take 1 and 1/2 (one and one-half) tablets by mouth daily., Disp: 135 tablet, Rfl: 0    loratadine (Claritin) 10 mg tablet, TAKE ONE TABLET BY MOUTH ONCE daily (Patient not taking: Reported on 1/29/2024), Disp: 30 tablet, Rfl: 11    oxyCODONE myristate (Xtampza ER) 9 mg 12 hr abuse-deterrent capsule, Take 1 capsule (9 mg) by mouth every 12 hours., Disp: , Rfl:           Assessment/Plan   Past medical history of hypertension, CAD, A-fib, adrenal insufficiency, diabetes, MGUS, seronegative rheumatoid arthritis, psoriatic arthritis, thrombocytopenia, migraines, chronic sinus infections, ulcerative colitis, anxiety/depression, insomnia     HTN  Continue carvedilol 6.25 mg daily  Continue Lasix 60 mg daily  Continue losartan 50 mg daily  Physical exam was stable. Discussed maintaining diets such as DASH to help maintain normal Blood pressure. Encouraged regular  exercise for a total of 120 min weekly. We will fu labs in 1-3 days. For now there will be no change in medical management. All questions and concerns were addressed. Pt will follow up in 4-6 months or sooner if needed.      Osteoporosis  Reviewed different medications including oral and injectable  Patient would like to start alendronate 70 mg weekly    Abdominal pain  Ultrasound of the abdomen ordered  Patient continues to complain of abdominal pain  Concerns for persistent constipation due to patient's chronic opioid use versus acute pathology  Continue MiraLAX for bowel regiment     DM   A1C 6.0% 10/23 previous elevation up to 9.0% in 3/2020  Pt states she would like to start a GLP-1 agonist but can't due to gastroparesis   Reviewed red flag symptoms: including constipation due to her chronic opioid usage, recommend small meals       Adrenal insufficiency  Continue hydrocortisone 10 mg AM and midday, 5mg in the evening  Following with endocrinology      rheumatoid vs  psoriatic arthritis   Following with rheumatology  Continue Plaquenil 300 mg daily  Continue Leflunoide 20mg daily   Pain management: MS Contin 15 mg 3 3 times daily, Morphine 30 mg twice daily     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  Followed by hematology  Currently observation  Concerns for autoimmune versus inflammatory versus infection versus malignancy causes s/p bone marrow biopsy 3/22 no evidence of plasma cell dyscrasia     Thrombocytopenia  Platelets at 74  currently under observation  Possibly secondary to liver disease     Ulcerative Colitis   EGD and colonoscopy completed 10/12/2023 Bleeding arteriovenous malformations (AVMs) of the distal esophagus and duodenal bulb     Compression fracture  CT angio chest 7/23  T11 compression fracture was seen of uncertain age  Hyperlipidemia  Continue statin therapy     Migraines   Currently on imitrex and fioracet PRN  Not happening monthly  But having multiple headaches daily due to chronic sinusitis    Currently using amitriptyline   Role of CRP nurtec and catarino will trial nurtec 75 mg Q 48 hours      Levothyroxine 88 mcg 6 days a week with 2 tabs on Sunday   TSH 0.47 6/8/23     Anxiety/depression  Doing better   Mostly gets down due to her health   Will be following with psychiatry at [facility]   Previously tried Seroquel, Cymbalta, Buspar, amitriptyline   hydroxyzine which causes her neck to itch, trazodone in the past with reaction              Marjorie Saha, DO

## 2024-01-30 LAB
EST. AVERAGE GLUCOSE BLD GHB EST-MCNC: 206 MG/DL
HBA1C MFR BLD: 8.8 %

## 2024-01-31 ENCOUNTER — LAB (OUTPATIENT)
Dept: LAB | Facility: LAB | Age: 64
End: 2024-01-31
Payer: MEDICAID

## 2024-01-31 ENCOUNTER — OFFICE VISIT (OUTPATIENT)
Dept: RHEUMATOLOGY | Facility: CLINIC | Age: 64
End: 2024-01-31
Payer: MEDICAID

## 2024-01-31 VITALS — DIASTOLIC BLOOD PRESSURE: 60 MMHG | WEIGHT: 151 LBS | BODY MASS INDEX: 28.53 KG/M2 | SYSTOLIC BLOOD PRESSURE: 145 MMHG

## 2024-01-31 DIAGNOSIS — M05.79 RHEUMATOID ARTHRITIS INVOLVING MULTIPLE SITES WITH POSITIVE RHEUMATOID FACTOR (MULTI): ICD-10-CM

## 2024-01-31 DIAGNOSIS — M17.12 ARTHRITIS OF LEFT KNEE: ICD-10-CM

## 2024-01-31 DIAGNOSIS — M05.79 RHEUMATOID ARTHRITIS INVOLVING MULTIPLE SITES WITH POSITIVE RHEUMATOID FACTOR (MULTI): Primary | ICD-10-CM

## 2024-01-31 LAB
CRP SERPL-MCNC: 0.65 MG/DL
ERYTHROCYTE [SEDIMENTATION RATE] IN BLOOD BY WESTERGREN METHOD: 43 MM/H (ref 0–30)

## 2024-01-31 PROCEDURE — 20610 DRAIN/INJ JOINT/BURSA W/O US: CPT | Performed by: INTERNAL MEDICINE

## 2024-01-31 PROCEDURE — 36415 COLL VENOUS BLD VENIPUNCTURE: CPT

## 2024-01-31 PROCEDURE — 3078F DIAST BP <80 MM HG: CPT | Performed by: INTERNAL MEDICINE

## 2024-01-31 PROCEDURE — 99213 OFFICE O/P EST LOW 20 MIN: CPT | Performed by: INTERNAL MEDICINE

## 2024-01-31 PROCEDURE — 85652 RBC SED RATE AUTOMATED: CPT

## 2024-01-31 PROCEDURE — 1036F TOBACCO NON-USER: CPT | Performed by: INTERNAL MEDICINE

## 2024-01-31 PROCEDURE — 4010F ACE/ARB THERAPY RXD/TAKEN: CPT | Performed by: INTERNAL MEDICINE

## 2024-01-31 PROCEDURE — 3052F HG A1C>EQUAL 8.0%<EQUAL 9.0%: CPT | Performed by: INTERNAL MEDICINE

## 2024-01-31 PROCEDURE — 86140 C-REACTIVE PROTEIN: CPT

## 2024-01-31 PROCEDURE — 3077F SYST BP >= 140 MM HG: CPT | Performed by: INTERNAL MEDICINE

## 2024-01-31 RX ORDER — TRIAMCINOLONE ACETONIDE 40 MG/ML
40 INJECTION, SUSPENSION INTRA-ARTICULAR; INTRAMUSCULAR
Status: COMPLETED | OUTPATIENT
Start: 2024-01-31 | End: 2024-01-31

## 2024-01-31 RX ORDER — LIDOCAINE HYDROCHLORIDE 10 MG/ML
0.5 INJECTION INFILTRATION; PERINEURAL
Status: COMPLETED | OUTPATIENT
Start: 2024-01-31 | End: 2024-01-31

## 2024-01-31 RX ORDER — LIDOCAINE HYDROCHLORIDE 10 MG/ML
1 INJECTION INFILTRATION; PERINEURAL ONCE
Status: COMPLETED | OUTPATIENT
Start: 2024-01-31 | End: 2024-01-31

## 2024-01-31 RX ORDER — TRIAMCINOLONE ACETONIDE 40 MG/ML
40 INJECTION, SUSPENSION INTRA-ARTICULAR; INTRAMUSCULAR ONCE
Status: COMPLETED | OUTPATIENT
Start: 2024-01-31 | End: 2024-01-31

## 2024-01-31 RX ADMIN — LIDOCAINE HYDROCHLORIDE 10 MG: 10 INJECTION INFILTRATION; PERINEURAL at 16:52

## 2024-01-31 RX ADMIN — LIDOCAINE HYDROCHLORIDE 0.5 ML: 10 INJECTION INFILTRATION; PERINEURAL at 16:49

## 2024-01-31 RX ADMIN — TRIAMCINOLONE ACETONIDE 40 MG: 40 INJECTION, SUSPENSION INTRA-ARTICULAR; INTRAMUSCULAR at 16:54

## 2024-01-31 RX ADMIN — TRIAMCINOLONE ACETONIDE 40 MG: 40 INJECTION, SUSPENSION INTRA-ARTICULAR; INTRAMUSCULAR at 16:49

## 2024-01-31 NOTE — PROGRESS NOTES
Subjective  . Daniela Saez is a 63 y.o. female who presents for Arthritis, Rheumatoid Arthritis, and Follow-up.    Arthritis    . 63-year-old female with history of seronegative RA, gout, psoriatic arthritis, left knee OA, FMS, RSD, chronic pain syndrome, hypothyroidism, adrenal insufficiency, diabetes, peripheral neuropathy, anxiety and depression presented for follow-up.     She says that she recently has sinus infection and bronchitis.  She finished antibiotics few days ago.  She states she is hurting all over.  Worst pain in his lower back and left knee.  She has not noticed swelling of the left knee.  She has an appointment with spine specialist next week.      Immunosuppression: Leflunomide and hydroxychloroquine.     Past immunosuppressive therapy:  1. Humira. Discontinued due to sinus infection/bronchitis.  2. SSZ. Abd.pain and Nausea.  3. Enbrel. Nausea.  4. MTX. Nausea    Review of Systems   Musculoskeletal:  Positive for arthritis.   All other systems reviewed and are negative.    Objective     Blood pressure 145/60, weight 68.5 kg (151 lb).    Physical Exam.  Gen. AAO x3, NAD.  HEENT: No pallor or icterus, PERRLA, EOMI.   Skin: Scattered small dry spots on the upper extremities.  Heart: S1, S2/ RRR.   Lungs: CTA B.  Abdomen: Soft, NT/ND, BS regular.  MSK: No synovitis.  Left knee with patellofemoral crepitation and medial joint line tenderness.  Neuro: Sensation to touch intact.Strength 5/5 throughout.   Psych:Appropriate mood and behavior  EXT: No edema    Assessment/Plan  .  63-year-old female with history of seronegative RA, gout, psoriatic arthritis, left knee OA, FMS, RSD, chronic pain syndrome, hypothyroidism, adrenal insufficiency, diabetes, peripheral neuropathy, anxiety and depression presented for follow-up.       #1: Seropositive RA/psoriatic arthritis:  -She is awaiting [employer] approval.  -Continue leflunomide and hydroxychloroquine.  Annual eye exam discussed.  -Labs.    #2: Left knee  OA.  -Kenalog 40 mg mixed with 1 normal 1% lidocaine injected.    #3: Gout.  Continue allopurinol 100 mg daily.      Follow-up in 4 months.     This note was partially generated using the Dragon Voice recognition system. There may be some incorrect wording, spelling and/or spelling errors or punctuation errors that were not corrected prior to committing the note to the medical record.    Problem List Items Addressed This Visit       Rheumatoid arthritis involving multiple sites (CMS/[facility]) - Primary    Relevant Orders    C-reactive protein    Sedimentation Rate            Active Ambulatory Problems     Diagnosis Date Noted    Adrenal insufficiency (CMS/[facility]) 09/21/2023    Anxiety 09/21/2023    Arthritis of left knee 09/21/2023    Atrial fibrillation (CMS/HCC) 09/21/2023    Balance problem 09/21/2023    Cervical radiculopathy 09/21/2023    Chronic pancreatitis (CMS/HCC) 09/21/2023    Chronic superficial gastritis without bleeding 09/21/2023    Cirrhosis of liver without ascites (CMS/HCC) 09/21/2023    Complex regional pain syndrome type 1 of right upper extremity 09/21/2023    Coronary artery disease of native artery of native heart with stable angina pectoris (CMS/[facility]) 09/21/2023    Depression with anxiety 09/21/2023    Type 2 diabetes mellitus with ketoacidosis without coma, without long-term current use of insulin (CMS/HCC) 09/21/2023    Dry eyes 09/21/2023    Uncontrolled hypertension 09/21/2023    Fatigue 09/21/2023    Gastroesophageal reflux disease 09/21/2023    Gastroparesis 09/21/2023    Herpes simplex vulvovaginitis 09/21/2023    High serum protein level 09/21/2023    Weakness 09/21/2023    History of myocardial infarction 09/21/2023    Hyperlipidemia 09/21/2023    Mixed hyperlipidemia 09/21/2023    Hypothyroidism 09/21/2023    Insomnia 09/21/2023    Other insomnia 09/21/2023    Intestinal malabsorption, unspecified 09/21/2023    Intractable migraine without aura and without status migrainosus 09/21/2023     Iron deficiency anemia 09/21/2023    Left knee pain 09/21/2023    Left ventricular hypertrophy 09/21/2023    Liver lesion 09/21/2023    Low back pain 09/21/2023    Lumbago with sciatica, left side 09/21/2023    Lumbago with sciatica, right side 09/21/2023    Lumbar stenosis with neurogenic claudication 09/21/2023    Major depression, recurrent, chronic (CMS/HCC) 09/21/2023    Memory deficit 09/21/2023    Migraine 09/21/2023    Mild persistent asthma without complication 09/21/2023    Muscle weakness 09/21/2023    Myalgia 09/21/2023    Myofascial pain syndrome 09/21/2023    Opiate dependence (CMS/HCC) 09/21/2023    Monoclonal gammopathy 09/21/2023    Paraproteinemia 09/21/2023    Bilateral occipital neuralgia 09/21/2023    Chronic neck pain 09/21/2023    Diffuse pain 09/21/2023    Fibromyalgia 09/21/2023    Idiopathic peripheral neuropathy 09/21/2023    Other chronic pain 09/21/2023    Peripheral neuropathy 09/21/2023    RSD (reflex sympathetic dystrophy) 09/21/2023    Restless legs syndrome 09/21/2023    Rheumatoid arthritis involving multiple sites (CMS/[facility]) 09/21/2023    Spondylolisthesis 09/21/2023    Tremors of nervous system 09/21/2023    Type 2 diabetes mellitus without complications (CMS/[facility]) 09/21/2023    Ulcerative colitis (CMS/[facility]) 09/21/2023    Vitamin D deficiency 09/21/2023    Allergic rhinitis 09/21/2023    Atrophic vaginitis 09/21/2023    Dizziness 09/21/2023    Medication management 09/21/2023    Nausea and vomiting 09/21/2023    Screening for breast cancer 09/21/2023    Yeast infection 09/21/2023    Other fracture of t11-T12 vertebra, initial encounter for closed fracture (CMS/[facility]) 12/31/2023    Chronic diastolic heart failure (CMS/[facility]) 01/08/2024    Acute bronchitis 01/18/2024     Resolved Ambulatory Problems     Diagnosis Date Noted    Simple chronic bronchitis (CMS/[facility]) 09/21/2023    Acute maxillary sinusitis 01/18/2024     Past Medical History:   Diagnosis Date    Anemia     Coronary artery  disease     Depression     Gout     Hypertension     Thrombocythemia        Family History   Problem Relation Name Age of Onset    Heart disease Mother      Hypertension Mother      Arthritis Mother      Diabetes Mother      Hyperlipidemia Mother      Hypertension Father      Brain cancer Father      Diabetes Sister      Diabetes Brother         Past Surgical History:   Procedure Laterality Date    CT GUIDED PERCUTANEOUS BIOPSY BONE DEEP  3/9/2022    CT GUIDED PERCUTANEOUS BIOPSY BONE DEEP 3/9/2022 [facility]    OTHER SURGICAL HISTORY  2019    Tonsillectomy with adenoidectomy    OTHER SURGICAL HISTORY  2019    Bunionectomy    OTHER SURGICAL HISTORY  2019    Hernia repair    OTHER SURGICAL HISTORY  2019    Cholecystectomy    OTHER SURGICAL HISTORY  2019    Uterine nerve ablation    OTHER SURGICAL HISTORY  2019    Ovarian torsion repair    OTHER SURGICAL HISTORY  2019    Tubal ligation       Social History     Tobacco Use   Smoking Status Former    Types: Cigarettes    Quit date: 2004    Years since quittin.6   Smokeless Tobacco Never       Allergies  Metoclopramide, Fentanyl, Gabapentin, Levetiracetam, Metformin, Morphine-naltrexone, and Torsemide    Current Meds  Current Outpatient Medications   Medication Instructions    acetaminophen (TYLENOL) 975 mg, oral, Every 4 hours PRN    albuterol 90 mcg/actuation inhaler inhale 2 puffs by mouth four times a day    albuterol 90 mcg/actuation inhaler 1-2 puffs, inhalation, Every 6 hours PRN    alendronate (FOSAMAX) 70 mg, oral, Every 7 days, Take in the morning with a full glass of water, on an empty stomach, and do not take anything else by mouth or lie down for the next 30 min.    allopurinol (ZYLOPRIM) 100 mg, oral, Daily    amitriptyline (ELAVIL) 50 mg, oral, Daily    atorvastatin (LIPITOR) 40 mg, oral, Daily    baclofen (Lioresal) 10 mg tablet Take 2 tablets (20 mg) by mouth 2 times a day.    blood sugar diagnostic  (OneTouch Verio test strips) strip TEST 4 TIMES A DAY. MAY SUBSTITUTE WITH INSURANCE COVERED STRIPS.    bumetanide (BUMEX) 1 mg, oral, Daily    butalbital-acetaminophen-caff (Fioricet) -40 mg capsule 1 capsule, oral, Every 6 hours PRN    carboxymethylcellulose (Refresh Tears) 0.5 % ophthalmic solution 1-2 drops each eye 3 times a day for 30 days    carvedilol (Coreg) 6.25 mg tablet TAKE ONE TABLET BY MOUTH TWICE DAILY    clopidogrel (Plavix) 75 mg tablet TAKE 1 TABLET BY MOUTH EVERY DAY for 90    famotidine (PEPCID) 20 mg, oral, 2 times daily    fluocinonide (Lidex) 0.05 % cream Topical, 2 times daily    fluticasone (Flonase Sensimist) 27.5 mcg/actuation nasal spray 2 sprays, Each Nostril, Nightly    hydrocortisone (CORTEF) 5-10 mg, oral, 3 times daily    hydroxychloroquine (PLAQUENIL) 300 mg, oral, Daily, Take 1 and 1/2 (one and one-half) tablets by mouth daily.    ketotifen (Zaditor) 0.025 % (0.035 %) ophthalmic solution 1 drop, Both Eyes, 2 times daily    leflunomide (ARAVA) 20 mg, oral, Daily    levocetirizine (Xyzal) 5 mg tablet 1 tablet in the evening Orally Once a day for 30 day(s)    levothyroxine (Synthroid, Levoxyl) 88 mcg tablet 1 tablet daily 6 days per week, 2 tablets one day per week    lidocaine (Lidoderm) 5 % patch apply up to 3 patches to skin once a day, remove after 12 hours for 30 days    losartan (COZAAR) 50 mg, oral, Daily    miconazole (Micotin) 2 % powder      morphine (MSIR) 30 mg, oral, 2 times daily    morphine CR (MS Contin) 15 mg 12 hr tablet 1 tablet, oral, 3 times daily    mupirocin (Bactroban) 2 % ointment Topical, 3 times daily RT    nystatin (Mycostatin) cream Topical, 2 times daily    ondansetron (ZOFRAN) 4 mg, oral, Every 8 hours PRN    oxyCODONE myristate (Xtampza ER) 9 mg 12 hr abuse-deterrent capsule 1 capsule, oral, Every 12 hours    pancrelipase, Lip-Prot-Amyl, (Creon) 36,000-114,000- 180,000 unit capsule,delayed release(DR/EC) capsule 1 capsule, oral, 2 times daily     pantoprazole (PROTONIX) 40 mg, oral, Daily RT    polyethylene glycol (Miralax) 17 gram/dose powder      potassium chloride CR (Klor-Con M20) 20 mEq ER tablet 20 mEq, oral, Daily, Do not crush or chew.    pseudoephedrine-guaifenesin (Mucinex D)  mg 12 hr tablet 1 tablet, oral, Every 12 hours, Do not crush, chew, or split.    rimegepant (NURTEC) 75 mg, oral, Every other day    sucralfate (Carafate) 1 gram tablet 1 tablet on an empty stomach Orally Twice a day    SUMAtriptan (Imitrex) 25 mg tablet TAKE 1 TABLET BY MOUTH AS NEEDED FOR MIGRAINE. MAY REPEAT IN 2 HOURS IF NEEDED.    tiotropium-olodateroL (Stiolto Respimat) 2.5-2.5 mcg/actuation mist inhaler 2 puffs Inhalation Once a day for 30 days    triamcinolone (Kenalog) 0.1 % cream 1 application as needed Externally Twice a day as needed        Lab Results   Component Value Date    RF <10 10/20/2023    SEDRATE 24 10/20/2023    CRP 0.26 10/20/2023    URICACID 2.2 (L) 10/20/2023    CKTOTAL 61 09/12/2022        Large Joint Injection/Arthrocentesis: L knee on 1/31/2024 4:49 PM  Indications: pain  Details: 25 G needle, anterolateral approach  Medications: 40 mg triamcinolone acetonide 40 mg/mL; 0.5 mL lidocaine 10 mg/mL (1 %)  Outcome: tolerated well, no immediate complications  Consent was given by the patient. Immediately prior to procedure a time out was called to verify the correct patient, procedure, equipment, support staff and site/side marked as required. Patient was prepped and draped in the usual sterile fashion.          Lazaro La MD

## 2024-02-04 ENCOUNTER — TELEPHONE (OUTPATIENT)
Dept: PRIMARY CARE | Facility: CLINIC | Age: 64
End: 2024-02-04
Payer: MEDICAID

## 2024-02-05 ENCOUNTER — APPOINTMENT (OUTPATIENT)
Dept: CARDIOLOGY | Facility: CLINIC | Age: 64
End: 2024-02-05
Payer: MEDICAID

## 2024-02-06 NOTE — TELEPHONE ENCOUNTER
Pt called mali her BS was 225 on Saturday was over 500 decided to take her mothers insulin wanted advise for dosing of insulin, advised would need an appointment to discuss proper treatment of diabetes and that taking someone else's insulin was not appropriate.

## 2024-02-07 ENCOUNTER — APPOINTMENT (OUTPATIENT)
Dept: RADIOLOGY | Facility: HOSPITAL | Age: 64
End: 2024-02-07
Payer: MEDICAID

## 2024-02-08 ENCOUNTER — APPOINTMENT (OUTPATIENT)
Dept: RADIOLOGY | Facility: HOSPITAL | Age: 64
End: 2024-02-08
Payer: MEDICAID

## 2024-02-08 ENCOUNTER — APPOINTMENT (OUTPATIENT)
Dept: CARDIOLOGY | Facility: HOSPITAL | Age: 64
End: 2024-02-08
Payer: MEDICAID

## 2024-02-08 ENCOUNTER — HOSPITAL ENCOUNTER (EMERGENCY)
Facility: HOSPITAL | Age: 64
Discharge: HOME | End: 2024-02-08
Attending: EMERGENCY MEDICINE
Payer: MEDICAID

## 2024-02-08 ENCOUNTER — TELEPHONE (OUTPATIENT)
Dept: PRIMARY CARE | Facility: CLINIC | Age: 64
End: 2024-02-08

## 2024-02-08 VITALS
RESPIRATION RATE: 18 BRPM | HEIGHT: 61 IN | OXYGEN SATURATION: 99 % | DIASTOLIC BLOOD PRESSURE: 76 MMHG | SYSTOLIC BLOOD PRESSURE: 155 MMHG | BODY MASS INDEX: 29.05 KG/M2 | WEIGHT: 153.88 LBS | TEMPERATURE: 97.6 F | HEART RATE: 82 BPM

## 2024-02-08 DIAGNOSIS — L40.50 PSORIATIC ARTHRITIS (MULTI): ICD-10-CM

## 2024-02-08 DIAGNOSIS — R06.02 SHORTNESS OF BREATH: Primary | ICD-10-CM

## 2024-02-08 DIAGNOSIS — M60.80 OTHER MYOSITIS, UNSPECIFIED SITE: ICD-10-CM

## 2024-02-08 DIAGNOSIS — J40 BRONCHITIS: ICD-10-CM

## 2024-02-08 PROBLEM — M60.9 MYOSITIS: Status: ACTIVE | Noted: 2024-02-08

## 2024-02-08 LAB
ALBUMIN SERPL BCP-MCNC: 3.9 G/DL (ref 3.4–5)
ALP SERPL-CCNC: 113 U/L (ref 33–136)
ALT SERPL W P-5'-P-CCNC: 23 U/L (ref 7–45)
ANION GAP SERPL CALC-SCNC: 11 MMOL/L (ref 10–20)
APPEARANCE UR: CLEAR
AST SERPL W P-5'-P-CCNC: 37 U/L (ref 9–39)
ATRIAL RATE: 85 BPM
BASOPHILS # BLD AUTO: 0.03 X10*3/UL (ref 0–0.1)
BASOPHILS NFR BLD AUTO: 0.7 %
BILIRUB SERPL-MCNC: 0.8 MG/DL (ref 0–1.2)
BILIRUB UR STRIP.AUTO-MCNC: NEGATIVE MG/DL
BNP SERPL-MCNC: 31 PG/ML (ref 0–99)
BUN SERPL-MCNC: 10 MG/DL (ref 6–23)
CALCIUM SERPL-MCNC: 9 MG/DL (ref 8.6–10.3)
CARDIAC TROPONIN I PNL SERPL HS: 7 NG/L (ref 0–13)
CARDIAC TROPONIN I PNL SERPL HS: 8 NG/L (ref 0–13)
CHLORIDE SERPL-SCNC: 94 MMOL/L (ref 98–107)
CK SERPL-CCNC: 748 U/L (ref 0–215)
CO2 SERPL-SCNC: 34 MMOL/L (ref 21–32)
COLOR UR: YELLOW
CREAT SERPL-MCNC: 0.78 MG/DL (ref 0.5–1.05)
D DIMER PPP FEU-MCNC: 513 NG/ML FEU
EGFRCR SERPLBLD CKD-EPI 2021: 87 ML/MIN/1.73M*2
EOSINOPHIL # BLD AUTO: 0.17 X10*3/UL (ref 0–0.7)
EOSINOPHIL NFR BLD AUTO: 3.8 %
ERYTHROCYTE [DISTWIDTH] IN BLOOD BY AUTOMATED COUNT: 15.1 % (ref 11.5–14.5)
FLUAV RNA RESP QL NAA+PROBE: NOT DETECTED
FLUBV RNA RESP QL NAA+PROBE: NOT DETECTED
GLUCOSE SERPL-MCNC: 274 MG/DL (ref 74–99)
GLUCOSE UR STRIP.AUTO-MCNC: NEGATIVE MG/DL
HCT VFR BLD AUTO: 37.4 % (ref 36–46)
HGB BLD-MCNC: 11.9 G/DL (ref 12–16)
HOLD SPECIMEN: NORMAL
IMM GRANULOCYTES # BLD AUTO: 0.01 X10*3/UL (ref 0–0.7)
IMM GRANULOCYTES NFR BLD AUTO: 0.2 % (ref 0–0.9)
KETONES UR STRIP.AUTO-MCNC: NEGATIVE MG/DL
LACTATE SERPL-SCNC: 1.1 MMOL/L (ref 0.4–2)
LEUKOCYTE ESTERASE UR QL STRIP.AUTO: NEGATIVE
LYMPHOCYTES # BLD AUTO: 1.04 X10*3/UL (ref 1.2–4.8)
LYMPHOCYTES NFR BLD AUTO: 23.4 %
MCH RBC QN AUTO: 28.9 PG (ref 26–34)
MCHC RBC AUTO-ENTMCNC: 31.8 G/DL (ref 32–36)
MCV RBC AUTO: 91 FL (ref 80–100)
MONOCYTES # BLD AUTO: 0.4 X10*3/UL (ref 0.1–1)
MONOCYTES NFR BLD AUTO: 9 %
NEUTROPHILS # BLD AUTO: 2.8 X10*3/UL (ref 1.2–7.7)
NEUTROPHILS NFR BLD AUTO: 62.9 %
NITRITE UR QL STRIP.AUTO: NEGATIVE
NRBC BLD-RTO: 0 /100 WBCS (ref 0–0)
P AXIS: 57 DEGREES
P OFFSET: 188 MS
P ONSET: 106 MS
PH UR STRIP.AUTO: 6 [PH]
PLATELET # BLD AUTO: 60 X10*3/UL (ref 150–450)
POTASSIUM SERPL-SCNC: 3.4 MMOL/L (ref 3.5–5.3)
PR INTERVAL: 218 MS
PROT SERPL-MCNC: 6.6 G/DL (ref 6.4–8.2)
PROT UR STRIP.AUTO-MCNC: NEGATIVE MG/DL
Q ONSET: 215 MS
QRS COUNT: 14 BEATS
QRS DURATION: 84 MS
QT INTERVAL: 390 MS
QTC CALCULATION(BAZETT): 464 MS
QTC FREDERICIA: 438 MS
R AXIS: -75 DEGREES
RBC # BLD AUTO: 4.12 X10*6/UL (ref 4–5.2)
RBC # UR STRIP.AUTO: NEGATIVE /UL
RSV RNA RESP QL NAA+PROBE: NOT DETECTED
SARS-COV-2 RNA RESP QL NAA+PROBE: NOT DETECTED
SODIUM SERPL-SCNC: 136 MMOL/L (ref 136–145)
SP GR UR STRIP.AUTO: 1.01
T AXIS: 75 DEGREES
T OFFSET: 410 MS
UROBILINOGEN UR STRIP.AUTO-MCNC: <2 MG/DL
VENTRICULAR RATE: 85 BPM
WBC # BLD AUTO: 4.5 X10*3/UL (ref 4.4–11.3)

## 2024-02-08 PROCEDURE — 83605 ASSAY OF LACTIC ACID: CPT | Performed by: EMERGENCY MEDICINE

## 2024-02-08 PROCEDURE — 87086 URINE CULTURE/COLONY COUNT: CPT | Mod: CONLAB | Performed by: EMERGENCY MEDICINE

## 2024-02-08 PROCEDURE — 71045 X-RAY EXAM CHEST 1 VIEW: CPT

## 2024-02-08 PROCEDURE — 82550 ASSAY OF CK (CPK): CPT | Performed by: EMERGENCY MEDICINE

## 2024-02-08 PROCEDURE — 36415 COLL VENOUS BLD VENIPUNCTURE: CPT | Performed by: EMERGENCY MEDICINE

## 2024-02-08 PROCEDURE — 99284 EMERGENCY DEPT VISIT MOD MDM: CPT | Mod: 25

## 2024-02-08 PROCEDURE — 85025 COMPLETE CBC W/AUTO DIFF WBC: CPT | Performed by: EMERGENCY MEDICINE

## 2024-02-08 PROCEDURE — 81003 URINALYSIS AUTO W/O SCOPE: CPT | Performed by: EMERGENCY MEDICINE

## 2024-02-08 PROCEDURE — 85379 FIBRIN DEGRADATION QUANT: CPT | Performed by: EMERGENCY MEDICINE

## 2024-02-08 PROCEDURE — 99285 EMERGENCY DEPT VISIT HI MDM: CPT | Mod: 25 | Performed by: EMERGENCY MEDICINE

## 2024-02-08 PROCEDURE — 87040 BLOOD CULTURE FOR BACTERIA: CPT | Mod: CONLAB | Performed by: EMERGENCY MEDICINE

## 2024-02-08 PROCEDURE — 71045 X-RAY EXAM CHEST 1 VIEW: CPT | Performed by: RADIOLOGY

## 2024-02-08 PROCEDURE — 84075 ASSAY ALKALINE PHOSPHATASE: CPT | Performed by: EMERGENCY MEDICINE

## 2024-02-08 PROCEDURE — 84484 ASSAY OF TROPONIN QUANT: CPT | Performed by: EMERGENCY MEDICINE

## 2024-02-08 PROCEDURE — 93005 ELECTROCARDIOGRAM TRACING: CPT | Mod: 76

## 2024-02-08 PROCEDURE — 87637 SARSCOV2&INF A&B&RSV AMP PRB: CPT | Performed by: EMERGENCY MEDICINE

## 2024-02-08 PROCEDURE — 83880 ASSAY OF NATRIURETIC PEPTIDE: CPT | Performed by: EMERGENCY MEDICINE

## 2024-02-08 PROCEDURE — 93005 ELECTROCARDIOGRAM TRACING: CPT

## 2024-02-08 ASSESSMENT — PAIN SCALES - GENERAL: PAINLEVEL_OUTOF10: 0 - NO PAIN

## 2024-02-08 ASSESSMENT — COLUMBIA-SUICIDE SEVERITY RATING SCALE - C-SSRS
1. IN THE PAST MONTH, HAVE YOU WISHED YOU WERE DEAD OR WISHED YOU COULD GO TO SLEEP AND NOT WAKE UP?: NO
2. HAVE YOU ACTUALLY HAD ANY THOUGHTS OF KILLING YOURSELF?: NO
6. HAVE YOU EVER DONE ANYTHING, STARTED TO DO ANYTHING, OR PREPARED TO DO ANYTHING TO END YOUR LIFE?: NO

## 2024-02-08 ASSESSMENT — PAIN - FUNCTIONAL ASSESSMENT: PAIN_FUNCTIONAL_ASSESSMENT: 0-10

## 2024-02-08 NOTE — ED PROVIDER NOTES
[facility]  ED  Provider Note  2/8/2024 11:03 AM  AC02/AC02        History of Present Illness:   Daniela Saez is a 63 y.o. female presenting to the ED for chronic cough with bodyaches and pains, beginning 1 month ago.  The complaint has been worsening today, moderate body aches mild cough in severity, and worsened by nothing.  Patient denies any fever or chills.  She denies chest pain.  She has no change in her mild chronic pedal edema.  She denies any back pain or injury.  She has had some mild nausea but no vomiting.  She denies diarrhea.  She does not have a sore throat or ear pain.  She does not have a stiff neck.  She complains of bodyaches from head to toe.  She has had some chills recently as well.  She has history of chronic sinusitis and chronic bronchitis.  She is not a current smoker, she quit 19 years ago.      Review of Systems:   Pertinent positives and review of systems as noted above.  Remaining 10 review of systems is negative or noncontributory to today's episode of care.  Review of Systems       --------------------------------------------- PAST HISTORY ---------------------------------------------  Past Medical History:  has a past medical history of Anemia, Anxiety, Atrial fibrillation (CMS/HCC), Coronary artery disease, Depression, Fibromyalgia, Gastroparesis, Gout, Hypertension, Hypothyroidism, and Thrombocythemia.    Past Surgical History:  has a past surgical history that includes Other surgical history (08/16/2019); Other surgical history (08/16/2019); Other surgical history (08/16/2019); Other surgical history (08/16/2019); Other surgical history (08/16/2019); Other surgical history (08/16/2019); Other surgical history (08/16/2019); and CT guided percutaneous biopsy bone deep (3/9/2022).    Social History:  reports that she quit smoking about 19 years ago. Her smoking use included cigarettes. She has never used smokeless tobacco. She reports that she does not currently  use alcohol. She reports that she does not use drugs.    Family History: family history includes Arthritis in her mother; Brain cancer in her father; Diabetes in her brother, mother, and sister; Heart disease in her mother; Hyperlipidemia in her mother; Hypertension in her father and mother. Unless otherwise noted, family history is non contributory    Patient's Medications   New Prescriptions    No medications on file   Previous Medications    ACETAMINOPHEN (TYLENOL) 325 MG TABLET    Take 3 tablets (975 mg) by mouth every 4 hours if needed for mild pain (1 - 3).    ALBUTEROL 90 MCG/ACTUATION INHALER    inhale 2 puffs by mouth four times a day    ALBUTEROL 90 MCG/ACTUATION INHALER    Inhale 1-2 puffs every 6 hours if needed for wheezing.    ALENDRONATE (FOSAMAX) 70 MG TABLET    Take 1 tablet (70 mg) by mouth every 7 days. Take in the morning with a full glass of water, on an empty stomach, and do not take anything else by mouth or lie down for the next 30 min.    ALLOPURINOL (ZYLOPRIM) 100 MG TABLET    Take 1 tablet (100 mg) by mouth once daily.    AMITRIPTYLINE (ELAVIL) 50 MG TABLET    Take 1 tablet (50 mg) by mouth once daily.    ATORVASTATIN (LIPITOR) 40 MG TABLET    Take 1 tablet (40 mg) by mouth once daily.    BACLOFEN (LIORESAL) 10 MG TABLET    Take 2 tablets (20 mg) by mouth 2 times a day.    BLOOD SUGAR DIAGNOSTIC (ONETOUCH VERIO TEST STRIPS) STRIP    TEST 4 TIMES A DAY. MAY SUBSTITUTE WITH INSURANCE COVERED STRIPS.    BUMETANIDE (BUMEX) 1 MG TABLET    Take 1 tablet (1 mg) by mouth once daily.    BUTALBITAL-ACETAMINOPHEN-CAFF (FIORICET) -40 MG CAPSULE    Take 1 capsule by mouth every 6 hours if needed for headaches.    CARBOXYMETHYLCELLULOSE (REFRESH TEARS) 0.5 % OPHTHALMIC SOLUTION    1-2 drops each eye 3 times a day for 30 days    CARVEDILOL (COREG) 6.25 MG TABLET    TAKE ONE TABLET BY MOUTH TWICE DAILY    CLOPIDOGREL (PLAVIX) 75 MG TABLET    TAKE 1 TABLET BY MOUTH EVERY DAY for 90    FAMOTIDINE  (PEPCID) 20 MG TABLET    Take 1 tablet (20 mg) by mouth 2 times a day.    FLUOCINONIDE (LIDEX) 0.05 % CREAM    Apply topically 2 times a day.    FLUTICASONE (FLONASE SENSIMIST) 27.5 MCG/ACTUATION NASAL SPRAY    Administer 2 sprays into each nostril once daily at bedtime.    HYDROCORTISONE (CORTEF) 5 MG TABLET    Take 1-2 tablets (5-10 mg) by mouth 3 times a day.    HYDROXYCHLOROQUINE (PLAQUENIL) 200 MG TABLET    Take 1.5 tablets (300 mg) by mouth once daily. Take 1 and 1/2 (one and one-half) tablets by mouth daily.    KETOTIFEN (ZADITOR) 0.025 % (0.035 %) OPHTHALMIC SOLUTION    Administer 1 drop into both eyes 2 times a day.    LEFLUNOMIDE (ARAVA) 20 MG TABLET    TAKE ONE TABLET BY MOUTH EVERY DAY    LEVOCETIRIZINE (XYZAL) 5 MG TABLET    1 tablet in the evening Orally Once a day for 30 day(s)    LEVOTHYROXINE (SYNTHROID, LEVOXYL) 88 MCG TABLET    1 tablet daily 6 days per week, 2 tablets one day per week    LIDOCAINE (LIDODERM) 5 % PATCH    apply up to 3 patches to skin once a day, remove after 12 hours for 30 days    LOSARTAN (COZAAR) 50 MG TABLET    Take 1 tablet (50 mg) by mouth once daily.    MICONAZOLE (MICOTIN) 2 % POWDER         MORPHINE (MSIR) 30 MG TABLET    Take 1 tablet (30 mg) by mouth 2 times a day.    MORPHINE CR (MS CONTIN) 15 MG 12 HR TABLET    Take 1 tablet (15 mg) by mouth 3 times a day.    MUPIROCIN (BACTROBAN) 2 % OINTMENT    Apply topically 3 times a day.    NYSTATIN (MYCOSTATIN) CREAM    Apply topically 2 times a day.    ONDANSETRON (ZOFRAN) 4 MG TABLET    Take 1 tablet (4 mg) by mouth every 8 hours if needed for nausea or vomiting.    OXYCODONE MYRISTATE (XTAMPZA ER) 9 MG 12 HR ABUSE-DETERRENT CAPSULE    Take 1 capsule (9 mg) by mouth every 12 hours.    PANCRELIPASE, LIP-PROT-AMYL, (CREON) 36,000-114,000- 180,000 UNIT CAPSULE,DELAYED RELEASE(DR/EC) CAPSULE    Take 1 capsule by mouth 2 times a day.    PANTOPRAZOLE (PROTONIX) 40 MG EC TABLET    Take 1 tablet (40 mg) by mouth once daily.     POLYETHYLENE GLYCOL (MIRALAX) 17 GRAM/DOSE POWDER         POTASSIUM CHLORIDE CR (KLOR-CON M20) 20 MEQ ER TABLET    Take 1 tablet (20 mEq) by mouth once daily. Do not crush or chew.    PSEUDOEPHEDRINE-GUAIFENESIN (MUCINEX D)  MG 12 HR TABLET    Take 1 tablet by mouth every 12 hours. Do not crush, chew, or split.    RIMEGEPANT (NURTEC) 75 MG TABLET,DISINTEGRATING    Take 1 tablet (75 mg) by mouth every other day.    SUCRALFATE (CARAFATE) 1 GRAM TABLET    1 tablet on an empty stomach Orally Twice a day    SUMATRIPTAN (IMITREX) 25 MG TABLET    TAKE 1 TABLET BY MOUTH AS NEEDED FOR MIGRAINE. MAY REPEAT IN 2 HOURS IF NEEDED.    TIOTROPIUM-OLODATEROL (STIOLTO RESPIMAT) 2.5-2.5 MCG/ACTUATION MIST INHALER    2 puffs Inhalation Once a day for 30 days    TRIAMCINOLONE (KENALOG) 0.1 % CREAM    1 application as needed Externally Twice a day as needed   Modified Medications    No medications on file   Discontinued Medications    No medications on file      The patient’s home medications have been reviewed.    Allergies: Metoclopramide, Fentanyl, Gabapentin, Levetiracetam, Metformin, Morphine-naltrexone, and Torsemide    -------------------------------------------------- RESULTS -------------------------------------------------  All laboratory and radiology results have been personally reviewed by myself   LABS:  Labs Reviewed   CBC WITH AUTO DIFFERENTIAL - Abnormal       Result Value    WBC 4.5      nRBC 0.0      RBC 4.12      Hemoglobin 11.9 (*)     Hematocrit 37.4      MCV 91      MCH 28.9      MCHC 31.8 (*)     RDW 15.1 (*)     Platelets 60 (*)     Neutrophils % 62.9      Immature Granulocytes %, Automated 0.2      Lymphocytes % 23.4      Monocytes % 9.0      Eosinophils % 3.8      Basophils % 0.7      Neutrophils Absolute 2.80      Immature Granulocytes Absolute, Automated 0.01      Lymphocytes Absolute 1.04 (*)     Monocytes Absolute 0.40      Eosinophils Absolute 0.17      Basophils Absolute 0.03     COMPREHENSIVE  METABOLIC PANEL - Abnormal    Glucose 274 (*)     Sodium 136      Potassium 3.4 (*)     Chloride 94 (*)     Bicarbonate 34 (*)     Anion Gap 11      Urea Nitrogen 10      Creatinine 0.78      eGFR 87      Calcium 9.0      Albumin 3.9      Alkaline Phosphatase 113      Total Protein 6.6      AST 37      Bilirubin, Total 0.8      ALT 23     CREATINE KINASE - Abnormal    Creatine Kinase 748 (*)    D-DIMER, NON VTE - Abnormal    D-Dimer Non VTE, Quant (ng/mL FEU) 513 (*)     Narrative:     The D-Dimer assay is reported in ng/mL Fibrinogen Equivalent Units (FEU). The results of this assay should NOT be used for the exclusion of Deep Vein Thrombosis and/or Pulmonary Embolism.   B-TYPE NATRIURETIC PEPTIDE - Normal    BNP 31      Narrative:        <100 pg/mL - Heart failure unlikely  100-299 pg/mL - Intermediate probability of acute heart                  failure exacerbation. Correlate with clinical                  context and patient history.    >=300 pg/mL - Heart Failure likely. Correlate with clinical                  context and patient history.    BNP testing is performed using different testing methodology at [facility] than at other [facility]. Direct result comparisons should only be made within the same method.      TROPONIN I, HIGH SENSITIVITY - Normal    Troponin I, High Sensitivity 8      Narrative:     Less than 99th percentile of normal range cutoff-  Female and children under 18 years old <14 ng/L; Male <21 ng/L: Negative  Repeat testing should be performed if clinically indicated.     Female and children under 18 years old 14-50 ng/L; Male 21-50 ng/L:  Consistent with possible cardiac damage and possible increased clinical   risk. Serial measurements may help to assess extent of myocardial damage.     >50 ng/L: Consistent with cardiac damage, increased clinical risk and  myocardial infarction. Serial measurements may help assess extent of   myocardial damage.      NOTE: Children less  than 1 year old may have higher baseline troponin   levels and results should be interpreted in conjunction with the overall   clinical context.     NOTE: Troponin I testing is performed using a different   testing methodology at [facility] than at other   [facility]. Direct result comparisons should only   be made within the same method.   LACTATE - Normal    Lactate 1.1      Narrative:     Venipuncture immediately after or during the administration of Metamizole may lead to falsely low results. Testing should be performed immediately  prior to Metamizole dosing.   SARS-COV-2 AND INFLUENZA A/B PCR - Normal    Flu A Result Not Detected      Flu B Result Not Detected      Coronavirus 2019, PCR Not Detected      Narrative:     This assay has received FDA Emergency Use Authorization (EUA) and  is only authorized for the duration of time that circumstances exist to justify the authorization of the emergency use of in vitro diagnostic tests for the detection of SARS-CoV-2 virus and/or diagnosis of COVID-19 infection under section 564(b)(1) of the Act, 21 U.S.C. 360bbb-3(b)(1). Testing for SARS-CoV-2 is only recommended for patients who meet current clinical and/or epidemiological criteria as defined by federal, state, or local public health directives. This assay is an in vitro diagnostic nucleic acid amplification test for the qualitative detection of SARS-CoV-2, Influenza A, and Influenza B from nasopharyngeal specimens and has been validated for use at [facility]. Negative results do not preclude COVID-19 infections or Influenza A/B infections, and should not be used as the sole basis for diagnosis, treatment, or other management decisions. If Influenza A/B and RSV PCR results are negative, testing for Parainfluenza virus, Adenovirus and Metapneumovirus is routinely performed for [facility] pediatric oncology and intensive care inpatients, and is available on other patients by  placing an add-on request.    RSV PCR - Normal    RSV PCR Not Detected      Narrative:     This assay is an FDA-cleared, in vitro diagnostic nucleic acid amplification test for the detection of RSV from nasopharyngeal specimens, and has been validated for use at [facility]. Negative results do not preclude RSV infections, and should not be used as the sole basis for diagnosis, treatment, or other management decisions. If Influenza A/B and RSV PCR results are negative, testing for Parainfluenza virus, Adenovirus and Metapneumovirus is routinely performed for pediatric oncology and intensive care inpatients at [facility], and is available on other patients by placing an add-on request.       URINALYSIS WITH REFLEX CULTURE AND MICROSCOPIC - Normal    Color, Urine Yellow      Appearance, Urine Clear      Specific Gravity, Urine 1.006      pH, Urine 6.0      Protein, Urine NEGATIVE      Glucose, Urine NEGATIVE      Blood, Urine NEGATIVE      Ketones, Urine NEGATIVE      Bilirubin, Urine NEGATIVE      Urobilinogen, Urine <2.0      Nitrite, Urine NEGATIVE      Leukocyte Esterase, Urine NEGATIVE     SERIAL TROPONIN, 1 HOUR - Normal    Troponin I, High Sensitivity 7      Narrative:     Less than 99th percentile of normal range cutoff-  Female and children under 18 years old <14 ng/L; Male <21 ng/L: Negative  Repeat testing should be performed if clinically indicated.     Female and children under 18 years old 14-50 ng/L; Male 21-50 ng/L:  Consistent with possible cardiac damage and possible increased clinical   risk. Serial measurements may help to assess extent of myocardial damage.     >50 ng/L: Consistent with cardiac damage, increased clinical risk and  myocardial infarction. Serial measurements may help assess extent of   myocardial damage.      NOTE: Children less than 1 year old may have higher baseline troponin   levels and results should be interpreted in conjunction with the overall   clinical  context.     NOTE: Troponin I testing is performed using a different   testing methodology at [facility] than at other   [facility]. Direct result comparisons should only   be made within the same method.   BLOOD CULTURE   URINE CULTURE   GRAY TOP    Extra Tube Hold for add-ons.     TROPONIN SERIES- (INITIAL, 1 HR)    Narrative:     The following orders were created for panel order Troponin Series, (0, 1 HR).  Procedure                               Abnormality         Status                     ---------                               -----------         ------                     Troponin I, High Sensiti...[[id number]]                                                 Troponin, High Sensitivi...[[id number]]  Normal              Final result                 Please view results for these tests on the individual orders.   URINALYSIS WITH REFLEX CULTURE AND MICROSCOPIC    Narrative:     The following orders were created for panel order Urinalysis with Reflex Culture and Microscopic.  Procedure                               Abnormality         Status                     ---------                               -----------         ------                     Urinalysis with Reflex C...[[id number]]  Normal              Final result               Extra Urine Gray Tube[[id number]]                            In process                   Please view results for these tests on the individual orders.   EXTRA URINE GRAY TUBE     EKG shows a sinus rhythm at 85 bpm there is a first-degree AV block at 22 ms, inferior Q waves are noted, anterolateral infarct uncertain age is noted, no acute ST elevations.  Interpreted by KAILA Esparza MD    RADIOLOGY:  Interpreted by Radiologist.  XR chest 1 view   Final Result   1.  No evidence of acute cardiopulmonary process.                  MACRO:   None        Signed by: Evan Marks 2/8/2024 11:37 AM   Dictation workstation:   [id number]          Encounter Date: 01/07/24   ECG 12  "lead   Result Value    Ventricular Rate 83    Atrial Rate 83    RI Interval 156    QRS Duration 88    QT Interval 400    QTC Calculation(Bazett) 470    P Axis 54    R Axis -22    T Axis 92    QRS Count 13    Q Onset 209    P Onset 131    P Offset 190    T Offset 409    QTC Fredericia 446    Narrative    Normal sinus rhythm  Inferior infarct (cited on or before 07-JAN-2024)  Possible Anterolateral infarct (cited on or before 07-JAN-2024)  Abnormal ECG  When compared with ECG of 24-JUL-2023 13:11,  Previous ECG has undetermined rhythm, needs review  Left anterior fascicular block is no longer Present  Questionable change in initial forces of Lateral leads  T wave inversion now evident in Lateral leads  See ED provider note for full interpretation and clinical correlation  Confirmed by Shanda Hernandez ([id number]) on 1/10/2024 4:23:13 PM     ------------------------- NURSING NOTES AND VITALS REVIEWED ---------------------------   The nursing notes within the ED encounter and vital signs as below have been reviewed.   /80   Pulse 85   Temp 36.4 °C (97.6 °F)   Resp 17   Ht 1.549 m (5' 1\")   Wt 69.8 kg (153 lb 14.1 oz)   SpO2 93%   BMI 29.08 kg/m²   Oxygen Saturation Interpretation: Normal      ---------------------------------------------------PHYSICAL EXAM--------------------------------------  Physical Exam   Constitutional/General: Alert and oriented x3, well appearing, non toxic in NAD  Head: Normocephalic and atraumatic  Eyes: PERRL, EOMI, conjunctiva normal, sclera non icteric  Mouth: Oropharynx clear, handling secretions, no trismus, no asymmetry of the posterior oropharynx or uvular edema  Neck: Supple, full ROM, non tender to palpation in the midline, no stridor, no crepitus, no meningeal signs  Respiratory: Lungs clear to auscultation bilaterally, no wheezes, rales, or rhonchi. Not in respiratory distress  Cardiovascular:  Regular rate. Regular rhythm. No murmurs, gallops, or rubs. 2+ distal " pulses  Chest: No chest wall tenderness  GI:  Abdomen Soft, Non tender, Non distended.  +BS. No organomegaly, no palpable masses,  No rebound, guarding, or rigidity.   Musculoskeletal: Moves all extremities x 4. Warm and well perfused, no clubbing, cyanosis, or edema. Capillary refill <3 seconds  Integument: skin warm and dry. No rashes.   Lymphatic: no lymphadenopathy noted  Neurologic: GCS 15, no focal deficits, symmetric strength 5/5 in the upper and lower extremities bilaterally  Psychiatric: Normal Affect    Procedures    ------------------------------ ED COURSE/MEDICAL DECISION MAKING----------------------    Medical Decision Making:   I have discussed this case with Dr. Willis the patient's rheumatologist.  He suggested the patient could benefit of inpatient care with increased steroids.  Given her elevated CPK and possible myositis Dr. Mcginnis prefers the patient be transferred for rheumatology care.  I will consult the transfer center and arrange for this care.  She has been updated and is aware of the process currently in place.  She is considering going home because she does want to spend the night in the emergency department.  I spoke with  the on-call rheumatologist from [facility].  He suggested the patient was stable for outpatient management and could have a workup for the myositis done with Dr. Willis in the office.    Diagnoses as of 02/08/24 1452   Shortness of breath   Bronchitis   Psoriatic arthritis (CMS/[facility])   Other myositis, unspecified site      Counseling:   The emergency provider has spoken with the patient and discussed today’s results, in addition to providing specific details for the plan of care and counseling regarding the diagnosis and prognosis.  Questions are answered at this time and they are agreeable with the plan.      --------------------------------- IMPRESSION AND DISPOSITION ---------------------------------        IMPRESSION  1. Shortness of breath    2.  Bronchitis    3. Psoriatic arthritis (CMS/[facility])    4. Other myositis, unspecified site        DISPOSITION  Disposition: Discharge to home  Patient condition is stable      Billing Provider Critical Care Time: 0 minutes     Nils Esparza MD  02/09/24 0657       Nils Esparza MD  02/09/24 0658

## 2024-02-08 NOTE — TELEPHONE ENCOUNTER
Called home, spoke to her , he said she was sleeping. I told him that Dr Saha asked her to schedule appointment to be seen.  He said he would tell her

## 2024-02-08 NOTE — TELEPHONE ENCOUNTER
Pt left VM stating they are still very sick, PT asked for a appointment with , Pt sounded like they were having respiratory issues. Potential virtual visit?

## 2024-02-08 NOTE — DISCHARGE INSTRUCTIONS
Continue home medications.    Consult with your rheumatologist about cutting back on the prednisone's and the cholesterol drugs which may be causing the myositis.  Please call him in the morning for outpatient follow-up.    Return for worsening symptoms or concerns    Return for worsening symptoms or concerns.

## 2024-02-09 ENCOUNTER — TELEPHONE (OUTPATIENT)
Dept: ENDOCRINOLOGY | Facility: CLINIC | Age: 64
End: 2024-02-09
Payer: MEDICAID

## 2024-02-09 ENCOUNTER — HOSPITAL ENCOUNTER (OUTPATIENT)
Dept: RADIOLOGY | Facility: HOSPITAL | Age: 64
End: 2024-02-09
Payer: MEDICAID

## 2024-02-09 LAB — BACTERIA UR CULT: NORMAL

## 2024-02-09 NOTE — TELEPHONE ENCOUNTER
Pt called and stated she wanted to let Dr. Phillips know that she has gone down on her Hydrocortisone to 4 tablets daily since her last visit about a month ago. Asked if that was the 10/23/24 fuv and pt stated she has only been down to 4 tablets for a month.     Pt stated that since the steroids make her BS high she has started taking her mom's insulin. Asked pt about her BS numbers and pt stated she has only been testing on occasion and she doesn't recall the numbers. Asked pt what the name of the insulin is that she has been taking and after looking she stated Lispro and Lantus. Asked how much she takes and pt stated she takes Lispro 50 TID and Lantus 12 QPM.     Pt is asking if she could be prescribed the Lispro and Lantus at [facility] in [place].     Verified phone # [phone]    Provider sent message.

## 2024-02-13 ENCOUNTER — TELEPHONE (OUTPATIENT)
Dept: PRIMARY CARE | Facility: CLINIC | Age: 64
End: 2024-02-13
Payer: MEDICAID

## 2024-02-13 LAB — BACTERIA BLD CULT: NORMAL

## 2024-02-13 NOTE — TELEPHONE ENCOUNTER
Pt called asking to schedule a post ER visit with dr neslon.Pt was called back and left a VM stating to call the  to schedule and appointment.

## 2024-02-13 NOTE — TELEPHONE ENCOUNTER
Pt called asking to schedule a post ER visit with dr nelson.Pt was called back and left a VM stating to call the  to schedule and appointment.

## 2024-02-13 NOTE — TELEPHONE ENCOUNTER
Spoke with patient, offered first available and patient stated not sure if she could find transportation. Stated she will continue to rest and let muscles rest, if still having a problem will call back and schedule.

## 2024-02-19 ENCOUNTER — TELEPHONE (OUTPATIENT)
Dept: PRIMARY CARE | Facility: CLINIC | Age: 64
End: 2024-02-19
Payer: MEDICAID

## 2024-02-21 NOTE — PROGRESS NOTES
Patient Visit Information:     Visit Type: Follow up visit: New Provider    History of Present Illness:     Hematology History:   Patient is a 63-year-old white female referred for monoclonal gammopathy which so far appears to be MGUS (Dr. Costello agreed diagnosis of MGUS likely autoimmune disease  related), bone marrow biopsy with no evidence of myeloma 3/9/2022, found during neurosurgery evaluation for neuropathy.  She also has anemia with a history of iron infusions due to inadequate response on oral iron with iron deficiency unclear etiology,  pancytopenia at least in part due to her liver issues. Previously followed by Dr. OLYA Uriarte and Dr. Mei.     She has numerous medical issues including migraines, anxiety/depression/insomnia, chronic pain with RSD seen by pain management, type 2 diabetes although later off diabetic medications, adrenal insufficiency treated with hydrocortisone, rheumatoid vs  psoriatic arthritis, hypothyroidism, hyperlipidemia, hypertension, atrial fibrillation, gastroparesis/colitis/gastritis, neuropathy.   9/2022 she had a moderate-high-grade T11 burst compression fracture, surgery delayed due to thrombocytopenia, was seen by hematology-oncology at [facility] where she was going to have her  spine surgery done with thrombocytopenia due to cirrhosis recommending platelet transfusions preoperatively if needed, 3/14/2023 had spine surgery according to notes lumbar laminectomy complicated by infection with mental status changes transferred to  [facility] with revision 4/2023.     EGD and colonoscopy completed 10/12/2023 Bleeding arteriovenous malformations (AVMs) of the distal esophagus and duodenal bulb. Small AVMs of the rectosigmoid colon, internal hemorrhoids. EGD with biopsy and hemostasis of bleeding AVMs of the esophagus and duodenum with argon plasma coagulation (APC). Colonoscopy with treatment of AVMs in the rectosigmoid colon with APC.    Follows Dr. Phillips, Endocrinologist   Follows   Abhinav Willis, Rheumatologist  Follows Dr. Gomez, Gastroenterologist    Follows Pain Management in [place]   Recommend she follow up with hepatology for fatty liver/cirrhosis and enlarged spleen  Follows Dermatology     ID Statement:      Daniela Saez is a 63 year old female       Chief Complaint: Follow up MGUS     Interval History:    Daniela Saez presents today for follow up of MGUS and thrombocytopenia.  Following evaluation patient has been sent to the laboratory.  Patient has a significant history as stated above with multiple comorbidities.  Bone marrow biopsy completed March 2022 with no findings of dysplasia.  Normal bone marrow and plasma cell findings.  She endorses fatigue.  She endorses maxillary and frontal sinus pressure, ear pain recently on antibiotics for recurrent sinusitis.  She is supposed to have maxillary sinus surgery and continues to report sinus pressure.  Today she endorses strained and tender muscles in her arms.  Endorses pain that is general.  No fevers, sore throat unless she has sinus issues, no shortness of breath, chest pain, abdominal pain, nausea, vomiting, constipation, she has IBS-d, no blood in stool, melena, dysuria or hematuria.  She does note occasional frequency and inability to urinate at times.  Stable appetite stable weight.  She does note bone pain, arthralgia.     Medical History:  -Autoimmune   -ACI 2016  -Acute Gout   -Acute blood loss  -asthma w/out complications with seasonal changes   -RA  -RSD  -SNHL  -Syncope and collapse   -Vitamin D def  -Psoriatic arthrosis  -MTHR: methylene   -thrombocytopenia  -recurrent maxillary sinusitis   -IBSD  -Fibromyalgia   -neuropathic pain  -lymphocytic colitis   -lactose intolerance   -insomnia   -hypothyroidism  -A-fib  -cervical spondylosis  -KL MGUS  -Gastritis  -gastroparesis  -HCC hepatic cirrhosis       -essential HTN  -JAMES  -CRPS   -Mixed hyperlipidemia   -Migraines  -Depression     Social History:  -Live with  family in [place]   -Stay at home Mom, 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, 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   -Divorces, 5 adult children   -Former smoker quit  (1-1.5 pack/day 19 years)  -No alcohol   -No marijuana or drug use     Family History:  -Mom 88 years old living -cardiovascular, macular degenerative   -Dad 80 years old  Glioblastoma (15 months)   -Genetic cardiac issues Lost 3 year old great-nephew   -Lost nephew and niece to [facility]   -Brother- colectomy Gi bleeding   -No other genetic, hematologic, autoimmune, or cancer than stated     Review of Systems:   A review of systems has been completed and are negative for complaints except what is stated in the assessment, HPI, IH, and/or past medical history.      Allergies and Intolerances:  Allergies   Allergen Reactions    Metoclopramide Anaphylaxis and Other     Lockjaw    Fentanyl Unknown    Gabapentin Other and Unknown     SUICIDAL THOUGHTS    Levetiracetam Unknown    Metformin Unknown    Morphine-Naltrexone Unknown    Torsemide Unknown         Outpatient Medication Profile:     Current Outpatient Medications   Medication Instructions    acetaminophen (TYLENOL) 975 mg, oral, Every 4 hours PRN    albuterol 90 mcg/actuation inhaler inhale 2 puffs by mouth four times a day    albuterol 90 mcg/actuation inhaler 1-2 puffs, inhalation, Every 6 hours PRN    alendronate (FOSAMAX) 70 mg, oral, Every 7 days, Take in the morning with a full glass of water, on an empty stomach, and do not take anything else by mouth or lie down for the next 30 min.    allopurinol (ZYLOPRIM) 100 mg, oral, Daily    amitriptyline (ELAVIL) 50 mg, oral, Daily    atorvastatin (LIPITOR) 40 mg, oral, Daily    baclofen (Lioresal) 10 mg tablet Take 2 tablets (20 mg) by mouth 2 times a day.    blood sugar diagnostic (OneTouch Verio test strips) strip TEST 4 TIMES A DAY. MAY SUBSTITUTE WITH INSURANCE COVERED STRIPS.    bumetanide (BUMEX) 1 mg, oral, Daily    butalbital-acetaminophen-caff (Fioricet) -40  mg capsule 1 capsule, oral, Every 6 hours PRN    carboxymethylcellulose (Refresh Tears) 0.5 % ophthalmic solution 1-2 drops each eye 3 times a day for 30 days    carvedilol (Coreg) 6.25 mg tablet TAKE ONE TABLET BY MOUTH TWICE DAILY    clopidogrel (Plavix) 75 mg tablet TAKE 1 TABLET BY MOUTH EVERY DAY for 90    famotidine (PEPCID) 20 mg, oral, 2 times daily    fluocinonide (Lidex) 0.05 % cream Topical, 2 times daily    fluticasone (Flonase Sensimist) 27.5 mcg/actuation nasal spray 2 sprays, Each Nostril, Nightly    hydrocortisone (CORTEF) 5-10 mg, oral, 3 times daily    hydroxychloroquine (PLAQUENIL) 300 mg, oral, Daily, Take 1 and 1/2 (one and one-half) tablets by mouth daily.    ketotifen (Zaditor) 0.025 % (0.035 %) ophthalmic solution 1 drop, Both Eyes, 2 times daily    leflunomide (ARAVA) 20 mg, oral, Daily    levocetirizine (Xyzal) 5 mg tablet 1 tablet in the evening Orally Once a day for 30 day(s)    levothyroxine (Synthroid, Levoxyl) 88 mcg tablet 1 tablet daily 6 days per week, 2 tablets one day per week    lidocaine (Lidoderm) 5 % patch apply up to 3 patches to skin once a day, remove after 12 hours for 30 days    losartan (COZAAR) 50 mg, oral, Daily    miconazole (Micotin) 2 % powder      morphine (MSIR) 30 mg, oral, 2 times daily    morphine CR (MS Contin) 15 mg 12 hr tablet 1 tablet, oral, 3 times daily    mupirocin (Bactroban) 2 % ointment Topical, 3 times daily RT    nystatin (Mycostatin) cream Topical, 2 times daily    ondansetron (ZOFRAN) 4 mg, oral, Every 8 hours PRN    oxyCODONE myristate (Xtampza ER) 9 mg 12 hr abuse-deterrent capsule 1 capsule, oral, Every 12 hours    pancrelipase, Lip-Prot-Amyl, (Creon) 36,000-114,000- 180,000 unit capsule,delayed release(DR/EC) capsule 1 capsule, oral, 2 times daily    pantoprazole (PROTONIX) 40 mg, oral, Daily RT    polyethylene glycol (Miralax) 17 gram/dose powder      potassium chloride CR (Klor-Con M20) 20 mEq ER tablet 20 mEq, oral, Daily, Do not crush or  chew.    pseudoephedrine-guaifenesin (Mucinex D)  mg 12 hr tablet 1 tablet, oral, Every 12 hours, Do not crush, chew, or split.    rimegepant (NURTEC) 75 mg, oral, Every other day    sucralfate (Carafate) 1 gram tablet 1 tablet on an empty stomach Orally Twice a day    SUMAtriptan (Imitrex) 25 mg tablet TAKE 1 TABLET BY MOUTH AS NEEDED FOR MIGRAINE. MAY REPEAT IN 2 HOURS IF NEEDED.    tiotropium-olodateroL (Stiolto Respimat) 2.5-2.5 mcg/actuation mist inhaler 2 puffs Inhalation Once a day for 30 days    triamcinolone (Kenalog) 0.1 % cream 1 application as needed Externally Twice a day as needed      Past Medical History:   Diagnosis Date    Anemia     Anxiety     Atrial fibrillation (CMS/HCC)     Coronary artery disease     Depression     Fibromyalgia     Gastroparesis     Gastroparesis    Gout     Hypertension     Hypothyroidism     Thrombocythemia       Past Surgical History:   Procedure Laterality Date    CT GUIDED PERCUTANEOUS BIOPSY BONE DEEP  3/9/2022    CT GUIDED PERCUTANEOUS BIOPSY BONE DEEP 3/9/2022 [facility]    OTHER SURGICAL HISTORY  08/16/2019    Tonsillectomy with adenoidectomy    OTHER SURGICAL HISTORY  08/16/2019    Bunionectomy    OTHER SURGICAL HISTORY  08/16/2019    Hernia repair    OTHER SURGICAL HISTORY  08/16/2019    Cholecystectomy    OTHER SURGICAL HISTORY  08/16/2019    Uterine nerve ablation    OTHER SURGICAL HISTORY  08/16/2019    Ovarian torsion repair    OTHER SURGICAL HISTORY  08/16/2019    Tubal ligation         Performance:   ECOG Performance Status: 1   0 Fully active, able to carry on all pre-disease performance without restriction  1 Restricted in physically strenuous activity but ambulatory and able to carry out work of a light or sedentary nature, e.g., light house work, office work  2 Ambulatory and capable of all selfcare but unable to carry out any work activities; up and about more than 50% of waking hours  3 Capable of only limited selfcare; confined to bed or  "chair more than 50% of waking hours  4 Completely disabled; cannot carry on any selfcare; totally confined to bed or chair  5 Dead     Vitals and Measurements:       1/29/2024     3:23 PM 1/31/2024     2:37 PM 2/8/2024    11:05 AM 2/8/2024    11:15 AM 2/8/2024    11:30 AM 2/8/2024     4:31 PM 2/23/2024     1:53 PM   Vitals   Systolic 108 145 167 167 161 155 144   Diastolic 64 60 80 80 80 76 82   Heart Rate 90  85  81 82 87   Temp   36.4 °C (97.6 °F)    36.6 °C (97.9 °F)   Resp 17  17   18 18   Height (in) 1.549 m (5' 1\")  1.549 m (5' 1\")       Weight (lb) 151.8 151 153.88    149.8   BMI 28.68 kg/m2 28.53 kg/m2 29.08 kg/m2    28.3 kg/m2   BSA (m2) 1.72 m2 1.72 m2 1.73 m2    1.71 m2   Visit Report Report Report     Report      Physical Exam:      Constitutional: Patient is awake/alert/oriented  x3, No distress, Nourished, Hydrated, Alert and Cooperative   Eyes: PERRL, EOMI, clear sclera   ENMT: Mucous membranes moist, no oral lesions or sores.  Bilateral ears canals red with effusion, likely from sinusitis, maxillary and frontal tenderness with palpation.   Head/Neck: Neck supple, no apparent injury, thyroid  without mass or tenderness, No JVD, trachea midline, no bruits   Respiratory/Thorax: Patent airways, CTAB, chest symmetry, normal inspiratory and expiratory effort    Cardiovascular: Regular, rate and rhythm, Positive for murmur, no carotid bruit   Gastrointestinal: Non-distended, soft, non-tender, no masses or organomegaly appreciated    Genitourinary: deferred   Musculoskeletal: no pain on palpation of spine, normal strength for baseline    Extremities: Pulses two pulses, warm, dry. Atraumatic in appearance without tenderness or deformity. Extremities are without swelling or erythema. Full range of motion is noted to all joints. No sores or lesions.   Neurological: Alert and oriented x3, intact senses,  motor, response and reflexes, normal strength, cranial nerves normal   Breast: deferred   Lymphatic: No " "significant lymphadenopathy   Psychological: Appropriate mood and behavior   Skin: Dry, no lesions, no rashes      Lab Results:  Labs:  Lab Results   Component Value Date    WBC 8.0 02/23/2024    NEUTROABS 5.43 02/23/2024    IGABSOL 0.02 02/23/2024    LYMPHSABS 1.59 02/23/2024    MONOSABS 0.58 02/23/2024    EOSABS 0.29 02/23/2024    BASOSABS 0.06 02/23/2024    RBC 4.51 02/23/2024    MCV 90 02/23/2024    MCHC 31.7 (L) 02/23/2024    HGB 12.9 02/23/2024    HCT 40.7 02/23/2024    PLT 90 (L) 02/23/2024     Lab Results   Component Value Date    RETICCTPCT 2.5 (H) 02/23/2024      Lab Results   Component Value Date    CREATININE 0.78 02/23/2024    BUN 8 02/23/2024    EGFR 87 02/23/2024     (L) 02/23/2024    K 3.7 02/23/2024    CL 89 (L) 02/23/2024    CO2 36 (H) 02/23/2024      Lab Results   Component Value Date    ALT 26 02/23/2024    AST 34 02/23/2024    ALKPHOS 146 (H) 02/23/2024    BILITOT 1.0 02/23/2024      Lab Results   Component Value Date    TSH 0.47 06/08/2023     Lab Results   Component Value Date    TSH 0.47 06/08/2023    P6FGTJT 72 04/22/2020    B4TTZLY 6.2 04/22/2020     Lab Results   Component Value Date    IRON 89 02/23/2024    TIBC 411 02/23/2024    FERRITIN 89 02/23/2024      Lab Results   Component Value Date    AINZRXAK44 964 (H) 12/11/2023      Lab Results   Component Value Date    FOLATE >24.0 06/22/2023     Lab Results   Component Value Date    GUIDO NEGATIVE 10/06/2022    RF <10 10/20/2023    SEDRATE 18 02/23/2024      Lab Results   Component Value Date    CRP 1.27 (H) 02/23/2024      No results found for: \"ALLYN\"  Lab Results   Component Value Date     02/23/2024     Lab Results   Component Value Date    HAPTOGLOBIN 59 12/11/2023     Lab Results   Component Value Date    SPEP Normal. 12/11/2023     Lab Results   Component Value Date    IGG 1,090 12/11/2023     (H) 12/11/2023     (H) 12/11/2023       Radiology Results:  MRI [place] WO/W IVCON 05/20/2022  Order: " [id number]  IMPRESSION  Stable 1.2 cm pancreatic body cystic lesion, likely a sidebranch IPMN.  Cirrhosis with portal hypertension including small volume ascites.  No  OPTN 5 lesion.    CT SINUS STEREO WO IVCON 11/22/2023  Order: [id number]  IMPRESSION:  Extensive postop changes with opacification of several residual left  posterior ethmoid air cells.    MRI Lumbar spine 11/10/2023  There is multilevel spondylosis. There are varying degrees of spinal  canal and neural foraminal narrowing as described above.    CT Angio Chest 07/24/2023  IMPRESSION:  No CT evidence of pulmonary embolism in the current exam.     No mass or pneumonia evident in either lung. Mild scattered stable  bilateral scarring.     Mild nonspecific stable mediastinal and bilateral hilar adenopathy,  most likely reactive..     Splenomegaly. Findings worrisome for cirrhosis of the liver. Mild  ascites along the anterior aspect of the left hepatic lobe.     Stable moderate T11 compression fracture. Progression of now moderate  loss of height with anterior wedging at T8, exact age uncertain but  still probably remote.    XR BONE SURVEY 03/02/2021  Narrative & Impression   MRN: [id number]  Patient Name: RUKHSANA PALACIO     STUDY:  OSSEOUS SURVEY, COMPLETE; ;  3/2/2021 2:57 pm     INDICATION:  MGUS - c/o increased bone pain.     COMPARISON:  None.     ACCESSION NUMBER(S):  [id number]     ORDERING CLINICIAN:  AYSE LEDEZMA     FINDINGS:  No acute fracture or dislocation is seen. No definite lytic or  blastic lesions are seen.     Degenerative changes are noted in the bilateral acromioclavicular  joints. Degenerative changes are seen in cervical spine, thoracic  spine and lumbar spine. The most severely affected level in the  lumbar spine is L4-5 with grade 1 anterolisthesis of L4 on L5.     Degenerative changes are noted in bilateral hips and SI joints.     Numerous metallic coils are seen in the abdomen and pelvis.     Degenerative changes are noted in the  bilateral knees.     IMPRESSION:  No definite lytic or blastic lesions.     Degenerative changes, as described above.     Assessment and Plan:   1. IgG kappa light chain MGUS  Daniela Saez is a 63-year-old female that presents for follow-up of monoclonal gammopathy of undetermined significance.  She requested a sooner appointment due to generalized pain.  Upon review of her labs from August 2023 it appears she has IgG kappa light chain MGUS with elevation in the kappa light chain and ratio.  She has slight elevation in IgA.  She has no elevated M protein on SPEP.  New workup in process and I will call her with results.  We did discuss the importance of monitoring, slim crab criteria and understanding how kidney function, elevated calcium levels, and anemia are signs and symptoms.  Patient has many comorbidities and autoimmune disease.    She follows pain management next week as well.    Discussed her kappa light chain can be elevated in autoimmune versus inflammatory versus infection versus malignancy.  She had a bone marrow biopsy March 9, 2022, there was no evidence of plasma cell dyscrasia. Cytogenetics negative for intraepithelial lesions or malignancy. Cellular change associated with atrophy and acute inflammation.      2.  Iron deficiency anemia  Iron deficiency anemia noted 5 months ago in June 2023, since then there has not been any deficiency.  We will redraw her labs today and assess.  If needed we will give her IV iron Feraheme.  She is likely iron deficient due to multiple chronic disease and inflammation.    3.  Thrombocytopenia  Platelets from August 2023 were 80 show a decreased platelet count dating back to May 2023.  The lowest 62.  Today they are 90.  We will continue to monitor.  We discussed liver disease and ITP as probable cause.  Discussed the importance of monitoring platelet count, bleeding and practicing safety to prevent injury and bleeding.     4.  Recurrent sinus infections  Upon  assessment is clear the patient has otitis externa bilateral and maxillary and frontal sinus tenderness with drainage.  Purulent mucus secretions in bilateral nares.  Patient just received Keflex and azithromycin previously.  Patient has an appointment with ENT next week..     **I will call her with results of her labs to [phone] her home number**    Follow-up:    RTC:  --4 months with labs one week before     Medications:  -Continue B-12 supplement three times a week     Imaging:  -no imaging ordered today    Referrals:  -ENT as scheduled for recurrent sinus infections  -Specialists as scheduled   -[facility]     Patient Instructions Summary:   Reviewed monoclonal gammopathy of undetermined significance.  Discussed monitoring lab values and significance.  Reviewed thrombocytopenia, the purpose of our platelets and monitoring for bleeding.  We reviewed slim crab criteria.  Patient's questions answered to her liking.  She will call with any questions or concerns.    Thank you for allowing me to care for you today. It was a pleasure meeting you.    Sincerely,  Arlet Monte, APRN-CNP

## 2024-02-22 ENCOUNTER — TELEPHONE (OUTPATIENT)
Dept: HEMATOLOGY/ONCOLOGY | Facility: HOSPITAL | Age: 64
End: 2024-02-22
Payer: MEDICAID

## 2024-02-22 NOTE — TELEPHONE ENCOUNTER
Reached out to patient regarding her appointment for tomorrow with YASSINE Browne. Spoke with patient regarding the labs that were needed prior to her appointment tomorrow. Patient verbalized and agreed to appointment.

## 2024-02-23 ENCOUNTER — OFFICE VISIT (OUTPATIENT)
Dept: HEMATOLOGY/ONCOLOGY | Facility: HOSPITAL | Age: 64
End: 2024-02-23
Payer: MEDICAID

## 2024-02-23 ENCOUNTER — LAB (OUTPATIENT)
Dept: LAB | Facility: LAB | Age: 64
End: 2024-02-23
Payer: MEDICAID

## 2024-02-23 ENCOUNTER — HOSPITAL ENCOUNTER (OUTPATIENT)
Dept: RADIOLOGY | Facility: HOSPITAL | Age: 64
Discharge: HOME | End: 2024-02-23
Payer: MEDICAID

## 2024-02-23 VITALS
SYSTOLIC BLOOD PRESSURE: 144 MMHG | BODY MASS INDEX: 28.3 KG/M2 | DIASTOLIC BLOOD PRESSURE: 82 MMHG | RESPIRATION RATE: 18 BRPM | OXYGEN SATURATION: 98 % | TEMPERATURE: 97.9 F | WEIGHT: 149.8 LBS | HEART RATE: 87 BPM

## 2024-02-23 DIAGNOSIS — R21 RASH AND NONSPECIFIC SKIN ERUPTION: ICD-10-CM

## 2024-02-23 DIAGNOSIS — D50.8 OTHER IRON DEFICIENCY ANEMIA: ICD-10-CM

## 2024-02-23 DIAGNOSIS — J32.0 MAXILLARY SINUSITIS, UNSPECIFIED CHRONICITY: ICD-10-CM

## 2024-02-23 DIAGNOSIS — D69.6 THROMBOCYTOPENIA (CMS-HCC): ICD-10-CM

## 2024-02-23 DIAGNOSIS — K90.89 OTHER SPECIFIED INTESTINAL MALABSORPTION (HHS-HCC): ICD-10-CM

## 2024-02-23 DIAGNOSIS — K90.89 OTHER SPECIFIED INTESTINAL MALABSORPTION (HHS-HCC): Primary | ICD-10-CM

## 2024-02-23 DIAGNOSIS — E11.9 TYPE 2 DIABETES MELLITUS WITHOUT COMPLICATION, WITHOUT LONG-TERM CURRENT USE OF INSULIN (MULTI): ICD-10-CM

## 2024-02-23 LAB
ALBUMIN SERPL BCP-MCNC: 4.5 G/DL (ref 3.4–5)
ALP SERPL-CCNC: 146 U/L (ref 33–136)
ALT SERPL W P-5'-P-CCNC: 26 U/L (ref 7–45)
ANION GAP SERPL CALC-SCNC: 12 MMOL/L (ref 10–20)
AST SERPL W P-5'-P-CCNC: 34 U/L (ref 9–39)
BASOPHILS # BLD AUTO: 0.06 X10*3/UL (ref 0–0.1)
BASOPHILS NFR BLD AUTO: 0.8 %
BILIRUB SERPL-MCNC: 1 MG/DL (ref 0–1.2)
BUN SERPL-MCNC: 8 MG/DL (ref 6–23)
CALCIUM SERPL-MCNC: 9.4 MG/DL (ref 8.6–10.3)
CHLORIDE SERPL-SCNC: 89 MMOL/L (ref 98–107)
CO2 SERPL-SCNC: 36 MMOL/L (ref 21–32)
CREAT SERPL-MCNC: 0.78 MG/DL (ref 0.5–1.05)
CRP SERPL-MCNC: 1.27 MG/DL
EGFRCR SERPLBLD CKD-EPI 2021: 87 ML/MIN/1.73M*2
EOSINOPHIL # BLD AUTO: 0.29 X10*3/UL (ref 0–0.7)
EOSINOPHIL NFR BLD AUTO: 3.6 %
ERYTHROCYTE [DISTWIDTH] IN BLOOD BY AUTOMATED COUNT: 14.7 % (ref 11.5–14.5)
ERYTHROCYTE [SEDIMENTATION RATE] IN BLOOD BY WESTERGREN METHOD: 18 MM/H (ref 0–30)
FERRITIN SERPL-MCNC: 89 NG/ML (ref 8–150)
GLUCOSE SERPL-MCNC: 208 MG/DL (ref 74–99)
HCT VFR BLD AUTO: 40.7 % (ref 36–46)
HGB BLD-MCNC: 12.9 G/DL (ref 12–16)
HGB RETIC QN: 33 PG (ref 28–38)
IMM GRANULOCYTES # BLD AUTO: 0.02 X10*3/UL (ref 0–0.7)
IMM GRANULOCYTES NFR BLD AUTO: 0.3 % (ref 0–0.9)
IMMATURE RETIC FRACTION: 12.6 %
IRON SATN MFR SERPL: 22 % (ref 25–45)
IRON SERPL-MCNC: 89 UG/DL (ref 35–150)
LDH SERPL L TO P-CCNC: 220 U/L (ref 84–246)
LYMPHOCYTES # BLD AUTO: 1.59 X10*3/UL (ref 1.2–4.8)
LYMPHOCYTES NFR BLD AUTO: 19.9 %
MCH RBC QN AUTO: 28.6 PG (ref 26–34)
MCHC RBC AUTO-ENTMCNC: 31.7 G/DL (ref 32–36)
MCV RBC AUTO: 90 FL (ref 80–100)
MONOCYTES # BLD AUTO: 0.58 X10*3/UL (ref 0.1–1)
MONOCYTES NFR BLD AUTO: 7.3 %
NEUTROPHILS # BLD AUTO: 5.43 X10*3/UL (ref 1.2–7.7)
NEUTROPHILS NFR BLD AUTO: 68.1 %
NRBC BLD-RTO: 0 /100 WBCS (ref 0–0)
PLATELET # BLD AUTO: 90 X10*3/UL (ref 150–450)
POTASSIUM SERPL-SCNC: 3.7 MMOL/L (ref 3.5–5.3)
PROT SERPL-MCNC: 7.7 G/DL (ref 6.4–8.2)
RBC # BLD AUTO: 4.51 X10*6/UL (ref 4–5.2)
RETICS #: 0.11 X10*6/UL (ref 0.02–0.08)
RETICS/RBC NFR AUTO: 2.5 % (ref 0.5–2)
SODIUM SERPL-SCNC: 133 MMOL/L (ref 136–145)
TIBC SERPL-MCNC: 411 UG/DL (ref 240–445)
UIBC SERPL-MCNC: 322 UG/DL (ref 110–370)
WBC # BLD AUTO: 8 X10*3/UL (ref 4.4–11.3)

## 2024-02-23 PROCEDURE — 84155 ASSAY OF PROTEIN SERUM: CPT

## 2024-02-23 PROCEDURE — 1036F TOBACCO NON-USER: CPT

## 2024-02-23 PROCEDURE — 82525 ASSAY OF COPPER: CPT

## 2024-02-23 PROCEDURE — 83521 IG LIGHT CHAINS FREE EACH: CPT

## 2024-02-23 PROCEDURE — 82232 ASSAY OF BETA-2 PROTEIN: CPT

## 2024-02-23 PROCEDURE — 3079F DIAST BP 80-89 MM HG: CPT

## 2024-02-23 PROCEDURE — 82306 VITAMIN D 25 HYDROXY: CPT

## 2024-02-23 PROCEDURE — 99214 OFFICE O/P EST MOD 30 MIN: CPT | Mod: 25

## 2024-02-23 PROCEDURE — 86334 IMMUNOFIX E-PHORESIS SERUM: CPT

## 2024-02-23 PROCEDURE — 76705 ECHO EXAM OF ABDOMEN: CPT | Performed by: STUDENT IN AN ORGANIZED HEALTH CARE EDUCATION/TRAINING PROGRAM

## 2024-02-23 PROCEDURE — 85652 RBC SED RATE AUTOMATED: CPT

## 2024-02-23 PROCEDURE — 85045 AUTOMATED RETICULOCYTE COUNT: CPT

## 2024-02-23 PROCEDURE — 86320 SERUM IMMUNOELECTROPHORESIS: CPT

## 2024-02-23 PROCEDURE — 84591 ASSAY OF NOS VITAMIN: CPT

## 2024-02-23 PROCEDURE — 4010F ACE/ARB THERAPY RXD/TAKEN: CPT

## 2024-02-23 PROCEDURE — 82746 ASSAY OF FOLIC ACID SERUM: CPT

## 2024-02-23 PROCEDURE — 84165 PROTEIN E-PHORESIS SERUM: CPT

## 2024-02-23 PROCEDURE — 82728 ASSAY OF FERRITIN: CPT

## 2024-02-23 PROCEDURE — 99214 OFFICE O/P EST MOD 30 MIN: CPT

## 2024-02-23 PROCEDURE — 80053 COMPREHEN METABOLIC PANEL: CPT

## 2024-02-23 PROCEDURE — 83615 LACTATE (LD) (LDH) ENZYME: CPT

## 2024-02-23 PROCEDURE — 3077F SYST BP >= 140 MM HG: CPT

## 2024-02-23 PROCEDURE — 36415 COLL VENOUS BLD VENIPUNCTURE: CPT

## 2024-02-23 PROCEDURE — 83550 IRON BINDING TEST: CPT

## 2024-02-23 PROCEDURE — 82784 ASSAY IGA/IGD/IGG/IGM EACH: CPT

## 2024-02-23 PROCEDURE — 86140 C-REACTIVE PROTEIN: CPT

## 2024-02-23 PROCEDURE — 84207 ASSAY OF VITAMIN B-6: CPT

## 2024-02-23 PROCEDURE — 76705 ECHO EXAM OF ABDOMEN: CPT

## 2024-02-23 PROCEDURE — 3052F HG A1C>EQUAL 8.0%<EQUAL 9.0%: CPT

## 2024-02-23 PROCEDURE — 83540 ASSAY OF IRON: CPT

## 2024-02-23 PROCEDURE — 83010 ASSAY OF HAPTOGLOBIN QUANT: CPT

## 2024-02-23 PROCEDURE — 85025 COMPLETE CBC W/AUTO DIFF WBC: CPT

## 2024-02-23 PROCEDURE — 82607 VITAMIN B-12: CPT

## 2024-02-23 SDOH — ECONOMIC STABILITY: FOOD INSECURITY: WITHIN THE PAST 12 MONTHS, YOU WORRIED THAT YOUR FOOD WOULD RUN OUT BEFORE YOU GOT MONEY TO BUY MORE.: PATIENT DECLINED

## 2024-02-23 SDOH — ECONOMIC STABILITY: FOOD INSECURITY: WITHIN THE PAST 12 MONTHS, THE FOOD YOU BOUGHT JUST DIDN'T LAST AND YOU DIDN'T HAVE MONEY TO GET MORE.: PATIENT DECLINED

## 2024-02-23 SDOH — HEALTH STABILITY: PHYSICAL HEALTH: ON AVERAGE, HOW MANY MINUTES DO YOU ENGAGE IN EXERCISE AT THIS LEVEL?: PATIENT DECLINED

## 2024-02-23 SDOH — ECONOMIC STABILITY: GENERAL
WHICH OF THE FOLLOWING WOULD YOU LIKE TO GET CONNECTED TO IN ORDER TO RECEIVE A DISCOUNT OR FOR FREE? (CHOOSE ALL THAT APPLY): NONE OF THESE

## 2024-02-23 SDOH — ECONOMIC STABILITY: HOUSING INSECURITY

## 2024-02-23 SDOH — ECONOMIC STABILITY: GENERAL

## 2024-02-23 SDOH — ECONOMIC STABILITY: TRANSPORTATION INSECURITY
IN THE PAST 12 MONTHS, HAS LACK OF TRANSPORTATION KEPT YOU FROM MEETINGS, WORK, OR FROM GETTING THINGS NEEDED FOR DAILY LIVING?: PATIENT DECLINED

## 2024-02-23 SDOH — ECONOMIC STABILITY: TRANSPORTATION INSECURITY
IN THE PAST 12 MONTHS, HAS THE LACK OF TRANSPORTATION KEPT YOU FROM MEDICAL APPOINTMENTS OR FROM GETTING MEDICATIONS?: PATIENT DECLINED

## 2024-02-23 SDOH — HEALTH STABILITY: PHYSICAL HEALTH
ON AVERAGE, HOW MANY DAYS PER WEEK DO YOU ENGAGE IN MODERATE TO STRENUOUS EXERCISE (LIKE A BRISK WALK)?: PATIENT DECLINED

## 2024-02-23 SDOH — ECONOMIC STABILITY: HOUSING INSECURITY
IN THE LAST 12 MONTHS, WAS THERE A TIME WHEN YOU DID NOT HAVE A STEADY PLACE TO SLEEP OR SLEPT IN A SHELTER (INCLUDING NOW)?: PATIENT DECLINED

## 2024-02-23 SDOH — ECONOMIC STABILITY: INCOME INSECURITY: IN THE LAST 12 MONTHS, WAS THERE A TIME WHEN YOU WERE NOT ABLE TO PAY THE MORTGAGE OR RENT ON TIME?: PATIENT DECLINED

## 2024-02-23 ASSESSMENT — LIFESTYLE VARIABLES
AUDIT-C TOTAL SCORE: -1
SKIP TO QUESTIONS 9-10: 0
HOW MANY STANDARD DRINKS CONTAINING ALCOHOL DO YOU HAVE ON A TYPICAL DAY: PATIENT DECLINED
HOW OFTEN DO YOU HAVE A DRINK CONTAINING ALCOHOL: PATIENT DECLINED
HOW OFTEN DO YOU HAVE SIX OR MORE DRINKS ON ONE OCCASION: PATIENT DECLINED

## 2024-02-23 ASSESSMENT — SOCIAL DETERMINANTS OF HEALTH (SDOH)
WITHIN THE LAST YEAR, HAVE YOU BEEN AFRAID OF YOUR PARTNER OR EX-PARTNER?: NO
HOW HARD IS IT FOR YOU TO PAY FOR THE VERY BASICS LIKE FOOD, HOUSING, MEDICAL CARE, AND HEATING?: PATIENT DECLINED
WITHIN THE LAST YEAR, HAVE YOU BEEN HUMILIATED OR EMOTIONALLY ABUSED IN OTHER WAYS BY YOUR PARTNER OR EX-PARTNER?: NO
IN A TYPICAL WEEK, HOW MANY TIMES DO YOU TALK ON THE PHONE WITH FAMILY, FRIENDS, OR NEIGHBORS?: PATIENT DECLINED
WITHIN THE LAST YEAR, HAVE TO BEEN RAPED OR FORCED TO HAVE ANY KIND OF SEXUAL ACTIVITY BY YOUR PARTNER OR EX-PARTNER?: NO
HOW OFTEN DO YOU GET TOGETHER WITH FRIENDS OR RELATIVES?: PATIENT DECLINED
HOW OFTEN DO YOU ATTEND CHURCH OR RELIGIOUS SERVICES?: PATIENT DECLINED
IN THE PAST 12 MONTHS, HAS THE ELECTRIC, GAS, OIL, OR WATER COMPANY THREATENED TO SHUT OFF SERVICE IN YOUR HOME?: PATIENT DECLINED
HOW OFTEN DO YOU ATTENT MEETINGS OF THE CLUB OR ORGANIZATION YOU BELONG TO?: PATIENT DECLINED
WITHIN THE LAST YEAR, HAVE YOU BEEN KICKED, HIT, SLAPPED, OR OTHERWISE PHYSICALLY HURT BY YOUR PARTNER OR EX-PARTNER?: NO
ARE YOU MARRIED, WIDOWED, DIVORCED, SEPARATED, NEVER MARRIED, OR LIVING WITH A PARTNER?: PATIENT DECLINED
DO YOU BELONG TO ANY CLUBS OR ORGANIZATIONS SUCH AS CHURCH GROUPS UNIONS, FRATERNAL OR ATHLETIC GROUPS, OR SCHOOL GROUPS?: PATIENT DECLINED

## 2024-02-23 ASSESSMENT — PATIENT HEALTH QUESTIONNAIRE - PHQ9
2. FEELING DOWN, DEPRESSED OR HOPELESS: SEVERAL DAYS
SUM OF ALL RESPONSES TO PHQ9 QUESTIONS 1 & 2: 1
1. LITTLE INTEREST OR PLEASURE IN DOING THINGS: NOT AT ALL

## 2024-02-23 ASSESSMENT — PAIN SCALES - GENERAL: PAINLEVEL: 7

## 2024-02-24 LAB
25(OH)D3 SERPL-MCNC: 43 NG/ML (ref 30–100)
B2 MICROGLOB SERPL-MCNC: 3.3 MG/L (ref 0.7–2.2)
FOLATE SERPL-MCNC: 13.6 NG/ML
IGA SERPL-MCNC: 667 MG/DL (ref 70–400)
IGG SERPL-MCNC: 1080 MG/DL (ref 700–1600)
IGM SERPL-MCNC: 319 MG/DL (ref 40–230)
PROT SERPL-MCNC: 7.9 G/DL (ref 6.4–8.2)
VIT B12 SERPL-MCNC: 1133 PG/ML (ref 211–911)

## 2024-02-25 LAB
HAPTOGLOB SERPL-MCNC: 99 MG/DL (ref 37–246)
KAPPA LC SERPL-MCNC: 3.67 MG/DL (ref 0.33–1.94)
KAPPA LC/LAMBDA SER: 2.06 {RATIO} (ref 0.26–1.65)
LAMBDA LC SERPL-MCNC: 1.78 MG/DL (ref 0.57–2.63)

## 2024-02-27 ENCOUNTER — TELEPHONE (OUTPATIENT)
Dept: ENDOCRINOLOGY | Facility: CLINIC | Age: 64
End: 2024-02-27

## 2024-02-27 ENCOUNTER — APPOINTMENT (OUTPATIENT)
Dept: ENDOCRINOLOGY | Facility: CLINIC | Age: 64
End: 2024-02-27
Payer: MEDICAID

## 2024-02-27 LAB — COPPER SERPL-MCNC: 199.3 UG/DL (ref 80–155)

## 2024-02-27 NOTE — TELEPHONE ENCOUNTER
Pt was dropped off at wrong office at approximately 1:00 pm and asked to r/s the 2/27/24 follow up visit. Pt has been provided follow up visit on 3/20/24 and provided pt address pt repeated location back to confirm

## 2024-02-28 LAB — PYRIDOXAL PHOS SERPL-SCNC: 41.7 NMOL/L (ref 20–125)

## 2024-02-29 LAB
ALBUMIN: 4.2 G/DL (ref 3.4–5)
ALPHA 1 GLOBULIN: 0.4 G/DL (ref 0.2–0.6)
ALPHA 2 GLOBULIN: 0.8 G/DL (ref 0.4–1.1)
BETA GLOBULIN: 1.3 G/DL (ref 0.5–1.2)
GAMMA GLOBULIN: 1.1 G/DL (ref 0.5–1.4)
IMMUNOFIXATION COMMENT: ABNORMAL
PATH REVIEW - SERUM IMMUNOFIXATION: ABNORMAL
PATH REVIEW-SERUM PROTEIN ELECTROPHORESIS: ABNORMAL
PROTEIN ELECTROPHORESIS COMMENT: ABNORMAL

## 2024-03-01 LAB
NIACIN SERPL-MCNC: NORMAL NG/ML
NICOTINAMIDE SERPL-MCNC: 37 NG/ML
NICOTINURATE SERPL-MCNC: NORMAL NG/ML

## 2024-03-11 ENCOUNTER — APPOINTMENT (OUTPATIENT)
Dept: HEMATOLOGY/ONCOLOGY | Facility: HOSPITAL | Age: 64
End: 2024-03-11
Payer: MEDICAID

## 2024-03-12 DIAGNOSIS — M05.79 RHEUMATOID ARTHRITIS INVOLVING MULTIPLE SITES WITH POSITIVE RHEUMATOID FACTOR (MULTI): ICD-10-CM

## 2024-03-12 RX ORDER — HYDROXYCHLOROQUINE SULFATE 200 MG/1
TABLET, FILM COATED ORAL
Qty: 135 TABLET | Refills: 0 | Status: SHIPPED | OUTPATIENT
Start: 2024-03-12 | End: 2024-03-21 | Stop reason: SDUPTHER

## 2024-03-15 ENCOUNTER — OFFICE VISIT (OUTPATIENT)
Dept: HEMATOLOGY/ONCOLOGY | Facility: HOSPITAL | Age: 64
End: 2024-03-15
Payer: MEDICAID

## 2024-03-15 VITALS
WEIGHT: 152.12 LBS | TEMPERATURE: 97 F | SYSTOLIC BLOOD PRESSURE: 128 MMHG | RESPIRATION RATE: 17 BRPM | DIASTOLIC BLOOD PRESSURE: 74 MMHG | BODY MASS INDEX: 28.74 KG/M2 | OXYGEN SATURATION: 97 % | HEART RATE: 81 BPM

## 2024-03-15 DIAGNOSIS — D50.8 OTHER IRON DEFICIENCY ANEMIA: ICD-10-CM

## 2024-03-15 DIAGNOSIS — D47.2 MGUS (MONOCLONAL GAMMOPATHY OF UNKNOWN SIGNIFICANCE): Primary | ICD-10-CM

## 2024-03-15 DIAGNOSIS — D69.6 THROMBOCYTOPENIA (CMS-HCC): ICD-10-CM

## 2024-03-15 PROCEDURE — 3074F SYST BP LT 130 MM HG: CPT

## 2024-03-15 PROCEDURE — 99214 OFFICE O/P EST MOD 30 MIN: CPT

## 2024-03-15 PROCEDURE — 1036F TOBACCO NON-USER: CPT

## 2024-03-15 PROCEDURE — 4010F ACE/ARB THERAPY RXD/TAKEN: CPT

## 2024-03-15 PROCEDURE — 3052F HG A1C>EQUAL 8.0%<EQUAL 9.0%: CPT

## 2024-03-15 PROCEDURE — 3078F DIAST BP <80 MM HG: CPT

## 2024-03-15 RX ORDER — BUTALBITAL, ASPIRIN, AND CAFFEINE 325; 50; 40 MG/1; MG/1; MG/1
1 CAPSULE ORAL EVERY 6 HOURS PRN
COMMUNITY

## 2024-03-15 RX ORDER — DIPHENOXYLATE HYDROCHLORIDE AND ATROPINE SULFATE 2.5; .025 MG/1; MG/1
1 TABLET ORAL 4 TIMES DAILY
COMMUNITY

## 2024-03-15 ASSESSMENT — PAIN SCALES - GENERAL: PAINLEVEL: 6

## 2024-03-15 NOTE — PROGRESS NOTES
Patient Visit Information:     Visit Type: Follow up visit: New Provider    History of Present Illness:     Hematology History:   Patient is a 63-year-old white female referred for monoclonal gammopathy which so far appears to be MGUS (Dr. Costello agreed diagnosis of MGUS likely autoimmune disease  related), bone marrow biopsy with no evidence of myeloma 3/9/2022, found during neurosurgery evaluation for neuropathy.  She also has anemia with a history of iron infusions due to inadequate response on oral iron with iron deficiency unclear etiology,  pancytopenia at least in part due to her liver issues. Previously followed by Dr. OLYA Uriarte and Dr. Mei.     She has numerous medical issues including migraines, anxiety/depression/insomnia, chronic pain with RSD seen by pain management, type 2 diabetes although later off diabetic medications, adrenal insufficiency treated with hydrocortisone, rheumatoid vs  psoriatic arthritis, hypothyroidism, hyperlipidemia, hypertension, atrial fibrillation, gastroparesis/colitis/gastritis, neuropathy.   9/2022 she had a moderate-high-grade T11 burst compression fracture, surgery delayed due to thrombocytopenia, was seen by hematology-oncology at [facility] where she was going to have her  spine surgery done with thrombocytopenia due to cirrhosis recommending platelet transfusions preoperatively if needed, 3/14/2023 had spine surgery according to notes lumbar laminectomy complicated by infection with mental status changes transferred to  [place] with revision 4/2023.     EGD and colonoscopy completed 10/12/2023 Bleeding arteriovenous malformations (AVMs) of the distal esophagus and duodenal bulb. Small AVMs of the rectosigmoid colon, internal hemorrhoids. EGD with biopsy and hemostasis of bleeding AVMs of the esophagus and duodenum with argon plasma coagulation (APC). Colonoscopy with treatment of AVMs in the rectosigmoid colon with APC.    Follows Dr. Phillips, Endocrinologist   Follows   Abhinav Willis, Rheumatologist  Follows Dr. Gomez, Gastroenterologist    Follows Pain Management in [place]   Recommend she follow up with hepatology for fatty liver/cirrhosis and enlarged spleen  Follows Dermatology     ID Statement:      Daniela Saez is a 63 year old female       Chief Complaint: Follow up MGUS     Interval History:    Daniela Saez presents today for follow up of MGUS and thrombocytopenia. Patient has a significant history as stated above with multiple comorbidities.  Bone marrow biopsy completed March 2022 with no findings of dysplasia.  Normal bone marrow and plasma cell findings.  She endorses fatigue intermittently.  She endorses maxillary and frontal sinus pressure chronic, ear pain recently on antibiotics for recurrent sinusitis, following ENT frequently.  She plans to have maxillary sinus surgery and continues to report sinus pressure. Endorses joint and back pain.  No fevers, sore throat unless she has sinus issues, no shortness of breath, chest pain, abdominal pain, nausea, vomiting, constipation, she has IBS-d, no blood in stool, melena, dysuria or hematuria.  She does note occasional frequency and inability to urinate at times.  Stable appetite- stable weight.  She does note bone pain, arthralgia.     Medical History:  -Autoimmune   -ACI 2016  -Acute Gout   -Acute blood loss  -asthma w/out complications with seasonal changes   -RA  -RSD  -SNHL  -Syncope and collapse   -Vitamin D def  -Psoriatic arthrosis  -MTHR: methylene   -thrombocytopenia  -recurrent maxillary sinusitis   -IBSD  -Fibromyalgia   -neuropathic pain  -lymphocytic colitis   -lactose intolerance   -insomnia   -hypothyroidism  -A-fib  -cervical spondylosis  -KL MGUS  -Gastritis  -gastroparesis  -HCC hepatic cirrhosis       -essential HTN  -JAMES  -CRPS   -Mixed hyperlipidemia   -Migraines  -Depression     Social History:  -Live with family in [place]   -Stay at home Mom, 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, 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    -Divorces, 5 adult children   -Former smoker quit  (1-1.5 pack/day 19 years)  -No alcohol   -No marijuana or drug use     Family History:  -Mom 88 years old living -cardiovascular, macular degenerative   -Dad 80 years old  Glioblastoma (15 months)   -Genetic cardiac issues Lost 3 year old great-nephew   -Lost nephew and niece to [facility]   -Brother- colectomy Gi bleeding   -No other genetic, hematologic, autoimmune, or cancer than stated     Review of Systems:   A review of systems has been completed and are negative for complaints except what is stated in the assessment, HPI, IH, and/or past medical history.      Allergies and Intolerances:  Allergies   Allergen Reactions    Metoclopramide Anaphylaxis and Other     Lockjaw    Fentanyl Unknown    Gabapentin Other and Unknown     SUICIDAL THOUGHTS    Levetiracetam Unknown    Metformin Unknown    Morphine-Naltrexone Unknown    Torsemide Unknown         Outpatient Medication Profile:     Current Outpatient Medications   Medication Instructions    acetaminophen (TYLENOL) 975 mg, oral, Every 4 hours PRN    albuterol 90 mcg/actuation inhaler inhale 2 puffs by mouth four times a day    albuterol 90 mcg/actuation inhaler 1-2 puffs, inhalation, Every 6 hours PRN    alendronate (FOSAMAX) 70 mg, oral, Every 7 days, Take in the morning with a full glass of water, on an empty stomach, and do not take anything else by mouth or lie down for the next 30 min.    allopurinol (ZYLOPRIM) 100 mg, oral, Daily    amitriptyline (ELAVIL) 50 mg, oral, Daily    atorvastatin (LIPITOR) 40 mg, oral, Daily    baclofen (Lioresal) 10 mg tablet Take 2 tablets (20 mg) by mouth 2 times a day.    blood sugar diagnostic (OneTouch Verio test strips) strip TEST 4 TIMES A DAY. MAY SUBSTITUTE WITH INSURANCE COVERED STRIPS.    bumetanide (BUMEX) 1 mg, oral, Daily    butalbital-acetaminophen-caff (Fioricet) -40 mg capsule 1 capsule, oral, Every 6 hours PRN    carboxymethylcellulose (Refresh  Tears) 0.5 % ophthalmic solution 1-2 drops each eye 3 times a day for 30 days    carvedilol (Coreg) 6.25 mg tablet TAKE ONE TABLET BY MOUTH TWICE DAILY    clopidogrel (Plavix) 75 mg tablet TAKE 1 TABLET BY MOUTH EVERY DAY for 90    famotidine (PEPCID) 20 mg, oral, 2 times daily    fluocinonide (Lidex) 0.05 % cream Topical, 2 times daily    fluticasone (Flonase Sensimist) 27.5 mcg/actuation nasal spray 2 sprays, Each Nostril, Nightly    hydrocortisone (CORTEF) 5-10 mg, oral, 3 times daily    hydroxychloroquine (Plaquenil) 200 mg tablet take ONE & ONE-HALF tablet BY MOUTH EVERY DAY    ketotifen (Zaditor) 0.025 % (0.035 %) ophthalmic solution 1 drop, Both Eyes, 2 times daily    leflunomide (ARAVA) 20 mg, oral, Daily    levocetirizine (Xyzal) 5 mg tablet 1 tablet in the evening Orally Once a day for 30 day(s)    levothyroxine (Synthroid, Levoxyl) 88 mcg tablet 1 tablet daily 6 days per week, 2 tablets one day per week    lidocaine (Lidoderm) 5 % patch apply up to 3 patches to skin once a day, remove after 12 hours for 30 days    losartan (COZAAR) 50 mg, oral, Daily    miconazole (Micotin) 2 % powder      morphine (MSIR) 30 mg, oral, 2 times daily    morphine CR (MS Contin) 15 mg 12 hr tablet 1 tablet, oral, 3 times daily    mupirocin (Bactroban) 2 % ointment Topical, 3 times daily RT    nystatin (Mycostatin) cream Topical, 2 times daily    ondansetron (ZOFRAN) 4 mg, oral, Every 8 hours PRN    oxyCODONE myristate (Xtampza ER) 9 mg 12 hr abuse-deterrent capsule 1 capsule, oral, Every 12 hours    pancrelipase, Lip-Prot-Amyl, (Creon) 36,000-114,000- 180,000 unit capsule,delayed release(DR/EC) capsule 1 capsule, oral, 2 times daily    pantoprazole (PROTONIX) 40 mg, oral, Daily RT    polyethylene glycol (Miralax) 17 gram/dose powder      potassium chloride CR (Klor-Con M20) 20 mEq ER tablet 20 mEq, oral, Daily, Do not crush or chew.    pseudoephedrine-guaifenesin (Mucinex D)  mg 12 hr tablet 1 tablet, oral, Every 12  hours, Do not crush, chew, or split.    rimegepant (NURTEC) 75 mg, oral, Every other day    sucralfate (Carafate) 1 gram tablet 1 tablet on an empty stomach Orally Twice a day    SUMAtriptan (Imitrex) 25 mg tablet TAKE 1 TABLET BY MOUTH AS NEEDED FOR MIGRAINE. MAY REPEAT IN 2 HOURS IF NEEDED.    tiotropium-olodateroL (Stiolto Respimat) 2.5-2.5 mcg/actuation mist inhaler 2 puffs Inhalation Once a day for 30 days    triamcinolone (Kenalog) 0.1 % cream 1 application as needed Externally Twice a day as needed      Past Medical History:   Diagnosis Date    Anemia     Anxiety     Atrial fibrillation (CMS/HCC)     Coronary artery disease     Depression     Fibromyalgia     Gastroparesis     Gastroparesis    Gout     Hypertension     Hypothyroidism     Thrombocythemia       Past Surgical History:   Procedure Laterality Date    CT GUIDED PERCUTANEOUS BIOPSY BONE DEEP  3/9/2022    CT GUIDED PERCUTANEOUS BIOPSY BONE DEEP 3/9/2022 [facility]    OTHER SURGICAL HISTORY  08/16/2019    Tonsillectomy with adenoidectomy    OTHER SURGICAL HISTORY  08/16/2019    Bunionectomy    OTHER SURGICAL HISTORY  08/16/2019    Hernia repair    OTHER SURGICAL HISTORY  08/16/2019    Cholecystectomy    OTHER SURGICAL HISTORY  08/16/2019    Uterine nerve ablation    OTHER SURGICAL HISTORY  08/16/2019    Ovarian torsion repair    OTHER SURGICAL HISTORY  08/16/2019    Tubal ligation         Performance:   ECOG Performance Status: 1   0 Fully active, able to carry on all pre-disease performance without restriction  1 Restricted in physically strenuous activity but ambulatory and able to carry out work of a light or sedentary nature, e.g., light house work, office work  2 Ambulatory and capable of all selfcare but unable to carry out any work activities; up and about more than 50% of waking hours  3 Capable of only limited selfcare; confined to bed or chair more than 50% of waking hours  4 Completely disabled; cannot carry on any selfcare; totally  "confined to bed or chair  5 Dead     Vitals and Measurements:       1/29/2024     3:23 PM 1/31/2024     2:37 PM 2/8/2024    11:05 AM 2/8/2024    11:15 AM 2/8/2024    11:30 AM 2/8/2024     4:31 PM 2/23/2024     1:53 PM   Vitals   Systolic 108 145 167 167 161 155 144   Diastolic 64 60 80 80 80 76 82   Heart Rate 90  85  81 82 87   Temp   36.4 °C (97.6 °F)    36.6 °C (97.9 °F)   Resp 17  17   18 18   Height (in) 1.549 m (5' 1\")  1.549 m (5' 1\")       Weight (lb) 151.8 151 153.88    149.8   BMI 28.68 kg/m2 28.53 kg/m2 29.08 kg/m2    28.3 kg/m2   BSA (m2) 1.72 m2 1.72 m2 1.73 m2    1.71 m2   Visit Report Report Report     Report      Physical Exam:      Constitutional: Patient is awake/alert/oriented  x3, No distress, Nourished, Hydrated, Alert and Cooperative   Eyes: PERRL, EOMI, clear sclera   ENMT: Mucous membranes moist, no oral lesions or sores.     Head/Neck: Neck supple, no apparent injury, thyroid  without mass or tenderness, No JVD, trachea midline, no bruits   Respiratory/Thorax: Patent airways, CTAB, chest symmetry, normal inspiratory and expiratory effort    Cardiovascular: Regular, rate and rhythm, Positive for murmur, no carotid bruit   Gastrointestinal: Non-distended, soft, non-tender, no masses or organomegaly appreciated    Genitourinary: deferred   Musculoskeletal: no pain on palpation of spine, normal strength for baseline    Extremities: Pulses palpable, warm, dry. Atraumatic in appearance without tenderness or deformity. Extremities are without swelling or erythema. Full range of motion is noted to all joints. No sores or lesions.   Neurological: Alert and oriented x3, intact senses,  motor, response and reflexes, normal strength, cranial nerves normal   Breast: deferred   Lymphatic: No significant lymphadenopathy   Psychological: Appropriate mood and behavior   Skin: Dry, no lesions, no rashes      Lab Results:  Labs:  Lab Results   Component Value Date    WBC 8.0 02/23/2024    NEUTROABS 5.43 " "02/23/2024    IGABSOL 0.02 02/23/2024    LYMPHSABS 1.59 02/23/2024    MONOSABS 0.58 02/23/2024    EOSABS 0.29 02/23/2024    BASOSABS 0.06 02/23/2024    RBC 4.51 02/23/2024    MCV 90 02/23/2024    MCHC 31.7 (L) 02/23/2024    HGB 12.9 02/23/2024    HCT 40.7 02/23/2024    PLT 90 (L) 02/23/2024     Lab Results   Component Value Date    RETICCTPCT 2.5 (H) 02/23/2024      Lab Results   Component Value Date    CREATININE 0.78 02/23/2024    BUN 8 02/23/2024    EGFR 87 02/23/2024     (L) 02/23/2024    K 3.7 02/23/2024    CL 89 (L) 02/23/2024    CO2 36 (H) 02/23/2024      Lab Results   Component Value Date    ALT 26 02/23/2024    AST 34 02/23/2024    ALKPHOS 146 (H) 02/23/2024    BILITOT 1.0 02/23/2024      Lab Results   Component Value Date    TSH 0.47 06/08/2023     Lab Results   Component Value Date    TSH 0.47 06/08/2023    C6YOTRF 72 04/22/2020    S5TOEAG 6.2 04/22/2020     Lab Results   Component Value Date    IRON 89 02/23/2024    TIBC 411 02/23/2024    FERRITIN 89 02/23/2024      Lab Results   Component Value Date    WQQOOYTY72 1,133 (H) 02/23/2024      Lab Results   Component Value Date    FOLATE 13.6 02/23/2024     Lab Results   Component Value Date    GUIDO NEGATIVE 10/06/2022    RF <10 10/20/2023    SEDRATE 18 02/23/2024      Lab Results   Component Value Date    CRP 1.27 (H) 02/23/2024      No results found for: \"ALLYN\"  Lab Results   Component Value Date     02/23/2024     Lab Results   Component Value Date    HAPTOGLOBIN 99 02/23/2024     Lab Results   Component Value Date    SPEP Increased level of beta globulins. 02/23/2024     Lab Results   Component Value Date    IGG 1,080 02/23/2024     (H) 02/23/2024     (H) 02/23/2024       Radiology Results:  CT ABDOMEN PELVIS W IV CONTRAST  3/26/2022  IMPRESSION:  1.  Cirrhotic hepatic morphology with sequela of portal hypertension  including splenomegaly, intra-abdominal collateral vessel formation,  and intra-abdominal ascites.  2. Diffuse wall " thickening of the small bowel, colon, and upper  gastrointestinal tract which is nonspecific and may be related to  portal enteropathy/colopathy. Clinically exclude other causes of  diffuse bowel inflammation.  3. Duodenal diverticulum.  4. Coronary atherosclerotic calcifications.  5. Regions of apparent anterior peritoneal nodularity are favored to  be chronic given postsurgical changes along the anterior peritoneum  and collateral vessel formation.  MRI PANC/[place] WO/W IVCON 05/20/2022  Order: [id number]  IMPRESSION  Stable 1.2 cm pancreatic body cystic lesion, likely a sidebranch IPMN.  Cirrhosis with portal hypertension including small volume ascites.  No  OPTN 5 lesion.    CT SINUS STEREO WO IVCON 11/22/2023  Order: [id number]  IMPRESSION:  Extensive postop changes with opacification of several residual left  posterior ethmoid air cells.    MRI Lumbar spine 11/10/2023  There is multilevel spondylosis. There are varying degrees of spinal  canal and neural foraminal narrowing as described above.    CT Angio Chest 07/24/2023  IMPRESSION:  No CT evidence of pulmonary embolism in the current exam.     No mass or pneumonia evident in either lung. Mild scattered stable  bilateral scarring.     Mild nonspecific stable mediastinal and bilateral hilar adenopathy,  most likely reactive..     Splenomegaly. Findings worrisome for cirrhosis of the liver. Mild  ascites along the anterior aspect of the left hepatic lobe.     Stable moderate T11 compression fracture. Progression of now moderate  loss of height with anterior wedging at T8, exact age uncertain but  still probably remote.    XR BONE SURVEY 03/02/2021  Narrative & Impression   MRN: [id number]  Patient Name: RUKHSANA PALACIO     STUDY:  OSSEOUS SURVEY, COMPLETE; ;  3/2/2021 2:57 pm     INDICATION:  MGUS - c/o increased bone pain.     COMPARISON:  None.     ACCESSION NUMBER(S):  [id number]     ORDERING CLINICIAN:  AYSE LEDEZMA     FINDINGS:  No acute fracture or dislocation  is seen. No definite lytic or  blastic lesions are seen.     Degenerative changes are noted in the bilateral acromioclavicular  joints. Degenerative changes are seen in cervical spine, thoracic  spine and lumbar spine. The most severely affected level in the  lumbar spine is L4-5 with grade 1 anterolisthesis of L4 on L5.     Degenerative changes are noted in bilateral hips and SI joints.     Numerous metallic coils are seen in the abdomen and pelvis.     Degenerative changes are noted in the bilateral knees.     IMPRESSION:  No definite lytic or blastic lesions.     Degenerative changes, as described above.     Assessment and Plan:   1. IgG kappa light chain MGUS  Daniela Saez is a 63-year-old female that presents for follow-up of monoclonal gammopathy of undetermined significance.  Upon review of her labs from August 2023 it appears she has IgG kappa light chain MGUS with elevation in the kappa light chain and ratio.  She has slight elevation in IgA.  She has no elevated M protein on SPEP.  New workup 2/2024 reveal similar findings. We did discuss the importance of monitoring, slim crab criteria and understanding how kidney function, elevated calcium levels, and anemia are signs and symptoms.  Patient has many comorbidities and autoimmune disease.    She follows pain management, orthopedics, endocrinology, primary care, and rheumatology.     Discussed her kappa light chain can be elevated in autoimmune versus inflammatory versus infection versus malignancy.  She had a bone marrow biopsy March 9, 2022, there was no evidence of plasma cell dyscrasia. Cytogenetics negative for intraepithelial lesions or malignancy. Cellular change associated with atrophy and acute inflammation. Favoring autoimmune, chronic infection, liver disease, and inflammatory conditions.      2.  Iron deficiency anemia  Iron deficiency anemia noted in June 2023, since then there has not been any deficiency. B12, B6, folate, and copper all  assessed as this is what she requested and was previously assessed.     3.  Thrombocytopenia  Platelets from August 2023 were 80 show a decreased platelet count dating back to May 2023.  The lowest 62.  Most current result is 90.  We will continue to monitor.  We discussed that liver disease and ITP are potential causes. According to CT abdomen from 3/2022 she has cirrhotic hepatic morphology with sequela of portal hypertension. Discussed the importance of monitoring platelet count, bleeding and practicing safety to prevent injury and bleeding. She is in process of rheumatology diagnosis of possible psoriatic arthritis. US of abdomen to assess current status liver and spleen.    4.  Recurrent sinus infections  Upon assessment is clear the patient has otitis externa bilateral and maxillary and frontal sinus tenderness with drainage.  Purulent mucus secretions in bilateral nares.  Patient just received Keflex and azithromycin previously.  Patient has an appointment with ENT next week.    5.  Joint and back Pain  Multiple complaints of joint pain and discomfort. She addresses it at every visit and expressed today it causing disturbance in quality of life. Most recent x-rays show degenerative disc disease. On 12/14/2023 Dexa shows osteopetrosis of left femoral neck and total hip and lumbar spine osteopenia.    I have advised her to continue to see her specialists and consider evidence-based holistic medicine. Provided her [employer] and provided their information.     Follow-up:    RTC:  --4 months with labs one week before     Medications:  -NA    Imaging:  -US to assess liver and spleen    Referrals:  -ENT as scheduled for recurrent sinus infections  -Specialists as scheduled   -[facility]     Patient Instructions Summary:   Reviewed lab results.  Discussed monitoring lab values and significance.  Patient's questions answered to her liking.  She will call with any questions or concerns.    Thank you  for allowing me to care for you today.    Sincerely,  Arlet Monte, APRN-CNP

## 2024-03-20 ENCOUNTER — OFFICE VISIT (OUTPATIENT)
Dept: ENDOCRINOLOGY | Facility: CLINIC | Age: 64
End: 2024-03-20
Payer: MEDICAID

## 2024-03-20 ENCOUNTER — APPOINTMENT (OUTPATIENT)
Dept: ORTHOPEDIC SURGERY | Facility: CLINIC | Age: 64
End: 2024-03-20
Payer: MEDICAID

## 2024-03-20 VITALS
BODY MASS INDEX: 29.29 KG/M2 | HEART RATE: 92 BPM | DIASTOLIC BLOOD PRESSURE: 84 MMHG | SYSTOLIC BLOOD PRESSURE: 137 MMHG | WEIGHT: 155 LBS

## 2024-03-20 DIAGNOSIS — E27.40 ADRENAL INSUFFICIENCY (MULTI): ICD-10-CM

## 2024-03-20 DIAGNOSIS — E11.9 TYPE 2 DIABETES MELLITUS WITHOUT COMPLICATION, UNSPECIFIED WHETHER LONG TERM INSULIN USE (MULTI): Primary | ICD-10-CM

## 2024-03-20 LAB — POC HEMOGLOBIN A1C: 8.9 % (ref 4.2–6.5)

## 2024-03-20 PROCEDURE — 3052F HG A1C>EQUAL 8.0%<EQUAL 9.0%: CPT | Performed by: INTERNAL MEDICINE

## 2024-03-20 PROCEDURE — 99214 OFFICE O/P EST MOD 30 MIN: CPT | Performed by: INTERNAL MEDICINE

## 2024-03-20 PROCEDURE — 3079F DIAST BP 80-89 MM HG: CPT | Performed by: INTERNAL MEDICINE

## 2024-03-20 PROCEDURE — 83036 HEMOGLOBIN GLYCOSYLATED A1C: CPT | Performed by: INTERNAL MEDICINE

## 2024-03-20 PROCEDURE — 4010F ACE/ARB THERAPY RXD/TAKEN: CPT | Performed by: INTERNAL MEDICINE

## 2024-03-20 PROCEDURE — 1036F TOBACCO NON-USER: CPT | Performed by: INTERNAL MEDICINE

## 2024-03-20 PROCEDURE — 3075F SYST BP GE 130 - 139MM HG: CPT | Performed by: INTERNAL MEDICINE

## 2024-03-20 RX ORDER — BLOOD-GLUCOSE SENSOR
EACH MISCELLANEOUS
Qty: 9 EACH | Refills: 3 | Status: SHIPPED | OUTPATIENT
Start: 2024-03-20

## 2024-03-20 RX ORDER — INSULIN ASPART 100 [IU]/ML
2-6 INJECTION, SOLUTION INTRAVENOUS; SUBCUTANEOUS
Qty: 15 ML | Refills: 1 | Status: SHIPPED | OUTPATIENT
Start: 2024-03-20

## 2024-03-20 RX ORDER — INSULIN ASPART 100 [IU]/ML
INJECTION, SOLUTION INTRAVENOUS; SUBCUTANEOUS
COMMUNITY
End: 2024-03-20 | Stop reason: SDUPTHER

## 2024-03-20 RX ORDER — BLOOD-GLUCOSE,RECEIVER,CONT
EACH MISCELLANEOUS
Qty: 1 EACH | Refills: 0 | Status: SHIPPED | OUTPATIENT
Start: 2024-03-20

## 2024-03-20 RX ORDER — INSULIN GLARGINE 100 [IU]/ML
12 INJECTION, SOLUTION SUBCUTANEOUS NIGHTLY
COMMUNITY
End: 2024-03-20 | Stop reason: SDUPTHER

## 2024-03-20 RX ORDER — HYDROCORTISONE 5 MG/1
5-10 TABLET ORAL 2 TIMES DAILY
Qty: 360 TABLET | Refills: 3 | Status: SHIPPED | OUTPATIENT
Start: 2024-03-20 | End: 2025-03-20

## 2024-03-20 RX ORDER — PEN NEEDLE, DIABETIC 32GX 5/32"
NEEDLE, DISPOSABLE MISCELLANEOUS
Qty: 400 EACH | Refills: 1 | Status: SHIPPED | OUTPATIENT
Start: 2024-03-20

## 2024-03-20 RX ORDER — HYDROCORTISONE 5 MG/1
10 TABLET ORAL DAILY
COMMUNITY
End: 2024-03-20 | Stop reason: SDUPTHER

## 2024-03-20 RX ORDER — INSULIN GLARGINE 100 [IU]/ML
12 INJECTION, SOLUTION SUBCUTANEOUS NIGHTLY
Qty: 15 ML | Refills: 1 | Status: SHIPPED | OUTPATIENT
Start: 2024-03-20

## 2024-03-20 ASSESSMENT — ENCOUNTER SYMPTOMS
DIARRHEA: 0
FEVER: 0
VOMITING: 0
POLYDIPSIA: 0
UNEXPECTED WEIGHT CHANGE: 1
WEAKNESS: 1
NECK PAIN: 1
COUGH: 1
FATIGUE: 1
BACK PAIN: 1
CONSTIPATION: 0
ABDOMINAL PAIN: 0
FREQUENCY: 0
WHEEZING: 1
NAUSEA: 0
ARTHRALGIAS: 1
SHORTNESS OF BREATH: 0
TREMORS: 1
HEADACHES: 0
APPETITE CHANGE: 0

## 2024-03-20 NOTE — PROGRESS NOTES
History Of Present Illness  Daniela Saez is a 63 y.o. female     Duration of type 2 diabetes mellitus:  8 years  Complications:  peripheral neuropathy    Hyperglycemia noted 1 month ago, to 500 mg/dl  She started taking her mother's insulin  Lantus 12 units at bedtime  Novolog 1 unit per 50 mg/dl    She states she overate when her hydrocortisone 25 mg/day    Patient is testing glucose 2-3 times daily  Records not provided    Last eye exam:  2021  Scheduled    Adrenal insufficiency  Hydrocortisone 10 mg QAM only    Hypothyroidism   Levothyroxine 88 mcg Mon-Sat, 176 mcg on Sunday    Past Medical History  She has a past medical history of Anemia, Anxiety, Atrial fibrillation (CMS/HCC), Coronary artery disease, Depression, Fibromyalgia, Gastroparesis, Gout, Hypertension, Hypothyroidism, and Thrombocythemia.    Surgical History  She has a past surgical history that includes Other surgical history (08/16/2019); Other surgical history (08/16/2019); Other surgical history (08/16/2019); Other surgical history (08/16/2019); Other surgical history (08/16/2019); Other surgical history (08/16/2019); Other surgical history (08/16/2019); and CT guided percutaneous biopsy bone deep (3/9/2022).     Social History  She reports that she quit smoking about 19 years ago. Her smoking use included cigarettes. She has never used smokeless tobacco. She reports that she does not currently use alcohol. She reports that she does not use drugs.    Family History  Family History   Problem Relation Name Age of Onset    Heart disease Mother      Hypertension Mother      Arthritis Mother      Diabetes Mother      Hyperlipidemia Mother      Hypertension Father      Brain cancer Father      Diabetes Sister      Diabetes Brother         Medications  Current Outpatient Medications   Medication Instructions    acetaminophen (TYLENOL) 975 mg, oral, Every 4 hours PRN    albuterol 90 mcg/actuation inhaler Inhale 2 puffs if needed for shortness of breath.     alendronate (FOSAMAX) 70 mg, oral, Every 7 days, Take in the morning with a full glass of water, on an empty stomach, and do not take anything else by mouth or lie down for the next 30 min.    allopurinol (ZYLOPRIM) 100 mg, oral, Daily    amitriptyline (ELAVIL) 50 mg, oral, Daily    atorvastatin (LIPITOR) 40 mg, oral, Daily    baclofen (Lioresal) 10 mg tablet Take 2 tablets (20 mg) by mouth 2 times a day.    blood sugar diagnostic (OneTouch Verio test strips) strip TEST 4 TIMES A DAY. MAY SUBSTITUTE WITH INSURANCE COVERED STRIPS.    bumetanide (BUMEX) 1 mg, oral, Daily    butalbital-aspirin-caffeine (Fiorinal) -40 mg capsule 1 capsule, oral, Every 6 hours PRN    carboxymethylcellulose (Refresh Tears) 0.5 % ophthalmic solution 1-2 drops each eye 3 times a day for 30 days    carvedilol (Coreg) 6.25 mg tablet TAKE ONE TABLET BY MOUTH TWICE DAILY    clopidogrel (Plavix) 75 mg tablet TAKE 1 TABLET BY MOUTH EVERY DAY for 90    diphenoxylate-atropine (LomotiL) 2.5-0.025 mg tablet 1 tablet, oral, 4 times daily    famotidine (PEPCID) 20 mg, oral, 2 times daily    fluocinonide (Lidex) 0.05 % cream Topical, 2 times daily    fluticasone (Flonase Sensimist) 27.5 mcg/actuation nasal spray 2 sprays, Each Nostril, Nightly    hydroxychloroquine (Plaquenil) 200 mg tablet take ONE & ONE-HALF tablet BY MOUTH EVERY DAY    insulin aspart (NovoLOG Flexpen U-100 Insulin) 100 unit/mL (3 mL) pen subcutaneous, 3 times daily before meals, Take as directed per insulin instructions.    ketotifen (Zaditor) 0.025 % (0.035 %) ophthalmic solution 1 drop, Both Eyes, 2 times daily    Lantus Solostar U-100 Insulin 12 Units, subcutaneous, Nightly, Take as directed per insulin instructions.    leflunomide (ARAVA) 20 mg, oral, Daily    levocetirizine (Xyzal) 5 mg tablet 1 tablet in the evening Orally Once a day for 30 day(s)    levothyroxine (Synthroid, Levoxyl) 88 mcg tablet 1 tablet daily 6 days per week, 2 tablets one day per week    lidocaine  (Lidoderm) 5 % patch apply up to 3 patches to skin once a day, remove after 12 hours for 30 days    losartan (COZAAR) 50 mg, oral, Daily    miconazole (Micotin) 2 % powder      morphine (MSIR) 30 mg, oral, 2 times daily    morphine CR (MS Contin) 15 mg 12 hr tablet 1 tablet, oral, 2 times daily    MORPHINE SULFATE ER PO 30 mg, oral, 2 times daily    mupirocin (Bactroban) 2 % ointment Topical, 3 times daily RT    nystatin (Mycostatin) cream Topical, 2 times daily    ondansetron (ZOFRAN) 4 mg, oral, Every 8 hours PRN    pancrelipase, Lip-Prot-Amyl, (Creon) 36,000-114,000- 180,000 unit capsule,delayed release(DR/EC) capsule 1 capsule, oral, 2 times daily    pantoprazole (PROTONIX) 40 mg, oral, Daily RT    polyethylene glycol (Miralax) 17 gram/dose powder      potassium chloride CR (Klor-Con M20) 20 mEq ER tablet 20 mEq, oral, Daily, Do not crush or chew.    pseudoephedrine-guaifenesin (Mucinex D)  mg 12 hr tablet 1 tablet, oral, Every 12 hours, Do not crush, chew, or split.    sucralfate (Carafate) 1 gram tablet Take 1 tablet (1 g) by mouth 4 times a day before meals.    SUMAtriptan (Imitrex) 25 mg tablet TAKE 1 TABLET BY MOUTH AS NEEDED FOR MIGRAINE. MAY REPEAT IN 2 HOURS IF NEEDED.    tiotropium-olodateroL (Stiolto Respimat) 2.5-2.5 mcg/actuation mist inhaler 2 puffs Inhalation Once a day for 30 days    triamcinolone (Kenalog) 0.1 % cream 1 application as needed Externally Twice a day as needed       Allergies  Metoclopramide, Fentanyl, Gabapentin, Levetiracetam, Metformin, Morphine-naltrexone, and Torsemide    Review of Systems   Constitutional:  Positive for fatigue and unexpected weight change. Negative for appetite change and fever.   HENT:  Positive for hearing loss and tinnitus.         Denies dry mouth   Eyes:  Positive for visual disturbance.   Respiratory:  Positive for cough and wheezing. Negative for shortness of breath.    Cardiovascular:  Negative for chest pain.   Gastrointestinal:  Negative for  abdominal pain, constipation, diarrhea, nausea and vomiting.   Endocrine: Negative for polydipsia and polyuria.   Genitourinary:  Negative for frequency.   Musculoskeletal:  Positive for arthralgias, back pain and neck pain.   Skin:  Negative for rash.   Neurological:  Positive for tremors and weakness. Negative for headaches.   Psychiatric/Behavioral:          Memory loss           Last Recorded Vitals  Blood pressure 137/84, pulse 92, weight 70.3 kg (155 lb).    Physical Exam  Constitutional:       General: She is not in acute distress.  HENT:      Head: Normocephalic.      Mouth/Throat:      Mouth: Mucous membranes are moist.   Eyes:      Extraocular Movements: Extraocular movements intact.   Neck:      Thyroid: No thyromegaly.   Cardiovascular:      Pulses:           Radial pulses are 2+ on the right side and 2+ on the left side.   Musculoskeletal:      Right lower leg: No edema.      Left lower leg: No edema.   Lymphadenopathy:      Cervical: No cervical adenopathy.   Neurological:      Mental Status: She is alert.      Motor: No tremor.   Psychiatric:         Mood and Affect: Affect normal.          Relevant Results  Glucose (mg/dL)   Date Value   02/23/2024 208 (H)   02/08/2024 274 (H)   01/07/2024 167 (H)     POC HEMOGLOBIN A1c (%)   Date Value   10/23/2023 6.0     Hemoglobin A1C (%)   Date Value   01/29/2024 8.8 (H)   04/04/2023 4.8   01/17/2023 5.4   06/17/2021 6.6 (H)     Bicarbonate (mmol/L)   Date Value   02/23/2024 36 (H)   02/08/2024 34 (H)   01/07/2024 33 (H)     Urea Nitrogen (mg/dL)   Date Value   02/23/2024 8   02/08/2024 10   01/07/2024 9     Creatinine (mg/dL)   Date Value   02/23/2024 0.78   02/08/2024 0.78   01/07/2024 0.56     Lab Results   Component Value Date    CHOL 178 08/28/2023    CHOL 151 03/10/2023    CHOL 124 08/20/2021     Lab Results   Component Value Date    HDL 58.3 08/28/2023    HDL 58.8 03/10/2023    HDL 28.1 (A) 08/20/2021     Lab Results   Component Value Date    TRIG 153 (H)  08/28/2023    TRIG 123 03/10/2023    TRIG 153 (H) 08/20/2021     Lab Results   Component Value Date    TSH 0.47 06/08/2023         IMPRESSION  TYPE 2 DIABETES MELLITUS WITH HYPERGLYCEMIA  Rapid A1c 8.9%  Patient has resumed insulin use    ADRENAL INSUFFICIENCY  Patient decreased her replacement steroid      RECOMMENDATIONS  Change hydrocortisone to 5 mg twice daily    Lantus 12 units at bedtime  Novolog before meals, 1 unit for every 50 over glucose 101 mg/dl    DexCom G7  Review DexCom at all appointments  Otherwise, please bring written glucose record (2 weeks' worth) to all appointments.     I do not recommend Ozempic due to history of pancreatic disease..    Follow up 3 months

## 2024-03-20 NOTE — LETTER
March 20, 2024     Marjorie Saha DO  [address]  [address]  [facility] [id number]    Patient: Daniela Saez   YOB: 1960   Date of Visit: 3/20/2024       Dear Dr. Marjorie Saha DO:    Thank you for referring Daniela Saez to me for evaluation. Below are my notes for this consultation.  If you have questions, please do not hesitate to call me. I look forward to following your patient along with you.       Sincerely,     Tony Phillips MD      CC: No Recipients  ______________________________________________________________________________________    History Of Present Illness  Daniela Saez is a 63 y.o. female     Duration of type 2 diabetes mellitus:  8 years  Complications:  peripheral neuropathy    Hyperglycemia noted 1 month ago, to 500 mg/dl  She started taking her mother's insulin  Lantus 12 units at bedtime  Novolog 1 unit per 50 mg/dl    She states she overate when her hydrocortisone 25 mg/day    Patient is testing glucose 2-3 times daily  Records not provided    Last eye exam:  2021  Scheduled    Adrenal insufficiency  Hydrocortisone 10 mg QAM only    Hypothyroidism   Levothyroxine 88 mcg Mon-Sat, 176 mcg on Sunday    Past Medical History  She has a past medical history of Anemia, Anxiety, Atrial fibrillation (CMS/HCC), Coronary artery disease, Depression, Fibromyalgia, Gastroparesis, Gout, Hypertension, Hypothyroidism, and Thrombocythemia.    Surgical History  She has a past surgical history that includes Other surgical history (08/16/2019); Other surgical history (08/16/2019); Other surgical history (08/16/2019); Other surgical history (08/16/2019); Other surgical history (08/16/2019); Other surgical history (08/16/2019); Other surgical history (08/16/2019); and CT guided percutaneous biopsy bone deep (3/9/2022).     Social History  She reports that she quit smoking about 19 years ago. Her smoking use included cigarettes. She has never used smokeless tobacco. She reports that she does not  currently use alcohol. She reports that she does not use drugs.    Family History  Family History   Problem Relation Name Age of Onset   • Heart disease Mother     • Hypertension Mother     • Arthritis Mother     • Diabetes Mother     • Hyperlipidemia Mother     • Hypertension Father     • Brain cancer Father     • Diabetes Sister     • Diabetes Brother         Medications  Current Outpatient Medications   Medication Instructions   • acetaminophen (TYLENOL) 975 mg, oral, Every 4 hours PRN   • albuterol 90 mcg/actuation inhaler Inhale 2 puffs if needed for shortness of breath.   • alendronate (FOSAMAX) 70 mg, oral, Every 7 days, Take in the morning with a full glass of water, on an empty stomach, and do not take anything else by mouth or lie down for the next 30 min.   • allopurinol (ZYLOPRIM) 100 mg, oral, Daily   • amitriptyline (ELAVIL) 50 mg, oral, Daily   • atorvastatin (LIPITOR) 40 mg, oral, Daily   • baclofen (Lioresal) 10 mg tablet Take 2 tablets (20 mg) by mouth 2 times a day.   • blood sugar diagnostic (OneTouch Verio test strips) strip TEST 4 TIMES A DAY. MAY SUBSTITUTE WITH INSURANCE COVERED STRIPS.   • bumetanide (BUMEX) 1 mg, oral, Daily   • butalbital-aspirin-caffeine (Fiorinal) -40 mg capsule 1 capsule, oral, Every 6 hours PRN   • carboxymethylcellulose (Refresh Tears) 0.5 % ophthalmic solution 1-2 drops each eye 3 times a day for 30 days   • carvedilol (Coreg) 6.25 mg tablet TAKE ONE TABLET BY MOUTH TWICE DAILY   • clopidogrel (Plavix) 75 mg tablet TAKE 1 TABLET BY MOUTH EVERY DAY for 90   • diphenoxylate-atropine (LomotiL) 2.5-0.025 mg tablet 1 tablet, oral, 4 times daily   • famotidine (PEPCID) 20 mg, oral, 2 times daily   • fluocinonide (Lidex) 0.05 % cream Topical, 2 times daily   • fluticasone (Flonase Sensimist) 27.5 mcg/actuation nasal spray 2 sprays, Each Nostril, Nightly   • hydroxychloroquine (Plaquenil) 200 mg tablet take ONE & ONE-HALF tablet BY MOUTH EVERY DAY   • insulin aspart  (NovoLOG Flexpen U-100 Insulin) 100 unit/mL (3 mL) pen subcutaneous, 3 times daily before meals, Take as directed per insulin instructions.   • ketotifen (Zaditor) 0.025 % (0.035 %) ophthalmic solution 1 drop, Both Eyes, 2 times daily   • Lantus Solostar U-100 Insulin 12 Units, subcutaneous, Nightly, Take as directed per insulin instructions.   • leflunomide (ARAVA) 20 mg, oral, Daily   • levocetirizine (Xyzal) 5 mg tablet 1 tablet in the evening Orally Once a day for 30 day(s)   • levothyroxine (Synthroid, Levoxyl) 88 mcg tablet 1 tablet daily 6 days per week, 2 tablets one day per week   • lidocaine (Lidoderm) 5 % patch apply up to 3 patches to skin once a day, remove after 12 hours for 30 days   • losartan (COZAAR) 50 mg, oral, Daily   • miconazole (Micotin) 2 % powder     • morphine (MSIR) 30 mg, oral, 2 times daily   • morphine CR (MS Contin) 15 mg 12 hr tablet 1 tablet, oral, 2 times daily   • MORPHINE SULFATE ER PO 30 mg, oral, 2 times daily   • mupirocin (Bactroban) 2 % ointment Topical, 3 times daily RT   • nystatin (Mycostatin) cream Topical, 2 times daily   • ondansetron (ZOFRAN) 4 mg, oral, Every 8 hours PRN   • pancrelipase, Lip-Prot-Amyl, (Creon) 36,000-114,000- 180,000 unit capsule,delayed release(DR/EC) capsule 1 capsule, oral, 2 times daily   • pantoprazole (PROTONIX) 40 mg, oral, Daily RT   • polyethylene glycol (Miralax) 17 gram/dose powder     • potassium chloride CR (Klor-Con M20) 20 mEq ER tablet 20 mEq, oral, Daily, Do not crush or chew.   • pseudoephedrine-guaifenesin (Mucinex D)  mg 12 hr tablet 1 tablet, oral, Every 12 hours, Do not crush, chew, or split.   • sucralfate (Carafate) 1 gram tablet Take 1 tablet (1 g) by mouth 4 times a day before meals.   • SUMAtriptan (Imitrex) 25 mg tablet TAKE 1 TABLET BY MOUTH AS NEEDED FOR MIGRAINE. MAY REPEAT IN 2 HOURS IF NEEDED.   • tiotropium-olodateroL (Stiolto Respimat) 2.5-2.5 mcg/actuation mist inhaler 2 puffs Inhalation Once a day for 30  days   • triamcinolone (Kenalog) 0.1 % cream 1 application as needed Externally Twice a day as needed       Allergies  Metoclopramide, Fentanyl, Gabapentin, Levetiracetam, Metformin, Morphine-naltrexone, and Torsemide    Review of Systems   Constitutional:  Positive for fatigue and unexpected weight change. Negative for appetite change and fever.   HENT:  Positive for hearing loss and tinnitus.         Denies dry mouth   Eyes:  Positive for visual disturbance.   Respiratory:  Positive for cough and wheezing. Negative for shortness of breath.    Cardiovascular:  Negative for chest pain.   Gastrointestinal:  Negative for abdominal pain, constipation, diarrhea, nausea and vomiting.   Endocrine: Negative for polydipsia and polyuria.   Genitourinary:  Negative for frequency.   Musculoskeletal:  Positive for arthralgias, back pain and neck pain.   Skin:  Negative for rash.   Neurological:  Positive for tremors and weakness. Negative for headaches.   Psychiatric/Behavioral:          Memory loss           Last Recorded Vitals  Blood pressure 137/84, pulse 92, weight 70.3 kg (155 lb).    Physical Exam  Constitutional:       General: She is not in acute distress.  HENT:      Head: Normocephalic.      Mouth/Throat:      Mouth: Mucous membranes are moist.   Eyes:      Extraocular Movements: Extraocular movements intact.   Neck:      Thyroid: No thyromegaly.   Cardiovascular:      Pulses:           Radial pulses are 2+ on the right side and 2+ on the left side.   Musculoskeletal:      Right lower leg: No edema.      Left lower leg: No edema.   Lymphadenopathy:      Cervical: No cervical adenopathy.   Neurological:      Mental Status: She is alert.      Motor: No tremor.   Psychiatric:         Mood and Affect: Affect normal.          Relevant Results  Glucose (mg/dL)   Date Value   02/23/2024 208 (H)   02/08/2024 274 (H)   01/07/2024 167 (H)     POC HEMOGLOBIN A1c (%)   Date Value   10/23/2023 6.0     Hemoglobin A1C (%)   Date  Value   01/29/2024 8.8 (H)   04/04/2023 4.8   01/17/2023 5.4   06/17/2021 6.6 (H)     Bicarbonate (mmol/L)   Date Value   02/23/2024 36 (H)   02/08/2024 34 (H)   01/07/2024 33 (H)     Urea Nitrogen (mg/dL)   Date Value   02/23/2024 8   02/08/2024 10   01/07/2024 9     Creatinine (mg/dL)   Date Value   02/23/2024 0.78   02/08/2024 0.78   01/07/2024 0.56     Lab Results   Component Value Date    CHOL 178 08/28/2023    CHOL 151 03/10/2023    CHOL 124 08/20/2021     Lab Results   Component Value Date    HDL 58.3 08/28/2023    HDL 58.8 03/10/2023    HDL 28.1 (A) 08/20/2021     Lab Results   Component Value Date    TRIG 153 (H) 08/28/2023    TRIG 123 03/10/2023    TRIG 153 (H) 08/20/2021     Lab Results   Component Value Date    TSH 0.47 06/08/2023         IMPRESSION  TYPE 2 DIABETES MELLITUS WITH HYPERGLYCEMIA  Rapid A1c 8.9%  Patient has resumed insulin use    ADRENAL INSUFFICIENCY  Patient decreased her replacement steroid      RECOMMENDATIONS  Change hydrocortisone to 5 mg twice daily    Lantus 12 units at bedtime  Novolog before meals, 1 unit for every 50 over glucose 101 mg/dl    DexCom G7  Review DexCom at all appointments  Otherwise, please bring written glucose record (2 weeks' worth) to all appointments.     I do not recommend Ozempic due to history of pancreatic disease..    Follow up 3 months

## 2024-03-20 NOTE — PATIENT INSTRUCTIONS
A1c 8.9%    RECOMMENDATIONS  Change hydrocortisone to 5 mg twice daily    Lantus 12 units at bedtime  Novolog before meals, 1 unit for every 50 over glucose 101 mg/dl    DexCom G7  Review DexCom at all appointments  Otherwise, please bring written glucose record (2 weeks' worth) to all appointments.     I do not recommend Ozempic due to history of pancreatic disease..    Follow up 3 months

## 2024-03-21 ENCOUNTER — TELEPHONE (OUTPATIENT)
Dept: PRIMARY CARE | Facility: CLINIC | Age: 64
End: 2024-03-21
Payer: MEDICAID

## 2024-03-21 DIAGNOSIS — M05.79 RHEUMATOID ARTHRITIS INVOLVING MULTIPLE SITES WITH POSITIVE RHEUMATOID FACTOR (MULTI): ICD-10-CM

## 2024-03-21 DIAGNOSIS — M10.09 IDIOPATHIC GOUT OF MULTIPLE SITES, UNSPECIFIED CHRONICITY: ICD-10-CM

## 2024-03-21 DIAGNOSIS — G43.709 CHRONIC MIGRAINE WITHOUT AURA WITHOUT STATUS MIGRAINOSUS, NOT INTRACTABLE: Primary | ICD-10-CM

## 2024-03-21 RX ORDER — HYDROXYCHLOROQUINE SULFATE 200 MG/1
TABLET, FILM COATED ORAL
Qty: 135 TABLET | Refills: 0 | Status: SHIPPED | OUTPATIENT
Start: 2024-03-21

## 2024-03-21 RX ORDER — SUMATRIPTAN SUCCINATE 25 MG/1
25 TABLET ORAL AS NEEDED
Qty: 9 TABLET | Refills: 3 | Status: SHIPPED | OUTPATIENT
Start: 2024-03-21

## 2024-03-21 RX ORDER — ALLOPURINOL 100 MG/1
100 TABLET ORAL DAILY
Qty: 90 TABLET | Refills: 0 | Status: SHIPPED | OUTPATIENT
Start: 2024-03-21 | End: 2024-06-19

## 2024-03-21 NOTE — TELEPHONE ENCOUNTER
Patient called for refills on    Hydroxychloroquine 200 mg #135 take 1 and 1/2 tablets by mouth daily    Allopurinol 100mg #90 one tablet daily    [facility]  [phone]

## 2024-04-01 ENCOUNTER — APPOINTMENT (OUTPATIENT)
Dept: PRIMARY CARE | Facility: CLINIC | Age: 64
End: 2024-04-01
Payer: MEDICAID

## 2024-04-10 ENCOUNTER — LAB (OUTPATIENT)
Dept: LAB | Facility: LAB | Age: 64
End: 2024-04-10
Payer: MEDICAID

## 2024-04-10 ENCOUNTER — OFFICE VISIT (OUTPATIENT)
Dept: PRIMARY CARE | Facility: CLINIC | Age: 64
End: 2024-04-10
Payer: MEDICAID

## 2024-04-10 VITALS
HEIGHT: 61 IN | OXYGEN SATURATION: 95 % | HEART RATE: 83 BPM | SYSTOLIC BLOOD PRESSURE: 96 MMHG | RESPIRATION RATE: 17 BRPM | BODY MASS INDEX: 28.85 KG/M2 | DIASTOLIC BLOOD PRESSURE: 50 MMHG | WEIGHT: 152.8 LBS

## 2024-04-10 DIAGNOSIS — R93.89 ABNORMAL ULTRASOUND: ICD-10-CM

## 2024-04-10 DIAGNOSIS — M81.8 ADULT IDIOPATHIC GENERALIZED OSTEOPOROSIS: ICD-10-CM

## 2024-04-10 DIAGNOSIS — B37.2 SKIN CANDIDIASIS: ICD-10-CM

## 2024-04-10 DIAGNOSIS — R74.8 ABNORMAL CK: ICD-10-CM

## 2024-04-10 DIAGNOSIS — I10 PRIMARY HYPERTENSION: ICD-10-CM

## 2024-04-10 DIAGNOSIS — R74.8 ABNORMAL CK: Primary | ICD-10-CM

## 2024-04-10 LAB
APPEARANCE UR: CLEAR
BILIRUB UR STRIP.AUTO-MCNC: NEGATIVE MG/DL
CK SERPL-CCNC: 68 U/L (ref 0–215)
COLOR UR: YELLOW
GLUCOSE UR STRIP.AUTO-MCNC: NEGATIVE MG/DL
KETONES UR STRIP.AUTO-MCNC: NEGATIVE MG/DL
LEUKOCYTE ESTERASE UR QL STRIP.AUTO: ABNORMAL
NITRITE UR QL STRIP.AUTO: NEGATIVE
PH UR STRIP.AUTO: 6 [PH]
PROT UR STRIP.AUTO-MCNC: NEGATIVE MG/DL
RBC # UR STRIP.AUTO: NEGATIVE /UL
RBC #/AREA URNS AUTO: NORMAL /HPF
SP GR UR STRIP.AUTO: 1.01
SQUAMOUS #/AREA URNS AUTO: NORMAL /HPF
TRANS CELLS #/AREA UR COMP ASSIST: NORMAL /HPF
UROBILINOGEN UR STRIP.AUTO-MCNC: 2 MG/DL
WBC #/AREA URNS AUTO: NORMAL /HPF

## 2024-04-10 PROCEDURE — 3074F SYST BP LT 130 MM HG: CPT | Performed by: STUDENT IN AN ORGANIZED HEALTH CARE EDUCATION/TRAINING PROGRAM

## 2024-04-10 PROCEDURE — 3078F DIAST BP <80 MM HG: CPT | Performed by: STUDENT IN AN ORGANIZED HEALTH CARE EDUCATION/TRAINING PROGRAM

## 2024-04-10 PROCEDURE — 99214 OFFICE O/P EST MOD 30 MIN: CPT | Performed by: STUDENT IN AN ORGANIZED HEALTH CARE EDUCATION/TRAINING PROGRAM

## 2024-04-10 PROCEDURE — 4010F ACE/ARB THERAPY RXD/TAKEN: CPT | Performed by: STUDENT IN AN ORGANIZED HEALTH CARE EDUCATION/TRAINING PROGRAM

## 2024-04-10 PROCEDURE — 81001 URINALYSIS AUTO W/SCOPE: CPT

## 2024-04-10 PROCEDURE — 82550 ASSAY OF CK (CPK): CPT

## 2024-04-10 PROCEDURE — 3052F HG A1C>EQUAL 8.0%<EQUAL 9.0%: CPT | Performed by: STUDENT IN AN ORGANIZED HEALTH CARE EDUCATION/TRAINING PROGRAM

## 2024-04-10 PROCEDURE — 36415 COLL VENOUS BLD VENIPUNCTURE: CPT

## 2024-04-10 RX ORDER — NYSTATIN 100000 U/G
CREAM TOPICAL 2 TIMES DAILY
Qty: 30 G | Refills: 2 | Status: SHIPPED | OUTPATIENT
Start: 2024-04-10

## 2024-04-10 RX ORDER — TRIAMCINOLONE ACETONIDE 1 MG/G
CREAM TOPICAL
Qty: 45 G | Refills: 1 | Status: SHIPPED | OUTPATIENT
Start: 2024-04-10

## 2024-04-10 RX ORDER — ALENDRONATE SODIUM 70 MG/1
70 TABLET ORAL
Qty: 12 TABLET | Refills: 3 | Status: SHIPPED | OUTPATIENT
Start: 2024-04-10 | End: 2025-03-12

## 2024-04-10 RX ORDER — LOSARTAN POTASSIUM 25 MG/1
25 TABLET ORAL DAILY
Qty: 90 TABLET | Refills: 0 | Status: SHIPPED | OUTPATIENT
Start: 2024-04-10

## 2024-04-10 ASSESSMENT — PATIENT HEALTH QUESTIONNAIRE - PHQ9
1. LITTLE INTEREST OR PLEASURE IN DOING THINGS: NOT AT ALL
2. FEELING DOWN, DEPRESSED OR HOPELESS: NOT AT ALL
SUM OF ALL RESPONSES TO PHQ9 QUESTIONS 1 AND 2: 0

## 2024-04-10 NOTE — PROGRESS NOTES
Subjective   Patient ID: Daniela Saez is a 64 y.o. female who presents for Follow-up (Pt is here for a 2 month follow up.).        Diabetes  Her hydrocortisone has been changed to 5 mg twice daily  Patient self restarted her insulin and now has been placed on 12 units of Lantus at bedtime  A continuous glucose monitoring system of Dexcom 7 has been ordered through her endocrinologist  A1c was 8.9%  She is down to 10 units at bedtime due to lows in the AM       Sinusitis  Dx with Asthma  Following with ENT and allergist   Currently on Medrol dose pack, zpack and diflucan started 4/9/24  Continue albuterol           Objective   Physical Exam  Vitals reviewed.   Constitutional:       Appearance: Normal appearance.   Cardiovascular:      Rate and Rhythm: Normal rate and regular rhythm.      Heart sounds: Murmur heard.   Pulmonary:      Effort: Pulmonary effort is normal. No respiratory distress.      Breath sounds: Normal breath sounds. No wheezing.   Musculoskeletal:      Cervical back: Neck supple.      Left lower leg: No edema.   Neurological:      Mental Status: She is alert.       Assessment/Plan   Problem List Items Addressed This Visit    None  Visit Diagnoses         Codes    Abnormal CK    -  Primary R74.8    Relevant Orders    Creatine Kinase (Completed)    Urinalysis with Reflex Microscopic (Completed)    Skin candidiasis     B37.2    Relevant Medications    triamcinolone (Kenalog) 0.1 % cream    nystatin (Mycostatin) cream    Adult idiopathic generalized osteoporosis     M81.8    Relevant Medications    alendronate (Fosamax) 70 mg tablet    Primary hypertension     I10    Relevant Medications    losartan (Cozaar) 25 mg tablet    Abnormal ultrasound     R93.89    Relevant Orders    Referral to General Surgery          Past medical history of hypertension, CAD, A-fib, adrenal insufficiency, diabetes, MGUS, seronegative rheumatoid arthritis, psoriatic arthritis, thrombocytopenia, migraines, chronic sinus  infections, ulcerative colitis, anxiety/depression, insomnia    Skin Candidiasis  Nystatin refilled     Abnormal CK levels   Repeat CL levels and UA        HTN  Continue carvedilol 6.25 mg daily  Continue Lasix 60 mg daily  Continue losartan 50 mg daily  Physical exam was stable. Discussed maintaining diets such as DASH to help maintain normal Blood pressure. Encouraged regular exercise for a total of 120 min weekly. We will fu labs in 1-3 days. For now there will be no change in medical management. All questions and concerns were addressed. Pt will follow up in 4-6 months or sooner if needed.      Osteoporosis  Reviewed different medications including oral and injectable  Patient would like to start alendronate 70 mg weekly     Abdominal pain  Ultrasound of the abdomen ordered  Patient continues to complain of abdominal pain  Concerns for persistent constipation due to patient's chronic opioid use versus acute pathology  Continue MiraLAX for bowel regiment     DM   A1C 8.9%  1 month ago, 6.0% 10/23 previous elevation up to 9.0% in 3/2020  Pt states she would like to start a GLP-1 agonist but can't due to gastroparesis   Reviewed red flag symptoms: including constipation due to her chronic opioid usage, recommend small meals        Adrenal insufficiency  Continue hydrocortisone 10 mg AM and midday, 5mg in the evening  Following with endocrinology      rheumatoid vs  psoriatic arthritis   Following with rheumatology  Continue Plaquenil 300 mg daily  Continue Leflunoide 20mg daily   Pain management: MS Contin 15 mg 3 3 times daily, Morphine 30 mg twice daily     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  Followed by hematology  Currently observation  Concerns for autoimmune versus inflammatory versus infection versus malignancy causes s/p bone marrow biopsy 3/22 no evidence of plasma cell dyscrasia     Thrombocytopenia  Platelets at 74  currently under observation  Possibly secondary to liver disease     Ulcerative Colitis   EGD  and colonoscopy completed 10/12/2023 Bleeding arteriovenous malformations (AVMs) of the distal esophagus and duodenal bulb     Compression fracture  CT angio chest 7/23  T11 compression fracture was seen of uncertain age  Hyperlipidemia  Continue statin therapy     Migraines   Currently on imitrex and fioracet PRN  Not happening monthly  But having multiple headaches daily due to chronic sinusitis   Currently using amitriptyline   Role of CRP nurtec and ubrevely will trial nurtec 75 mg Q 48 hours      Levothyroxine 88 mcg 6 days a week with 2 tabs on Sunday   TSH 0.47 6/8/23     Anxiety/depression  Doing better   Mostly gets down due to her health   Will be following with psychiatry at [facility]   Previously tried Seroquel, Cymbalta, Buspar, amitriptyline   hydroxyzine which causes her neck to itch, trazodone in the past with reaction           Marjorie Saha DO 04/10/24 1:23 PM

## 2024-04-11 ENCOUNTER — APPOINTMENT (OUTPATIENT)
Dept: RADIOLOGY | Facility: HOSPITAL | Age: 64
End: 2024-04-11
Payer: MEDICAID

## 2024-04-12 DIAGNOSIS — I50.32 CHRONIC DIASTOLIC HEART FAILURE (MULTI): ICD-10-CM

## 2024-04-12 DIAGNOSIS — I10 PRIMARY HYPERTENSION: ICD-10-CM

## 2024-04-15 ENCOUNTER — TELEPHONE (OUTPATIENT)
Dept: PRIMARY CARE | Facility: CLINIC | Age: 64
End: 2024-04-15
Payer: MEDICAID

## 2024-04-15 NOTE — TELEPHONE ENCOUNTER
Pt is taking the medication incorrectly. Please advise that it works best if taken within 30min of headache onset.

## 2024-04-16 RX ORDER — POTASSIUM CHLORIDE 20 MEQ/1
20 TABLET, EXTENDED RELEASE ORAL DAILY
Qty: 90 TABLET | Refills: 3 | Status: SHIPPED | OUTPATIENT
Start: 2024-04-16 | End: 2025-04-16

## 2024-04-17 ENCOUNTER — APPOINTMENT (OUTPATIENT)
Dept: RADIOLOGY | Facility: HOSPITAL | Age: 64
End: 2024-04-17
Payer: MEDICAID

## 2024-04-18 ENCOUNTER — OFFICE VISIT (OUTPATIENT)
Dept: ORTHOPEDIC SURGERY | Facility: CLINIC | Age: 64
End: 2024-04-18
Payer: MEDICAID

## 2024-04-18 ENCOUNTER — HOSPITAL ENCOUNTER (OUTPATIENT)
Dept: RADIOLOGY | Facility: CLINIC | Age: 64
Discharge: HOME | End: 2024-04-18
Payer: MEDICAID

## 2024-04-18 VITALS — BODY MASS INDEX: 28.7 KG/M2 | HEIGHT: 61 IN | WEIGHT: 152 LBS

## 2024-04-18 DIAGNOSIS — M17.12 ARTHRITIS OF LEFT KNEE: ICD-10-CM

## 2024-04-18 PROCEDURE — 99213 OFFICE O/P EST LOW 20 MIN: CPT | Performed by: ORTHOPAEDIC SURGERY

## 2024-04-18 PROCEDURE — 73564 X-RAY EXAM KNEE 4 OR MORE: CPT | Mod: LEFT SIDE | Performed by: RADIOLOGY

## 2024-04-18 PROCEDURE — 1036F TOBACCO NON-USER: CPT | Performed by: ORTHOPAEDIC SURGERY

## 2024-04-18 PROCEDURE — 3052F HG A1C>EQUAL 8.0%<EQUAL 9.0%: CPT | Performed by: ORTHOPAEDIC SURGERY

## 2024-04-18 PROCEDURE — 73564 X-RAY EXAM KNEE 4 OR MORE: CPT | Mod: LT

## 2024-04-18 PROCEDURE — 4010F ACE/ARB THERAPY RXD/TAKEN: CPT | Performed by: ORTHOPAEDIC SURGERY

## 2024-04-18 ASSESSMENT — PAIN SCALES - GENERAL: PAINLEVEL_OUTOF10: 8

## 2024-04-18 ASSESSMENT — PAIN DESCRIPTION - DESCRIPTORS: DESCRIPTORS: ACHING;PENETRATING;DISCOMFORT

## 2024-04-18 ASSESSMENT — PAIN - FUNCTIONAL ASSESSMENT: PAIN_FUNCTIONAL_ASSESSMENT: 0-10

## 2024-04-18 NOTE — PROGRESS NOTES
Patient is a 64-year-old female who presents today for discussion of possible left knee arthroplasty.  She has had cortisone injections, takes Tylenol and wears a brace.  She has a history of rheumatoid arthritis and is on Plaquenil.  She sees pain management and is on a significant dose of narcotic pain medication.  She has difficulty walking certain distance.  Her surgery would have to be at [facility].  They would have to meet agreement with her pain management specialist that he would manage her pain postoperatively due to the degree of pain medication she is on.    Left knee:  AAOx3, NAD, walks with a moderate antalgic gait  Varus allignment  Range of motion lacks 15 degrees of full extension and flexes to 110 degrees  Stable to varus/valgus/anterior/posterior stress through out the range of motion  Slight laxity with varus stress  Diffuse medial  joint line tenderness to palpation  Moderate effusion  SILT in a darrell/saph/per/tib distribution  5/5 knee extension/df/pf/ehl  ½ dorsalis pedis and posterior tibial pulse  no popliteal lymphadenopathy  no other overlying lesions  mood: euthymic  Respirations non labored    Plain films were reviewed by myself in clinic today.  She has advanced osteoarthritis of her right knee with complete loss of medial joint space, underlying sclerosis and osteophytic change.    Discussed with her today that it is very difficult to manage her pain postoperatively given her chronic narcotic use.  Her pain management doctor would have to manage her pain medications postoperatively.  We would not prescribe any postoperative narcotics.  I also discussed with her that patient satisfaction and patient is on chronic narcotics after knee replacement have decreased patient satisfaction over the general population.  She was given my office card and number to call to schedule surgery.  We would see her back if she is ahead of time for a full surgical  consult.    Terry  M17.12  [id number]  Attune  Overnight

## 2024-04-22 ENCOUNTER — TELEPHONE (OUTPATIENT)
Dept: PRIMARY CARE | Facility: CLINIC | Age: 64
End: 2024-04-22
Payer: MEDICAID

## 2024-04-29 ENCOUNTER — APPOINTMENT (OUTPATIENT)
Dept: ENDOCRINOLOGY | Facility: CLINIC | Age: 64
End: 2024-04-29
Payer: MEDICAID

## 2024-05-06 ENCOUNTER — APPOINTMENT (OUTPATIENT)
Dept: RADIOLOGY | Facility: HOSPITAL | Age: 64
End: 2024-05-06
Payer: MEDICAID

## 2024-05-08 DIAGNOSIS — I48.0 PAROXYSMAL ATRIAL FIBRILLATION (MULTI): ICD-10-CM

## 2024-05-08 RX ORDER — CLOPIDOGREL BISULFATE 75 MG/1
TABLET ORAL
Qty: 90 TABLET | Refills: 1 | Status: SHIPPED | OUTPATIENT
Start: 2024-05-08

## 2024-05-10 ENCOUNTER — APPOINTMENT (OUTPATIENT)
Dept: SURGERY | Facility: CLINIC | Age: 64
End: 2024-05-10
Payer: MEDICAID

## 2024-05-13 ENCOUNTER — APPOINTMENT (OUTPATIENT)
Dept: RADIOLOGY | Facility: HOSPITAL | Age: 64
End: 2024-05-13
Payer: MEDICAID

## 2024-05-14 ENCOUNTER — HOSPITAL ENCOUNTER (EMERGENCY)
Facility: HOSPITAL | Age: 64
Discharge: OTHER INSTITUTION | End: 2024-05-15
Attending: STUDENT IN AN ORGANIZED HEALTH CARE EDUCATION/TRAINING PROGRAM
Payer: MEDICAID

## 2024-05-14 ENCOUNTER — TELEPHONE (OUTPATIENT)
Dept: PRIMARY CARE | Facility: CLINIC | Age: 64
End: 2024-05-14
Payer: MEDICAID

## 2024-05-14 ENCOUNTER — APPOINTMENT (OUTPATIENT)
Dept: RADIOLOGY | Facility: HOSPITAL | Age: 64
End: 2024-05-14
Payer: MEDICAID

## 2024-05-14 DIAGNOSIS — A41.9 SEPSIS WITH ACUTE ORGAN DYSFUNCTION AND SEPTIC SHOCK, DUE TO UNSPECIFIED ORGANISM, UNSPECIFIED ORGAN DYSFUNCTION TYPE (MULTI): Primary | ICD-10-CM

## 2024-05-14 DIAGNOSIS — M46.20 SPINAL ABSCESS (MULTI): ICD-10-CM

## 2024-05-14 DIAGNOSIS — R65.21 SEPSIS WITH ACUTE ORGAN DYSFUNCTION AND SEPTIC SHOCK, DUE TO UNSPECIFIED ORGANISM, UNSPECIFIED ORGAN DYSFUNCTION TYPE (MULTI): Primary | ICD-10-CM

## 2024-05-14 DIAGNOSIS — N12 PYELONEPHRITIS: ICD-10-CM

## 2024-05-14 LAB
ALBUMIN SERPL BCP-MCNC: 4 G/DL (ref 3.4–5)
ALP SERPL-CCNC: 187 U/L (ref 33–136)
ALT SERPL W P-5'-P-CCNC: 18 U/L (ref 7–45)
ANION GAP BLDV CALCULATED.4IONS-SCNC: 10 MMOL/L (ref 10–25)
ANION GAP SERPL CALC-SCNC: 18 MMOL/L (ref 10–20)
APPEARANCE UR: ABNORMAL
AST SERPL W P-5'-P-CCNC: 36 U/L (ref 9–39)
BACTERIA #/AREA URNS AUTO: ABNORMAL /HPF
BASE EXCESS BLDV CALC-SCNC: 6.6 MMOL/L (ref -2–3)
BASOPHILS # BLD AUTO: 0.08 X10*3/UL (ref 0–0.1)
BASOPHILS NFR BLD AUTO: 1.2 %
BILIRUB SERPL-MCNC: 2 MG/DL (ref 0–1.2)
BILIRUB UR STRIP.AUTO-MCNC: NEGATIVE MG/DL
BNP SERPL-MCNC: 25 PG/ML (ref 0–99)
BODY TEMPERATURE: ABNORMAL
BUN SERPL-MCNC: 13 MG/DL (ref 6–23)
CA-I BLDV-SCNC: 1.02 MMOL/L (ref 1.1–1.33)
CALCIUM SERPL-MCNC: 8.6 MG/DL (ref 8.6–10.3)
CARDIAC TROPONIN I PNL SERPL HS: 43 NG/L (ref 0–13)
CARDIAC TROPONIN I PNL SERPL HS: 96 NG/L (ref 0–13)
CHLORIDE BLDV-SCNC: 92 MMOL/L (ref 98–107)
CHLORIDE SERPL-SCNC: 92 MMOL/L (ref 98–107)
CO2 SERPL-SCNC: 29 MMOL/L (ref 21–32)
COLOR UR: YELLOW
CREAT SERPL-MCNC: 1.04 MG/DL (ref 0.5–1.05)
EGFRCR SERPLBLD CKD-EPI 2021: 61 ML/MIN/1.73M*2
EOSINOPHIL # BLD AUTO: 0.05 X10*3/UL (ref 0–0.7)
EOSINOPHIL NFR BLD AUTO: 0.7 %
ERYTHROCYTE [DISTWIDTH] IN BLOOD BY AUTOMATED COUNT: 15.9 % (ref 11.5–14.5)
FLUAV RNA RESP QL NAA+PROBE: NOT DETECTED
FLUBV RNA RESP QL NAA+PROBE: NOT DETECTED
GLUCOSE BLDV-MCNC: 79 MG/DL (ref 74–99)
GLUCOSE SERPL-MCNC: 74 MG/DL (ref 74–99)
GLUCOSE UR STRIP.AUTO-MCNC: NEGATIVE MG/DL
HCO3 BLDV-SCNC: 31.9 MMOL/L (ref 22–26)
HCT VFR BLD AUTO: 38.6 % (ref 36–46)
HCT VFR BLD EST: 41 % (ref 36–46)
HGB BLD-MCNC: 12.1 G/DL (ref 12–16)
HGB BLDV-MCNC: 13.7 G/DL (ref 12–16)
HOLD SPECIMEN: NORMAL
HOLD SPECIMEN: NORMAL
IMM GRANULOCYTES # BLD AUTO: 0.03 X10*3/UL (ref 0–0.7)
IMM GRANULOCYTES NFR BLD AUTO: 0.4 % (ref 0–0.9)
INHALED O2 CONCENTRATION: 28 %
KETONES UR STRIP.AUTO-MCNC: NEGATIVE MG/DL
LACTATE BLDV-SCNC: 4.9 MMOL/L (ref 0.4–2)
LACTATE SERPL-SCNC: 2.8 MMOL/L (ref 0.4–2)
LACTATE SERPL-SCNC: 3.8 MMOL/L (ref 0.4–2)
LACTATE SERPL-SCNC: 4.5 MMOL/L (ref 0.4–2)
LEUKOCYTE ESTERASE UR QL STRIP.AUTO: ABNORMAL
LIPASE SERPL-CCNC: 4 U/L (ref 9–82)
LYMPHOCYTES # BLD AUTO: 0.34 X10*3/UL (ref 1.2–4.8)
LYMPHOCYTES NFR BLD AUTO: 4.9 %
MCH RBC QN AUTO: 28.3 PG (ref 26–34)
MCHC RBC AUTO-ENTMCNC: 31.3 G/DL (ref 32–36)
MCV RBC AUTO: 90 FL (ref 80–100)
MONOCYTES # BLD AUTO: 0.03 X10*3/UL (ref 0.1–1)
MONOCYTES NFR BLD AUTO: 0.4 %
MUCOUS THREADS #/AREA URNS AUTO: ABNORMAL /LPF
NEUTROPHILS # BLD AUTO: 6.4 X10*3/UL (ref 1.2–7.7)
NEUTROPHILS NFR BLD AUTO: 92.4 %
NITRITE UR QL STRIP.AUTO: POSITIVE
NRBC BLD-RTO: 0.3 /100 WBCS (ref 0–0)
OXYHGB MFR BLDV: 52.6 % (ref 45–75)
PCO2 BLDV: 47 MM HG (ref 41–51)
PH BLDV: 7.44 PH (ref 7.33–7.43)
PH UR STRIP.AUTO: 7 [PH]
PLATELET # BLD AUTO: 69 X10*3/UL (ref 150–450)
PO2 BLDV: 32 MM HG (ref 35–45)
POTASSIUM BLDV-SCNC: 3.5 MMOL/L (ref 3.5–5.3)
POTASSIUM SERPL-SCNC: 3.6 MMOL/L (ref 3.5–5.3)
PROT SERPL-MCNC: 7.3 G/DL (ref 6.4–8.2)
PROT UR STRIP.AUTO-MCNC: ABNORMAL MG/DL
RBC # BLD AUTO: 4.28 X10*6/UL (ref 4–5.2)
RBC # UR STRIP.AUTO: ABNORMAL /UL
RBC #/AREA URNS AUTO: ABNORMAL /HPF
SAO2 % BLDV: 54 % (ref 45–75)
SARS-COV-2 RNA RESP QL NAA+PROBE: NOT DETECTED
SODIUM BLDV-SCNC: 130 MMOL/L (ref 136–145)
SODIUM SERPL-SCNC: 135 MMOL/L (ref 136–145)
SP GR UR STRIP.AUTO: 1.01
SQUAMOUS #/AREA URNS AUTO: ABNORMAL /HPF
TRANS CELLS #/AREA UR COMP ASSIST: ABNORMAL /HPF
UROBILINOGEN UR STRIP.AUTO-MCNC: 4 MG/DL
WBC # BLD AUTO: 6.9 X10*3/UL (ref 4.4–11.3)
WBC #/AREA URNS AUTO: >50 /HPF
WBC CLUMPS #/AREA URNS AUTO: ABNORMAL /HPF

## 2024-05-14 PROCEDURE — 94799 UNLISTED PULMONARY SVC/PX: CPT

## 2024-05-14 PROCEDURE — C9113 INJ PANTOPRAZOLE SODIUM, VIA: HCPCS | Mod: SE | Performed by: EMERGENCY MEDICINE

## 2024-05-14 PROCEDURE — 84484 ASSAY OF TROPONIN QUANT: CPT | Performed by: STUDENT IN AN ORGANIZED HEALTH CARE EDUCATION/TRAINING PROGRAM

## 2024-05-14 PROCEDURE — 94660 CPAP INITIATION&MGMT: CPT

## 2024-05-14 PROCEDURE — 85025 COMPLETE CBC W/AUTO DIFF WBC: CPT | Performed by: STUDENT IN AN ORGANIZED HEALTH CARE EDUCATION/TRAINING PROGRAM

## 2024-05-14 PROCEDURE — 83690 ASSAY OF LIPASE: CPT | Performed by: STUDENT IN AN ORGANIZED HEALTH CARE EDUCATION/TRAINING PROGRAM

## 2024-05-14 PROCEDURE — 36556 INSERT NON-TUNNEL CV CATH: CPT | Performed by: STUDENT IN AN ORGANIZED HEALTH CARE EDUCATION/TRAINING PROGRAM

## 2024-05-14 PROCEDURE — 96365 THER/PROPH/DIAG IV INF INIT: CPT | Mod: 59

## 2024-05-14 PROCEDURE — 2500000005 HC RX 250 GENERAL PHARMACY W/O HCPCS: Mod: SE

## 2024-05-14 PROCEDURE — 2550000001 HC RX 255 CONTRASTS: Mod: SE | Performed by: STUDENT IN AN ORGANIZED HEALTH CARE EDUCATION/TRAINING PROGRAM

## 2024-05-14 PROCEDURE — 96366 THER/PROPH/DIAG IV INF ADDON: CPT | Mod: 59

## 2024-05-14 PROCEDURE — 87040 BLOOD CULTURE FOR BACTERIA: CPT | Mod: CONLAB | Performed by: STUDENT IN AN ORGANIZED HEALTH CARE EDUCATION/TRAINING PROGRAM

## 2024-05-14 PROCEDURE — 74177 CT ABD & PELVIS W/CONTRAST: CPT | Performed by: STUDENT IN AN ORGANIZED HEALTH CARE EDUCATION/TRAINING PROGRAM

## 2024-05-14 PROCEDURE — 83880 ASSAY OF NATRIURETIC PEPTIDE: CPT | Performed by: STUDENT IN AN ORGANIZED HEALTH CARE EDUCATION/TRAINING PROGRAM

## 2024-05-14 PROCEDURE — 36415 COLL VENOUS BLD VENIPUNCTURE: CPT | Performed by: STUDENT IN AN ORGANIZED HEALTH CARE EDUCATION/TRAINING PROGRAM

## 2024-05-14 PROCEDURE — 84132 ASSAY OF SERUM POTASSIUM: CPT | Performed by: STUDENT IN AN ORGANIZED HEALTH CARE EDUCATION/TRAINING PROGRAM

## 2024-05-14 PROCEDURE — 2500000001 HC RX 250 WO HCPCS SELF ADMINISTERED DRUGS (ALT 637 FOR MEDICARE OP): Mod: SE | Performed by: STUDENT IN AN ORGANIZED HEALTH CARE EDUCATION/TRAINING PROGRAM

## 2024-05-14 PROCEDURE — P9612 CATHETERIZE FOR URINE SPEC: HCPCS

## 2024-05-14 PROCEDURE — 96367 TX/PROPH/DG ADDL SEQ IV INF: CPT | Mod: 59

## 2024-05-14 PROCEDURE — 94760 N-INVAS EAR/PLS OXIMETRY 1: CPT

## 2024-05-14 PROCEDURE — 96375 TX/PRO/DX INJ NEW DRUG ADDON: CPT | Mod: 59

## 2024-05-14 PROCEDURE — 74177 CT ABD & PELVIS W/CONTRAST: CPT

## 2024-05-14 PROCEDURE — 87636 SARSCOV2 & INF A&B AMP PRB: CPT | Performed by: STUDENT IN AN ORGANIZED HEALTH CARE EDUCATION/TRAINING PROGRAM

## 2024-05-14 PROCEDURE — 96376 TX/PRO/DX INJ SAME DRUG ADON: CPT | Mod: 59

## 2024-05-14 PROCEDURE — 2500000004 HC RX 250 GENERAL PHARMACY W/ HCPCS (ALT 636 FOR OP/ED): Mod: SE | Performed by: STUDENT IN AN ORGANIZED HEALTH CARE EDUCATION/TRAINING PROGRAM

## 2024-05-14 PROCEDURE — 83605 ASSAY OF LACTIC ACID: CPT | Mod: 91 | Performed by: STUDENT IN AN ORGANIZED HEALTH CARE EDUCATION/TRAINING PROGRAM

## 2024-05-14 PROCEDURE — 71260 CT THORAX DX C+: CPT | Performed by: STUDENT IN AN ORGANIZED HEALTH CARE EDUCATION/TRAINING PROGRAM

## 2024-05-14 PROCEDURE — 2500000005 HC RX 250 GENERAL PHARMACY W/O HCPCS: Mod: SE | Performed by: STUDENT IN AN ORGANIZED HEALTH CARE EDUCATION/TRAINING PROGRAM

## 2024-05-14 PROCEDURE — 87086 URINE CULTURE/COLONY COUNT: CPT | Mod: CONLAB | Performed by: STUDENT IN AN ORGANIZED HEALTH CARE EDUCATION/TRAINING PROGRAM

## 2024-05-14 PROCEDURE — 81001 URINALYSIS AUTO W/SCOPE: CPT | Performed by: STUDENT IN AN ORGANIZED HEALTH CARE EDUCATION/TRAINING PROGRAM

## 2024-05-14 PROCEDURE — 99291 CRITICAL CARE FIRST HOUR: CPT | Mod: 25 | Performed by: STUDENT IN AN ORGANIZED HEALTH CARE EDUCATION/TRAINING PROGRAM

## 2024-05-14 PROCEDURE — 2500000004 HC RX 250 GENERAL PHARMACY W/ HCPCS (ALT 636 FOR OP/ED): Mod: SE

## 2024-05-14 PROCEDURE — 2500000004 HC RX 250 GENERAL PHARMACY W/ HCPCS (ALT 636 FOR OP/ED): Mod: SE | Performed by: EMERGENCY MEDICINE

## 2024-05-14 RX ORDER — NOREPINEPHRINE BITARTRATE/D5W 8 MG/250ML
.01-1 PLASTIC BAG, INJECTION (ML) INTRAVENOUS CONTINUOUS
Status: DISCONTINUED | OUTPATIENT
Start: 2024-05-14 | End: 2024-05-15 | Stop reason: HOSPADM

## 2024-05-14 RX ORDER — ACETAMINOPHEN 325 MG/1
TABLET ORAL
Status: COMPLETED
Start: 2024-05-14 | End: 2024-05-14

## 2024-05-14 RX ORDER — SODIUM CHLORIDE 9 MG/ML
INJECTION, SOLUTION INTRAVENOUS
Status: COMPLETED
Start: 2024-05-14 | End: 2024-05-14

## 2024-05-14 RX ORDER — SODIUM CHLORIDE, SODIUM LACTATE, POTASSIUM CHLORIDE, CALCIUM CHLORIDE 600; 310; 30; 20 MG/100ML; MG/100ML; MG/100ML; MG/100ML
125 INJECTION, SOLUTION INTRAVENOUS CONTINUOUS
Status: DISCONTINUED | OUTPATIENT
Start: 2024-05-14 | End: 2024-05-15 | Stop reason: HOSPADM

## 2024-05-14 RX ORDER — ONDANSETRON HYDROCHLORIDE 2 MG/ML
INJECTION, SOLUTION INTRAVENOUS
Status: COMPLETED
Start: 2024-05-14 | End: 2024-05-14

## 2024-05-14 RX ORDER — NAPROXEN SODIUM 220 MG/1
324 TABLET, FILM COATED ORAL ONCE
Status: COMPLETED | OUTPATIENT
Start: 2024-05-14 | End: 2024-05-14

## 2024-05-14 RX ORDER — NOREPINEPHRINE BITARTRATE/D5W 8 MG/250ML
PLASTIC BAG, INJECTION (ML) INTRAVENOUS
Status: COMPLETED
Start: 2024-05-14 | End: 2024-05-14

## 2024-05-14 RX ORDER — VANCOMYCIN HYDROCHLORIDE 1 G/200ML
1 INJECTION, SOLUTION INTRAVENOUS
Status: COMPLETED | OUTPATIENT
Start: 2024-05-14 | End: 2024-05-14

## 2024-05-14 RX ORDER — KETOROLAC TROMETHAMINE 15 MG/ML
INJECTION, SOLUTION INTRAMUSCULAR; INTRAVENOUS
Status: COMPLETED
Start: 2024-05-14 | End: 2024-05-14

## 2024-05-14 RX ORDER — ACETAMINOPHEN 325 MG/1
975 TABLET ORAL ONCE
Status: COMPLETED | OUTPATIENT
Start: 2024-05-14 | End: 2024-05-14

## 2024-05-14 RX ORDER — KETOROLAC TROMETHAMINE 15 MG/ML
15 INJECTION, SOLUTION INTRAMUSCULAR; INTRAVENOUS ONCE
Status: COMPLETED | OUTPATIENT
Start: 2024-05-14 | End: 2024-05-14

## 2024-05-14 RX ORDER — PANTOPRAZOLE SODIUM 40 MG/10ML
40 INJECTION, POWDER, LYOPHILIZED, FOR SOLUTION INTRAVENOUS ONCE
Status: COMPLETED | OUTPATIENT
Start: 2024-05-14 | End: 2024-05-14

## 2024-05-14 RX ORDER — ONDANSETRON HYDROCHLORIDE 2 MG/ML
4 INJECTION, SOLUTION INTRAVENOUS ONCE
Status: COMPLETED | OUTPATIENT
Start: 2024-05-14 | End: 2024-05-14

## 2024-05-14 RX ORDER — VASOPRESSIN 20 U/ML
INJECTION PARENTERAL
Status: COMPLETED
Start: 2024-05-14 | End: 2024-05-14

## 2024-05-14 RX ADMIN — SODIUM CHLORIDE, POTASSIUM CHLORIDE, SODIUM LACTATE AND CALCIUM CHLORIDE 1000 ML: 600; 310; 30; 20 INJECTION, SOLUTION INTRAVENOUS at 14:11

## 2024-05-14 RX ADMIN — ACETAMINOPHEN 975 MG: 325 TABLET ORAL at 15:31

## 2024-05-14 RX ADMIN — PIPERACILLIN SODIUM AND TAZOBACTAM SODIUM 4.5 G: 4; .5 INJECTION, SOLUTION INTRAVENOUS at 14:14

## 2024-05-14 RX ADMIN — VANCOMYCIN HYDROCHLORIDE 1 G: 1 INJECTION, SOLUTION INTRAVENOUS at 14:55

## 2024-05-14 RX ADMIN — ONDANSETRON 4 MG: 2 INJECTION INTRAMUSCULAR; INTRAVENOUS at 20:58

## 2024-05-14 RX ADMIN — ASPIRIN 81 MG CHEWABLE TABLET 324 MG: 81 TABLET CHEWABLE at 16:35

## 2024-05-14 RX ADMIN — SODIUM CHLORIDE, POTASSIUM CHLORIDE, SODIUM LACTATE AND CALCIUM CHLORIDE 125 ML/HR: 600; 310; 30; 20 INJECTION, SOLUTION INTRAVENOUS at 23:30

## 2024-05-14 RX ADMIN — SODIUM CHLORIDE, POTASSIUM CHLORIDE, SODIUM LACTATE AND CALCIUM CHLORIDE 125 ML/HR: 600; 310; 30; 20 INJECTION, SOLUTION INTRAVENOUS at 15:30

## 2024-05-14 RX ADMIN — KETOROLAC TROMETHAMINE 15 MG: 15 INJECTION, SOLUTION INTRAMUSCULAR; INTRAVENOUS at 14:19

## 2024-05-14 RX ADMIN — Medication 30 PERCENT: at 14:55

## 2024-05-14 RX ADMIN — ONDANSETRON HYDROCHLORIDE 4 MG: 2 INJECTION, SOLUTION INTRAVENOUS at 20:58

## 2024-05-14 RX ADMIN — NOREPINEPHRINE BITARTRATE 0.03 MCG/KG/MIN: 8 INJECTION, SOLUTION INTRAVENOUS at 15:12

## 2024-05-14 RX ADMIN — Medication 0.03 MCG/KG/MIN: at 15:12

## 2024-05-14 RX ADMIN — IOHEXOL 75 ML: 350 INJECTION, SOLUTION INTRAVENOUS at 19:52

## 2024-05-14 RX ADMIN — VANCOMYCIN HYDROCHLORIDE 1 G: 1 INJECTION, SOLUTION INTRAVENOUS at 14:15

## 2024-05-14 RX ADMIN — VASOPRESSIN 20 UNITS: 20 INJECTION INTRAVENOUS at 18:23

## 2024-05-14 RX ADMIN — Medication 30 PERCENT: at 20:00

## 2024-05-14 RX ADMIN — SODIUM CHLORIDE, POTASSIUM CHLORIDE, SODIUM LACTATE AND CALCIUM CHLORIDE 1000 ML: 600; 310; 30; 20 INJECTION, SOLUTION INTRAVENOUS at 20:10

## 2024-05-14 RX ADMIN — PANTOPRAZOLE SODIUM 40 MG: 40 INJECTION, POWDER, FOR SOLUTION INTRAVENOUS at 22:20

## 2024-05-14 RX ADMIN — SODIUM CHLORIDE, POTASSIUM CHLORIDE, SODIUM LACTATE AND CALCIUM CHLORIDE 1000 ML: 600; 310; 30; 20 INJECTION, SOLUTION INTRAVENOUS at 16:10

## 2024-05-14 ASSESSMENT — PAIN SCALES - GENERAL
PAINLEVEL_OUTOF10: 0 - NO PAIN
PAINLEVEL_OUTOF10: 4
PAINLEVEL_OUTOF10: 2
PAINLEVEL_OUTOF10: 0 - NO PAIN
PAINLEVEL_OUTOF10: 4
PAINLEVEL_OUTOF10: 5 - MODERATE PAIN
PAINLEVEL_OUTOF10: 5 - MODERATE PAIN

## 2024-05-14 ASSESSMENT — PAIN - FUNCTIONAL ASSESSMENT
PAIN_FUNCTIONAL_ASSESSMENT: 0-10
PAIN_FUNCTIONAL_ASSESSMENT: 0-10

## 2024-05-15 ENCOUNTER — APPOINTMENT (OUTPATIENT)
Dept: CARDIOLOGY | Facility: HOSPITAL | Age: 64
End: 2024-05-15
Payer: MEDICAID

## 2024-05-15 ENCOUNTER — APPOINTMENT (OUTPATIENT)
Dept: ORTHOPEDIC SURGERY | Facility: CLINIC | Age: 64
End: 2024-05-15
Payer: MEDICAID

## 2024-05-15 VITALS
WEIGHT: 170.64 LBS | OXYGEN SATURATION: 97 % | SYSTOLIC BLOOD PRESSURE: 169 MMHG | TEMPERATURE: 98.6 F | HEIGHT: 63 IN | BODY MASS INDEX: 30.23 KG/M2 | RESPIRATION RATE: 25 BRPM | HEART RATE: 109 BPM | DIASTOLIC BLOOD PRESSURE: 89 MMHG

## 2024-05-15 PROBLEM — N12 PYELONEPHRITIS: Status: ACTIVE | Noted: 2024-05-15

## 2024-05-15 PROBLEM — R65.21 SEPSIS WITH ACUTE ORGAN DYSFUNCTION AND SEPTIC SHOCK (MULTI): Status: ACTIVE | Noted: 2024-05-15

## 2024-05-15 PROBLEM — A41.9 SEPSIS WITH ACUTE ORGAN DYSFUNCTION AND SEPTIC SHOCK (MULTI): Status: ACTIVE | Noted: 2024-05-15

## 2024-05-15 PROBLEM — M46.20 SPINAL ABSCESS (MULTI): Status: ACTIVE | Noted: 2024-05-15

## 2024-05-15 LAB
CARDIAC TROPONIN I PNL SERPL HS: 80 NG/L (ref 0–13)
HOLD SPECIMEN: NORMAL

## 2024-05-15 PROCEDURE — 9420000001 HC RT PATIENT EDUCATION 5 MIN

## 2024-05-15 PROCEDURE — 2500000001 HC RX 250 WO HCPCS SELF ADMINISTERED DRUGS (ALT 637 FOR MEDICARE OP): Mod: SE | Performed by: EMERGENCY MEDICINE

## 2024-05-15 PROCEDURE — 94760 N-INVAS EAR/PLS OXIMETRY 1: CPT

## 2024-05-15 PROCEDURE — 96375 TX/PRO/DX INJ NEW DRUG ADDON: CPT

## 2024-05-15 PROCEDURE — 94660 CPAP INITIATION&MGMT: CPT

## 2024-05-15 PROCEDURE — 96365 THER/PROPH/DIAG IV INF INIT: CPT

## 2024-05-15 PROCEDURE — 2500000004 HC RX 250 GENERAL PHARMACY W/ HCPCS (ALT 636 FOR OP/ED): Mod: SE

## 2024-05-15 PROCEDURE — 2500000005 HC RX 250 GENERAL PHARMACY W/O HCPCS: Mod: SE | Performed by: STUDENT IN AN ORGANIZED HEALTH CARE EDUCATION/TRAINING PROGRAM

## 2024-05-15 PROCEDURE — 93005 ELECTROCARDIOGRAM TRACING: CPT

## 2024-05-15 PROCEDURE — 96376 TX/PRO/DX INJ SAME DRUG ADON: CPT

## 2024-05-15 PROCEDURE — 2500000004 HC RX 250 GENERAL PHARMACY W/ HCPCS (ALT 636 FOR OP/ED): Mod: SE | Performed by: STUDENT IN AN ORGANIZED HEALTH CARE EDUCATION/TRAINING PROGRAM

## 2024-05-15 PROCEDURE — 96366 THER/PROPH/DIAG IV INF ADDON: CPT

## 2024-05-15 PROCEDURE — 84484 ASSAY OF TROPONIN QUANT: CPT | Mod: 91 | Performed by: EMERGENCY MEDICINE

## 2024-05-15 PROCEDURE — 2500000004 HC RX 250 GENERAL PHARMACY W/ HCPCS (ALT 636 FOR OP/ED): Mod: JZ,SE | Performed by: EMERGENCY MEDICINE

## 2024-05-15 RX ORDER — MORPHINE SULFATE 15 MG/1
30 TABLET, FILM COATED, EXTENDED RELEASE ORAL EVERY 12 HOURS SCHEDULED
Status: DISCONTINUED | OUTPATIENT
Start: 2024-05-15 | End: 2024-05-15 | Stop reason: HOSPADM

## 2024-05-15 RX ORDER — MORPHINE SULFATE 4 MG/ML
4 INJECTION INTRAVENOUS ONCE
Status: COMPLETED | OUTPATIENT
Start: 2024-05-15 | End: 2024-05-15

## 2024-05-15 RX ORDER — VANCOMYCIN HYDROCHLORIDE 1 G/20ML
INJECTION, POWDER, LYOPHILIZED, FOR SOLUTION INTRAVENOUS DAILY PRN
Status: DISCONTINUED | OUTPATIENT
Start: 2024-05-15 | End: 2024-05-15 | Stop reason: HOSPADM

## 2024-05-15 RX ORDER — ONDANSETRON HYDROCHLORIDE 2 MG/ML
INJECTION, SOLUTION INTRAVENOUS
Status: COMPLETED
Start: 2024-05-15 | End: 2024-05-15

## 2024-05-15 RX ORDER — ONDANSETRON HYDROCHLORIDE 2 MG/ML
4 INJECTION, SOLUTION INTRAVENOUS ONCE
Status: COMPLETED | OUTPATIENT
Start: 2024-05-15 | End: 2024-05-15

## 2024-05-15 RX ORDER — VANCOMYCIN 1.25 G/250ML
1750 INJECTION, SOLUTION INTRAVENOUS EVERY 24 HOURS
Status: DISCONTINUED | OUTPATIENT
Start: 2024-05-15 | End: 2024-05-15 | Stop reason: HOSPADM

## 2024-05-15 RX ORDER — MORPHINE SULFATE ORAL SOLUTION 10 MG/5ML
30 SOLUTION ORAL 2 TIMES DAILY PRN
Status: DISCONTINUED | OUTPATIENT
Start: 2024-05-15 | End: 2024-05-15 | Stop reason: HOSPADM

## 2024-05-15 RX ORDER — MORPHINE SULFATE 4 MG/ML
INJECTION INTRAVENOUS
Status: COMPLETED
Start: 2024-05-15 | End: 2024-05-15

## 2024-05-15 RX ORDER — MORPHINE SULFATE 2 MG/ML
INJECTION, SOLUTION INTRAMUSCULAR; INTRAVENOUS
Status: COMPLETED
Start: 2024-05-15 | End: 2024-05-15

## 2024-05-15 RX ORDER — MORPHINE SULFATE 2 MG/ML
2 INJECTION, SOLUTION INTRAMUSCULAR; INTRAVENOUS ONCE
Status: COMPLETED | OUTPATIENT
Start: 2024-05-15 | End: 2024-05-15

## 2024-05-15 RX ORDER — MORPHINE SULFATE 4 MG/ML
4 INJECTION INTRAVENOUS
Status: DISCONTINUED | OUTPATIENT
Start: 2024-05-15 | End: 2024-05-15

## 2024-05-15 RX ADMIN — MORPHINE SULFATE 2 MG: 2 INJECTION, SOLUTION INTRAMUSCULAR; INTRAVENOUS at 01:51

## 2024-05-15 RX ADMIN — MORPHINE SULFATE 4 MG: 4 INJECTION, SOLUTION INTRAMUSCULAR; INTRAVENOUS at 02:45

## 2024-05-15 RX ADMIN — MORPHINE SULFATE 4 MG: 4 INJECTION, SOLUTION INTRAMUSCULAR; INTRAVENOUS at 03:34

## 2024-05-15 RX ADMIN — MORPHINE SULFATE 4 MG: 4 INJECTION, SOLUTION INTRAMUSCULAR; INTRAVENOUS at 05:22

## 2024-05-15 RX ADMIN — PIPERACILLIN SODIUM AND TAZOBACTAM SODIUM 4.5 G: 4; .5 INJECTION, SOLUTION INTRAVENOUS at 08:36

## 2024-05-15 RX ADMIN — ONDANSETRON 4 MG: 2 INJECTION INTRAMUSCULAR; INTRAVENOUS at 05:22

## 2024-05-15 RX ADMIN — MORPHINE SULFATE 4 MG: 4 INJECTION, SOLUTION INTRAMUSCULAR; INTRAVENOUS at 08:36

## 2024-05-15 RX ADMIN — ONDANSETRON HYDROCHLORIDE 4 MG: 2 INJECTION, SOLUTION INTRAVENOUS at 05:22

## 2024-05-15 RX ADMIN — Medication 30 PERCENT: at 10:02

## 2024-05-15 RX ADMIN — PIPERACILLIN SODIUM AND TAZOBACTAM SODIUM 4.5 G: 4; .5 INJECTION, SOLUTION INTRAVENOUS at 13:53

## 2024-05-15 RX ADMIN — MORPHINE SULFATE 4 MG: 4 INJECTION INTRAVENOUS at 02:45

## 2024-05-15 RX ADMIN — Medication 3 L/MIN: at 08:00

## 2024-05-15 RX ADMIN — SODIUM CHLORIDE, POTASSIUM CHLORIDE, SODIUM LACTATE AND CALCIUM CHLORIDE 125 ML/HR: 600; 310; 30; 20 INJECTION, SOLUTION INTRAVENOUS at 11:23

## 2024-05-15 RX ADMIN — MORPHINE SULFATE 30 MG: 15 TABLET, FILM COATED, EXTENDED RELEASE ORAL at 10:31

## 2024-05-15 RX ADMIN — PIPERACILLIN SODIUM AND TAZOBACTAM SODIUM 4.5 G: 4; .5 INJECTION, SOLUTION INTRAVENOUS at 02:34

## 2024-05-15 RX ADMIN — VANCOMYCIN 1750 MG: 1.25 INJECTION, SOLUTION INTRAVENOUS at 13:52

## 2024-05-15 ASSESSMENT — PAIN SCALES - GENERAL
PAINLEVEL_OUTOF10: 6
PAINLEVEL_OUTOF10: 3
PAINLEVEL_OUTOF10: 5 - MODERATE PAIN
PAINLEVEL_OUTOF10: 5 - MODERATE PAIN
PAINLEVEL_OUTOF10: 4
PAINLEVEL_OUTOF10: 4
PAINLEVEL_OUTOF10: 5 - MODERATE PAIN
PAINLEVEL_OUTOF10: 6
PAINLEVEL_OUTOF10: 6
PAINLEVEL_OUTOF10: 5 - MODERATE PAIN
PAINLEVEL_OUTOF10: 5 - MODERATE PAIN
PAINLEVEL_OUTOF10: 4
PAINLEVEL_OUTOF10: 5 - MODERATE PAIN

## 2024-05-15 ASSESSMENT — PAIN DESCRIPTION - LOCATION: LOCATION: BACK

## 2024-05-15 NOTE — CONSULTS
Vancomycin Dosing by Pharmacy- INITIAL    Daniela Saez is a 64 y.o. year old female who Pharmacy has been consulted for vancomycin dosing for other UTI . Based on the patient's indication and renal status this patient will be dosed based on a goal AUC of 500-600.     Renal function is currently declining.    Visit Vitals  /89   Pulse (!) 109   Temp 37 °C (98.6 °F) (Temporal)   Resp (!) 25        Lab Results   Component Value Date    CREATININE 1.04 05/14/2024    CREATININE 0.78 02/23/2024    CREATININE 0.78 02/08/2024    CREATININE 0.56 01/07/2024      Patient weight is 77.4 kg    Assessment/Plan     Patient has already been given a loading dose of 2000 mg in ED.  Will initiate vancomycin maintenance,  1750 mg every 24 hours.    This dosing regimen is predicted by [employer] to result in the following pharmacokinetic parameters:  Loading dose: 2000 mg in ED  Regimen: 1750 mg IV every 24 hours.  Start time: 13:52 on 05/15/2024  Exposure target: AUC24 (range)500-600 mg/L.hr   AUC24,ss: 557 mg/L.hr  Probability of AUC24 > 400: 82 %  Ctrough,ss: 15.8 mg/L  Probability of Ctrough,ss > 20: 32 %  Probability of nephrotoxicity (Lodise ASHLEY 2009): 11 %      Follow-up level will be ordered on 5/17/24 at 5:00 unless clinically indicated sooner.  Will continue to monitor renal function daily while on vancomycin and order serum creatinine at least every 48 hours if not already ordered.  Follow for continued vancomycin needs, clinical response, and signs/symptoms of toxicity.       Lakeisha Lorenz, PharmD

## 2024-05-15 NOTE — PROGRESS NOTES
Emergency Medicine Transition of Care Note.    I received Daniela Saez in signout from Dr. Butler.  Please see the previous ED provider note for all HPI, PE and MDM up to the time of signout at 07:00. This is in addition to the primary record.    In brief Daniela Saez is an 64 y.o. female presenting for altered mental status.  Noted to have spinal abscess.  Vancomycin and Zosyn are ordered schedule.  She has been accepted for transfer to [place].  She is now off pressors.  Antibiotics are scheduled.  She will be transported to [place] pending bed availability.    I reviewed [facility] and confirmed doses of home pain medication with pharmacy.  I ordered equivalent doses available on our formulary.    Morphine 30 mg ER BID ordered scheduled for pain.    Morphine IR oral liquid 30 mg BID ordered for breakthrough pain.    Remained hemodynamically stable on HFNC.  Pain reasonably well controlled.  Notified of positive blood culture.  She is on appropriate antibiotics.  Transferred to [facility] in stable condition.    Chief Complaint   Patient presents with    Fall    Altered Mental Status     Pt is not answering questions on arrival to ED.  Skin pale.  Pts family called squad stating pt had a fall 3 days ago with no injury.  Pt febrile on arrival to ED.      At the time of signout we were awaiting: Transport    ED Course as of 05/15/24 0815   Tue May 14, 2024   2116 Evaluated patient [MN]   2240 Updated Dr Edge [MN]   2353 Off pressors [MN]   Wed May 15, 2024   0024 EKG obtained, patient reassessed [MN]   0125 CT report reviewed with patient [MN]   0140 [facility] transfer center notified of need for transfer [MN]   0225 Discussed with Dr Jarek Proctor [MN]      ED Course User Index  [MN] Audrey Butler MD         Diagnoses as of 05/15/24 0815   Sepsis with acute organ dysfunction and septic shock, due to unspecified organism, unspecified organ dysfunction type (Multi)   Pyelonephritis   Spinal abscess (Multi)        Medical Decision Making      Final diagnoses:   [A41.9, R65.21] Sepsis with acute organ dysfunction and septic shock, due to unspecified organism, unspecified organ dysfunction type (Multi)   [N12] Pyelonephritis   [M46.20] Spinal abscess (Multi)           Procedure  Procedures    Chris Mathew DO

## 2024-05-15 NOTE — PROGRESS NOTES
"Daniela Saez is a 64 y.o. female on day 0 of admission presenting with No Principal Problem: There is no principal problem currently on the Problem List. Please update the Problem List and refresh.. Patient had presented to ED with altered mental status. She was found to bbe septic by Dr Silva from urine source. Cultures and antibiotics ordered. Shee her note for full details of the H&P. She was accepted for admission here by Dr Mccall, but signed out to me at change of shift as her clinical status was improving and there was expectation that she might be able to be weaned from  pressors and allow admission to non ICU setting.    Subjective   Patient is complaining of headache and epigastric discomfort. She says she is on opiates at home for chronic back pain since a back surgery at [place] [facility] a year ago       Objective     Physical Exam  Vitals reviewed.   HENT:      Head: Normocephalic.   Eyes:      Extraocular Movements: Extraocular movements intact.      Pupils: Pupils are equal, round, and reactive to light.   Cardiovascular:      Rate and Rhythm: Regular rhythm. Tachycardia present.      Heart sounds: Normal heart sounds.   Pulmonary:      Effort: Pulmonary effort is normal.      Breath sounds: Normal breath sounds.   Abdominal:      General: Bowel sounds are normal.      Palpations: Abdomen is soft.   Musculoskeletal:      Cervical back: Normal range of motion.      Comments: No redness, warmth, induration, trauma in the lumbosacral area.   Neurological:      Mental Status: She is alert.      Cranial Nerves: No cranial nerve deficit, dysarthria or facial asymmetry.      Sensory: No sensory deficit.      Motor: No weakness.   Psychiatric:         Mood and Affect: Mood normal.         Speech: Speech normal.       Last Recorded Vitals  Blood pressure 117/64, pulse (!) 108, temperature 36.8 °C (98.3 °F), temperature source Temporal, resp. rate 20, height 1.6 m (5' 3\"), weight 77.4 kg (170 lb 10.2 oz), SpO2 " 96%.  Intake/Output last 3 Shifts:  I/O last 3 completed shifts:  In: 2300 (29.7 mL/kg) [IV Piggyback:2300]  Out: - (0 mL/kg)   Weight: 77.4 kg     Relevant Results              This patient has a central line   Reason for the central line remaining today? Parenteral medication    EKG -   0024 --twelve-lead EKG was obtained and read by me. This demonstrates sinus tachycardia with a rate of 107, left anterior fascicular block, poor R wave progression, but no ectopy, no ischemia, no pericarditis. Compared to most recent prior EKG (2/8/24), FL interval is now normal.    Labs Reviewed   CBC WITH AUTO DIFFERENTIAL - Abnormal       Result Value    WBC 6.9      nRBC 0.3 (*)     RBC 4.28      Hemoglobin 12.1      Hematocrit 38.6      MCV 90      MCH 28.3      MCHC 31.3 (*)     RDW 15.9 (*)     Platelets 69 (*)     Neutrophils % 92.4      Immature Granulocytes %, Automated 0.4      Lymphocytes % 4.9      Monocytes % 0.4      Eosinophils % 0.7      Basophils % 1.2      Neutrophils Absolute 6.40      Immature Granulocytes Absolute, Automated 0.03      Lymphocytes Absolute 0.34 (*)     Monocytes Absolute 0.03 (*)     Eosinophils Absolute 0.05      Basophils Absolute 0.08     COMPREHENSIVE METABOLIC PANEL - Abnormal    Glucose 74      Sodium 135 (*)     Potassium 3.6      Chloride 92 (*)     Bicarbonate 29      Anion Gap 18      Urea Nitrogen 13      Creatinine 1.04      eGFR 61      Calcium 8.6      Albumin 4.0      Alkaline Phosphatase 187 (*)     Total Protein 7.3      AST 36      Bilirubin, Total 2.0 (*)     ALT 18     LACTATE - Abnormal    Lactate 4.5 (*)     Narrative:     Venipuncture immediately after or during the administration of Metamizole may lead to falsely low results. Testing should be performed immediately  prior to Metamizole dosing.   TROPONIN I, HIGH SENSITIVITY - Abnormal    Troponin I, High Sensitivity 43 (*)     Narrative:     Less than 99th percentile of normal range cutoff-  Female and children under 18  years old <14 ng/L; Male <21 ng/L: Negative  Repeat testing should be performed if clinically indicated.     Female and children under 18 years old 14-50 ng/L; Male 21-50 ng/L:  Consistent with possible cardiac damage and possible increased clinical   risk. Serial measurements may help to assess extent of myocardial damage.     >50 ng/L: Consistent with cardiac damage, increased clinical risk and  myocardial infarction. Serial measurements may help assess extent of   myocardial damage.      NOTE: Children less than 1 year old may have higher baseline troponin   levels and results should be interpreted in conjunction with the overall   clinical context.     NOTE: Troponin I testing is performed using a different   testing methodology at [facility] than at other   [facility]. Direct result comparisons should only   be made within the same method.   LIPASE - Abnormal    Lipase 4 (*)     Narrative:     Venipuncture immediately after or during the administration of Metamizole may lead to falsely low results. Testing should be performed immediately prior to Metamizole dosing.   URINALYSIS WITH REFLEX CULTURE AND MICROSCOPIC - Abnormal    Color, Urine Yellow      Appearance, Urine Hazy (*)     Specific Gravity, Urine 1.008      pH, Urine 7.0      Protein, Urine 100 (2+) (*)     Glucose, Urine NEGATIVE      Blood, Urine MODERATE (2+) (*)     Ketones, Urine NEGATIVE      Bilirubin, Urine NEGATIVE      Urobilinogen, Urine 4.0 (*)     Nitrite, Urine POSITIVE (*)     Leukocyte Esterase, Urine LARGE (3+) (*)    BLOOD GAS VENOUS FULL PANEL - Abnormal    POCT pH, Venous 7.44 (*)     POCT pCO2, Venous 47      POCT pO2, Venous 32 (*)     POCT SO2, Venous 54      POCT Oxy Hemoglobin, Venous 52.6      POCT Hematocrit Calculated, Venous 41.0      POCT Sodium, Venous 130 (*)     POCT Potassium, Venous 3.5      POCT Chloride, Venous 92 (*)     POCT Ionized Calicum, Venous 1.02 (*)     POCT Glucose, Venous 79      POCT  Lactate, Venous 4.9 (*)     POCT Base Excess, Venous 6.6 (*)     POCT HCO3 Calculated, Venous 31.9 (*)     POCT Hemoglobin, Venous 13.7      POCT Anion Gap, Venous 10.0      Patient Temperature        FiO2 28     MICROSCOPIC ONLY, URINE - Abnormal    WBC, Urine >50 (*)     WBC Clumps, Urine FEW      RBC, Urine 6-10 (*)     Squamous Epithelial Cells, Urine 1-9 (SPARSE)      Transitional Epithelial Cells, Urine 1-2 (FEW)      Bacteria, Urine 4+ (*)     Mucus, Urine FEW     LACTATE - Abnormal    Lactate 3.8 (*)     Narrative:     Venipuncture immediately after or during the administration of Metamizole may lead to falsely low results. Testing should be performed immediately  prior to Metamizole dosing.   TROPONIN I, HIGH SENSITIVITY - Abnormal    Troponin I, High Sensitivity 96 (*)     Narrative:     Less than 99th percentile of normal range cutoff-  Female and children under 18 years old <14 ng/L; Male <21 ng/L: Negative  Repeat testing should be performed if clinically indicated.     Female and children under 18 years old 14-50 ng/L; Male 21-50 ng/L:  Consistent with possible cardiac damage and possible increased clinical   risk. Serial measurements may help to assess extent of myocardial damage.     >50 ng/L: Consistent with cardiac damage, increased clinical risk and  myocardial infarction. Serial measurements may help assess extent of   myocardial damage.      NOTE: Children less than 1 year old may have higher baseline troponin   levels and results should be interpreted in conjunction with the overall   clinical context.     NOTE: Troponin I testing is performed using a different   testing methodology at [facility] than at other   [facility]. Direct result comparisons should only   be made within the same method.   LACTATE - Abnormal    Lactate 2.8 (*)     Narrative:     Venipuncture immediately after or during the administration of Metamizole may lead to falsely low results. Testing should be  performed immediately  prior to Metamizole dosing.   TROPONIN I, HIGH SENSITIVITY - Abnormal    Troponin I, High Sensitivity 80 (*)     Narrative:     Less than 99th percentile of normal range cutoff-  Female and children under 18 years old <14 ng/L; Male <21 ng/L: Negative  Repeat testing should be performed if clinically indicated.     Female and children under 18 years old 14-50 ng/L; Male 21-50 ng/L:  Consistent with possible cardiac damage and possible increased clinical   risk. Serial measurements may help to assess extent of myocardial damage.     >50 ng/L: Consistent with cardiac damage, increased clinical risk and  myocardial infarction. Serial measurements may help assess extent of   myocardial damage.      NOTE: Children less than 1 year old may have higher baseline troponin   levels and results should be interpreted in conjunction with the overall   clinical context.     NOTE: Troponin I testing is performed using a different   testing methodology at [facility] than at other   [facility]. Direct result comparisons should only   be made within the same method.   INFLUENZA A AND B PCR - Normal    Flu A Result Not Detected      Flu B Result Not Detected      Narrative:     This assay is an in vitro diagnostic multiplex nucleic acid amplification test for the detection and discrimination of Influenza A & B from nasopharyngeal specimens, and has been validated for use at [facility]. Negative results do not preclude Influenza A/B infections, and should not be used as the sole basis for diagnosis, treatment, or other management decisions. If Influenza A/B and RSV PCR results are negative, testing for Parainfluenza virus, Adenovirus and Metapneumovirus is routinely performed for [facility] pediatric oncology and intensive care inpatients, and is available on other patients by placing an add-on request.   SARS-COV-2 PCR - Normal    Coronavirus 2019, PCR Not Detected       Narrative:     This assay has received FDA Emergency Use Authorization (EUA) and is only authorized for the duration of time that circumstances exist to justify the authorization of the emergency use of in vitro diagnostic tests for the detection of SARS-CoV-2 virus and/or diagnosis of COVID-19 infection under section 564(b)(1) of the Act, 21 U.S.C. 360bbb-3(b)(1). This assay is an in vitro diagnostic nucleic acid amplification test for the qualitative detection of SARS-CoV-2 from nasopharyngeal specimens and has been validated for use at [facility]. Negative results do not preclude COVID-19 infections and should not be used as the sole basis for diagnosis, treatment, or other management decisions.     B-TYPE NATRIURETIC PEPTIDE - Normal    BNP 25      Narrative:        <100 pg/mL - Heart failure unlikely  100-299 pg/mL - Intermediate probability of acute heart                  failure exacerbation. Correlate with clinical                  context and patient history.    >=300 pg/mL - Heart Failure likely. Correlate with clinical                  context and patient history.    BNP testing is performed using different testing methodology at [facility] than at [facility]. Direct result comparisons should only be made within the same method.      BLOOD CULTURE   BLOOD CULTURE   URINE CULTURE   URINALYSIS WITH REFLEX CULTURE AND MICROSCOPIC    Narrative:     The following orders were created for panel order Urinalysis with Reflex Culture and Microscopic.  Procedure                               Abnormality         Status                     ---------                               -----------         ------                     Urinalysis with Reflex C...[[id number]]  Abnormal            Final result               Extra Urine Gray Tube[[id number]]                            Final result                 Please view results for these tests on the individual orders.   EXTRA  URINE GRAY TUBE    Extra Tube Hold for add-ons.       CT chest abdomen pelvis w IV contrast   Final Result   1. Trace gas in the urinary bladder lumen with mild perivesical   haziness concerning for cystitis.        2. Enhancement and thickening of the bilateral ureteral urothelial   extending to the renal pelvocaliceal systems (right > left)   consistent with ureteritis and pyelitis, as well as likely right   pyelonephritis. Possible early left pyelonephritis.        3. New gas containing collection in the L4-5 laminectomy bed   measuring 1.8 x 2.2 x 1.1 cm. In the absence of intervention this   raises concern for abscess. There is also a more superficial   elongated collection extending from the midline musculature into the   subcutaneous tissues and which may drain to the skin surface that   measures approximately 3.4 cm x 0.9 cm x 5.9 cm which appears grossly   unchanged from 11/10/2023. Unclear whether these collections   communicate. Recommend fluid sampling of the midline collection and   laminectomy bed gas containing collection to evaluate for infection   that could be a cause or consequence of the pyelonephritis.        4. Interstitial pulmonary edema.        5. Hepatic cirrhosis and splenomegaly with scattered varices   indicative of portal hypertension.        Additional chronic nonacute findings as above.        MACRO:   None.        Signed by: David Espinoza 5/14/2024 8:50 PM   Dictation workstation:   [id number]        ED Medication Administration from 05/14/2024 1322 to 05/15/2024 0158         Date/Time Order Dose Route Action Action by     05/14/2024 1340 EDT vancomycin (Vancocin) 2 g in dextrose 5 % in water (D5W) 500 mL IV 2 g intravenous Not Given CARON Cowan     05/14/2024 1411 EDT lactated Ringer's bolus 1,000 mL 1,000 mL intravenous New Bag CARON Cowan     05/14/2024 1414 EDT piperacillin-tazobactam-dextrose (Zosyn) IV 4.5 g 4.5 g intravenous New Bag CARON Cowan     05/14/2024 1415 EDT  vancomycin (Vancocin) 1 g in dextrose 5% water 200 mL 1 g intravenous New Bag Cowan, A     05/14/2024 1419 EDT ketorolac (Toradol) injection 15 mg 15 mg intravenous Given Cowan, A     05/14/2024 1444 EDT piperacillin-tazobactam-dextrose (Zosyn) IV 4.5 g 0 g intravenous Stopped Cowan, A     05/14/2024 1455 EDT oxygen (O2) therapy 30 percent inhalation Start Roberto Carlos, L     05/14/2024 1455 EDT vancomycin (Vancocin) 1 g in dextrose 5% water 200 mL 1 g intravenous New Bag Cowan, A     05/14/2024 1500 EDT norepinephrine (Levophed) 8 mg in dextrose 5% 250 mL (0.032 mg/mL) infusion (premix) 0.11 mcg/kg/min intravenous Rate/Dose Change Cowan, A     05/14/2024 1512 EDT norepinephrine (Levophed) 8 mg in dextrose 5% 250 mL (0.032 mg/mL) infusion (premix) 0.03 mcg/kg/min intravenous New Bag Cowan, A     05/14/2024 1513 EDT vancomycin (Vancocin) 1 g in dextrose 5% water 200 mL 0 g intravenous Stopped Cowan, A     05/14/2024 1517 EDT norepinephrine (Levophed) 8 mg in dextrose 5% 250 mL (0.032 mg/mL) infusion (premix) 0.04 mcg/kg/min intravenous Rate/Dose Change Cowan, A     05/14/2024 1526 EDT norepinephrine (Levophed) 8 mg in dextrose 5% 250 mL (0.032 mg/mL) infusion (premix) 0.05 mcg/kg/min intravenous Rate/Dose Change Cowan, A     05/14/2024 1530 EDT lactated Ringer's infusion 125 mL/hr intravenous New Bag Cowan, A     05/14/2024 1531 EDT acetaminophen (Tylenol) tablet 975 mg 975 mg oral Given Cowan, A     05/14/2024 1535 EDT lactated Ringer's bolus 1,000 mL 0 mL intravenous Stopped Cowan, A     05/14/2024 1536 EDT norepinephrine (Levophed) 8 mg in dextrose 5% 250 mL (0.032 mg/mL) infusion (premix) 0.06 mcg/kg/min intravenous Rate/Dose Change CARON Cowan     05/14/2024 1545 EDT norepinephrine (Levophed) 8 mg in dextrose 5% 250 mL (0.032 mg/mL) infusion (premix) 0.07 mcg/kg/min intravenous Rate/Dose Change CARON Cowan     05/14/2024 1554 EDT norepinephrine (Levophed) 8 mg in dextrose 5% 250 mL (0.032 mg/mL)  infusion (premix) 0.1 mcg/kg/min intravenous Rate/Dose Change CARON Cowan     05/14/2024 1610 EDT lactated Ringer's bolus 1,000 mL 1,000 mL intravenous New Bag CARON Cowan     05/14/2024 1612 EDT norepinephrine (Levophed) 8 mg in dextrose 5% 250 mL (0.032 mg/mL) infusion (premix) 0.12 mcg/kg/min intravenous Rate/Dose Change CARON Cowan     05/14/2024 1618 EDT norepinephrine (Levophed) 8 mg in dextrose 5% 250 mL (0.032 mg/mL) infusion (premix) 0.13 mcg/kg/min intravenous Rate/Dose Change CARON Cowan     05/14/2024 1634 EDT norepinephrine (Levophed) 8 mg in dextrose 5% 250 mL (0.032 mg/mL) infusion (premix) 0.14 mcg/kg/min intravenous Rate/Dose Change CARON Cowan     05/14/2024 1635 EDT aspirin chewable tablet 324 mg 324 mg oral Given CARON Cowan     05/14/2024 1645 EDT norepinephrine (Levophed) 8 mg in dextrose 5% 250 mL (0.032 mg/mL) infusion (premix) 0.15 mcg/kg/min intravenous Rate/Dose Change CARON Cowan     05/14/2024 1733 EDT norepinephrine (Levophed) 8 mg in dextrose 5% 250 mL (0.032 mg/mL) infusion (premix) 0.16 mcg/kg/min intravenous Rate/Dose Change CARON Cowan     05/14/2024 1742 EDT lactated Ringer's bolus 1,000 mL 0 mL intravenous Stopped CARON Cowan     05/14/2024 1755 EDT vasopressin (Vasostrict) 0.2 unit/mL infusion 0.01 Units/min intravenous Not Given LIANE Donald     05/14/2024 1823 EDT vasopressin (Vasostrict) injection  - Omnicell Override Pull 20 Units  New LIANE Gotti     05/14/2024 1825 EDT sodium chloride 0.9% infusion  - Omnicell Override Pull --  Override Pull LIANE Zeng     05/14/2024 1952 EDT iohexol (OMNIPaque) 350 mg iodine/mL solution 75 mL 75 mL intravenous Given CARON Colón     05/14/2024 2000 EDT oxygen (O2) therapy 30 percent inhalation Start SEDA Vazquez     05/14/2024 2009 EDT lactated Ringer's infusion 125 mL/hr intravenous Rate/Dose Verify LIANE Zeng     05/14/2024 2010 EDT lactated Ringer's bolus 1,000 mL 1,000 mL intravenous New Bag LIANE Zeng     05/14/2024 2050 EDT vasopressin (Vasostrict) injection  -  Omnicell Override Pull 0 Units  Stopped LIANE Zeng     05/14/2024 2058 EDT ondansetron (Zofran) injection 4 mg 4 mg intravenous Given LIANE Zeng     05/14/2024 2059 EDT norepinephrine (Levophed) 8 mg in dextrose 5% 250 mL (0.032 mg/mL) infusion (premix) 0.12 mcg/kg/min intravenous Rate/Dose Change LIANE Zeng     05/14/2024 2118 EDT norepinephrine (Levophed) 8 mg in dextrose 5% 250 mL (0.032 mg/mL) infusion (premix) 0.1 mcg/kg/min intravenous Rate/Dose Change Karri S     05/14/2024 2124 EDT lactated Ringer's bolus 1,000 mL 0 mL intravenous Stopped Karri S     05/14/2024 2156 EDT norepinephrine (Levophed) 8 mg in dextrose 5% 250 mL (0.032 mg/mL) infusion (premix) 0.08 mcg/kg/min intravenous Rate/Dose Change LIANE Zeng     05/14/2024 2217 EDT norepinephrine (Levophed) 8 mg in dextrose 5% 250 mL (0.032 mg/mL) infusion (premix) 0.06 mcg/kg/min intravenous Rate/Dose Change LIANE Zeng     05/14/2024 2220 EDT pantoprazole (ProtoNix) injection 40 mg 40 mg intravenous Given LIANE Zeng     05/14/2024 2228 EDT norepinephrine (Levophed) 8 mg in dextrose 5% 250 mL (0.032 mg/mL) infusion (premix) 0.04 mcg/kg/min intravenous Rate/Dose Change LIANE Zeng     05/14/2024 2246 EDT norepinephrine (Levophed) 8 mg in dextrose 5% 250 mL (0.032 mg/mL) infusion (premix) 0.02 mcg/kg/min intravenous Rate/Dose Change LINAE Zeng     05/14/2024 2312 EDT norepinephrine (Levophed) 8 mg in dextrose 5% 250 mL (0.032 mg/mL) infusion (premix) 0.01 mcg/kg/min intravenous Rate/Dose Change LIANE Zeng     05/14/2024 2328 EDT norepinephrine (Levophed) 8 mg in dextrose 5% 250 mL (0.032 mg/mL) infusion (premix) 0 mcg/kg/min intravenous Stopped LIANE Zeng     05/15/2024 0151 EDT morphine injection 2 mg 2 mg intravenous Given LIANE Zeng                   Assessment/Plan   Active Problems:  There are no active Hospital Problems.    Patient had presented to the emergency department with fever, hypotension, altered mental status and was diagnosed with sepsis and UTI.  She had initially had to be  placed on pressors and was at scheduled to be admitted here to ICU, but was signed out to me as her clinical status was improving it was hoped that she could be weaned off the pressors and therefore go to a non-ICU setting.    Patient was successfully able to be weaned off of the pressors at which point plan was for telemetry admission.  On review of the studies that had been ordered prior to my arrival, patient's troponin is noted to be elevated 41 initially and then 96.  I did reorder a third troponin which is now down to 80.  EKG was obtained at that time as well that shows no ischemic changes.    As patient had complained of abdominal pain a CT of abdomen had been ordered.  I was able to review those results and note that they do demonstrate evidence of acute cystitis; however, radiology feels there is a new fluid collection at the site of the previous laminectomy which is concerning for possible abscess.  I did discuss this with the patient, and she states that after her initial back surgery a year ago she did develop an abscess in this area and had to go back for more surgery.  She says all those procedures were done at [place].    I explained to patient that it seems there may be another fluid collection which may be contributing to the patient's illness today and that she would need to be transferred to a facility that has spine surgery capability.  She states that if at all possible she would prefer to go back to [place].    0225 --Case was discussed by phone with Dr. Louis Bee for internal medicine at [facility] including patient's past medical history, presenting signs and symptoms, current clinical condition and test results.  He has graciously accepted the patient for transfer.         I spent 28 minutes in the critical care of this patient for diagnosis of sepsis including frequent bedside exceptions, consultation with other physicians, test interpretation and excluding any and all  separately billable procedures.      Audrey Butler MD

## 2024-05-15 NOTE — ED PROVIDER NOTES
Chief Complaint: Altered mental status  HPI: This is a 64-year-old female, brought to the emergency department by EMS from home for evaluation of altered mental status which started today.  Per EMS, the patient's family called EMS for her altered mentation because she had fallen 3 days ago without any injury.  Per EMS, the patient is febrile, and is not answering questions though is awake and opening eyes and moving all extremities spontaneously.  Further history is limited secondary to altered mental status.    Past Medical History:   Diagnosis Date    Anemia     Anxiety     Atrial fibrillation (Multi)     Coronary artery disease     Depression     Fibromyalgia     Gastroparesis     Gastroparesis    Gout     Hypertension     Hypothyroidism     Thrombocythemia       Past Surgical History:   Procedure Laterality Date    CT GUIDED PERCUTANEOUS BIOPSY BONE DEEP  3/9/2022    CT GUIDED PERCUTANEOUS BIOPSY BONE DEEP 3/9/2022 [facility]    OTHER SURGICAL HISTORY  08/16/2019    Tonsillectomy with adenoidectomy    OTHER SURGICAL HISTORY  08/16/2019    Bunionectomy    OTHER SURGICAL HISTORY  08/16/2019    Hernia repair    OTHER SURGICAL HISTORY  08/16/2019    Cholecystectomy    OTHER SURGICAL HISTORY  08/16/2019    Uterine nerve ablation    OTHER SURGICAL HISTORY  08/16/2019    Ovarian torsion repair    OTHER SURGICAL HISTORY  08/16/2019    Tubal ligation       Physical Exam  Constitutional:       General: She is in acute distress.      Appearance: Normal appearance. She is ill-appearing.      Comments: Eyes open spontaneously, moving all extremities, however is not answering any questions just moaning   HENT:      Head: Normocephalic and atraumatic.      Mouth/Throat:      Mouth: Mucous membranes are moist.   Eyes:      Conjunctiva/sclera: Conjunctivae normal.   Cardiovascular:      Rate and Rhythm: Normal rate.   Pulmonary:      Effort: Pulmonary effort is normal.   Abdominal:      General: Abdomen is flat. There is no  distension.      Palpations: Abdomen is soft.      Tenderness: There is abdominal tenderness (Diffuse). There is no guarding or rebound.      Hernia: No hernia is present.   Musculoskeletal:         General: Normal range of motion.      Cervical back: Normal range of motion.   Skin:     General: Skin is warm and dry.   Neurological:      General: No focal deficit present.   Psychiatric:         Mood and Affect: Mood normal.         ED Course/[facility]  ED Course as of 05/15/24 1836   Tue May 14, 2024   2116 Evaluated patient [MN]   2240 Updated Dr Edge [MN]   2353 Off pressors [MN]   Wed May 15, 2024   0024 EKG obtained, patient reassessed [MN]   0125 CT report reviewed with patient [MN]   0140 [facility] transfer center notified of need for transfer [MN]   0225 Discussed with Dr Jarek Proctor [MN]      ED Course User Index  [MN] Audrey Butler MD         Diagnoses as of 05/15/24 1836   Sepsis with acute organ dysfunction and septic shock, due to unspecified organism, unspecified organ dysfunction type (Multi)   Pyelonephritis   Spinal abscess (Multi)     EKG interpreted by myself (ED attending physician): Sinus tachycardia, rate of 107, left axis deviation, normal intervals, nonspecific ST segment changes, nonischemic EKG    This is a 64 y.o. female presenting to the ED for evaluation of altered mental status, which started today.  Per EMS, the patient's family called EMS when they found her altered.  They were concerned because she fell 3 days ago, however did not have any traumatic injuries from the fall.  Further history is limited secondary to patient mental status.  On physical exam, the patient does appear to be in acute distressAnd is ill-appearing.  She opens eyes spontaneously and is moving all extremities, however is just moaning, not answering questions.  There is diffuse abdominal tenderness to palpation.  Patient was febrile on arrival.  She was also tachycardic.  Patient's blood pressure was initially  stable, however did begin to decrease.  It was not responsive to the 2 L of IV fluids, as such she was started on Levophed.  Central access was obtained in the right fem, and the patient was then started on vaso as the Levophed dose was increasing, and her blood pressure was not improving.  Lactate was initially elevated, however after IV fluids, it began to decrease.  On repeat evaluation, the patient's mentation has improved, however she is lab work was also concerning for evidence of urinary tract infection as well as a slightly elevated troponin, likely demand ischemia in the setting of poor perfusion given her low blood pressure, which I feel is likely secondary to sepsis.  She was covered broadly with vancomycin and Zosyn.  At time of signout, the patient is pending CT abdomen pelvis, as well as final disposition.  [facility]    Critical care time: 60 min    Final Impression  1.  Sepsis  2.  UTI  3.  Shock  Disposition/Plan: Signout  Condition at disposition: improved.     Lakeisha Silva DO  Emergency Medicine Physician    Patient ID: Daniela Saez is a 64 y.o. female.    Central Line    Performed by: Lakeisha Silva DO  Authorized by: Lakeisha Silva DO    Consent:     Consent obtained:  Written    Consent given by:  Patient    Risks, benefits, and alternatives were discussed: yes      Risks discussed:  Arterial puncture, bleeding and infection    Alternatives discussed:  No treatment  Universal protocol:     Procedure explained and questions answered to patient or proxy's satisfaction: yes      Relevant documents present and verified: yes      Patient identity confirmed:  Verbally with patient and arm band  Pre-procedure details:     Indication(s): central venous access      Hand hygiene: Hand hygiene performed prior to insertion      Sterile barrier technique: All elements of maximal sterile technique followed      Skin preparation:   Chlorhexidine    Skin preparation agent: Skin preparation agent completely dried prior to procedure    Sedation:     Sedation type:  None  Anesthesia:     Anesthesia method:  Local infiltration    Local anesthetic:  Lidocaine 1% w/o epi  Procedure details:     Location:  R femoral    Patient position:  Supine    Procedural supplies:  Triple lumen    Ultrasound guidance: yes      Number of attempts:  2    Successful placement: yes    Post-procedure details:     Post-procedure:  Dressing applied and line sutured    Assessment:  Blood return through all ports    Procedure completion:  Tolerated  Critical Care    Performed by: Lakeisha Silva DO  Authorized by: Chris Mathew DO    Critical care provider statement:     Critical care time (minutes):  60    Critical care time was exclusive of:  Separately billable procedures and treating other patients    Critical care was necessary to treat or prevent imminent or life-threatening deterioration of the following conditions:  Sepsis and shock    Critical care was time spent personally by me on the following activities:  Evaluation of patient's response to treatment, examination of patient, ordering and performing treatments and interventions, ordering and review of laboratory studies, ordering and review of radiographic studies, pulse oximetry, re-evaluation of patient's condition and vascular access procedures       Lakeisha Silva DO  05/15/24 5520

## 2024-05-15 NOTE — ED PROCEDURE NOTE
Procedure  Critical Care    Performed by: Audrey Butler MD  Authorized by: Lakeisha Silva DO    Critical care provider statement:     Critical care time (minutes):  28    Critical care time was exclusive of:  Separately billable procedures and treating other patients    Critical care was necessary to treat or prevent imminent or life-threatening deterioration of the following conditions:  Sepsis    Critical care was time spent personally by me on the following activities:  Development of treatment plan with patient or surrogate, discussions with consultants, evaluation of patient's response to treatment, examination of patient, ordering and review of laboratory studies, ordering and review of radiographic studies, pulse oximetry, re-evaluation of patient's condition and review of old charts    Care discussed with: accepting provider at another facility                 Audrey Butler MD  05/15/24 0217

## 2024-05-17 LAB
ATRIAL RATE: 107 BPM
BACTERIA UR CULT: ABNORMAL
P AXIS: 48 DEGREES
PR INTERVAL: 192 MS
Q ONSET: 213 MS
QRS COUNT: 18 BEATS
QRS DURATION: 82 MS
QT INTERVAL: 374 MS
QTC CALCULATION(BAZETT): 499 MS
QTC FREDERICIA: 453 MS
R AXIS: -56 DEGREES
T AXIS: 73 DEGREES
T OFFSET: 400 MS
VENTRICULAR RATE: 107 BPM

## 2024-05-19 LAB
BACTERIA BLD AEROBE CULT: ABNORMAL
BACTERIA BLD AEROBE CULT: ABNORMAL
BACTERIA BLD CULT: ABNORMAL
BACTERIA BLD CULT: ABNORMAL
GRAM STN SPEC: ABNORMAL
GRAM STN SPEC: ABNORMAL

## 2024-05-28 ENCOUNTER — TELEPHONE (OUTPATIENT)
Dept: PRIMARY CARE | Facility: CLINIC | Age: 64
End: 2024-05-28
Payer: MEDICAID

## 2024-05-30 DIAGNOSIS — E11.69 TYPE 2 DIABETES MELLITUS WITH OTHER SPECIFIED COMPLICATION, WITHOUT LONG-TERM CURRENT USE OF INSULIN (MULTI): ICD-10-CM

## 2024-05-30 RX ORDER — ATORVASTATIN CALCIUM 40 MG/1
40 TABLET, FILM COATED ORAL DAILY
Qty: 90 TABLET | Refills: 1 | Status: SHIPPED | OUTPATIENT
Start: 2024-05-30

## 2024-05-31 ENCOUNTER — APPOINTMENT (OUTPATIENT)
Dept: RHEUMATOLOGY | Facility: CLINIC | Age: 64
End: 2024-05-31
Payer: MEDICAID

## 2024-06-03 ENCOUNTER — APPOINTMENT (OUTPATIENT)
Dept: SURGERY | Facility: CLINIC | Age: 64
End: 2024-06-03
Payer: MEDICAID

## 2024-06-07 ENCOUNTER — OFFICE VISIT (OUTPATIENT)
Dept: PRIMARY CARE | Facility: CLINIC | Age: 64
End: 2024-06-07
Payer: MEDICAID

## 2024-06-07 VITALS
HEART RATE: 82 BPM | BODY MASS INDEX: 27.29 KG/M2 | HEIGHT: 63 IN | WEIGHT: 154 LBS | OXYGEN SATURATION: 92 % | SYSTOLIC BLOOD PRESSURE: 142 MMHG | DIASTOLIC BLOOD PRESSURE: 82 MMHG

## 2024-06-07 DIAGNOSIS — Z09 HOSPITAL DISCHARGE FOLLOW-UP: Primary | ICD-10-CM

## 2024-06-07 PROCEDURE — 99213 OFFICE O/P EST LOW 20 MIN: CPT

## 2024-06-07 PROCEDURE — 3052F HG A1C>EQUAL 8.0%<EQUAL 9.0%: CPT

## 2024-06-07 PROCEDURE — 3079F DIAST BP 80-89 MM HG: CPT

## 2024-06-07 PROCEDURE — 4010F ACE/ARB THERAPY RXD/TAKEN: CPT

## 2024-06-07 PROCEDURE — 3077F SYST BP >= 140 MM HG: CPT

## 2024-06-07 NOTE — PROGRESS NOTES
"Subjective   Patient ID: Daniela Saez is a 64 y.o. female who presents for Hospital Follow-up (HOSPITAL F/U. PT WAS DISCHARGED ON 05/24/24. OFFERED PT SEVERAL APPTS. THIS IS THE SOONEST SHE COULD COME IN. DX WAS ADREANL GLAND INSUFFICIENCY, HORMONAL, AND LOW BP//Was at [facility] at first (1 day stay) was sent to [facility] because they though there was an infection on spine where previous surgery but there wasn't an infection (stayed 12 days)./Dx Aldosternonism ).    Pt EMS to [facility] for change in mental status was found to have UTI/sepsis eventually transferred to [facility] for continual care.     Pt still has follow-up with nephrology.   Encourage to also follow-up with cardiology    Pt unsure of any questions she may have multiple times during visit she blames multi head traumas on her inability to remember things but she is here alone.     States she had made me a list but forgot it at home.               Review of Systems    Objective   /82   Pulse 82   Ht 1.6 m (5' 3\")   Wt 69.9 kg (154 lb)   SpO2 92%   BMI 27.28 kg/m²     Physical Exam  Constitutional:       General: She is not in acute distress.     Appearance: Normal appearance. She is normal weight. She is not ill-appearing.   Cardiovascular:      Heart sounds: Murmur heard.   Pulmonary:      Breath sounds: Normal breath sounds.   Neurological:      Mental Status: She is alert and oriented to person, place, and time. Mental status is at baseline.         Assessment/Plan   Daniela was seen today for hospital follow-up.  Diagnoses and all orders for this visit:  Hospital discharge follow-up           "

## 2024-06-10 ENCOUNTER — TELEPHONE (OUTPATIENT)
Dept: PRIMARY CARE | Facility: CLINIC | Age: 64
End: 2024-06-10
Payer: MEDICAID

## 2024-06-11 ENCOUNTER — TELEPHONE (OUTPATIENT)
Dept: PRIMARY CARE | Facility: CLINIC | Age: 64
End: 2024-06-11
Payer: MEDICAID

## 2024-06-12 ENCOUNTER — TELEMEDICINE (OUTPATIENT)
Dept: PRIMARY CARE | Facility: CLINIC | Age: 64
End: 2024-06-12
Payer: MEDICAID

## 2024-06-12 DIAGNOSIS — B37.2 YEAST DERMATITIS: Primary | ICD-10-CM

## 2024-06-12 DIAGNOSIS — G43.709 CHRONIC MIGRAINE WITHOUT AURA WITHOUT STATUS MIGRAINOSUS, NOT INTRACTABLE: ICD-10-CM

## 2024-06-12 PROCEDURE — 4010F ACE/ARB THERAPY RXD/TAKEN: CPT | Performed by: STUDENT IN AN ORGANIZED HEALTH CARE EDUCATION/TRAINING PROGRAM

## 2024-06-12 PROCEDURE — 3052F HG A1C>EQUAL 8.0%<EQUAL 9.0%: CPT | Performed by: STUDENT IN AN ORGANIZED HEALTH CARE EDUCATION/TRAINING PROGRAM

## 2024-06-12 PROCEDURE — 99214 OFFICE O/P EST MOD 30 MIN: CPT | Performed by: STUDENT IN AN ORGANIZED HEALTH CARE EDUCATION/TRAINING PROGRAM

## 2024-06-12 RX ORDER — FLUCONAZOLE 150 MG/1
150 TABLET ORAL DAILY
Qty: 3 TABLET | Refills: 1 | Status: SHIPPED | OUTPATIENT
Start: 2024-06-12 | End: 2024-06-18

## 2024-06-12 RX ORDER — SUMATRIPTAN SUCCINATE 25 MG/1
25 TABLET ORAL AS NEEDED
Qty: 27 TABLET | Refills: 2 | Status: SHIPPED | OUTPATIENT
Start: 2024-06-12

## 2024-06-12 NOTE — PROGRESS NOTES
Subjective   Patient ID: Daniela Saez is a 64 y.o. female who presents for No chief complaint on file..    HPI  Patient presents to the office virtually for concerns of a yeast candidiasis/dermatitis.  Patient had a recent hospitalization which included several antibiotics.  Since then she has had itching and erythema underneath her bilateral breasts, pannus as well as vaginally.  Patient has been using nystatin cream which has not been very helpful.  Patient is a diabetic and her blood sugars have been well-maintained.  She is down to an A1c of 7.1% which is down from 8.8% 4 months ago..    Patient also has concerns about her current migraine medication.  Patient was previously on Fioricet which helped nicely.  There was concerns due to patient chronic narcotic use along with the Fioricet interacting together.  Patient has found that Imitrex has been working well for her.  She is currently using it a couple times a week.    Since patient's discharge she has been noted to be extremely weak.  She has not been able to have physical therapy since her discharge.  She states that home health has been declining to her previously due to her home location in [facility].    Assessment/Plan   Diagnoses and all orders for this visit:  Yeast dermatitis  -     fluconazole (Diflucan) 150 mg tablet; Take 1 tablet (150 mg) by mouth once daily for 6 doses.  Chronic migraine without aura without status migrainosus, not intractable  -     SUMAtriptan (Imitrex) 25 mg tablet; Take 1 tablet (25 mg) by mouth if needed for migraine. May repeat in 2 hours if needed  Past medical history of hypertension, CAD, A-fib, adrenal insufficiency, diabetes, MGUS, seronegative rheumatoid arthritis, psoriatic arthritis, thrombocytopenia, migraines, chronic sinus infections, ulcerative colitis, anxiety/depression, insomnia     Skin Candidiasis  Nystatin refilled   Diclucan added     HTN  Continue carvedilol 6.25 mg daily  Continue Lasix 60 mg  daily  Continue losartan 50 mg daily  Physical exam was stable. Discussed maintaining diets such as DASH to help maintain normal Blood pressure. Encouraged regular exercise for a total of 120 min weekly. We will fu labs in 1-3 days. For now there will be no change in medical management. All questions and concerns were addressed. Pt will follow up in 4-6 months or sooner if needed.      Osteoporosis  Reviewed different medications including oral and injectable  Patient would like to start alendronate 70 mg weekly     Abdominal pain  Ultrasound of the abdomen ordered  Patient continues to complain of abdominal pain  Concerns for persistent constipation due to patient's chronic opioid use versus acute pathology  Continue MiraLAX for bowel regiment     DM   A1C 8.9%  1 month ago, 6.0% 10/23 previous elevation up to 9.0% in 3/2020  Pt states she would like to start a GLP-1 agonist but can't due to gastroparesis   Reviewed red flag symptoms: including constipation due to her chronic opioid usage, recommend small meals        Adrenal insufficiency  Continue hydrocortisone 10 mg AM and midday, 5mg in the evening  Following with endocrinology      rheumatoid vs  psoriatic arthritis   Following with rheumatology  Continue Plaquenil 300 mg daily  Continue Leflunoide 20mg daily   Pain management: MS Contin 15 mg 3 3 times daily, Morphine 30 mg twice daily     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  Followed by hematology  Currently observation  Concerns for autoimmune versus inflammatory versus infection versus malignancy causes s/p bone marrow biopsy 3/22 no evidence of plasma cell dyscrasia     Thrombocytopenia  Platelets at 74  currently under observation  Possibly secondary to liver disease     Ulcerative Colitis   EGD and colonoscopy completed 10/12/2023 Bleeding arteriovenous malformations (AVMs) of the distal esophagus and duodenal bulb     Compression fracture  CT angio chest 7/23  T11 compression fracture was seen of uncertain  age  Hyperlipidemia  Continue statin therapy     Migraines   Currently on imitrex and fioracet PRN  But having multiple headaches daily due to chronic sinusitis   Currently using amitriptyline   Role of CRP nurtec and catarino will trial nurtec 75 mg Q 48 hours      Levothyroxine 88 mcg 6 days a week with 2 tabs on Sunday   TSH 0.47 6/8/23     Anxiety/depression  Doing better   Mostly gets down due to her health   Will be following with psychiatry at [facility]   Previously tried Seroquel, Cymbalta, Buspar, amitriptyline   hydroxyzine which causes her neck to itch, trazodone in the past with reaction             Marjorie Saha,  06/12/24 1:05 PM

## 2024-06-15 DIAGNOSIS — B37.2 SKIN CANDIDIASIS: ICD-10-CM

## 2024-06-17 ENCOUNTER — TELEPHONE (OUTPATIENT)
Dept: PRIMARY CARE | Facility: CLINIC | Age: 64
End: 2024-06-17
Payer: MEDICAID

## 2024-06-17 RX ORDER — NYSTATIN 100000 U/G
CREAM TOPICAL 2 TIMES DAILY
Qty: 30 G | Refills: 2 | Status: SHIPPED | OUTPATIENT
Start: 2024-06-17

## 2024-06-17 RX ORDER — TRIAMCINOLONE ACETONIDE 1 MG/G
CREAM TOPICAL
Qty: 30 G | Refills: 2 | Status: SHIPPED | OUTPATIENT
Start: 2024-06-17

## 2024-06-19 ENCOUNTER — APPOINTMENT (OUTPATIENT)
Dept: PRIMARY CARE | Facility: CLINIC | Age: 64
End: 2024-06-19
Payer: MEDICAID

## 2024-06-19 ENCOUNTER — TELEPHONE (OUTPATIENT)
Dept: CARDIOLOGY | Facility: CLINIC | Age: 64
End: 2024-06-19

## 2024-06-20 ENCOUNTER — TELEMEDICINE (OUTPATIENT)
Dept: PRIMARY CARE | Facility: CLINIC | Age: 64
End: 2024-06-20
Payer: MEDICAID

## 2024-06-20 DIAGNOSIS — I50.32 CHRONIC DIASTOLIC HEART FAILURE (MULTI): ICD-10-CM

## 2024-06-20 DIAGNOSIS — R53.1 WEAKNESS: ICD-10-CM

## 2024-06-20 DIAGNOSIS — M79.89 LEG SWELLING: ICD-10-CM

## 2024-06-20 DIAGNOSIS — N63.10 MASS OF RIGHT BREAST, UNSPECIFIED QUADRANT: ICD-10-CM

## 2024-06-20 DIAGNOSIS — K21.9 GASTROESOPHAGEAL REFLUX DISEASE WITHOUT ESOPHAGITIS: ICD-10-CM

## 2024-06-20 DIAGNOSIS — D64.9 ANEMIA, UNSPECIFIED TYPE: Primary | ICD-10-CM

## 2024-06-20 DIAGNOSIS — I10 UNCONTROLLED HYPERTENSION: ICD-10-CM

## 2024-06-20 PROCEDURE — 4010F ACE/ARB THERAPY RXD/TAKEN: CPT | Performed by: STUDENT IN AN ORGANIZED HEALTH CARE EDUCATION/TRAINING PROGRAM

## 2024-06-20 PROCEDURE — 99214 OFFICE O/P EST MOD 30 MIN: CPT | Performed by: STUDENT IN AN ORGANIZED HEALTH CARE EDUCATION/TRAINING PROGRAM

## 2024-06-20 PROCEDURE — 3052F HG A1C>EQUAL 8.0%<EQUAL 9.0%: CPT | Performed by: STUDENT IN AN ORGANIZED HEALTH CARE EDUCATION/TRAINING PROGRAM

## 2024-06-20 RX ORDER — SUCRALFATE 1 G/1
1 TABLET ORAL
Qty: 360 TABLET | Refills: 1 | Status: SHIPPED | OUTPATIENT
Start: 2024-06-20 | End: 2024-12-17

## 2024-06-20 RX ORDER — EPLERENONE 50 MG/1
50 TABLET, FILM COATED ORAL 2 TIMES DAILY
Qty: 180 TABLET | Refills: 1 | Status: SHIPPED | OUTPATIENT
Start: 2024-06-20 | End: 2024-06-27 | Stop reason: SDUPTHER

## 2024-06-20 NOTE — PROGRESS NOTES
Subjective   Patient ID: Daniela Saez is a 64 y.o. female who presents for No chief complaint on file..    HPI   Patient presents to the office virtually today for a follow-up.  Patient admits to having increased somnolence which is better today but still has severe leg weakness and swelling.    Yeast infection  Skin candidiasis which was treated with Diflucan  It has improved along the pannus and underneath the breast  Will add in nystatin cream which was needed due to her antibiotic use    Migraines  Imitrex has been working well or better  Triggered by tension with her neck pain which is gone down frequency to twice a month    Diabetes blood sugars she has been having trouble with lows  Her A1c is down to 7.1% which was 8.8% 4 months ago  She has been holding her mealtime insulins    Hyperaldosteronism we are pending an MRI    Assessment/Plan   Diagnoses and all orders for this visit:  Anemia, unspecified type  -     Comprehensive metabolic panel; Future  -     CBC and Auto Differential; Future  -     Iron and TIBC; Future  -     Ferritin; Future  Leg swelling  -     Vascular US lower extremity venous duplex right; Future  Mass of right breast, unspecified quadrant  -     BI mammo bilateral diagnostic tomosynthesis; Future  Gastroesophageal reflux disease without esophagitis  -     sucralfate (Carafate) 1 gram tablet; Take 1 tablet (1 g) by mouth 4 times a day before meals.  Chronic diastolic heart failure (Multi)  -     eplerenone (Inspra) 50 mg tablet; Take 1 tablet (50 mg) by mouth 2 times a day.  Uncontrolled hypertension  -     eplerenone (Inspra) 50 mg tablet; Take 1 tablet (50 mg) by mouth 2 times a day.  Past medical history of hypertension, CAD, A-fib, adrenal insufficiency, diabetes, MGUS, seronegative rheumatoid arthritis, psoriatic arthritis, thrombocytopenia, migraines, chronic sinus infections, ulcerative colitis, anxiety/depression, insomnia     Skin Candidiasis  Nystatin refilled   Diclucan  added     HTN  Uncontrolled   Refilled Inspra 50mg BID   Continue carvedilol 6.25 mg daily  Continue Lasix 60 mg daily  Continue losartan 50 mg daily  Physical exam was stable. Discussed maintaining diets such as DASH to help maintain normal Blood pressure. Encouraged regular exercise for a total of 120 min weekly. We will fu labs in 1-3 days. For now there will be no change in medical management. All questions and concerns were addressed. Pt will follow up in 4-6 months or sooner if needed.     Leg Swelling   Encouraged compression socks   LE ultrasound ordered      Osteoporosis  Reviewed different medications including oral and injectable  Patient would like to start alendronate 70 mg weekly     Abdominal pain  Refilled Carafate for intermittent pain     Breast mass on the right   Mammogram ordered diag     DM   A1C 8.9%  1 month ago, 6.0% 10/23 previous elevation up to 9.0% in 3/2020  Pt states she would like to start a GLP-1 agonist but can't due to gastroparesis   Reviewed red flag symptoms: including constipation due to her chronic opioid usage, recommend small meals        Adrenal insufficiency  Continue hydrocortisone 10 mg AM and midday, 5mg in the evening  Following with endocrinology      rheumatoid vs  psoriatic arthritis   Following with rheumatology  Continue Plaquenil 300 mg daily  Continue Leflunoide 20mg daily   Pain management: MS Contin 15 mg 3 3 times daily, Morphine 30 mg twice daily     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  Followed by hematology  Currently observation  Concerns for autoimmune versus inflammatory versus infection versus malignancy causes s/p bone marrow biopsy 3/22 no evidence of plasma cell dyscrasia     Thrombocytopenia  Platelets at 74  currently under observation  Possibly secondary to liver disease     Ulcerative Colitis   EGD and colonoscopy completed 10/12/2023 Bleeding arteriovenous malformations (AVMs) of the distal esophagus and duodenal bulb     Compression fracture  CT  angio chest 7/23  T11 compression fracture was seen of uncertain age  Hyperlipidemia  Continue statin therapy     Migraines   Currently on imitrex and fioracet PRN  But having multiple headaches daily due to chronic sinusitis   Currently using amitriptyline   Role of CRP nurtec and ubrevely will trial nurtec 75 mg Q 48 hours      Levothyroxine 88 mcg 6 days a week with 2 tabs on Sunday   TSH 0.47 6/8/23     Anxiety/depression  Doing better   Mostly gets down due to her health   Will be following with psychiatry at [facility]   Previously tried Seroquel, Cymbalta, Buspar, amitriptyline   hydroxyzine which causes her neck to itch, trazodone in the past with reaction                   Marjorie Saha,  06/20/24 3:11 PM

## 2024-06-21 ENCOUNTER — OFFICE VISIT (OUTPATIENT)
Dept: HEMATOLOGY/ONCOLOGY | Facility: HOSPITAL | Age: 64
End: 2024-06-21
Payer: MEDICAID

## 2024-06-21 ENCOUNTER — LAB (OUTPATIENT)
Dept: LAB | Facility: LAB | Age: 64
End: 2024-06-21
Payer: MEDICAID

## 2024-06-21 VITALS
TEMPERATURE: 98.9 F | RESPIRATION RATE: 18 BRPM | SYSTOLIC BLOOD PRESSURE: 149 MMHG | HEART RATE: 82 BPM | OXYGEN SATURATION: 95 % | DIASTOLIC BLOOD PRESSURE: 83 MMHG | WEIGHT: 152 LBS | BODY MASS INDEX: 26.93 KG/M2

## 2024-06-21 DIAGNOSIS — D50.9 IRON DEFICIENCY ANEMIA, UNSPECIFIED IRON DEFICIENCY ANEMIA TYPE: ICD-10-CM

## 2024-06-21 DIAGNOSIS — K90.89 OTHER SPECIFIED INTESTINAL MALABSORPTION (HHS-HCC): ICD-10-CM

## 2024-06-21 DIAGNOSIS — R21 RASH AND NONSPECIFIC SKIN ERUPTION: ICD-10-CM

## 2024-06-21 DIAGNOSIS — D50.8 OTHER IRON DEFICIENCY ANEMIA: ICD-10-CM

## 2024-06-21 DIAGNOSIS — J32.0 MAXILLARY SINUSITIS, UNSPECIFIED CHRONICITY: ICD-10-CM

## 2024-06-21 DIAGNOSIS — D64.9 ANEMIA, UNSPECIFIED TYPE: ICD-10-CM

## 2024-06-21 DIAGNOSIS — D50.9 IRON DEFICIENCY ANEMIA, UNSPECIFIED IRON DEFICIENCY ANEMIA TYPE: Primary | ICD-10-CM

## 2024-06-21 DIAGNOSIS — D47.2 MGUS (MONOCLONAL GAMMOPATHY OF UNKNOWN SIGNIFICANCE): ICD-10-CM

## 2024-06-21 DIAGNOSIS — D69.6 THROMBOCYTOPENIA (CMS-HCC): ICD-10-CM

## 2024-06-21 LAB
ALBUMIN SERPL BCP-MCNC: 3.8 G/DL (ref 3.4–5)
ALP SERPL-CCNC: 124 U/L (ref 33–136)
ALT SERPL W P-5'-P-CCNC: 18 U/L (ref 7–45)
ANION GAP SERPL CALC-SCNC: 9 MMOL/L (ref 10–20)
AST SERPL W P-5'-P-CCNC: 25 U/L (ref 9–39)
BASOPHILS # BLD AUTO: 0.04 X10*3/UL (ref 0–0.1)
BASOPHILS NFR BLD AUTO: 0.7 %
BILIRUB SERPL-MCNC: 0.8 MG/DL (ref 0–1.2)
BUN SERPL-MCNC: 8 MG/DL (ref 6–23)
CALCIUM SERPL-MCNC: 8.2 MG/DL (ref 8.6–10.3)
CHLORIDE SERPL-SCNC: 99 MMOL/L (ref 98–107)
CO2 SERPL-SCNC: 29 MMOL/L (ref 21–32)
CREAT SERPL-MCNC: 0.63 MG/DL (ref 0.5–1.05)
EGFRCR SERPLBLD CKD-EPI 2021: >90 ML/MIN/1.73M*2
EOSINOPHIL # BLD AUTO: 0.12 X10*3/UL (ref 0–0.7)
EOSINOPHIL NFR BLD AUTO: 2.1 %
ERYTHROCYTE [DISTWIDTH] IN BLOOD BY AUTOMATED COUNT: 17.5 % (ref 11.5–14.5)
FERRITIN SERPL-MCNC: 46 NG/ML (ref 8–150)
GLUCOSE SERPL-MCNC: 277 MG/DL (ref 74–99)
HCT VFR BLD AUTO: 29 % (ref 36–46)
HGB BLD-MCNC: 9 G/DL (ref 12–16)
IMM GRANULOCYTES # BLD AUTO: 0.01 X10*3/UL (ref 0–0.7)
IMM GRANULOCYTES NFR BLD AUTO: 0.2 % (ref 0–0.9)
IRON SATN MFR SERPL: 11 % (ref 25–45)
IRON SERPL-MCNC: 45 UG/DL (ref 35–150)
LYMPHOCYTES # BLD AUTO: 0.68 X10*3/UL (ref 1.2–4.8)
LYMPHOCYTES NFR BLD AUTO: 11.6 %
MCH RBC QN AUTO: 28.6 PG (ref 26–34)
MCHC RBC AUTO-ENTMCNC: 31 G/DL (ref 32–36)
MCV RBC AUTO: 92 FL (ref 80–100)
MONOCYTES # BLD AUTO: 0.38 X10*3/UL (ref 0.1–1)
MONOCYTES NFR BLD AUTO: 6.5 %
NEUTROPHILS # BLD AUTO: 4.62 X10*3/UL (ref 1.2–7.7)
NEUTROPHILS NFR BLD AUTO: 78.9 %
NRBC BLD-RTO: 0 /100 WBCS (ref 0–0)
PLATELET # BLD AUTO: 83 X10*3/UL (ref 150–450)
POTASSIUM SERPL-SCNC: 3.9 MMOL/L (ref 3.5–5.3)
PROT SERPL-MCNC: 6.3 G/DL (ref 6.4–8.2)
RBC # BLD AUTO: 3.15 X10*6/UL (ref 4–5.2)
SODIUM SERPL-SCNC: 133 MMOL/L (ref 136–145)
TIBC SERPL-MCNC: 392 UG/DL (ref 240–445)
UIBC SERPL-MCNC: 347 UG/DL (ref 110–370)
WBC # BLD AUTO: 5.9 X10*3/UL (ref 4.4–11.3)

## 2024-06-21 PROCEDURE — 4010F ACE/ARB THERAPY RXD/TAKEN: CPT

## 2024-06-21 PROCEDURE — 99214 OFFICE O/P EST MOD 30 MIN: CPT

## 2024-06-21 PROCEDURE — 3077F SYST BP >= 140 MM HG: CPT

## 2024-06-21 PROCEDURE — 85060 BLOOD SMEAR INTERPRETATION: CPT | Performed by: STUDENT IN AN ORGANIZED HEALTH CARE EDUCATION/TRAINING PROGRAM

## 2024-06-21 PROCEDURE — 84155 ASSAY OF PROTEIN SERUM: CPT

## 2024-06-21 PROCEDURE — 3079F DIAST BP 80-89 MM HG: CPT

## 2024-06-21 PROCEDURE — 83540 ASSAY OF IRON: CPT

## 2024-06-21 PROCEDURE — 86334 IMMUNOFIX E-PHORESIS SERUM: CPT

## 2024-06-21 PROCEDURE — 83521 IG LIGHT CHAINS FREE EACH: CPT

## 2024-06-21 PROCEDURE — 82784 ASSAY IGA/IGD/IGG/IGM EACH: CPT

## 2024-06-21 PROCEDURE — 82746 ASSAY OF FOLIC ACID SERUM: CPT

## 2024-06-21 PROCEDURE — 84165 PROTEIN E-PHORESIS SERUM: CPT

## 2024-06-21 PROCEDURE — 80053 COMPREHEN METABOLIC PANEL: CPT

## 2024-06-21 PROCEDURE — 85025 COMPLETE CBC W/AUTO DIFF WBC: CPT

## 2024-06-21 PROCEDURE — 3052F HG A1C>EQUAL 8.0%<EQUAL 9.0%: CPT

## 2024-06-21 PROCEDURE — 82728 ASSAY OF FERRITIN: CPT

## 2024-06-21 PROCEDURE — 83550 IRON BINDING TEST: CPT

## 2024-06-21 PROCEDURE — 82607 VITAMIN B-12: CPT

## 2024-06-21 RX ORDER — ALBUTEROL SULFATE 0.83 MG/ML
3 SOLUTION RESPIRATORY (INHALATION) AS NEEDED
OUTPATIENT
Start: 2024-06-28

## 2024-06-21 RX ORDER — EPINEPHRINE 0.3 MG/.3ML
0.3 INJECTION SUBCUTANEOUS EVERY 5 MIN PRN
OUTPATIENT
Start: 2024-06-28

## 2024-06-21 RX ORDER — FAMOTIDINE 10 MG/ML
20 INJECTION INTRAVENOUS ONCE AS NEEDED
OUTPATIENT
Start: 2024-06-28

## 2024-06-21 RX ORDER — DIPHENHYDRAMINE HYDROCHLORIDE 50 MG/ML
50 INJECTION INTRAMUSCULAR; INTRAVENOUS AS NEEDED
OUTPATIENT
Start: 2024-06-28

## 2024-06-21 RX ORDER — HEPARIN 100 UNIT/ML
500 SYRINGE INTRAVENOUS AS NEEDED
OUTPATIENT
Start: 2024-06-28

## 2024-06-21 RX ORDER — HEPARIN SODIUM,PORCINE/PF 10 UNIT/ML
50 SYRINGE (ML) INTRAVENOUS AS NEEDED
OUTPATIENT
Start: 2024-06-28

## 2024-06-21 ASSESSMENT — PATIENT HEALTH QUESTIONNAIRE - PHQ9
2. FEELING DOWN, DEPRESSED OR HOPELESS: NOT AT ALL
SUM OF ALL RESPONSES TO PHQ9 QUESTIONS 1 AND 2: 0
1. LITTLE INTEREST OR PLEASURE IN DOING THINGS: NOT AT ALL

## 2024-06-21 ASSESSMENT — ENCOUNTER SYMPTOMS
LOSS OF SENSATION IN FEET: 1
OCCASIONAL FEELINGS OF UNSTEADINESS: 1
DEPRESSION: 0

## 2024-06-21 ASSESSMENT — PAIN SCALES - GENERAL: PAINLEVEL: 9

## 2024-06-21 NOTE — PROGRESS NOTES
Patient Visit Information:     Visit Type: Follow up visit   History of Present Illness:     Hematology History:   Patient is a 64-year-old white female referred for monoclonal gammopathy which so far appears to be MGUS (Dr. Costello agreed diagnosis of MGUS likely autoimmune disease  related), bone marrow biopsy with no evidence of myeloma 3/9/2022, found during neurosurgery evaluation for neuropathy.  She also has anemia with a history of iron infusions due to inadequate response on oral iron with iron deficiency unclear etiology,  pancytopenia at least in part due to her liver issues. Previously followed by Dr. OYLA Uriarte and Dr. Mei.     She has numerous medical issues including migraines, anxiety/depression/insomnia, chronic pain with RSD seen by pain management, type 2 diabetes although later off diabetic medications, adrenal insufficiency treated with hydrocortisone, rheumatoid vs  psoriatic arthritis, hypothyroidism, hyperlipidemia, hypertension, atrial fibrillation, gastroparesis/colitis/gastritis, neuropathy.   9/2022 she had a moderate-high-grade T11 burst compression fracture, surgery delayed due to thrombocytopenia, was seen by hematology-oncology at [facility] where she was going to have her  spine surgery done with thrombocytopenia due to cirrhosis recommending platelet transfusions preoperatively if needed, 3/14/2023 had spine surgery according to notes lumbar laminectomy complicated by infection with mental status changes transferred to  [place] with revision 4/2023.     EGD and colonoscopy completed 10/12/2023 Bleeding arteriovenous malformations (AVMs) of the distal esophagus and duodenal bulb. Small AVMs of the rectosigmoid colon, internal hemorrhoids. EGD with biopsy and hemostasis of bleeding AVMs of the esophagus and duodenum with argon plasma coagulation (APC). Colonoscopy with treatment of AVMs in the rectosigmoid colon with APC.    Hospitalization 5/15/2024 for two weeks diagnosed primary  hyperaldosteronism. Presented to [facility] and back pain. Transported to [place] for spinal abscess.     Needs to follow Nephrology.  home care getting set up.      Follows Dr. Phillips, Endocrinologist   Follows DR. Abhinav Willis, Rheumatologist  Follows Dr. Gomez, Gastroenterologist    Follows Pain Management in [place]   Recommend she follow up with hepatology for fatty liver/cirrhosis and enlarged spleen  Follows Dermatology     ID Statement:      Daniela Saez is a 64 year old female       Chief Complaint: Follow up MGUS     Interval History:    Daniela Saez presents today for follow up of MGUS and thrombocytopenia. Patient has a significant history as stated above with multiple comorbidities.  Bone marrow biopsy completed March 2022 with no findings of dysplasia.  Normal bone marrow and plasma cell findings.  She endorses fatigue intermittently.  She endorses maxillary and frontal sinus pressure chronic, ear pain recently on antibiotics for recurrent sinusitis, following ENT frequently.  She plans to have maxillary sinus surgery and continues to report sinus pressure. Endorses joint and back pain.  No fevers, sore throat unless she has sinus issues, no shortness of breath, chest pain, abdominal pain, nausea, vomiting, constipation, she has IBS-d, no blood in stool, melena, dysuria or hematuria.  She does note occasional frequency and inability to urinate at times.  Stable appetite- stable weight.  She does note bone pain, arthralgia.     Psoriatic arthritis flare. On special cream. Bleeds easy when injury. Bruises easy. Sore under belly fold. Using mupocerine ointment and nystatin cream alternating.       Medical History:  -Autoimmune   -ACI 2016  -Acute Gout   -Acute blood loss  -asthma w/out complications with seasonal changes   -RA  -RSD  -SNHL  -Syncope and collapse   -Vitamin D def  -Psoriatic arthrosis  -MTHR: methylene   -thrombocytopenia  -recurrent maxillary sinusitis    -IBSD  -Fibromyalgia   -neuropathic pain  -lymphocytic colitis   -lactose intolerance   -insomnia   -hypothyroidism  -A-fib  -cervical spondylosis  -KL MGUS  -Gastritis  -gastroparesis  -HCC hepatic cirrhosis       -essential HTN  -JAMES  -CRPS   -Mixed hyperlipidemia   -Migraines  -Depression     Social History:  -Live with family in [place]   -Stay at home Mom, 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, 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   -Divorces, 5 adult children   -Former smoker quit  (1-1.5 pack/day 19 years)  -No alcohol   -No marijuana or drug use     Family History:  -Mom 88 years old living -cardiovascular, macular degenerative   -Dad 80 years old  Glioblastoma (15 months)   -Genetic cardiac issues Lost 3 year old great-nephew   -Lost nephew and niece to MCV   -Brother- colectomy Gi bleeding   -No other genetic, hematologic, autoimmune, or cancer than stated     Review of Systems:   A review of systems has been completed and are negative for complaints except what is stated in the assessment, HPI, IH, and/or past medical history.      Allergies and Intolerances:  Allergies   Allergen Reactions    Metoclopramide Anaphylaxis and Other     Lockjaw    Cefuroxime Axetil Diarrhea, GI Upset, Headache, Nausea/vomiting and Tinnitus    Fentanyl Unknown    Gabapentin Other and Unknown     SUICIDAL THOUGHTS    Levetiracetam Unknown    Metformin Unknown    Torsemide Unknown         Outpatient Medication Profile:     Current Outpatient Medications   Medication Instructions    acetaminophen (TYLENOL) 975 mg, oral, Every 4 hours PRN    albuterol 90 mcg/actuation inhaler Inhale 2 puffs if needed for shortness of breath.    alendronate (FOSAMAX) 70 mg, oral, Every 7 days, Take in the morning with a full glass of water, on an empty stomach, and do not take anything else by mouth or lie down for the next 30 min.    allopurinol (ZYLOPRIM) 100 mg, oral, Daily    amitriptyline (ELAVIL) 50 mg, oral, Daily    atorvastatin (LIPITOR) 40 mg, oral,  "Daily    baclofen (Lioresal) 10 mg tablet Take 2 tablets (20 mg) by mouth 2 times a day.    BD Saray 2nd Gen Pen Needle 32 gauge x 5/32\" needle Four times daily    blood sugar diagnostic (OneTouch Verio test strips) strip TEST 4 TIMES A DAY. MAY SUBSTITUTE WITH INSURANCE COVERED STRIPS.    bumetanide (BUMEX) 1 mg, oral, Daily    butalbital-aspirin-caffeine (Fiorinal) -40 mg capsule 1 capsule, oral, Every 6 hours PRN    carboxymethylcellulose (Refresh Tears) 0.5 % ophthalmic solution 1-2 drops each eye 3 times a day for 30 days    carvedilol (Coreg) 6.25 mg tablet TAKE ONE TABLET BY MOUTH TWICE DAILY    clopidogrel (Plavix) 75 mg tablet TAKE ONE TABLET BY MOUTH EVERY DAY    Dexcom G7  misc Use as instructed    Dexcom G7 Sensor device For continuous glucose monitoring.  Change every 10 days.    diphenoxylate-atropine (LomotiL) 2.5-0.025 mg tablet 1 tablet, oral, 4 times daily    eplerenone (INSPRA) 50 mg, oral, 2 times daily    famotidine (PEPCID) 20 mg, oral, 2 times daily    fluocinonide (Lidex) 0.05 % cream Topical, 2 times daily    fluticasone (Flonase Sensimist) 27.5 mcg/actuation nasal spray 2 sprays, Each Nostril, Nightly    hydrocortisone (CORTEF) 5-10 mg, oral, 2 times daily    hydroxychloroquine (Plaquenil) 200 mg tablet take ONE & ONE-HALF tablet BY MOUTH EVERY DAY    ketotifen (Zaditor) 0.025 % (0.035 %) ophthalmic solution 1 drop, Both Eyes, 2 times daily    Lantus Solostar U-100 Insulin 12 Units, subcutaneous, Nightly, Take as directed per insulin instructions.    leflunomide (ARAVA) 20 mg, oral, Daily    levocetirizine (Xyzal) 5 mg tablet 1 tablet in the evening Orally Once a day for 30 day(s)    levothyroxine (Synthroid, Levoxyl) 88 mcg tablet 1 tablet daily 6 days per week, 2 tablets one day per week    lidocaine (Lidoderm) 5 % patch apply up to 3 patches to skin once a day, remove after 12 hours for 30 days    losartan (COZAAR) 25 mg, oral, Daily    miconazole (Micotin) 2 % powder      " morphine (MSIR) 30 mg, oral, 2 times daily    morphine CR (MS Contin) 15 mg 12 hr tablet 1 tablet, oral, 2 times daily    MORPHINE SULFATE ER PO 30 mg, oral, 2 times daily    mupirocin (Bactroban) 2 % ointment Topical, 3 times daily RT    NovoLOG Flexpen U-100 Insulin 2-6 Units, subcutaneous, 3 times daily before meals    nystatin (Mycostatin) cream Topical, 2 times daily    ondansetron (ZOFRAN) 4 mg, oral, Every 8 hours PRN    pancrelipase, Lip-Prot-Amyl, (Creon) 36,000-114,000- 180,000 unit capsule,delayed release(DR/EC) capsule 1 capsule, oral, 2 times daily    pantoprazole (PROTONIX) 40 mg, oral, Daily RT    polyethylene glycol (Miralax) 17 gram/dose powder      sucralfate (CARAFATE) 1 g, oral, 4 times daily before meals and nightly    SUMAtriptan (IMITREX) 25 mg, oral, As needed, May repeat in 2 hours if needed    tiotropium-olodateroL (Stiolto Respimat) 2.5-2.5 mcg/actuation mist inhaler 2 puffs Inhalation Once a day for 30 days    triamcinolone (Kenalog) 0.1 % cream APPLY ONE APPLICATION EXTERNALLY TWICE DAILY AS NEEDED      Past Medical History:   Diagnosis Date    Anemia     Anxiety     Atrial fibrillation (Multi)     Coronary artery disease     Depression     Fibromyalgia     Gastroparesis     Gastroparesis    Gout     Hypertension     Hypothyroidism     Thrombocythemia       Past Surgical History:   Procedure Laterality Date    CT GUIDED PERCUTANEOUS BIOPSY BONE DEEP  3/9/2022    CT GUIDED PERCUTANEOUS BIOPSY BONE DEEP 3/9/2022 [facility]    OTHER SURGICAL HISTORY  08/16/2019    Tonsillectomy with adenoidectomy    OTHER SURGICAL HISTORY  08/16/2019    Bunionectomy    OTHER SURGICAL HISTORY  08/16/2019    Hernia repair    OTHER SURGICAL HISTORY  08/16/2019    Cholecystectomy    OTHER SURGICAL HISTORY  08/16/2019    Uterine nerve ablation    OTHER SURGICAL HISTORY  08/16/2019    Ovarian torsion repair    OTHER SURGICAL HISTORY  08/16/2019    Tubal ligation         Performance:   ECOG Performance Status:  "1   0 Fully active, able to carry on all pre-disease performance without restriction  1 Restricted in physically strenuous activity but ambulatory and able to carry out work of a light or sedentary nature, e.g., light house work, office work  2 Ambulatory and capable of all selfcare but unable to carry out any work activities; up and about more than 50% of waking hours  3 Capable of only limited selfcare; confined to bed or chair more than 50% of waking hours  4 Completely disabled; cannot carry on any selfcare; totally confined to bed or chair  5 Dead     Vitals and Measurements:       5/15/2024     1:00 PM 5/15/2024     1:06 PM 5/15/2024     1:15 PM 5/15/2024     1:30 PM 5/15/2024     1:45 PM 5/15/2024     2:00 PM 6/7/2024    12:19 PM   Vitals   Systolic  172    169 142   Diastolic  88    89 82   Heart Rate 109 109 108 108 108 109 82   Resp 25 27 20 21 30 25    Height (in)       1.6 m (5' 3\")   Weight (lb)       154   BMI       27.28 kg/m2   BSA (m2)       1.76 m2   Visit Report       Report      Physical Exam:      Constitutional: Patient is awake/alert/oriented  x3, No distress, Nourished, Hydrated, Alert and Cooperative   Eyes: PERRL, EOMI, clear sclera   ENMT: Mucous membranes moist, no oral lesions or sores.     Head/Neck: Neck supple, no apparent injury, thyroid  without mass or tenderness, No JVD, trachea midline, no bruits   Respiratory/Thorax: Patent airways, CTAB, chest symmetry, normal inspiratory and expiratory effort    Cardiovascular: Regular, rate and rhythm, Positive for murmur, no carotid bruit   Gastrointestinal: Non-distended, soft, non-tender, no masses or organomegaly appreciated    Genitourinary: deferred   Musculoskeletal: no pain on palpation of spine, normal strength for baseline    Extremities: Pulses palpable, warm, dry. Atraumatic in appearance without tenderness or deformity. Extremities are without swelling or erythema. Full range of motion is noted to all joints. No sores or lesions. " "  Neurological: Alert and oriented x3, intact senses,  motor, response and reflexes, normal strength, cranial nerves normal   Breast: deferred   Lymphatic: No significant lymphadenopathy   Psychological: Appropriate mood and behavior   Skin: Dry, tiny red lesions across arms and legs, consistent with psoriatic arthritis flare pt reports      Lab Results:  Labs:  Lab Results   Component Value Date    WBC 6.9 05/14/2024    NEUTROABS 6.40 05/14/2024    IGABSOL 0.03 05/14/2024    LYMPHSABS 0.34 (L) 05/14/2024    MONOSABS 0.03 (L) 05/14/2024    EOSABS 0.05 05/14/2024    BASOSABS 0.08 05/14/2024    RBC 4.28 05/14/2024    MCV 90 05/14/2024    MCHC 31.3 (L) 05/14/2024    HGB 12.1 05/14/2024    HCT 38.6 05/14/2024    PLT 69 (L) 05/14/2024     Lab Results   Component Value Date    RETICCTPCT 2.5 (H) 02/23/2024      Lab Results   Component Value Date    CREATININE 1.04 05/14/2024    BUN 13 05/14/2024    EGFR 61 05/14/2024     (L) 05/14/2024    K 3.6 05/14/2024    CL 92 (L) 05/14/2024    CO2 29 05/14/2024      Lab Results   Component Value Date    ALT 18 05/14/2024    AST 36 05/14/2024    ALKPHOS 187 (H) 05/14/2024    BILITOT 2.0 (H) 05/14/2024      Lab Results   Component Value Date    TSH 0.47 06/08/2023     Lab Results   Component Value Date    TSH 0.47 06/08/2023    T2UMCDF 72 04/22/2020    S9VZITZ 6.2 04/22/2020     Lab Results   Component Value Date    IRON 89 02/23/2024    TIBC 411 02/23/2024    FERRITIN 89 02/23/2024      Lab Results   Component Value Date    LLNWFFJL99 1,133 (H) 02/23/2024      Lab Results   Component Value Date    FOLATE 13.6 02/23/2024     Lab Results   Component Value Date    GUIDO NEGATIVE 10/06/2022    RF <10 10/20/2023    SEDRATE 18 02/23/2024      Lab Results   Component Value Date    CRP 1.27 (H) 02/23/2024      No results found for: \"ALLYN\"  Lab Results   Component Value Date     02/23/2024     Lab Results   Component Value Date    HAPTOGLOBIN 99 02/23/2024     Lab Results   Component " Value Date    SPEP Increased level of beta globulins. 02/23/2024     Lab Results   Component Value Date    IGG 1,080 02/23/2024     (H) 02/23/2024     (H) 02/23/2024       Radiology Results:  CT ABDOMEN PELVIS W IV CONTRAST  3/26/2022  IMPRESSION:  1.  Cirrhotic hepatic morphology with sequela of portal hypertension  including splenomegaly, intra-abdominal collateral vessel formation,  and intra-abdominal ascites.  2. Diffuse wall thickening of the small bowel, colon, and upper  gastrointestinal tract which is nonspecific and may be related to  portal enteropathy/colopathy. Clinically exclude other causes of  diffuse bowel inflammation.  3. Duodenal diverticulum.  4. Coronary atherosclerotic calcifications.  5. Regions of apparent anterior peritoneal nodularity are favored to  be chronic given postsurgical changes along the anterior peritoneum  and collateral vessel formation.  MRI PANC/[place]LAND WO/W IVCON 05/20/2022  Order: [id number]  IMPRESSION  Stable 1.2 cm pancreatic body cystic lesion, likely a sidebranch IPMN.  Cirrhosis with portal hypertension including small volume ascites.  No  OPTN 5 lesion.    CT SINUS STEREO WO IVCON 11/22/2023  Order: [id number]  IMPRESSION:  Extensive postop changes with opacification of several residual left  posterior ethmoid air cells.    MRI Lumbar spine 11/10/2023  There is multilevel spondylosis. There are varying degrees of spinal  canal and neural foraminal narrowing as described above.    CT Angio Chest 07/24/2023  IMPRESSION:  No CT evidence of pulmonary embolism in the current exam.     No mass or pneumonia evident in either lung. Mild scattered stable  bilateral scarring.     Mild nonspecific stable mediastinal and bilateral hilar adenopathy,  most likely reactive..     Splenomegaly. Findings worrisome for cirrhosis of the liver. Mild  ascites along the anterior aspect of the left hepatic lobe.     Stable moderate T11 compression fracture. Progression of now  moderate  loss of height with anterior wedging at T8, exact age uncertain but  still probably remote.    XR BONE SURVEY 03/02/2021  Narrative & Impression   MRN: [id number]  Patient Name: RUKHSANA SAEZ     STUDY:  OSSEOUS SURVEY, COMPLETE; ;  3/2/2021 2:57 pm     INDICATION:  MGUS - c/o increased bone pain.     COMPARISON:  None.     ACCESSION NUMBER(S):  [id number]     ORDERING CLINICIAN:  AYSE LEDEZMA     FINDINGS:  No acute fracture or dislocation is seen. No definite lytic or  blastic lesions are seen.     Degenerative changes are noted in the bilateral acromioclavicular  joints. Degenerative changes are seen in cervical spine, thoracic  spine and lumbar spine. The most severely affected level in the  lumbar spine is L4-5 with grade 1 anterolisthesis of L4 on L5.     Degenerative changes are noted in bilateral hips and SI joints.     Numerous metallic coils are seen in the abdomen and pelvis.     Degenerative changes are noted in the bilateral knees.     IMPRESSION:  No definite lytic or blastic lesions.     Degenerative changes, as described above.     Assessment and Plan:   1. IgG kappa light chain MGUS  Rukhsana Saez is a 63-year-old female that presents for follow-up of monoclonal gammopathy of undetermined significance.  Upon review of her labs from August 2023 it appears she has IgG kappa light chain MGUS with elevation in the kappa light chain and ratio.  She has slight elevation in IgA.  She has no elevated M protein on SPEP.  New workup 2/2024 reveal similar findings. We did discuss the importance of monitoring, slim crab criteria and understanding how kidney function, elevated calcium levels, and anemia are signs and symptoms.  Patient has many comorbidities and autoimmune disease.    She follows pain management, orthopedics, endocrinology, primary care, and rheumatology.     Discussed her kappa light chain can be elevated in autoimmune versus inflammatory versus infection versus malignancy.  She had a  bone marrow biopsy March 9, 2022, there was no evidence of plasma cell dyscrasia. Cytogenetics negative for intraepithelial lesions or malignancy. Cellular change associated with atrophy and acute inflammation. Favoring autoimmune, chronic infection, liver disease, and inflammatory conditions.     Labs are pending and we will call patient with results.     2.  Iron deficiency anemia  Iron deficiency anemia noted in June 2023, since then there has not been any deficiency. B12, B6, folate, and copper all assessed as this is what she requested and was previously assessed.   Iron deficiency noted patient will have 1 Feraheme infusion.    3.  Thrombocytopenia  Platelets from August 2023 were 80 show a decreased platelet count dating back to May 2023.  The lowest 62.  Most current result is 90.  We will continue to monitor.  We discussed that liver disease and ITP are potential causes. According to CT abdomen from 3/2022 she has cirrhotic hepatic morphology with sequela of portal hypertension. Discussed the importance of monitoring platelet count, bleeding and practicing safety to prevent injury and bleeding. She is in process of rheumatology diagnosis of possible psoriatic arthritis. US of abdomen to assess current status liver and spleen.  Platelets today are 83 they are improved.    4.  Recurrent sinus infections  Upon assessment is clear the patient has otitis externa bilateral and maxillary and frontal sinus tenderness with drainage.  Purulent mucus secretions in bilateral nares.  Patient just received Keflex and azithromycin previously.  Patient has an appointment with ENT.    5.  Joint and back Pain  Multiple complaints of joint pain and discomfort. She addresses it at every visit and expressed today it causing disturbance in quality of life. Most recent x-rays show degenerative disc disease. On 12/14/2023 Dexa shows osteopetrosis of left femoral neck and total hip and lumbar spine osteopenia.    I have advised her  to continue to see her specialists and consider evidence-based holistic medicine. Provided her [employer] and provided their information.     #6. Recent hospitalization primary hyperaldosteronism   Patient was admitted in May 2024 discharged 5/24/2024.  She is requesting home health services.  I have reached out to her primary to see if process is in the works.  If referral needs to be placed I am happy to do so.    Follow-up:    RTC:  --2 months with labs    Medications:  -IV iron 1 dose ordered    Imaging:  -N/A    Referrals:  -ENT as scheduled for recurrent sinus infections  -Specialists as scheduled   -[facility]     Patient Instructions Summary:   Reviewed lab results.  Discussed monitoring lab values and significance.  Patient's questions answered to her liking.  She will call with any questions or concerns.    Thank you for allowing me to care for you today.    Sincerely,  Arlet Monte, APRMADDY-CNP

## 2024-06-22 LAB
FOLATE SERPL-MCNC: 11.4 NG/ML
IGA SERPL-MCNC: 454 MG/DL (ref 70–400)
IGG SERPL-MCNC: 954 MG/DL (ref 700–1600)
IGM SERPL-MCNC: 194 MG/DL (ref 40–230)
PROT SERPL-MCNC: 6.7 G/DL (ref 6.4–8.2)
VIT B12 SERPL-MCNC: 790 PG/ML (ref 211–911)

## 2024-06-23 LAB
KAPPA LC SERPL-MCNC: 2.77 MG/DL (ref 0.33–1.94)
KAPPA LC/LAMBDA SER: 1.91 {RATIO} (ref 0.26–1.65)
LAMBDA LC SERPL-MCNC: 1.45 MG/DL (ref 0.57–2.63)

## 2024-06-24 LAB — PATH REVIEW-CBC DIFFERENTIAL: NORMAL

## 2024-06-25 ENCOUNTER — TELEPHONE (OUTPATIENT)
Dept: PRIMARY CARE | Facility: CLINIC | Age: 64
End: 2024-06-25
Payer: MEDICAID

## 2024-06-25 LAB
ALBUMIN: 4.1 G/DL (ref 3.4–5)
ALPHA 1 GLOBULIN: 0.3 G/DL (ref 0.2–0.6)
ALPHA 2 GLOBULIN: 0.6 G/DL (ref 0.4–1.1)
BETA GLOBULIN: 0.9 G/DL (ref 0.5–1.2)
GAMMA GLOBULIN: 0.8 G/DL (ref 0.5–1.4)
IMMUNOFIXATION COMMENT: NORMAL
M-PROTEIN 1: 0.1 G/DL
PATH REVIEW - SERUM IMMUNOFIXATION: NORMAL
PATH REVIEW-SERUM PROTEIN ELECTROPHORESIS: ABNORMAL
PROTEIN ELECTROPHORESIS COMMENT: ABNORMAL

## 2024-06-26 ENCOUNTER — TELEMEDICINE (OUTPATIENT)
Dept: CARDIOLOGY | Facility: CLINIC | Age: 64
End: 2024-06-26
Payer: MEDICAID

## 2024-06-26 ENCOUNTER — HOSPITAL ENCOUNTER (OUTPATIENT)
Dept: RADIOLOGY | Facility: HOSPITAL | Age: 64
Discharge: HOME | End: 2024-06-26
Payer: MEDICAID

## 2024-06-26 VITALS — BODY MASS INDEX: 28.89 KG/M2 | WEIGHT: 153 LBS | HEIGHT: 61 IN

## 2024-06-26 DIAGNOSIS — N63.10 MASS OF RIGHT BREAST, UNSPECIFIED QUADRANT: ICD-10-CM

## 2024-06-26 DIAGNOSIS — N63.0 BREAST LUMP: ICD-10-CM

## 2024-06-26 DIAGNOSIS — Z01.810 PREOPERATIVE CARDIOVASCULAR EXAMINATION: Primary | ICD-10-CM

## 2024-06-26 DIAGNOSIS — N63.10 MASS OF RIGHT BREAST, UNSPECIFIED QUADRANT: Primary | ICD-10-CM

## 2024-06-26 PROCEDURE — 4010F ACE/ARB THERAPY RXD/TAKEN: CPT | Performed by: INTERNAL MEDICINE

## 2024-06-26 PROCEDURE — 76642 ULTRASOUND BREAST LIMITED: CPT | Performed by: STUDENT IN AN ORGANIZED HEALTH CARE EDUCATION/TRAINING PROGRAM

## 2024-06-26 PROCEDURE — 99214 OFFICE O/P EST MOD 30 MIN: CPT | Performed by: INTERNAL MEDICINE

## 2024-06-26 PROCEDURE — 76642 ULTRASOUND BREAST LIMITED: CPT | Mod: RT

## 2024-06-26 PROCEDURE — 77066 DX MAMMO INCL CAD BI: CPT | Performed by: STUDENT IN AN ORGANIZED HEALTH CARE EDUCATION/TRAINING PROGRAM

## 2024-06-26 PROCEDURE — 1036F TOBACCO NON-USER: CPT | Performed by: INTERNAL MEDICINE

## 2024-06-26 PROCEDURE — 77062 BREAST TOMOSYNTHESIS BI: CPT

## 2024-06-26 PROCEDURE — 3052F HG A1C>EQUAL 8.0%<EQUAL 9.0%: CPT | Performed by: INTERNAL MEDICINE

## 2024-06-26 PROCEDURE — 76982 USE 1ST TARGET LESION: CPT | Mod: RT

## 2024-06-26 PROCEDURE — 77062 BREAST TOMOSYNTHESIS BI: CPT | Performed by: STUDENT IN AN ORGANIZED HEALTH CARE EDUCATION/TRAINING PROGRAM

## 2024-06-26 RX ORDER — EPLERENONE 50 MG/1
50 TABLET, FILM COATED ORAL 2 TIMES DAILY
Qty: 180 TABLET | Refills: 1 | Status: CANCELLED
Start: 2024-06-26 | End: 2024-12-23

## 2024-06-26 NOTE — ASSESSMENT & PLAN NOTE
She is not having any new cardiac issues and she is breathing comfortably and not showing signs of fluid retention she does have unilateral calf pain with a lump that is being evaluated with duplex ultrasound by her primary care physician to exclude DVT from a cardiac standpoint she will follow-upIs in our office on an as-needed basis.    This was a telemedicine visit, uncomplicated.

## 2024-06-26 NOTE — PROGRESS NOTES
Subjective      No chief complaint on file.            Had a prolonged hospital stay for sepsis with multiple comorbidities during that hospital stay in May 2024, and cardiology consultation for elevated troponin levels which were type II elevation was related to be cardiovascular stress in the setting of sepsis.  She remains angina free and breathing comfortably no new cardiac issues.       Sepsis (HCC) (POA: Yes)- etiology ?        -pyuria- UC  is negative   - per ID : MRI does not show defined abscess.   - stopped zosyn   -ID have signed off, OK for DC with no atb        Chronic low back pain   - s/p lumbar posterior fusoin surgery with Dr. Cadena in 2023. Complicated by infection at that time.   - was seen by neurosurgery  - per ID : MRI does not show defined abscess.  - PT/OT  -NSGY saw: recommend MRI dual, ESR and CRP in 8 weeks with results sent to office of Dr. Ash  -pain not well controlled, increased back to home morphine dosing         Elevated troponin             Assessment & Plan: Cardiology consult  -likely elevated in the setting of sepsis, no further cardiac testing         Adrenal insufficiency        - was seen by  endo.   - on cortef        Hypothyroidism- on synthroid        Hypokalemia             Assessment & Plan: Nephrology consult - conceren for primary hyperaldosteronism  -replace PRN        Leukocytosis- resolved  - per Id , no infection, off ATb  - steroids?   -resolved        HTN  Assessment & Plan: Nephrology consult - conceren for primary hyperaldosteronism  -continue losartan, carvedilol, hydralazine  - on            Review of Systems   All other systems reviewed and are negative.       Objective   Physical Exam  Constitutional:       Appearance: Normal appearance.   HENT:      Head: Normocephalic and atraumatic.   Eyes:      Pupils: Pupils are equal, round, and reactive to light.   Cardiovascular:      Rate and Rhythm: Normal rate and regular rhythm.      Pulses: Normal  pulses.      Heart sounds: Normal heart sounds.   Pulmonary:      Effort: Pulmonary effort is normal.      Breath sounds: Normal breath sounds.   Abdominal:      General: Abdomen is flat. Bowel sounds are normal.      Palpations: Abdomen is soft.   Musculoskeletal:         General: Normal range of motion.      Cervical back: Normal range of motion.   Skin:     General: Skin is warm and dry.   Neurological:      General: No focal deficit present.   Psychiatric:         Mood and Affect: Mood normal.         Judgment: Judgment normal.          Lab Review:   Not applicable    Chronic diastolic heart failure (Multi)  She is not having any new cardiac issues and she is breathing comfortably and not showing signs of fluid retention she does have unilateral calf pain with a lump that is being evaluated with duplex ultrasound by her primary care physician to exclude DVT from a cardiac standpoint she will follow-upIs in our office on an as-needed basis.    This was a telemedicine visit, uncomplicated.

## 2024-06-27 DIAGNOSIS — I10 UNCONTROLLED HYPERTENSION: ICD-10-CM

## 2024-06-27 DIAGNOSIS — I50.32 CHRONIC DIASTOLIC HEART FAILURE (MULTI): ICD-10-CM

## 2024-06-28 ENCOUNTER — APPOINTMENT (OUTPATIENT)
Dept: HEMATOLOGY/ONCOLOGY | Facility: HOSPITAL | Age: 64
End: 2024-06-28
Payer: MEDICAID

## 2024-07-01 ENCOUNTER — HOME HEALTH ADMISSION (OUTPATIENT)
Dept: HOME HEALTH SERVICES | Facility: HOME HEALTH | Age: 64
End: 2024-07-01
Payer: MEDICAID

## 2024-07-01 ENCOUNTER — DOCUMENTATION (OUTPATIENT)
Dept: HOME HEALTH SERVICES | Facility: HOME HEALTH | Age: 64
End: 2024-07-01
Payer: MEDICAID

## 2024-07-01 RX ORDER — EPLERENONE 50 MG/1
50 TABLET, FILM COATED ORAL 2 TIMES DAILY
Qty: 180 TABLET | Refills: 1 | Status: SHIPPED | OUTPATIENT
Start: 2024-07-01 | End: 2024-12-28

## 2024-07-01 NOTE — HH CARE COORDINATION
Home Care received a Referral for Physical Therapy and Occupational Therapy. We have processed the referral for a Start of Care on 7/2.     If you have any questions or concerns, please feel free to contact us at [phone]. Follow the prompts, enter your five digit zip code, and you will be directed to your care team on [facility].

## 2024-07-04 ENCOUNTER — HOME CARE VISIT (OUTPATIENT)
Dept: HOME HEALTH SERVICES | Facility: HOME HEALTH | Age: 64
End: 2024-07-04
Payer: MEDICAID

## 2024-07-04 VITALS
TEMPERATURE: 97.4 F | BODY MASS INDEX: 28.7 KG/M2 | WEIGHT: 152 LBS | SYSTOLIC BLOOD PRESSURE: 128 MMHG | HEART RATE: 76 BPM | OXYGEN SATURATION: 96 % | RESPIRATION RATE: 18 BRPM | DIASTOLIC BLOOD PRESSURE: 69 MMHG | HEIGHT: 61 IN

## 2024-07-04 PROCEDURE — G0151 HHCP-SERV OF PT,EA 15 MIN: HCPCS

## 2024-07-04 ASSESSMENT — ENCOUNTER SYMPTOMS
HYPERTENSION: 1
LOWER EXTREMITY EDEMA: 1
PAIN LOCATION: GENERALIZED
SUBJECTIVE PAIN PROGRESSION: UNCHANGED
HYPOTENSION: 1
OCCASIONAL FEELINGS OF UNSTEADINESS: 1
PAIN SEVERITY GOAL: 0/10
LOWEST PAIN SEVERITY IN PAST 24 HOURS: 6/10
HIGHEST PAIN SEVERITY IN PAST 24 HOURS: 10/10
PAIN: 1

## 2024-07-04 ASSESSMENT — ACTIVITIES OF DAILY LIVING (ADL)
AMBULATION ASSISTANCE ON FLAT SURFACES: 1
AMBULATION_DISTANCE/DURATION_TOLERATED: 50
OASIS_M1830: 03
ENTERING_EXITING_HOME: CONTACT GUARD ASSIST

## 2024-07-04 NOTE — CASE COMMUNICATION
Patient seen for PT evaluation and home health care admission s/p  referral. pt will benefit from PT and OT services to address deficits in balance, functional mobility, pain, and strength to retun to PLOF and to develop and implement a safe and appropriate HEP. pt to be seen per POC

## 2024-07-05 ENCOUNTER — INFUSION (OUTPATIENT)
Dept: HEMATOLOGY/ONCOLOGY | Facility: HOSPITAL | Age: 64
End: 2024-07-05
Payer: MEDICAID

## 2024-07-05 VITALS
RESPIRATION RATE: 18 BRPM | DIASTOLIC BLOOD PRESSURE: 68 MMHG | SYSTOLIC BLOOD PRESSURE: 132 MMHG | HEART RATE: 80 BPM | TEMPERATURE: 96.8 F | OXYGEN SATURATION: 94 %

## 2024-07-05 DIAGNOSIS — D50.9 IRON DEFICIENCY ANEMIA, UNSPECIFIED IRON DEFICIENCY ANEMIA TYPE: ICD-10-CM

## 2024-07-05 PROCEDURE — 2500000004 HC RX 250 GENERAL PHARMACY W/ HCPCS (ALT 636 FOR OP/ED): Mod: JZ,SE

## 2024-07-05 PROCEDURE — 96365 THER/PROPH/DIAG IV INF INIT: CPT | Mod: INF

## 2024-07-05 RX ORDER — FAMOTIDINE 10 MG/ML
20 INJECTION INTRAVENOUS ONCE AS NEEDED
Status: DISCONTINUED | OUTPATIENT
Start: 2024-07-05 | End: 2024-07-05 | Stop reason: HOSPADM

## 2024-07-05 RX ORDER — ALBUTEROL SULFATE 0.83 MG/ML
3 SOLUTION RESPIRATORY (INHALATION) AS NEEDED
OUTPATIENT
Start: 2024-07-12

## 2024-07-05 RX ORDER — DIPHENHYDRAMINE HYDROCHLORIDE 50 MG/ML
50 INJECTION INTRAMUSCULAR; INTRAVENOUS AS NEEDED
OUTPATIENT
Start: 2024-07-12

## 2024-07-05 RX ORDER — ALBUTEROL SULFATE 0.83 MG/ML
3 SOLUTION RESPIRATORY (INHALATION) AS NEEDED
Status: DISCONTINUED | OUTPATIENT
Start: 2024-07-05 | End: 2024-07-05 | Stop reason: HOSPADM

## 2024-07-05 RX ORDER — FAMOTIDINE 10 MG/ML
20 INJECTION INTRAVENOUS ONCE AS NEEDED
OUTPATIENT
Start: 2024-07-12

## 2024-07-05 RX ORDER — EPINEPHRINE 0.3 MG/.3ML
0.3 INJECTION SUBCUTANEOUS EVERY 5 MIN PRN
OUTPATIENT
Start: 2024-07-12

## 2024-07-05 RX ORDER — DIPHENHYDRAMINE HYDROCHLORIDE 50 MG/ML
50 INJECTION INTRAMUSCULAR; INTRAVENOUS AS NEEDED
Status: DISCONTINUED | OUTPATIENT
Start: 2024-07-05 | End: 2024-07-05 | Stop reason: HOSPADM

## 2024-07-05 RX ORDER — EPINEPHRINE 0.3 MG/.3ML
0.3 INJECTION SUBCUTANEOUS EVERY 5 MIN PRN
Status: DISCONTINUED | OUTPATIENT
Start: 2024-07-05 | End: 2024-07-05 | Stop reason: HOSPADM

## 2024-07-05 ASSESSMENT — ENCOUNTER SYMPTOMS
OCCASIONAL FEELINGS OF UNSTEADINESS: 1
DEPRESSION: 0
LOSS OF SENSATION IN FEET: 1

## 2024-07-08 ENCOUNTER — HOME CARE VISIT (OUTPATIENT)
Dept: HOME HEALTH SERVICES | Facility: HOME HEALTH | Age: 64
End: 2024-07-08
Payer: MEDICAID

## 2024-07-08 DIAGNOSIS — M06.9 RHEUMATOID ARTHRITIS INVOLVING MULTIPLE SITES, UNSPECIFIED WHETHER RHEUMATOID FACTOR PRESENT (MULTI): ICD-10-CM

## 2024-07-08 RX ORDER — LEFLUNOMIDE 20 MG/1
20 TABLET ORAL DAILY
Qty: 90 TABLET | Refills: 0 | Status: SHIPPED | OUTPATIENT
Start: 2024-07-08

## 2024-07-09 ENCOUNTER — HOME CARE VISIT (OUTPATIENT)
Dept: HOME HEALTH SERVICES | Facility: HOME HEALTH | Age: 64
End: 2024-07-09
Payer: MEDICAID

## 2024-07-10 ENCOUNTER — HOME CARE VISIT (OUTPATIENT)
Dept: HOME HEALTH SERVICES | Facility: HOME HEALTH | Age: 64
End: 2024-07-10
Payer: MEDICAID

## 2024-07-10 PROCEDURE — G0152 HHCP-SERV OF OT,EA 15 MIN: HCPCS

## 2024-07-10 ASSESSMENT — ENCOUNTER SYMPTOMS
HIGHEST PAIN SEVERITY IN PAST 24 HOURS: 10/10
SUBJECTIVE PAIN PROGRESSION: UNCHANGED
PAIN LOCATION - EXACERBATING FACTORS: ACTIVTY
PAIN LOCATION - PAIN FREQUENCY: CONSTANT
PAIN LOCATION: GENERALIZED
PERSON REPORTING PAIN: PATIENT
PAIN SEVERITY GOAL: 1/10
PAIN LOCATION - PAIN QUALITY: ACHY
PAIN: 1
PAIN LOCATION - RELIEVING FACTORS: PAIN MEDS
PAIN LOCATION - PAIN SEVERITY: 8/10
LOWEST PAIN SEVERITY IN PAST 24 HOURS: 5/10

## 2024-07-10 ASSESSMENT — ACTIVITIES OF DAILY LIVING (ADL)
TOILETING: INDEPENDENT
GROOMING ASSESSED: 1
TOILETING: 1
DRESSING_UB_CURRENT_FUNCTION: INDEPENDENT
BATHING ASSESSED: 1
DRESSING_LB_CURRENT_FUNCTION: INDEPENDENT
BATHING_CURRENT_FUNCTION: INDEPENDENT
GROOMING_CURRENT_FUNCTION: INDEPENDENT
PREPARING MEALS: INDEPENDENT

## 2024-07-12 ENCOUNTER — APPOINTMENT (OUTPATIENT)
Dept: RADIOLOGY | Facility: HOSPITAL | Age: 64
End: 2024-07-12
Payer: MEDICAID

## 2024-07-15 ENCOUNTER — APPOINTMENT (OUTPATIENT)
Dept: ENDOCRINOLOGY | Facility: CLINIC | Age: 64
End: 2024-07-15
Payer: MEDICAID

## 2024-07-15 ENCOUNTER — HOME CARE VISIT (OUTPATIENT)
Dept: HOME HEALTH SERVICES | Facility: HOME HEALTH | Age: 64
End: 2024-07-15
Payer: MEDICAID

## 2024-07-15 VITALS
BODY MASS INDEX: 28.34 KG/M2 | SYSTOLIC BLOOD PRESSURE: 160 MMHG | WEIGHT: 150 LBS | HEART RATE: 90 BPM | DIASTOLIC BLOOD PRESSURE: 82 MMHG

## 2024-07-15 DIAGNOSIS — E11.9 TYPE 2 DIABETES MELLITUS WITHOUT COMPLICATION, WITH LONG-TERM CURRENT USE OF INSULIN (MULTI): Primary | ICD-10-CM

## 2024-07-15 DIAGNOSIS — I10 PRIMARY HYPERTENSION: ICD-10-CM

## 2024-07-15 DIAGNOSIS — E27.40 ADRENAL INSUFFICIENCY (MULTI): ICD-10-CM

## 2024-07-15 DIAGNOSIS — M10.09 IDIOPATHIC GOUT OF MULTIPLE SITES, UNSPECIFIED CHRONICITY: ICD-10-CM

## 2024-07-15 DIAGNOSIS — Z79.4 TYPE 2 DIABETES MELLITUS WITHOUT COMPLICATION, WITH LONG-TERM CURRENT USE OF INSULIN (MULTI): Primary | ICD-10-CM

## 2024-07-15 PROCEDURE — 4010F ACE/ARB THERAPY RXD/TAKEN: CPT | Performed by: INTERNAL MEDICINE

## 2024-07-15 PROCEDURE — 99214 OFFICE O/P EST MOD 30 MIN: CPT | Performed by: INTERNAL MEDICINE

## 2024-07-15 PROCEDURE — 3077F SYST BP >= 140 MM HG: CPT | Performed by: INTERNAL MEDICINE

## 2024-07-15 PROCEDURE — 3079F DIAST BP 80-89 MM HG: CPT | Performed by: INTERNAL MEDICINE

## 2024-07-15 PROCEDURE — 3052F HG A1C>EQUAL 8.0%<EQUAL 9.0%: CPT | Performed by: INTERNAL MEDICINE

## 2024-07-15 RX ORDER — LOSARTAN POTASSIUM 25 MG/1
25 TABLET ORAL DAILY
Qty: 90 TABLET | Refills: 1 | Status: SHIPPED | OUTPATIENT
Start: 2024-07-15

## 2024-07-15 RX ORDER — HYDROCORTISONE SODIUM SUCCINATE 100 MG/2ML
100 INJECTION, POWDER, FOR SOLUTION INTRAMUSCULAR; INTRAVENOUS AS NEEDED
Qty: 1 EACH | Refills: 3 | Status: SHIPPED | OUTPATIENT
Start: 2024-07-15

## 2024-07-15 RX ORDER — ALLOPURINOL 100 MG/1
100 TABLET ORAL DAILY
Qty: 90 TABLET | Refills: 0 | Status: SHIPPED | OUTPATIENT
Start: 2024-07-15

## 2024-07-15 ASSESSMENT — ENCOUNTER SYMPTOMS
POLYDIPSIA: 0
ARTHRALGIAS: 1
ABDOMINAL PAIN: 1
NECK PAIN: 1
TREMORS: 1
VOMITING: 0
DIARRHEA: 0
APPETITE CHANGE: 0
BACK PAIN: 1
WEAKNESS: 1
COUGH: 1
WHEEZING: 1
SHORTNESS OF BREATH: 0
FREQUENCY: 0
NAUSEA: 1
NERVOUS/ANXIOUS: 0
MYALGIAS: 1
DYSPHORIC MOOD: 1
SORE THROAT: 0
HEADACHES: 0
FEVER: 0
CONSTIPATION: 1
FATIGUE: 1

## 2024-07-15 NOTE — PROGRESS NOTES
History Of Present Illness  Daniela Saez is a 64 y.o. female     Duration of type 2 diabetes mellitus:  9 years  Complications:  peripheral neuropathy     Admitted to [facility] last months with adrenal crisis, transferred to [facility] [place]  Presumed infection, but not found in spine.     Lantus 7 units at bedtime  Novolog 6-7 units at meal times.  2 unit per 50 over 50 , add 1 unit for every 50 over 200 mg/dl.       DexCom G7, started 2 months ago    Patient is testing glucose 1440 times daily  Records not provided     Last eye exam:  April 2024       Adrenal insufficiency  Hydrocortisone 5-10 mg BID     Hypothyroidism   Levothyroxine 88 mcg Mon-Sat, 176 mcg on Sunday      Past Medical History  She has a past medical history of Anemia, Anxiety, Atrial fibrillation (Multi), Coronary artery disease, Depression, Fibromyalgia, Gastroparesis, Gout, Hypertension, Hypothyroidism, and Thrombocythemia.    Surgical History  She has a past surgical history that includes Other surgical history (08/16/2019); Other surgical history (08/16/2019); Other surgical history (08/16/2019); Other surgical history (08/16/2019); Other surgical history (08/16/2019); Other surgical history (08/16/2019); Other surgical history (08/16/2019); and CT guided percutaneous biopsy bone deep (3/9/2022).     Social History  She reports that she quit smoking about 20 years ago. Her smoking use included cigarettes. She has never used smokeless tobacco. She reports that she does not currently use alcohol. She reports that she does not use drugs.    Family History  Family History   Problem Relation Name Age of Onset    Heart disease Mother      Hypertension Mother      Arthritis Mother      Diabetes Mother      Hyperlipidemia Mother      Hypertension Father      Brain cancer Father      Diabetes Sister      Diabetes Brother         Medications  Current Outpatient Medications   Medication Instructions    acetaminophen (TYLENOL) 650 mg, oral, Every 4 hours  "PRN    albuterol 90 mcg/actuation inhaler Inhale 2 puffs if needed for shortness of breath.    alendronate (FOSAMAX) 70 mg, oral, Every 7 days, Take in the morning with a full glass of water, on an empty stomach, and do not take anything else by mouth or lie down for the next 30 min.    allopurinol (ZYLOPRIM) 100 mg, oral, Daily    amitriptyline (ELAVIL) 50 mg, oral, Daily    atorvastatin (LIPITOR) 40 mg, oral, Daily    baclofen (Lioresal) 10 mg tablet 2 tablets, oral, 2 times daily    BD Saray 2nd Gen Pen Needle 32 gauge x 5/32\" needle Four times daily    blood sugar diagnostic (OneTouch Verio test strips) strip TEST 4 TIMES A DAY. MAY SUBSTITUTE WITH INSURANCE COVERED STRIPS.    bumetanide (BUMEX) 1 mg, oral, Daily    butalbital-aspirin-caffeine (Fiorinal) -40 mg capsule 1 capsule, oral, Every 6 hours PRN    carboxymethylcellulose (Refresh Tears) 0.5 % ophthalmic solution 1-2 drops each eye 3 times a day for 30 days    carvedilol (Coreg) 6.25 mg tablet TAKE ONE TABLET BY MOUTH TWICE DAILY    clopidogrel (Plavix) 75 mg tablet TAKE ONE TABLET BY MOUTH EVERY DAY    Dexcom G7  misc Use as instructed    Dexcom G7 Sensor device For continuous glucose monitoring.  Change every 10 days.    diphenoxylate-atropine (LomotiL) 2.5-0.025 mg tablet 1 tablet, oral, 4 times daily    eplerenone (INSPRA) 50 mg, oral, 2 times daily    famotidine (Pepcid) 20 mg tablet 1 tablet, oral, 2 times daily    fluocinonide (Lidex) 0.05 % cream Topical, 2 times daily    fluticasone (Flonase Sensimist) 27.5 mcg/actuation nasal spray 2 sprays, Each Nostril, Nightly    hydrocortisone (CORTEF) 5-10 mg, oral, 2 times daily    hydroxychloroquine (Plaquenil) 200 mg tablet take ONE & ONE-HALF tablet BY MOUTH EVERY DAY    ketotifen (Zaditor) 0.025 % (0.035 %) ophthalmic solution 1 drop, Both Eyes, 2 times daily    Lantus Solostar U-100 Insulin 12 Units, subcutaneous, Nightly, Take as directed per insulin instructions.    leflunomide (ARAVA) " 20 mg, oral, Daily    levocetirizine (Xyzal) 5 mg tablet 1 tablet in the evening Orally Once a day for 30 day(s)    levothyroxine (Synthroid, Levoxyl) 88 mcg tablet 1 tablet daily 6 days per week, 2 tablets one day per week    lidocaine (Lidoderm) 5 % patch apply up to 3 patches to skin once a day, remove after 12 hours for 30 days    losartan (COZAAR) 25 mg, oral, Daily    miconazole (Micotin) 2 % powder      morphine (MSIR) 30 mg tablet 1 tablet, oral, 2 times daily    morphine CR (MS Contin) 15 mg 12 hr tablet 1 tablet, oral, 2 times daily    MORPHINE SULFATE ER PO 30 mg, oral, 2 times daily    mupirocin (Bactroban) 2 % ointment Topical, 3 times daily RT    NovoLOG Flexpen U-100 Insulin 2-6 Units, subcutaneous, 3 times daily before meals    nystatin (Mycostatin) cream Topical, 2 times daily    ondansetron (Zofran) 4 mg tablet 1 tablet, oral, Every 8 hours PRN    pancrelipase, Lip-Prot-Amyl, (Creon) 36,000-114,000- 180,000 unit capsule,delayed release(DR/EC) capsule 1 capsule, oral, 2 times daily    pantoprazole (ProtoNix) 40 mg EC tablet 1 tablet, oral, Daily RT    polyethylene glycol (MIRALAX) 17 g, oral, Daily PRN    sucralfate (CARAFATE) 1 g, oral, 4 times daily before meals and nightly    SUMAtriptan (IMITREX) 25 mg, oral, As needed, May repeat in 2 hours if needed    tiotropium-olodateroL (Stiolto Respimat) 2.5-2.5 mcg/actuation mist inhaler 2 puffs Inhalation Once a day for 30 days    triamcinolone (Kenalog) 0.1 % cream APPLY ONE APPLICATION EXTERNALLY TWICE DAILY AS NEEDED       Allergies  Metoclopramide, Cefuroxime axetil, Fentanyl, Gabapentin, Levetiracetam, Metformin, and Torsemide    Review of Systems   Constitutional:  Positive for fatigue. Negative for appetite change and fever.   HENT:  Positive for hearing loss and tinnitus. Negative for sore throat.         Dry mouth   Eyes:  Negative for visual disturbance.        Dry eye   Respiratory:  Positive for cough and wheezing. Negative for shortness of  breath.    Cardiovascular:  Negative for chest pain.   Gastrointestinal:  Positive for abdominal pain, constipation and nausea. Negative for diarrhea and vomiting.   Endocrine: Negative for polydipsia and polyuria.   Genitourinary:  Negative for frequency.   Musculoskeletal:  Positive for arthralgias, back pain, myalgias and neck pain.   Skin:  Negative for rash.   Neurological:  Positive for tremors and weakness. Negative for headaches.   Psychiatric/Behavioral:  Positive for dysphoric mood. The patient is not nervous/anxious.         Memory loss         Last Recorded Vitals  Blood pressure 160/82, pulse 90, weight 68 kg (150 lb).    Physical Exam  Constitutional:       General: She is not in acute distress.  HENT:      Head: Normocephalic.      Mouth/Throat:      Mouth: Mucous membranes are moist.   Eyes:      Extraocular Movements: Extraocular movements intact.   Neck:      Thyroid: No thyroid mass or thyromegaly.   Cardiovascular:      Pulses:           Radial pulses are 2+ on the right side and 2+ on the left side.   Musculoskeletal:      Right lower leg: No edema.      Left lower leg: No edema.   Lymphadenopathy:      Cervical: No cervical adenopathy.   Neurological:      Mental Status: She is alert.      Motor: No tremor.   Psychiatric:         Mood and Affect: Affect normal.          Relevant Results  Glucose (mg/dL)   Date Value   06/21/2024 277 (H)   05/14/2024 74   02/23/2024 208 (H)     POC HEMOGLOBIN A1c (%)   Date Value   03/20/2024 8.9 (A)   10/23/2023 6.0     Hemoglobin A1C (%)   Date Value   05/24/2024 7.1 (H)   01/29/2024 8.8 (H)   04/04/2023 4.8   01/17/2023 5.4     Bicarbonate (mmol/L)   Date Value   06/21/2024 29   05/14/2024 29   02/23/2024 36 (H)     Urea Nitrogen (mg/dL)   Date Value   06/21/2024 8   05/14/2024 13   02/23/2024 8     Creatinine (mg/dL)   Date Value   06/21/2024 0.63   05/14/2024 1.04   02/23/2024 0.78       IMPRESSION  TYPE 2 DIABETES MELLITUS   LONG TERM CURRENT INSULIN  USE    ADRENAL INSUFFICIENCY  Recent episode of adrenal crisis      RECOMMENDATIONS  Continue hydrocortisone 5-10 mg twice daily    Solu-Cortef Act-O-Vial 100 mg injection into muscle as needed adrenal crisis.  Must activate EMS (9-1-1) if you are sick enough to inject this.     Continue   Lantus 7 units at bedtime  Novolog 6-7 units at meal times.      Review DexCom at all appointments  Follow up 3-4 months.

## 2024-07-15 NOTE — PATIENT INSTRUCTIONS
RECOMMENDATIONS  Continue hydrocortisone 5-10 mg twice daily    Solu-Cortef Act-O-Vial 100 mg injection into muscle as needed adrenal crisis.  Must activate EMS (9-1-1) if you are sick enough to inject this.     Continue   Lantus 7 units at bedtime  Novolog 6-7 units at meal times.      Review DexCom at all appointments  Follow up 3-4 months.

## 2024-07-17 ENCOUNTER — APPOINTMENT (OUTPATIENT)
Dept: RADIOLOGY | Facility: HOSPITAL | Age: 64
End: 2024-07-17
Payer: MEDICAID

## 2024-07-17 ENCOUNTER — TELEPHONE (OUTPATIENT)
Dept: PRIMARY CARE | Facility: CLINIC | Age: 64
End: 2024-07-17
Payer: MEDICAID

## 2024-07-17 DIAGNOSIS — R30.0 DYSURIA: Primary | ICD-10-CM

## 2024-07-19 ENCOUNTER — HOME CARE VISIT (OUTPATIENT)
Dept: HOME HEALTH SERVICES | Facility: HOME HEALTH | Age: 64
End: 2024-07-19
Payer: MEDICAID

## 2024-07-19 PROCEDURE — G0157 HHC PT ASSISTANT EA 15: HCPCS | Mod: CQ

## 2024-07-21 ASSESSMENT — PAIN SCALES - PAIN ASSESSMENT IN ADVANCED DEMENTIA (PAINAD)
BODYLANGUAGE: 0
NEGVOCALIZATION: 0 - NONE.
CONSOLABILITY: 0 - NO NEED TO CONSOLE.
FACIALEXPRESSION: 0 - SMILING OR INEXPRESSIVE.
FACIALEXPRESSION: 0
BODYLANGUAGE: 0 - RELAXED.
BREATHING: 0
TOTALSCORE: 0
CONSOLABILITY: 0
NEGVOCALIZATION: 0

## 2024-07-22 ENCOUNTER — TELEPHONE (OUTPATIENT)
Dept: PRIMARY CARE | Facility: CLINIC | Age: 64
End: 2024-07-22
Payer: MEDICAID

## 2024-07-22 ENCOUNTER — HOME CARE VISIT (OUTPATIENT)
Dept: HOME HEALTH SERVICES | Facility: HOME HEALTH | Age: 64
End: 2024-07-22
Payer: MEDICAID

## 2024-07-22 NOTE — TELEPHONE ENCOUNTER
SILVIA requesting Eplerenone be increased to 2 tabs bid, per Dr. Hilliard recommendation.  Please send to [facility].  Pt also apologized, she has not obtained UA yet, was recently seen in [facility] for bronchitis

## 2024-07-23 ENCOUNTER — HOSPITAL ENCOUNTER (EMERGENCY)
Facility: HOSPITAL | Age: 64
Discharge: HOME | End: 2024-07-23
Attending: EMERGENCY MEDICINE
Payer: MEDICAID

## 2024-07-23 ENCOUNTER — TELEPHONE (OUTPATIENT)
Dept: PRIMARY CARE | Facility: CLINIC | Age: 64
End: 2024-07-23
Payer: MEDICAID

## 2024-07-23 ENCOUNTER — APPOINTMENT (OUTPATIENT)
Dept: CARDIOLOGY | Facility: HOSPITAL | Age: 64
End: 2024-07-23
Payer: MEDICAID

## 2024-07-23 ENCOUNTER — APPOINTMENT (OUTPATIENT)
Dept: RADIOLOGY | Facility: HOSPITAL | Age: 64
End: 2024-07-23
Payer: MEDICAID

## 2024-07-23 VITALS
TEMPERATURE: 98.4 F | WEIGHT: 150 LBS | SYSTOLIC BLOOD PRESSURE: 138 MMHG | RESPIRATION RATE: 16 BRPM | OXYGEN SATURATION: 97 % | DIASTOLIC BLOOD PRESSURE: 76 MMHG | HEIGHT: 61 IN | HEART RATE: 78 BPM | BODY MASS INDEX: 28.32 KG/M2

## 2024-07-23 DIAGNOSIS — I50.32 CHRONIC DIASTOLIC HEART FAILURE (MULTI): ICD-10-CM

## 2024-07-23 DIAGNOSIS — R53.1 GENERALIZED WEAKNESS: Primary | ICD-10-CM

## 2024-07-23 DIAGNOSIS — I10 UNCONTROLLED HYPERTENSION: ICD-10-CM

## 2024-07-23 DIAGNOSIS — E87.6 HYPOKALEMIA: ICD-10-CM

## 2024-07-23 LAB
ALBUMIN SERPL BCP-MCNC: 3.6 G/DL (ref 3.4–5)
ALP SERPL-CCNC: 160 U/L (ref 33–136)
ALT SERPL W P-5'-P-CCNC: 26 U/L (ref 7–45)
ANION GAP SERPL CALC-SCNC: 11 MMOL/L (ref 10–20)
APPEARANCE UR: CLEAR
AST SERPL W P-5'-P-CCNC: 32 U/L (ref 9–39)
BACTERIA #/AREA URNS AUTO: ABNORMAL /HPF
BASOPHILS # BLD AUTO: 0.03 X10*3/UL (ref 0–0.1)
BASOPHILS NFR BLD AUTO: 0.4 %
BILIRUB SERPL-MCNC: 1 MG/DL (ref 0–1.2)
BILIRUB UR STRIP.AUTO-MCNC: NEGATIVE MG/DL
BNP SERPL-MCNC: 29 PG/ML (ref 0–99)
BUN SERPL-MCNC: 13 MG/DL (ref 6–23)
CALCIUM SERPL-MCNC: 8.9 MG/DL (ref 8.6–10.3)
CARDIAC TROPONIN I PNL SERPL HS: 6 NG/L (ref 0–13)
CHLORIDE SERPL-SCNC: 87 MMOL/L (ref 98–107)
CO2 SERPL-SCNC: 39 MMOL/L (ref 21–32)
COLOR UR: ABNORMAL
CREAT SERPL-MCNC: 0.93 MG/DL (ref 0.5–1.05)
CRP SERPL-MCNC: 2.63 MG/DL
EGFRCR SERPLBLD CKD-EPI 2021: 70 ML/MIN/1.73M*2
EOSINOPHIL # BLD AUTO: 0.19 X10*3/UL (ref 0–0.7)
EOSINOPHIL NFR BLD AUTO: 2.5 %
ERYTHROCYTE [DISTWIDTH] IN BLOOD BY AUTOMATED COUNT: 16.9 % (ref 11.5–14.5)
GLUCOSE SERPL-MCNC: 265 MG/DL (ref 74–99)
GLUCOSE UR STRIP.AUTO-MCNC: NEGATIVE MG/DL
HCT VFR BLD AUTO: 32.5 % (ref 36–46)
HGB BLD-MCNC: 9.8 G/DL (ref 12–16)
IMM GRANULOCYTES # BLD AUTO: 0.02 X10*3/UL (ref 0–0.7)
IMM GRANULOCYTES NFR BLD AUTO: 0.3 % (ref 0–0.9)
INR PPP: 1.2 (ref 0.9–1.1)
KETONES UR STRIP.AUTO-MCNC: NEGATIVE MG/DL
LACTATE SERPL-SCNC: 1.5 MMOL/L (ref 0.4–2)
LEUKOCYTE ESTERASE UR QL STRIP.AUTO: ABNORMAL
LYMPHOCYTES # BLD AUTO: 0.56 X10*3/UL (ref 1.2–4.8)
LYMPHOCYTES NFR BLD AUTO: 7.5 %
MAGNESIUM SERPL-MCNC: 1.85 MG/DL (ref 1.6–2.4)
MCH RBC QN AUTO: 27.9 PG (ref 26–34)
MCHC RBC AUTO-ENTMCNC: 30.2 G/DL (ref 32–36)
MCV RBC AUTO: 93 FL (ref 80–100)
MONOCYTES # BLD AUTO: 0.35 X10*3/UL (ref 0.1–1)
MONOCYTES NFR BLD AUTO: 4.7 %
NEUTROPHILS # BLD AUTO: 6.31 X10*3/UL (ref 1.2–7.7)
NEUTROPHILS NFR BLD AUTO: 84.6 %
NITRITE UR QL STRIP.AUTO: NEGATIVE
NRBC BLD-RTO: 0 /100 WBCS (ref 0–0)
PH UR STRIP.AUTO: 7 [PH]
PLATELET # BLD AUTO: 81 X10*3/UL (ref 150–450)
POTASSIUM SERPL-SCNC: 2.9 MMOL/L (ref 3.5–5.3)
PROT SERPL-MCNC: 6.8 G/DL (ref 6.4–8.2)
PROT UR STRIP.AUTO-MCNC: NEGATIVE MG/DL
PROTHROMBIN TIME: 13.2 SECONDS (ref 9.8–12.8)
RBC # BLD AUTO: 3.51 X10*6/UL (ref 4–5.2)
RBC # UR STRIP.AUTO: NEGATIVE /UL
RBC #/AREA URNS AUTO: ABNORMAL /HPF
SARS-COV-2 RNA RESP QL NAA+PROBE: NOT DETECTED
SODIUM SERPL-SCNC: 134 MMOL/L (ref 136–145)
SP GR UR STRIP.AUTO: 1
SQUAMOUS #/AREA URNS AUTO: ABNORMAL /HPF
UROBILINOGEN UR STRIP.AUTO-MCNC: <2 MG/DL
WBC # BLD AUTO: 7.5 X10*3/UL (ref 4.4–11.3)
WBC #/AREA URNS AUTO: ABNORMAL /HPF

## 2024-07-23 PROCEDURE — 87086 URINE CULTURE/COLONY COUNT: CPT | Mod: CONLAB | Performed by: EMERGENCY MEDICINE

## 2024-07-23 PROCEDURE — 83880 ASSAY OF NATRIURETIC PEPTIDE: CPT | Performed by: EMERGENCY MEDICINE

## 2024-07-23 PROCEDURE — 84484 ASSAY OF TROPONIN QUANT: CPT | Performed by: EMERGENCY MEDICINE

## 2024-07-23 PROCEDURE — 83735 ASSAY OF MAGNESIUM: CPT | Performed by: EMERGENCY MEDICINE

## 2024-07-23 PROCEDURE — 81001 URINALYSIS AUTO W/SCOPE: CPT | Performed by: EMERGENCY MEDICINE

## 2024-07-23 PROCEDURE — 93005 ELECTROCARDIOGRAM TRACING: CPT

## 2024-07-23 PROCEDURE — 85025 COMPLETE CBC W/AUTO DIFF WBC: CPT | Performed by: EMERGENCY MEDICINE

## 2024-07-23 PROCEDURE — 87635 SARS-COV-2 COVID-19 AMP PRB: CPT | Performed by: EMERGENCY MEDICINE

## 2024-07-23 PROCEDURE — 83605 ASSAY OF LACTIC ACID: CPT | Performed by: EMERGENCY MEDICINE

## 2024-07-23 PROCEDURE — 71045 X-RAY EXAM CHEST 1 VIEW: CPT | Mod: FOREIGN READ | Performed by: RADIOLOGY

## 2024-07-23 PROCEDURE — 86140 C-REACTIVE PROTEIN: CPT | Performed by: EMERGENCY MEDICINE

## 2024-07-23 PROCEDURE — 80053 COMPREHEN METABOLIC PANEL: CPT | Performed by: EMERGENCY MEDICINE

## 2024-07-23 PROCEDURE — 85610 PROTHROMBIN TIME: CPT | Performed by: EMERGENCY MEDICINE

## 2024-07-23 PROCEDURE — 82530 CORTISOL FREE: CPT | Performed by: EMERGENCY MEDICINE

## 2024-07-23 PROCEDURE — 99283 EMERGENCY DEPT VISIT LOW MDM: CPT | Mod: 25

## 2024-07-23 PROCEDURE — 71045 X-RAY EXAM CHEST 1 VIEW: CPT

## 2024-07-23 PROCEDURE — 2500000002 HC RX 250 W HCPCS SELF ADMINISTERED DRUGS (ALT 637 FOR MEDICARE OP, ALT 636 FOR OP/ED): Mod: SE | Performed by: EMERGENCY MEDICINE

## 2024-07-23 RX ORDER — POTASSIUM CHLORIDE 20 MEQ/1
20 TABLET, EXTENDED RELEASE ORAL 2 TIMES DAILY
Qty: 20 TABLET | Refills: 0 | Status: SHIPPED | OUTPATIENT
Start: 2024-07-23 | End: 2024-08-02

## 2024-07-23 RX ORDER — EPLERENONE 50 MG/1
100 TABLET, FILM COATED ORAL 2 TIMES DAILY
Qty: 360 TABLET | Refills: 1 | Status: SHIPPED | OUTPATIENT
Start: 2024-07-23 | End: 2025-01-19

## 2024-07-23 RX ORDER — POTASSIUM CHLORIDE 20 MEQ/1
40 TABLET, EXTENDED RELEASE ORAL ONCE
Status: COMPLETED | OUTPATIENT
Start: 2024-07-23 | End: 2024-07-23

## 2024-07-23 ASSESSMENT — PAIN DESCRIPTION - DESCRIPTORS: DESCRIPTORS: ACHING

## 2024-07-23 ASSESSMENT — PAIN SCALES - GENERAL
PAINLEVEL_OUTOF10: 0 - NO PAIN
PAINLEVEL_OUTOF10: 4
PAINLEVEL_OUTOF10: 3

## 2024-07-23 ASSESSMENT — PAIN DESCRIPTION - PAIN TYPE: TYPE: CHRONIC PAIN

## 2024-07-23 ASSESSMENT — PAIN - FUNCTIONAL ASSESSMENT: PAIN_FUNCTIONAL_ASSESSMENT: 0-10

## 2024-07-23 ASSESSMENT — PAIN DESCRIPTION - PROGRESSION: CLINICAL_PROGRESSION: NOT CHANGED

## 2024-07-23 NOTE — PROGRESS NOTES
Per nephrology patient is supposed to be on 2 tablets twice daily.  Her new refill was sent for this amount.

## 2024-07-23 NOTE — ED PROVIDER NOTES
Department of Emergency Medicine   ED  Provider Note  Admit Date/RoomTime: 7/23/2024  4:03 PM  ED Room: 05/[facility]                  History of Present Illness:   Daniela Saez is a 64 y.o. female presenting to the ED for generalized weakness, beginning 2 weeks ago.  The complaint has been persistent, moderate in severity, and worsened by nothing.  Patient states that she has generalized weakness and generalized aches and pains.  She has history of adrenal insufficiency and she thinks she may be going into crisis.  He has had nausea.  She had 2 or 3 episodes of vomiting yesterday.  No hematemesis hematochezia.  No diarrhea or constipation.  No black bloody or tarry stools.  She denies any сергей fever or chills.  She says she was seen on Friday at urgent care and diagnosed with bronchitis and was started on an antibiotic which she thinks was amoxicillin.  She has had a cough that is been minimally productive.  No associated chest pain.  She feels that her urine has been a little more cloudy than normal.      Review of Systems:   Pertinent positives and review of systems as noted above.  Remaining 10 review of systems is negative or noncontributory to today's episode of care.  Review of Systems   A complete review of systems is otherwise negative except as noted above.    --------------------------------------------- PAST HISTORY ---------------------------------------------  Past Medical History:  has a past medical history of Anemia, Anxiety, Atrial fibrillation (Multi), Coronary artery disease, Depression, Fibromyalgia, Gastroparesis, Gout, Hypertension, Hypothyroidism, and Thrombocythemia.  Adrenal insufficiency, anxiety, coronary artery disease, atrial fibrillation, depression, fibromyalgia, gastroparesis, hypothyroidism,    Past Surgical History:  has a past surgical history that includes Other surgical history (08/16/2019); Other surgical history (08/16/2019); Other surgical history (08/16/2019); Other surgical  "history (08/16/2019); Other surgical history (08/16/2019); Other surgical history (08/16/2019); Other surgical history (08/16/2019); and CT guided percutaneous biopsy bone deep (3/9/2022).  No recent surgeries.    Social History:  reports that she quit smoking about 20 years ago. Her smoking use included cigarettes. She has never used smokeless tobacco. She reports that she does not currently use alcohol. She reports that she does not use drugs.    Family History: family history includes Arthritis in her mother; Brain cancer in her father; Diabetes in her brother, mother, and sister; Heart disease in her mother; Hyperlipidemia in her mother; Hypertension in her father and mother. Unless otherwise noted, family history is non contributory    Patient's Medications   New Prescriptions    POTASSIUM CHLORIDE CR (KLOR-CON M20) 20 MEQ ER TABLET    Take 1 tablet (20 mEq) by mouth 2 times a day for 10 days. Do not crush or chew.   Previous Medications    ACETAMINOPHEN (TYLENOL) 325 MG TABLET    Take 2 tablets (650 mg) by mouth every 4 hours if needed for mild pain (1 - 3).    ALBUTEROL 90 MCG/ACTUATION INHALER    Inhale 2 puffs if needed for shortness of breath.    ALENDRONATE (FOSAMAX) 70 MG TABLET    Take 1 tablet (70 mg) by mouth every 7 days. Take in the morning with a full glass of water, on an empty stomach, and do not take anything else by mouth or lie down for the next 30 min.    ALLOPURINOL (ZYLOPRIM) 100 MG TABLET    TAKE ONE TABLET BY MOUTH EVERY DAY    AMITRIPTYLINE (ELAVIL) 50 MG TABLET    Take 1 tablet (50 mg) by mouth once daily.    ATORVASTATIN (LIPITOR) 40 MG TABLET    TAKE ONE TABLET BY MOUTH EVERY DAY    BACLOFEN (LIORESAL) 10 MG TABLET    Take 2 tablets (20 mg) by mouth 2 times a day.    BD JOEL 2ND GEN PEN NEEDLE 32 GAUGE X 5/32\" NEEDLE    Four times daily    BLOOD SUGAR DIAGNOSTIC (ONETOUCH VERIO TEST STRIPS) STRIP    TEST 4 TIMES A DAY. MAY SUBSTITUTE WITH INSURANCE COVERED STRIPS.    BUMETANIDE " (BUMEX) 1 MG TABLET    Take 1 tablet (1 mg) by mouth once daily.    BUTALBITAL-ASPIRIN-CAFFEINE (FIORINAL) -40 MG CAPSULE    Take 1 capsule by mouth every 6 hours if needed for migraine.    CARBOXYMETHYLCELLULOSE (REFRESH TEARS) 0.5 % OPHTHALMIC SOLUTION    1-2 drops each eye 3 times a day for 30 days    CARVEDILOL (COREG) 6.25 MG TABLET    TAKE ONE TABLET BY MOUTH TWICE DAILY    CLOPIDOGREL (PLAVIX) 75 MG TABLET    TAKE ONE TABLET BY MOUTH EVERY DAY    DEXCOM G7  MISC    Use as instructed    DEXCOM G7 SENSOR DEVICE    For continuous glucose monitoring.  Change every 10 days.    DIPHENOXYLATE-ATROPINE (LOMOTIL) 2.5-0.025 MG TABLET    Take 1 tablet by mouth 4 times a day.    EPLERENONE (INSPRA) 50 MG TABLET    Take 2 tablets (100 mg) by mouth 2 times a day.    FAMOTIDINE (PEPCID) 20 MG TABLET    Take 1 tablet (20 mg) by mouth 2 times a day.    FLUOCINONIDE (LIDEX) 0.05 % CREAM    Apply topically 2 times a day.    FLUTICASONE (FLONASE SENSIMIST) 27.5 MCG/ACTUATION NASAL SPRAY    Administer 2 sprays into each nostril once daily at bedtime.    HYDROCORTISONE (CORTEF) 5 MG TABLET    Take 1-2 tablets (5-10 mg) by mouth 2 times a day.    HYDROXYCHLOROQUINE (PLAQUENIL) 200 MG TABLET    take ONE & ONE-HALF tablet BY MOUTH EVERY DAY    KETOTIFEN (ZADITOR) 0.025 % (0.035 %) OPHTHALMIC SOLUTION    Administer 1 drop into both eyes 2 times a day.    LANTUS SOLOSTAR U-100 INSULIN 100 UNIT/ML (3 ML) PEN    Inject 12 Units under the skin once daily at bedtime. Take as directed per insulin instructions.    LEFLUNOMIDE (ARAVA) 20 MG TABLET    TAKE ONE TABLET BY MOUTH EVERY DAY    LEVOCETIRIZINE (XYZAL) 5 MG TABLET    1 tablet in the evening Orally Once a day for 30 day(s)    LEVOTHYROXINE (SYNTHROID, LEVOXYL) 88 MCG TABLET    1 tablet daily 6 days per week, 2 tablets one day per week    LIDOCAINE (LIDODERM) 5 % PATCH    apply up to 3 patches to skin once a day, remove after 12 hours for 30 days    LOSARTAN (COZAAR) 25 MG  TABLET    TAKE ONE TABLET BY MOUTH EVERY DAY    MICONAZOLE (MICOTIN) 2 % POWDER         MORPHINE (MSIR) 30 MG TABLET    Take 1 tablet (30 mg) by mouth 2 times a day.    MORPHINE CR (MS CONTIN) 15 MG 12 HR TABLET    Take 1 tablet (15 mg) by mouth 2 times a day.    MORPHINE SULFATE ER PO    Take 30 mg by mouth 2 times a day.    MUPIROCIN (BACTROBAN) 2 % OINTMENT    Apply topically 3 times a day.    NOVOLOG FLEXPEN U-100 INSULIN 100 UNIT/ML (3 ML) PEN    Inject 2-6 Units under the skin 3 times a day before meals.    NYSTATIN (MYCOSTATIN) CREAM    APPLY TOPICALLY TWICE DAILY    ONDANSETRON (ZOFRAN) 4 MG TABLET    Take 1 tablet (4 mg) by mouth every 8 hours if needed for nausea or vomiting.    PANCRELIPASE, LIP-PROT-AMYL, (CREON) 36,000-114,000- 180,000 UNIT CAPSULE,DELAYED RELEASE(DR/EC) CAPSULE    Take 1 capsule by mouth 2 times a day.    PANTOPRAZOLE (PROTONIX) 40 MG EC TABLET    Take 1 tablet (40 mg) by mouth once daily.    POLYETHYLENE GLYCOL (MIRALAX) 17 GRAM/DOSE POWDER    Take 17 g by mouth once daily as needed (constipation).    SOLU-CORTEF ACT-O-VIAL, PF, 100 MG/2 ML INJECTION    Inject 2 mL (100 mg) over 1 minutes into the muscle if needed for other (Adrenal crisis).    SUCRALFATE (CARAFATE) 1 GRAM TABLET    Take 1 tablet (1 g) by mouth 4 times a day before meals.    SUMATRIPTAN (IMITREX) 25 MG TABLET    Take 1 tablet (25 mg) by mouth if needed for migraine. May repeat in 2 hours if needed    TIOTROPIUM-OLODATEROL (STIOLTO RESPIMAT) 2.5-2.5 MCG/ACTUATION MIST INHALER    2 puffs Inhalation Once a day for 30 days    TRIAMCINOLONE (KENALOG) 0.1 % CREAM    APPLY ONE APPLICATION EXTERNALLY TWICE DAILY AS NEEDED   Modified Medications    No medications on file   Discontinued Medications    No medications on file      The patient’s home medications have been reviewed.    Allergies: Metoclopramide, Cefuroxime axetil, Fentanyl, Gabapentin, Levetiracetam, Metformin, and  Torsemide    -------------------------------------------------- RESULTS -------------------------------------------------  All laboratory and radiology results have been personally reviewed by myself   LABS:  Labs Reviewed   CBC WITH AUTO DIFFERENTIAL - Abnormal       Result Value    WBC 7.5      nRBC 0.0      RBC 3.51 (*)     Hemoglobin 9.8 (*)     Hematocrit 32.5 (*)     MCV 93      MCH 27.9      MCHC 30.2 (*)     RDW 16.9 (*)     Platelets 81 (*)     Neutrophils % 84.6      Immature Granulocytes %, Automated 0.3      Lymphocytes % 7.5      Monocytes % 4.7      Eosinophils % 2.5      Basophils % 0.4      Neutrophils Absolute 6.31      Immature Granulocytes Absolute, Automated 0.02      Lymphocytes Absolute 0.56 (*)     Monocytes Absolute 0.35      Eosinophils Absolute 0.19      Basophils Absolute 0.03     COMPREHENSIVE METABOLIC PANEL - Abnormal    Glucose 265 (*)     Sodium 134 (*)     Potassium 2.9 (*)     Chloride 87 (*)     Bicarbonate 39 (*)     Anion Gap 11      Urea Nitrogen 13      Creatinine 0.93      eGFR 70      Calcium 8.9      Albumin 3.6      Alkaline Phosphatase 160 (*)     Total Protein 6.8      AST 32      Bilirubin, Total 1.0      ALT 26     PROTIME-INR - Abnormal    Protime 13.2 (*)     INR 1.2 (*)    C-REACTIVE PROTEIN - Abnormal    C-Reactive Protein 2.63 (*)    URINALYSIS WITH REFLEX CULTURE AND MICROSCOPIC - Abnormal    Color, Urine Straw      Appearance, Urine Clear      Specific Gravity, Urine 1.002 (*)     pH, Urine 7.0      Protein, Urine NEGATIVE      Glucose, Urine NEGATIVE      Blood, Urine NEGATIVE      Ketones, Urine NEGATIVE      Bilirubin, Urine NEGATIVE      Urobilinogen, Urine <2.0      Nitrite, Urine NEGATIVE      Leukocyte Esterase, Urine SMALL (1+) (*)    MICROSCOPIC ONLY, URINE - Abnormal    WBC, Urine 11-20 (*)     RBC, Urine 1-2      Squamous Epithelial Cells, Urine 1-9 (SPARSE)      Bacteria, Urine 1+ (*)    MAGNESIUM - Normal    Magnesium 1.85     LACTATE - Normal     Lactate 1.5      Narrative:     Venipuncture immediately after or during the administration of Metamizole may lead to falsely low results. Testing should be performed immediately  prior to Metamizole dosing.   TROPONIN I, HIGH SENSITIVITY - Normal    Troponin I, High Sensitivity 6      Narrative:     Less than 99th percentile of normal range cutoff-  Female and children under 18 years old <14 ng/L; Male <21 ng/L: Negative  Repeat testing should be performed if clinically indicated.     Female and children under 18 years old 14-50 ng/L; Male 21-50 ng/L:  Consistent with possible cardiac damage and possible increased clinical   risk. Serial measurements may help to assess extent of myocardial damage.     >50 ng/L: Consistent with cardiac damage, increased clinical risk and  myocardial infarction. Serial measurements may help assess extent of   myocardial damage.      NOTE: Children less than 1 year old may have higher baseline troponin   levels and results should be interpreted in conjunction with the overall   clinical context.     NOTE: Troponin I testing is performed using a different   testing methodology at [facility] than at other   [facility]. Direct result comparisons should only   be made within the same method.   B-TYPE NATRIURETIC PEPTIDE - Normal    BNP 29      Narrative:        <100 pg/mL - Heart failure unlikely  100-299 pg/mL - Intermediate probability of acute heart                  failure exacerbation. Correlate with clinical                  context and patient history.    >=300 pg/mL - Heart Failure likely. Correlate with clinical                  context and patient history.    BNP testing is performed using different testing methodology at [facility] than at other [facility]. Direct result comparisons should only be made within the same method.      SARS-COV-2 PCR - Normal    Coronavirus 2019, PCR Not Detected      Narrative:     This assay has received FDA  "Emergency Use Authorization (EUA) and is only authorized for the duration of time that circumstances exist to justify the authorization of the emergency use of in vitro diagnostic tests for the detection of SARS-CoV-2 virus and/or diagnosis of COVID-19 infection under section 564(b)(1) of the Act, 21 U.S.C. 360bbb-3(b)(1). This assay is an in vitro diagnostic nucleic acid amplification test for the qualitative detection of SARS-CoV-2 from nasopharyngeal specimens and has been validated for use at [facility]. Negative results do not preclude COVID-19 infections and should not be used as the sole basis for diagnosis, treatment, or other management decisions.     URINE CULTURE   URINALYSIS WITH REFLEX CULTURE AND MICROSCOPIC    Narrative:     The following orders were created for panel order Urinalysis with Reflex Culture and Microscopic.  Procedure                               Abnormality         Status                     ---------                               -----------         ------                     Urinalysis with Reflex C...[[id number]]  Abnormal            Final result               Extra Urine Gray Tube[[id number]]                            In process                   Please view results for these tests on the individual orders.   EXTRA URINE GRAY TUBE   CORTISOL, FREE         RADIOLOGY:  Interpreted by Radiologist.  XR chest 1 view   Final Result   No acute process.   Signed by Ralf Camp MD            ------------------------- NURSING NOTES AND VITALS REVIEWED ---------------------------   The nursing notes within the ED encounter and vital signs as below have been reviewed.   /71   Pulse 89   Temp 36.7 °C (98.1 °F) (Temporal)   Resp 16   Ht 1.549 m (5' 1\")   Wt 68 kg (150 lb)   SpO2 94%   BMI 28.34 kg/m²   Oxygen Saturation Interpretation: Normal      ---------------------------------------------------PHYSICAL EXAM--------------------------------------  Physical " Exam  Vitals and nursing note reviewed.   Constitutional:       General: She is not in acute distress.     Appearance: She is well-developed. She is not toxic-appearing.      Comments: Patient appears somewhat pale and chronically ill.  She is awake and alert and oriented x 4.   HENT:      Head: Normocephalic and atraumatic.      Nose: Nose normal.      Mouth/Throat:      Mouth: Mucous membranes are moist.      Pharynx: Oropharynx is clear. No oropharyngeal exudate or posterior oropharyngeal erythema.   Eyes:      General: No scleral icterus.     Extraocular Movements: Extraocular movements intact.      Conjunctiva/sclera: Conjunctivae normal.      Pupils: Pupils are equal, round, and reactive to light.   Cardiovascular:      Rate and Rhythm: Normal rate and regular rhythm.      Pulses: Normal pulses.      Heart sounds: Murmur heard.   Pulmonary:      Effort: Pulmonary effort is normal. No respiratory distress.      Breath sounds: Normal breath sounds. No stridor. No wheezing, rhonchi or rales.   Abdominal:      General: There is no distension.      Palpations: Abdomen is soft.      Tenderness: There is no abdominal tenderness. There is no guarding or rebound.   Musculoskeletal:         General: No swelling, tenderness, deformity or signs of injury.      Cervical back: Normal range of motion and neck supple. No rigidity or tenderness.      Right lower leg: Edema present.      Left lower leg: Edema present.   Lymphadenopathy:      Cervical: No cervical adenopathy.   Skin:     General: Skin is warm and dry.      Capillary Refill: Capillary refill takes less than 2 seconds.      Findings: No rash.   Neurological:      Mental Status: She is alert and oriented to person, place, and time.   Psychiatric:         Mood and Affect: Mood normal.            Procedures  None  ------------------------------ ED COURSE/MEDICAL DECISION MAKING----------------------    Medical Decision Making:   Patient was seen and examined by  me.  Patient is having multiple labs ordered.  An IV will attempted to be established.  I am obtaining an EKG and a COVID test as well.    Results of testing have been sent unremarkable.  His CBC is near baseline.  I have replaced her potassium.  She will be discharged home with a prescription for potassium K-Dur 20 mEq twice daily x 10 days.  Patient's chest x-ray is unremarkable.  EKG shows no evidence of acute ischemia.  Patient states she is feeling better and wants to go home.  Patient be discharged.    ED Course as of 07/23/24 2017 Tue Jul 23, 2024   1651 An EKG at 1647 inter by me at 1650.  Normal sinus rhythm 82 bpm.  Left axis deviation.   ms  ms  ms.  Left anterior fascicular block.  No acute ST segment elevation or depression.  No STEMI. [EC]   2001 White count is normal at 7.5, hemoglobin 9.8 hematocrit 32.5, platelet count 81,000, [EC]   2002 Comprehensive metabolic panel(!!)  Glucose 265, sodium 134, potassium 2.9 was replaced with 40 mEq of KCl orally, chloride 87, bicarb 39  BUN 13 creatinine 0.93, GFR 70  LFTs are normal [EC]   2003 Urinalysis with Reflex Culture and Microscopic(!)  Urinalysis reveals small leukocyte Estrace 11-20 white cells per high-power field.  1+ bacteria worrisome for UTI. [EC]   2003 Lactate  Lactate is normal at 1.5 [EC]   2003 Magnesium  Magnesium is normal at 1 5,  BNP was normal at 29  C-reactive protein was mildly elevated 2.6 [EC]   2003 Troponin I, High Sensitivity  Troponin is normal at 6 [EC]   2004 Sars-CoV-2 PCR  Coronavirus is not detected [EC]   2004 Chest x-ray shows no acute cardiopulmonary process. [EC]      ED Course User Index  [EC] Arun Alvarado DO         Diagnoses as of 07/23/24 2017   Generalized weakness   Hypokalemia      Counseling:   The emergency provider has spoken with the patient and discussed today’s results, in addition to providing specific details for the plan of care and counseling regarding the diagnosis and  prognosis.  Questions are answered at this time and they are agreeable with the plan.      --------------------------------- IMPRESSION AND DISPOSITION ---------------------------------        IMPRESSION  1. Generalized weakness    2. Hypokalemia        DISPOSITION  Disposition: Discharge to home  Patient condition is fair      Billing Provider Critical Care Time: 0 minutes     Arun Alvarado DO  07/23/24 2014       Arun Alvarado DO  07/23/24 2017

## 2024-07-24 ENCOUNTER — HOSPITAL ENCOUNTER (OUTPATIENT)
Dept: RADIOLOGY | Facility: HOSPITAL | Age: 64
End: 2024-07-24
Payer: MEDICAID

## 2024-07-24 ENCOUNTER — APPOINTMENT (OUTPATIENT)
Dept: RADIOLOGY | Facility: HOSPITAL | Age: 64
End: 2024-07-24
Payer: MEDICAID

## 2024-07-24 ENCOUNTER — APPOINTMENT (OUTPATIENT)
Dept: RHEUMATOLOGY | Facility: CLINIC | Age: 64
End: 2024-07-24
Payer: MEDICAID

## 2024-07-24 DIAGNOSIS — M05.79 RHEUMATOID ARTHRITIS INVOLVING MULTIPLE SITES WITH POSITIVE RHEUMATOID FACTOR (MULTI): Primary | ICD-10-CM

## 2024-07-24 DIAGNOSIS — M10.09 IDIOPATHIC GOUT OF MULTIPLE SITES, UNSPECIFIED CHRONICITY: ICD-10-CM

## 2024-07-24 LAB
ATRIAL RATE: 82 BPM
HOLD SPECIMEN: NORMAL
P AXIS: 44 DEGREES
P OFFSET: 190 MS
P ONSET: 129 MS
PR INTERVAL: 162 MS
Q ONSET: 210 MS
QRS COUNT: 13 BEATS
QRS DURATION: 100 MS
QT INTERVAL: 432 MS
QTC CALCULATION(BAZETT): 504 MS
QTC FREDERICIA: 479 MS
R AXIS: -81 DEGREES
T AXIS: 65 DEGREES
T OFFSET: 426 MS
VENTRICULAR RATE: 82 BPM

## 2024-07-24 PROCEDURE — 4010F ACE/ARB THERAPY RXD/TAKEN: CPT | Performed by: INTERNAL MEDICINE

## 2024-07-24 PROCEDURE — 99212 OFFICE O/P EST SF 10 MIN: CPT | Performed by: INTERNAL MEDICINE

## 2024-07-24 PROCEDURE — 3052F HG A1C>EQUAL 8.0%<EQUAL 9.0%: CPT | Performed by: INTERNAL MEDICINE

## 2024-07-24 NOTE — PROGRESS NOTES
Subjective . Daniela Saez is a 64 y.o. female who presents for Follow-up.    HPI. 64-year-old female with history of seronegative RA, gout, psoriatic arthritis, left knee OA, FMS, RSD, chronic pain syndrome, hypothyroidism, adrenal insufficiency, diabetes, peripheral neuropathy, anxiety and depression presented for follow-up.     Virtual visit/phone call only.    She was hospitalized in May with altered mental status.  It was thought she may have sepsis, pyelonephritis and lumbar spine abscess at the site of previous laminectomy.  Urine cultures were negative.  She was treated with vancomycin and Zosyn.     She stated that she has had couple flares of arthritis and psoriasis since then.  She has not started Skyrizi.  She has an appointment with dermatology next week.    He has left knee osteoarthritis.  She is following with orthopedist.    Immunosuppression: Leflunomide and hydroxychloroquine.     Past immunosuppressive therapy:  1. Humira. Discontinued due to sinus infection/bronchitis.  2. SSZ. Abd.pain and Nausea.  3. Enbrel. Nausea.  4. MTX. Nausea    Review of Systems   All other systems reviewed and are negative.    Assessment/Plan  . 64-year-old female with history of seronegative RA, gout, psoriatic arthritis, left knee OA, FMS, RSD, chronic pain syndrome, hypothyroidism, adrenal insufficiency, diabetes, peripheral neuropathy, anxiety and depression presented for follow-up.     #1:Seropositive RA/psoriatic arthritis:   -Continue hydroxychloroquine.  Eye exam up to date.  -Continue leflunomide.    #2: Gout.  -Continue allopurinol.  #3:  Left knee OA.  She is following with orthopedist.    Follow-up in 3 months.     This note was partially generated using the Dragon Voice recognition system. There may be some incorrect wording, spelling and/or spelling errors or punctuation errors that were not corrected prior to committing the note to the medical record.    Problem List Items Addressed This Visit     None      Active Ambulatory Problems     Diagnosis Date Noted    Adrenal insufficiency (Multi) 09/21/2023    Anxiety 09/21/2023    Arthritis of left knee 09/21/2023    Atrial fibrillation (Multi) 09/21/2023    Balance problem 09/21/2023    Cervical radiculopathy 09/21/2023    Chronic pancreatitis (Multi) 09/21/2023    Chronic superficial gastritis without bleeding 09/21/2023    Cirrhosis of liver without ascites (Multi) 09/21/2023    Complex regional pain syndrome type 1 of right upper extremity 09/21/2023    Coronary artery disease of native artery of native heart with stable angina pectoris (CMS-HCC) 09/21/2023    Depression with anxiety 09/21/2023    Type 2 diabetes mellitus with ketoacidosis without coma, without long-term current use of insulin (Multi) 09/21/2023    Dry eyes 09/21/2023    Uncontrolled hypertension 09/21/2023    Fatigue 09/21/2023    Gastroesophageal reflux disease 09/21/2023    Gastroparesis 09/21/2023    Herpes simplex vulvovaginitis 09/21/2023    High serum protein level 09/21/2023    Weakness 09/21/2023    History of myocardial infarction 09/21/2023    Hyperlipidemia 09/21/2023    Mixed hyperlipidemia 09/21/2023    Hypothyroidism 09/21/2023    Insomnia 09/21/2023    Other insomnia 09/21/2023    Intestinal malabsorption, unspecified ([facility]) 09/21/2023    Intractable migraine without aura and without status migrainosus 09/21/2023    Iron deficiency anemia 09/21/2023    Left knee pain 09/21/2023    Left ventricular hypertrophy 09/21/2023    Liver lesion 09/21/2023    Low back pain 09/21/2023    Lumbago with sciatica, left side 09/21/2023    Lumbago with sciatica, right side 09/21/2023    Lumbar stenosis with neurogenic claudication 09/21/2023    Major depression, recurrent, chronic (CMS-[facility]) 09/21/2023    Memory deficit 09/21/2023    Migraine 09/21/2023    Mild persistent asthma without complication ([facility]) 09/21/2023    Muscle weakness 09/21/2023    Myalgia 09/21/2023    Myofascial pain  syndrome 09/21/2023    Opiate dependence (Multi) 09/21/2023    Monoclonal gammopathy 09/21/2023    Paraproteinemia 09/21/2023    Bilateral occipital neuralgia 09/21/2023    Chronic neck pain 09/21/2023    Diffuse pain 09/21/2023    Fibromyalgia 09/21/2023    Idiopathic peripheral neuropathy 09/21/2023    Other chronic pain 09/21/2023    Peripheral neuropathy 09/21/2023    RSD (reflex sympathetic dystrophy) 09/21/2023    Restless legs syndrome 09/21/2023    Rheumatoid arthritis involving multiple sites (Multi) 09/21/2023    Spondylolisthesis 09/21/2023    Tremors of nervous system 09/21/2023    Type 2 diabetes mellitus without complications (Multi) 09/21/2023    Ulcerative colitis (Multi) 09/21/2023    Vitamin D deficiency 09/21/2023    Allergic rhinitis 09/21/2023    Atrophic vaginitis 09/21/2023    Dizziness 09/21/2023    Medication management 09/21/2023    Nausea and vomiting 09/21/2023    Screening for breast cancer 09/21/2023    Yeast infection 09/21/2023    Other fracture of t11-T12 vertebra, initial encounter for closed fracture (Multi) 12/31/2023    Chronic diastolic heart failure (Multi) 01/08/2024    Bronchitis 01/18/2024    Shortness of breath 02/08/2024    Psoriatic arthritis (Multi) 02/08/2024    Myositis 02/08/2024    Sepsis with acute organ dysfunction and septic shock (Multi) 05/15/2024    Pyelonephritis 05/15/2024    Spinal abscess (Multi) 05/15/2024    Iron deficiency anemia 06/21/2024     Resolved Ambulatory Problems     Diagnosis Date Noted    Simple chronic bronchitis (Multi) 09/21/2023    Acute maxillary sinusitis 01/18/2024     Past Medical History:   Diagnosis Date    Anemia     Coronary artery disease     Depression     Gout     Hypertension     Thrombocythemia        Family History   Problem Relation Name Age of Onset    Heart disease Mother      Hypertension Mother      Arthritis Mother      Diabetes Mother      Hyperlipidemia Mother      Hypertension Father      Brain cancer Father       "Diabetes Sister      Diabetes Brother         Past Surgical History:   Procedure Laterality Date    CT GUIDED PERCUTANEOUS BIOPSY BONE DEEP  3/9/2022    CT GUIDED PERCUTANEOUS BIOPSY BONE DEEP 3/9/2022 [facility]    OTHER SURGICAL HISTORY  2019    Tonsillectomy with adenoidectomy    OTHER SURGICAL HISTORY  2019    Bunionectomy    OTHER SURGICAL HISTORY  2019    Hernia repair    OTHER SURGICAL HISTORY  2019    Cholecystectomy    OTHER SURGICAL HISTORY  2019    Uterine nerve ablation    OTHER SURGICAL HISTORY  2019    Ovarian torsion repair    OTHER SURGICAL HISTORY  2019    Tubal ligation       Social History     Tobacco Use   Smoking Status Former    Current packs/day: 0.00    Types: Cigarettes    Quit date: 2004    Years since quittin.1   Smokeless Tobacco Never       Allergies  Metoclopramide, Cefuroxime axetil, Fentanyl, Gabapentin, Levetiracetam, Metformin, and Torsemide    Current Meds  Current Outpatient Medications   Medication Instructions    acetaminophen (TYLENOL) 650 mg, oral, Every 4 hours PRN    albuterol 90 mcg/actuation inhaler Inhale 2 puffs if needed for shortness of breath.    alendronate (FOSAMAX) 70 mg, oral, Every 7 days, Take in the morning with a full glass of water, on an empty stomach, and do not take anything else by mouth or lie down for the next 30 min.    allopurinol (ZYLOPRIM) 100 mg, oral, Daily    amitriptyline (ELAVIL) 50 mg, oral, Daily    atorvastatin (LIPITOR) 40 mg, oral, Daily    baclofen (Lioresal) 10 mg tablet 2 tablets, oral, 2 times daily    BD Saray 2nd Gen Pen Needle 32 gauge x \" needle Four times daily    blood sugar diagnostic (OneTouch Verio test strips) strip TEST 4 TIMES A DAY. MAY SUBSTITUTE WITH INSURANCE COVERED STRIPS.    bumetanide (BUMEX) 1 mg, oral, Daily    butalbital-aspirin-caffeine (Fiorinal) -40 mg capsule 1 capsule, oral, Every 6 hours PRN    carboxymethylcellulose (Refresh Tears) 0.5 % " ophthalmic solution 1-2 drops each eye 3 times a day for 30 days    carvedilol (Coreg) 6.25 mg tablet TAKE ONE TABLET BY MOUTH TWICE DAILY    clopidogrel (Plavix) 75 mg tablet TAKE ONE TABLET BY MOUTH EVERY DAY    Dexcom G7  misc Use as instructed    Dexcom G7 Sensor device For continuous glucose monitoring.  Change every 10 days.    diphenoxylate-atropine (LomotiL) 2.5-0.025 mg tablet 1 tablet, oral, 4 times daily    eplerenone (INSPRA) 100 mg, oral, 2 times daily    famotidine (Pepcid) 20 mg tablet 1 tablet, oral, 2 times daily    fluocinonide (Lidex) 0.05 % cream Topical, 2 times daily    fluticasone (Flonase Sensimist) 27.5 mcg/actuation nasal spray 2 sprays, Each Nostril, Nightly    hydrocortisone (CORTEF) 5-10 mg, oral, 2 times daily    hydroxychloroquine (Plaquenil) 200 mg tablet take ONE & ONE-HALF tablet BY MOUTH EVERY DAY    ketotifen (Zaditor) 0.025 % (0.035 %) ophthalmic solution 1 drop, Both Eyes, 2 times daily    Lantus Solostar U-100 Insulin 12 Units, subcutaneous, Nightly, Take as directed per insulin instructions.    leflunomide (ARAVA) 20 mg, oral, Daily    levocetirizine (Xyzal) 5 mg tablet 1 tablet in the evening Orally Once a day for 30 day(s)    levothyroxine (Synthroid, Levoxyl) 88 mcg tablet 1 tablet daily 6 days per week, 2 tablets one day per week    lidocaine (Lidoderm) 5 % patch apply up to 3 patches to skin once a day, remove after 12 hours for 30 days    losartan (COZAAR) 25 mg, oral, Daily    miconazole (Micotin) 2 % powder      morphine (MSIR) 30 mg tablet 1 tablet, oral, 2 times daily    morphine CR (MS Contin) 15 mg 12 hr tablet 1 tablet, oral, 2 times daily    MORPHINE SULFATE ER PO 30 mg, oral, 2 times daily    mupirocin (Bactroban) 2 % ointment Topical, 3 times daily RT    NovoLOG Flexpen U-100 Insulin 2-6 Units, subcutaneous, 3 times daily before meals    nystatin (Mycostatin) cream Topical, 2 times daily    ondansetron (Zofran) 4 mg tablet 1 tablet, oral, Every 8 hours  PRN    pancrelipase, Lip-Prot-Amyl, (Creon) 36,000-114,000- 180,000 unit capsule,delayed release(DR/EC) capsule 1 capsule, oral, 2 times daily    pantoprazole (ProtoNix) 40 mg EC tablet 1 tablet, oral, Daily RT    polyethylene glycol (MIRALAX) 17 g, oral, Daily PRN    potassium chloride CR (Klor-Con M20) 20 mEq ER tablet 20 mEq, oral, 2 times daily, Do not crush or chew.    Solu-CORTEF Act-O-Vial (PF) 100 mg, intramuscular, As needed    sucralfate (CARAFATE) 1 g, oral, 4 times daily before meals and nightly    SUMAtriptan (IMITREX) 25 mg, oral, As needed, May repeat in 2 hours if needed    tiotropium-olodateroL (Stiolto Respimat) 2.5-2.5 mcg/actuation mist inhaler 2 puffs Inhalation Once a day for 30 days    triamcinolone (Kenalog) 0.1 % cream APPLY ONE APPLICATION EXTERNALLY TWICE DAILY AS NEEDED        Lab Results   Component Value Date    RF <10 10/20/2023    SEDRATE 18 02/23/2024    CRP 2.63 (H) 07/23/2024    URICACID 2.2 (L) 10/20/2023    CKTOTAL 68 04/10/2024             Lazaro La MD

## 2024-07-24 NOTE — DISCHARGE INSTRUCTIONS
Take medications as directed.    Continue regular home medications.  Follow-up with your primary care in the next 2 to 3 days.  Return if acutely worsening or worrisome symptoms.

## 2024-07-25 ENCOUNTER — APPOINTMENT (OUTPATIENT)
Dept: HOME HEALTH SERVICES | Facility: HOME HEALTH | Age: 64
End: 2024-07-25
Payer: MEDICAID

## 2024-07-25 LAB — BACTERIA UR CULT: NO GROWTH

## 2024-07-26 ENCOUNTER — HOME CARE VISIT (OUTPATIENT)
Dept: HOME HEALTH SERVICES | Facility: HOME HEALTH | Age: 64
End: 2024-07-26
Payer: MEDICAID

## 2024-07-26 ENCOUNTER — HOSPITAL ENCOUNTER (OUTPATIENT)
Facility: HOSPITAL | Age: 64
Setting detail: OBSERVATION
Discharge: HOME | End: 2024-07-27
Attending: EMERGENCY MEDICINE | Admitting: STUDENT IN AN ORGANIZED HEALTH CARE EDUCATION/TRAINING PROGRAM
Payer: MEDICAID

## 2024-07-26 ENCOUNTER — APPOINTMENT (OUTPATIENT)
Dept: RADIOLOGY | Facility: HOSPITAL | Age: 64
End: 2024-07-26
Payer: MEDICAID

## 2024-07-26 ENCOUNTER — APPOINTMENT (OUTPATIENT)
Dept: CARDIOLOGY | Facility: HOSPITAL | Age: 64
End: 2024-07-26
Payer: MEDICAID

## 2024-07-26 DIAGNOSIS — E11.10 TYPE 2 DIABETES MELLITUS WITH KETOACIDOSIS WITHOUT COMA, WITHOUT LONG-TERM CURRENT USE OF INSULIN (MULTI): ICD-10-CM

## 2024-07-26 DIAGNOSIS — J30.1 NON-SEASONAL ALLERGIC RHINITIS DUE TO POLLEN: ICD-10-CM

## 2024-07-26 DIAGNOSIS — N39.0 URINARY TRACT INFECTION IN ELDERLY PATIENT: Primary | ICD-10-CM

## 2024-07-26 LAB
ALBUMIN SERPL BCP-MCNC: 3.4 G/DL (ref 3.4–5)
ALP SERPL-CCNC: 182 U/L (ref 33–136)
ALT SERPL W P-5'-P-CCNC: 22 U/L (ref 7–45)
ANION GAP BLDV CALCULATED.4IONS-SCNC: -3 MMOL/L (ref 10–25)
ANION GAP SERPL CALC-SCNC: 11 MMOL/L (ref 10–20)
AST SERPL W P-5'-P-CCNC: 34 U/L (ref 9–39)
BASE EXCESS BLDV CALC-SCNC: 14.6 MMOL/L (ref -2–3)
BILIRUB SERPL-MCNC: 1.2 MG/DL (ref 0–1.2)
BNP SERPL-MCNC: 17 PG/ML (ref 0–99)
BODY TEMPERATURE: ABNORMAL
BUN SERPL-MCNC: 13 MG/DL (ref 6–23)
CA-I BLDV-SCNC: 1.06 MMOL/L (ref 1.1–1.33)
CALCIUM SERPL-MCNC: 8.6 MG/DL (ref 8.6–10.3)
CARDIAC TROPONIN I PNL SERPL HS: 5 NG/L (ref 0–13)
CARDIAC TROPONIN I PNL SERPL HS: 6 NG/L (ref 0–13)
CHLORIDE BLDV-SCNC: 95 MMOL/L (ref 98–107)
CHLORIDE SERPL-SCNC: 92 MMOL/L (ref 98–107)
CO2 SERPL-SCNC: 36 MMOL/L (ref 21–32)
CREAT SERPL-MCNC: 1.02 MG/DL (ref 0.5–1.05)
EGFRCR SERPLBLD CKD-EPI 2021: 62 ML/MIN/1.73M*2
ERYTHROCYTE [DISTWIDTH] IN BLOOD BY AUTOMATED COUNT: 16.9 % (ref 11.5–14.5)
ERYTHROCYTE [DISTWIDTH] IN BLOOD BY AUTOMATED COUNT: 16.9 % (ref 11.5–14.5)
GLUCOSE BLDV-MCNC: 91 MG/DL (ref 74–99)
GLUCOSE SERPL-MCNC: 81 MG/DL (ref 74–99)
HCO3 BLDV-SCNC: 42.6 MMOL/L (ref 22–26)
HCT VFR BLD AUTO: 25.8 % (ref 36–46)
HCT VFR BLD AUTO: 34.7 % (ref 36–46)
HCT VFR BLD EST: 34 % (ref 36–46)
HGB BLD-MCNC: 10.6 G/DL (ref 12–16)
HGB BLD-MCNC: 7.8 G/DL (ref 12–16)
HGB BLDV-MCNC: 11.2 G/DL (ref 12–16)
HOLD SPECIMEN: NORMAL
INHALED O2 CONCENTRATION: 21 %
LACTATE BLDV-SCNC: 0.9 MMOL/L (ref 0.4–2)
MCH RBC QN AUTO: 28.4 PG (ref 26–34)
MCH RBC QN AUTO: 28.7 PG (ref 26–34)
MCHC RBC AUTO-ENTMCNC: 30.2 G/DL (ref 32–36)
MCHC RBC AUTO-ENTMCNC: 30.5 G/DL (ref 32–36)
MCV RBC AUTO: 93 FL (ref 80–100)
MCV RBC AUTO: 95 FL (ref 80–100)
NRBC BLD-RTO: 0 /100 WBCS (ref 0–0)
NRBC BLD-RTO: 0 /100 WBCS (ref 0–0)
OXYHGB MFR BLDV: 57.2 % (ref 45–75)
PCO2 BLDV: 72 MM HG (ref 41–51)
PH BLDV: 7.38 PH (ref 7.33–7.43)
PLATELET # BLD AUTO: 52 X10*3/UL (ref 150–450)
PLATELET # BLD AUTO: 66 X10*3/UL (ref 150–450)
PO2 BLDV: 39 MM HG (ref 35–45)
POTASSIUM BLDV-SCNC: 4.1 MMOL/L (ref 3.5–5.3)
POTASSIUM SERPL-SCNC: 3.9 MMOL/L (ref 3.5–5.3)
PROT SERPL-MCNC: 6.8 G/DL (ref 6.4–8.2)
RBC # BLD AUTO: 2.72 X10*6/UL (ref 4–5.2)
RBC # BLD AUTO: 3.73 X10*6/UL (ref 4–5.2)
SAO2 % BLDV: 59 % (ref 45–75)
SODIUM BLDV-SCNC: 131 MMOL/L (ref 136–145)
SODIUM SERPL-SCNC: 135 MMOL/L (ref 136–145)
TSH SERPL-ACNC: 0.7 MIU/L (ref 0.44–3.98)
WBC # BLD AUTO: 5.6 X10*3/UL (ref 4.4–11.3)
WBC # BLD AUTO: 7.1 X10*3/UL (ref 4.4–11.3)

## 2024-07-26 PROCEDURE — 84484 ASSAY OF TROPONIN QUANT: CPT | Performed by: EMERGENCY MEDICINE

## 2024-07-26 PROCEDURE — 93005 ELECTROCARDIOGRAM TRACING: CPT

## 2024-07-26 PROCEDURE — 83880 ASSAY OF NATRIURETIC PEPTIDE: CPT | Performed by: EMERGENCY MEDICINE

## 2024-07-26 PROCEDURE — 99285 EMERGENCY DEPT VISIT HI MDM: CPT | Mod: 25

## 2024-07-26 PROCEDURE — 71045 X-RAY EXAM CHEST 1 VIEW: CPT | Mod: FOREIGN READ | Performed by: RADIOLOGY

## 2024-07-26 PROCEDURE — 2500000004 HC RX 250 GENERAL PHARMACY W/ HCPCS (ALT 636 FOR OP/ED): Mod: SE | Performed by: EMERGENCY MEDICINE

## 2024-07-26 PROCEDURE — 81001 URINALYSIS AUTO W/SCOPE: CPT | Performed by: EMERGENCY MEDICINE

## 2024-07-26 PROCEDURE — 85027 COMPLETE CBC AUTOMATED: CPT | Performed by: EMERGENCY MEDICINE

## 2024-07-26 PROCEDURE — 84443 ASSAY THYROID STIM HORMONE: CPT | Performed by: EMERGENCY MEDICINE

## 2024-07-26 PROCEDURE — 71045 X-RAY EXAM CHEST 1 VIEW: CPT

## 2024-07-26 PROCEDURE — 84132 ASSAY OF SERUM POTASSIUM: CPT | Performed by: EMERGENCY MEDICINE

## 2024-07-26 PROCEDURE — 96375 TX/PRO/DX INJ NEW DRUG ADDON: CPT

## 2024-07-26 PROCEDURE — 84132 ASSAY OF SERUM POTASSIUM: CPT | Mod: 59 | Performed by: EMERGENCY MEDICINE

## 2024-07-26 RX ORDER — MORPHINE SULFATE 30 MG/1
15 TABLET ORAL EVERY 4 HOURS PRN
Status: DISCONTINUED | OUTPATIENT
Start: 2024-07-26 | End: 2024-07-27 | Stop reason: CLARIF

## 2024-07-26 RX ADMIN — HYDROCORTISONE SODIUM SUCCINATE 100 MG: 100 INJECTION, POWDER, FOR SOLUTION INTRAMUSCULAR; INTRAVENOUS at 21:44

## 2024-07-26 ASSESSMENT — PAIN - FUNCTIONAL ASSESSMENT: PAIN_FUNCTIONAL_ASSESSMENT: 0-10

## 2024-07-26 ASSESSMENT — PAIN SCALES - GENERAL
PAINLEVEL_OUTOF10: 0 - NO PAIN

## 2024-07-26 ASSESSMENT — PAIN DESCRIPTION - DIRECTION: RADIATING_TOWARDS: ALL OVER

## 2024-07-27 VITALS
HEART RATE: 92 BPM | TEMPERATURE: 96.7 F | OXYGEN SATURATION: 96 % | BODY MASS INDEX: 27.64 KG/M2 | RESPIRATION RATE: 18 BRPM | HEIGHT: 61 IN | SYSTOLIC BLOOD PRESSURE: 122 MMHG | WEIGHT: 146.39 LBS | DIASTOLIC BLOOD PRESSURE: 65 MMHG

## 2024-07-27 PROBLEM — N39.0 UTI (URINARY TRACT INFECTION): Status: ACTIVE | Noted: 2024-07-27

## 2024-07-27 LAB
ALBUMIN SERPL BCP-MCNC: 3.1 G/DL (ref 3.4–5)
ANION GAP BLDV CALCULATED.4IONS-SCNC: NORMAL MMOL/L
ANION GAP SERPL CALC-SCNC: 10 MMOL/L (ref 10–20)
APPEARANCE UR: CLEAR
BACTERIA #/AREA URNS AUTO: ABNORMAL /HPF
BASE EXCESS BLDV CALC-SCNC: NORMAL MMOL/L
BILIRUB UR STRIP.AUTO-MCNC: NEGATIVE MG/DL
BODY TEMPERATURE: NORMAL
BUN SERPL-MCNC: 13 MG/DL (ref 6–23)
CA-I BLDV-SCNC: NORMAL MMOL/L
CALCIUM SERPL-MCNC: 8.3 MG/DL (ref 8.6–10.3)
CHLORIDE BLDV-SCNC: NORMAL MMOL/L
CHLORIDE SERPL-SCNC: 94 MMOL/L (ref 98–107)
CO2 SERPL-SCNC: 34 MMOL/L (ref 21–32)
COLOR UR: YELLOW
CORTIS F SERPL-MCNC: 1.28 UG/DL
CREAT SERPL-MCNC: 0.83 MG/DL (ref 0.5–1.05)
EGFRCR SERPLBLD CKD-EPI 2021: 80 ML/MIN/1.73M*2
ERYTHROCYTE [DISTWIDTH] IN BLOOD BY AUTOMATED COUNT: 16.6 % (ref 11.5–14.5)
GLUCOSE BLD MANUAL STRIP-MCNC: 171 MG/DL (ref 74–99)
GLUCOSE BLDV-MCNC: NORMAL MG/DL
GLUCOSE SERPL-MCNC: 191 MG/DL (ref 74–99)
GLUCOSE UR STRIP.AUTO-MCNC: NEGATIVE MG/DL
HCO3 BLDV-SCNC: NORMAL MMOL/L
HCT VFR BLD AUTO: 31.6 % (ref 36–46)
HCT VFR BLD EST: NORMAL %
HGB BLD-MCNC: 9.4 G/DL (ref 12–16)
HGB BLDV-MCNC: NORMAL G/DL
INHALED O2 CONCENTRATION: NORMAL %
KETONES UR STRIP.AUTO-MCNC: NEGATIVE MG/DL
LACTATE BLDV-SCNC: NORMAL MMOL/L
LEUKOCYTE ESTERASE UR QL STRIP.AUTO: ABNORMAL
MAGNESIUM SERPL-MCNC: 1.86 MG/DL (ref 1.6–2.4)
MCH RBC QN AUTO: 27.8 PG (ref 26–34)
MCHC RBC AUTO-ENTMCNC: 29.7 G/DL (ref 32–36)
MCV RBC AUTO: 94 FL (ref 80–100)
NITRITE UR QL STRIP.AUTO: NEGATIVE
NRBC BLD-RTO: 0 /100 WBCS (ref 0–0)
OXYHGB MFR BLDV: NORMAL %
PCO2 BLDV: NORMAL MM[HG]
PH BLDV: NORMAL [PH]
PH UR STRIP.AUTO: 7 [PH]
PHOSPHATE SERPL-MCNC: 3.7 MG/DL (ref 2.5–4.9)
PLATELET # BLD AUTO: 59 X10*3/UL (ref 150–450)
PO2 BLDV: NORMAL MM[HG]
POTASSIUM BLDV-SCNC: NORMAL MMOL/L
POTASSIUM SERPL-SCNC: 4.1 MMOL/L (ref 3.5–5.3)
PROT UR STRIP.AUTO-MCNC: NEGATIVE MG/DL
RBC # BLD AUTO: 3.38 X10*6/UL (ref 4–5.2)
RBC # UR STRIP.AUTO: ABNORMAL /UL
RBC #/AREA URNS AUTO: ABNORMAL /HPF
SAO2 % BLDV: NORMAL %
SODIUM BLDV-SCNC: NORMAL MMOL/L
SODIUM SERPL-SCNC: 134 MMOL/L (ref 136–145)
SP GR UR STRIP.AUTO: 1
SQUAMOUS #/AREA URNS AUTO: ABNORMAL /HPF
UROBILINOGEN UR STRIP.AUTO-MCNC: <2 MG/DL
WBC # BLD AUTO: 5.5 X10*3/UL (ref 4.4–11.3)
WBC #/AREA URNS AUTO: ABNORMAL /HPF

## 2024-07-27 PROCEDURE — G0378 HOSPITAL OBSERVATION PER HR: HCPCS

## 2024-07-27 PROCEDURE — 2500000004 HC RX 250 GENERAL PHARMACY W/ HCPCS (ALT 636 FOR OP/ED): Mod: SE | Performed by: EMERGENCY MEDICINE

## 2024-07-27 PROCEDURE — 94760 N-INVAS EAR/PLS OXIMETRY 1: CPT

## 2024-07-27 PROCEDURE — 97162 PT EVAL MOD COMPLEX 30 MIN: CPT | Mod: GP

## 2024-07-27 PROCEDURE — 2500000001 HC RX 250 WO HCPCS SELF ADMINISTERED DRUGS (ALT 637 FOR MEDICARE OP): Mod: SE | Performed by: EMERGENCY MEDICINE

## 2024-07-27 PROCEDURE — 83735 ASSAY OF MAGNESIUM: CPT | Performed by: STUDENT IN AN ORGANIZED HEALTH CARE EDUCATION/TRAINING PROGRAM

## 2024-07-27 PROCEDURE — 82947 ASSAY GLUCOSE BLOOD QUANT: CPT

## 2024-07-27 PROCEDURE — 80069 RENAL FUNCTION PANEL: CPT | Performed by: STUDENT IN AN ORGANIZED HEALTH CARE EDUCATION/TRAINING PROGRAM

## 2024-07-27 PROCEDURE — 83036 HEMOGLOBIN GLYCOSYLATED A1C: CPT | Mod: CONLAB | Performed by: NURSE PRACTITIONER

## 2024-07-27 PROCEDURE — 99222 1ST HOSP IP/OBS MODERATE 55: CPT | Performed by: STUDENT IN AN ORGANIZED HEALTH CARE EDUCATION/TRAINING PROGRAM

## 2024-07-27 PROCEDURE — 96366 THER/PROPH/DIAG IV INF ADDON: CPT

## 2024-07-27 PROCEDURE — 99239 HOSP IP/OBS DSCHRG MGMT >30: CPT | Performed by: STUDENT IN AN ORGANIZED HEALTH CARE EDUCATION/TRAINING PROGRAM

## 2024-07-27 PROCEDURE — 96365 THER/PROPH/DIAG IV INF INIT: CPT

## 2024-07-27 PROCEDURE — 85027 COMPLETE CBC AUTOMATED: CPT | Performed by: STUDENT IN AN ORGANIZED HEALTH CARE EDUCATION/TRAINING PROGRAM

## 2024-07-27 PROCEDURE — 2500000002 HC RX 250 W HCPCS SELF ADMINISTERED DRUGS (ALT 637 FOR MEDICARE OP, ALT 636 FOR OP/ED): Mod: SE | Performed by: STUDENT IN AN ORGANIZED HEALTH CARE EDUCATION/TRAINING PROGRAM

## 2024-07-27 PROCEDURE — 2500000001 HC RX 250 WO HCPCS SELF ADMINISTERED DRUGS (ALT 637 FOR MEDICARE OP): Mod: SE | Performed by: STUDENT IN AN ORGANIZED HEALTH CARE EDUCATION/TRAINING PROGRAM

## 2024-07-27 PROCEDURE — 36415 COLL VENOUS BLD VENIPUNCTURE: CPT | Performed by: STUDENT IN AN ORGANIZED HEALTH CARE EDUCATION/TRAINING PROGRAM

## 2024-07-27 PROCEDURE — 96375 TX/PRO/DX INJ NEW DRUG ADDON: CPT

## 2024-07-27 PROCEDURE — 2500000005 HC RX 250 GENERAL PHARMACY W/O HCPCS: Mod: SE | Performed by: STUDENT IN AN ORGANIZED HEALTH CARE EDUCATION/TRAINING PROGRAM

## 2024-07-27 PROCEDURE — 2500000004 HC RX 250 GENERAL PHARMACY W/ HCPCS (ALT 636 FOR OP/ED): Mod: SE | Performed by: STUDENT IN AN ORGANIZED HEALTH CARE EDUCATION/TRAINING PROGRAM

## 2024-07-27 RX ORDER — HYDROCORTISONE 10 MG/1
5 TABLET ORAL DAILY
Status: DISCONTINUED | OUTPATIENT
Start: 2024-07-27 | End: 2024-07-27 | Stop reason: HOSPADM

## 2024-07-27 RX ORDER — ACETAMINOPHEN 325 MG/1
650 TABLET ORAL EVERY 6 HOURS PRN
Status: DISCONTINUED | OUTPATIENT
Start: 2024-07-27 | End: 2024-07-27 | Stop reason: HOSPADM

## 2024-07-27 RX ORDER — HYDROXYCHLOROQUINE SULFATE 200 MG/1
300 TABLET, FILM COATED ORAL DAILY
Status: DISCONTINUED | OUTPATIENT
Start: 2024-07-27 | End: 2024-07-27 | Stop reason: HOSPADM

## 2024-07-27 RX ORDER — MORPHINE SULFATE 30 MG/1
30 TABLET, FILM COATED, EXTENDED RELEASE ORAL 2 TIMES DAILY
Status: ON HOLD | COMMUNITY
End: 2024-08-06 | Stop reason: SDUPTHER

## 2024-07-27 RX ORDER — LEVOTHYROXINE SODIUM 88 UG/1
88 TABLET ORAL
Status: DISCONTINUED | OUTPATIENT
Start: 2024-07-27 | End: 2024-07-27 | Stop reason: HOSPADM

## 2024-07-27 RX ORDER — SULFAMETHOXAZOLE AND TRIMETHOPRIM 800; 160 MG/1; MG/1
1 TABLET ORAL 2 TIMES DAILY
Qty: 14 TABLET | Refills: 0 | Status: SHIPPED | OUTPATIENT
Start: 2024-07-27 | End: 2024-08-03

## 2024-07-27 RX ORDER — POLYETHYLENE GLYCOL 3350 17 G/17G
17 POWDER, FOR SOLUTION ORAL DAILY
Status: DISCONTINUED | OUTPATIENT
Start: 2024-07-27 | End: 2024-07-27 | Stop reason: HOSPADM

## 2024-07-27 RX ORDER — MORPHINE SULFATE ORAL SOLUTION 10 MG/5ML
15 SOLUTION ORAL EVERY 4 HOURS PRN
Status: DISCONTINUED | OUTPATIENT
Start: 2024-07-27 | End: 2024-07-27

## 2024-07-27 RX ORDER — CIPROFLOXACIN 2 MG/ML
400 INJECTION, SOLUTION INTRAVENOUS EVERY 12 HOURS
Status: DISCONTINUED | OUTPATIENT
Start: 2024-07-27 | End: 2024-07-27

## 2024-07-27 RX ORDER — MONTELUKAST SODIUM 10 MG/1
10 TABLET ORAL NIGHTLY
Status: DISCONTINUED | OUTPATIENT
Start: 2024-07-27 | End: 2024-07-27 | Stop reason: HOSPADM

## 2024-07-27 RX ORDER — SULFAMETHOXAZOLE AND TRIMETHOPRIM 800; 160 MG/1; MG/1
1 TABLET ORAL 2 TIMES DAILY
Status: DISCONTINUED | OUTPATIENT
Start: 2024-07-27 | End: 2024-07-27 | Stop reason: HOSPADM

## 2024-07-27 RX ORDER — FAMOTIDINE 20 MG/1
20 TABLET, FILM COATED ORAL 2 TIMES DAILY
Status: DISCONTINUED | OUTPATIENT
Start: 2024-07-27 | End: 2024-07-27 | Stop reason: HOSPADM

## 2024-07-27 RX ORDER — GUAIFENESIN 600 MG/1
1200 TABLET, EXTENDED RELEASE ORAL 2 TIMES DAILY
Qty: 28 TABLET | Refills: 0 | Status: SHIPPED | OUTPATIENT
Start: 2024-07-27 | End: 2024-08-03

## 2024-07-27 RX ORDER — FLUTICASONE PROPIONATE 110 UG/1
2 AEROSOL, METERED RESPIRATORY (INHALATION)
COMMUNITY
End: 2024-07-31 | Stop reason: WASHOUT

## 2024-07-27 RX ORDER — ONDANSETRON 4 MG/1
4 TABLET, FILM COATED ORAL EVERY 6 HOURS PRN
Status: DISCONTINUED | OUTPATIENT
Start: 2024-07-27 | End: 2024-07-27 | Stop reason: HOSPADM

## 2024-07-27 RX ORDER — LEFLUNOMIDE 10 MG/1
20 TABLET ORAL DAILY
Status: DISCONTINUED | OUTPATIENT
Start: 2024-07-27 | End: 2024-07-27 | Stop reason: HOSPADM

## 2024-07-27 RX ORDER — MORPHINE SULFATE 4 MG/ML
4 INJECTION INTRAVENOUS
Status: DISCONTINUED | OUTPATIENT
Start: 2024-07-27 | End: 2024-07-27 | Stop reason: HOSPADM

## 2024-07-27 RX ORDER — LORATADINE 10 MG/1
10 TABLET ORAL DAILY
COMMUNITY
End: 2024-07-31 | Stop reason: WASHOUT

## 2024-07-27 RX ORDER — IPRATROPIUM BROMIDE 42 UG/1
2 SPRAY, METERED NASAL 2 TIMES DAILY
COMMUNITY

## 2024-07-27 RX ORDER — AMITRIPTYLINE HYDROCHLORIDE 25 MG/1
25 TABLET, FILM COATED ORAL NIGHTLY PRN
COMMUNITY

## 2024-07-27 RX ORDER — CIPROFLOXACIN 2 MG/ML
400 INJECTION, SOLUTION INTRAVENOUS ONCE
Status: COMPLETED | OUTPATIENT
Start: 2024-07-27 | End: 2024-07-27

## 2024-07-27 RX ORDER — CLOPIDOGREL BISULFATE 75 MG/1
75 TABLET ORAL DAILY
Status: DISCONTINUED | OUTPATIENT
Start: 2024-07-27 | End: 2024-07-27 | Stop reason: HOSPADM

## 2024-07-27 RX ADMIN — LEVOTHYROXINE SODIUM 88 MCG: 88 TABLET ORAL at 10:25

## 2024-07-27 RX ADMIN — MORPHINE SULFATE 15 MG: 10 SOLUTION ORAL at 10:40

## 2024-07-27 RX ADMIN — CLOPIDOGREL BISULFATE 75 MG: 75 TABLET ORAL at 10:24

## 2024-07-27 RX ADMIN — FAMOTIDINE 20 MG: 20 TABLET ORAL at 10:24

## 2024-07-27 RX ADMIN — LEFLUNOMIDE 20 MG: 10 TABLET, FILM COATED ORAL at 10:25

## 2024-07-27 RX ADMIN — HYDROXYCHLOROQUINE SULFATE 300 MG: 200 TABLET, FILM COATED ORAL at 10:21

## 2024-07-27 RX ADMIN — CIPROFLOXACIN 400 MG: 2 INJECTION, SOLUTION INTRAVENOUS at 00:50

## 2024-07-27 RX ADMIN — HYDROCORTISONE 5 MG: 10 TABLET ORAL at 10:21

## 2024-07-27 RX ADMIN — CIPROFLOXACIN 400 MG: 2 INJECTION, SOLUTION INTRAVENOUS at 10:21

## 2024-07-27 RX ADMIN — MORPHINE SULFATE 4 MG: 4 INJECTION, SOLUTION INTRAMUSCULAR; INTRAVENOUS at 12:01

## 2024-07-27 RX ADMIN — ONDANSETRON HYDROCHLORIDE 4 MG: 4 TABLET, FILM COATED ORAL at 07:44

## 2024-07-27 SDOH — SOCIAL STABILITY: SOCIAL INSECURITY: ARE YOU OR HAVE YOU BEEN THREATENED OR ABUSED PHYSICALLY, EMOTIONALLY, OR SEXUALLY BY ANYONE?: NO

## 2024-07-27 SDOH — SOCIAL STABILITY: SOCIAL INSECURITY
WITHIN THE LAST YEAR, HAVE YOU BEEN KICKED, HIT, SLAPPED, OR OTHERWISE PHYSICALLY HURT BY YOUR PARTNER OR EX-PARTNER?: NO

## 2024-07-27 SDOH — HEALTH STABILITY: MENTAL HEALTH
STRESS IS WHEN SOMEONE FEELS TENSE, NERVOUS, ANXIOUS, OR CAN'T SLEEP AT NIGHT BECAUSE THEIR MIND IS TROUBLED. HOW STRESSED ARE YOU?: NOT AT ALL

## 2024-07-27 SDOH — SOCIAL STABILITY: SOCIAL NETWORK: IN A TYPICAL WEEK, HOW MANY TIMES DO YOU TALK ON THE PHONE WITH FAMILY, FRIENDS, OR NEIGHBORS?: NEVER

## 2024-07-27 SDOH — SOCIAL STABILITY: SOCIAL INSECURITY: DO YOU FEEL UNSAFE GOING BACK TO THE PLACE WHERE YOU ARE LIVING?: NO

## 2024-07-27 SDOH — ECONOMIC STABILITY: INCOME INSECURITY: IN THE PAST 12 MONTHS, HAS THE ELECTRIC, GAS, OIL, OR WATER COMPANY THREATENED TO SHUT OFF SERVICE IN YOUR HOME?: NO

## 2024-07-27 SDOH — SOCIAL STABILITY: SOCIAL INSECURITY: HAVE YOU HAD THOUGHTS OF HARMING ANYONE ELSE?: NO

## 2024-07-27 SDOH — ECONOMIC STABILITY: FOOD INSECURITY: WITHIN THE PAST 12 MONTHS, YOU WORRIED THAT YOUR FOOD WOULD RUN OUT BEFORE YOU GOT MONEY TO BUY MORE.: NEVER TRUE

## 2024-07-27 SDOH — SOCIAL STABILITY: SOCIAL INSECURITY: WITHIN THE LAST YEAR, HAVE YOU BEEN HUMILIATED OR EMOTIONALLY ABUSED IN OTHER WAYS BY YOUR PARTNER OR EX-PARTNER?: NO

## 2024-07-27 SDOH — SOCIAL STABILITY: SOCIAL INSECURITY: WITHIN THE LAST YEAR, HAVE YOU BEEN AFRAID OF YOUR PARTNER OR EX-PARTNER?: NO

## 2024-07-27 SDOH — SOCIAL STABILITY: SOCIAL INSECURITY: HAS ANYONE EVER THREATENED TO HURT YOUR FAMILY OR YOUR PETS?: NO

## 2024-07-27 SDOH — SOCIAL STABILITY: SOCIAL INSECURITY: DOES ANYONE TRY TO KEEP YOU FROM HAVING/CONTACTING OTHER FRIENDS OR DOING THINGS OUTSIDE YOUR HOME?: NO

## 2024-07-27 SDOH — SOCIAL STABILITY: SOCIAL INSECURITY
WITHIN THE LAST YEAR, HAVE TO BEEN RAPED OR FORCED TO HAVE ANY KIND OF SEXUAL ACTIVITY BY YOUR PARTNER OR EX-PARTNER?: NO

## 2024-07-27 SDOH — SOCIAL STABILITY: SOCIAL INSECURITY: ABUSE: ADULT

## 2024-07-27 SDOH — SOCIAL STABILITY: SOCIAL NETWORK: HOW OFTEN DO YOU ATTENT MEETINGS OF THE CLUB OR ORGANIZATION YOU BELONG TO?: NEVER

## 2024-07-27 SDOH — SOCIAL STABILITY: SOCIAL INSECURITY: HAVE YOU HAD ANY THOUGHTS OF HARMING ANYONE ELSE?: NO

## 2024-07-27 SDOH — HEALTH STABILITY: MENTAL HEALTH
HOW OFTEN DO YOU NEED TO HAVE SOMEONE HELP YOU WHEN YOU READ INSTRUCTIONS, PAMPHLETS, OR OTHER WRITTEN MATERIAL FROM YOUR DOCTOR OR PHARMACY?: NEVER

## 2024-07-27 SDOH — ECONOMIC STABILITY: FOOD INSECURITY: WITHIN THE PAST 12 MONTHS, THE FOOD YOU BOUGHT JUST DIDN'T LAST AND YOU DIDN'T HAVE MONEY TO GET MORE.: NEVER TRUE

## 2024-07-27 SDOH — SOCIAL STABILITY: SOCIAL NETWORK: HOW OFTEN DO YOU GET TOGETHER WITH FRIENDS OR RELATIVES?: NEVER

## 2024-07-27 SDOH — SOCIAL STABILITY: SOCIAL NETWORK
DO YOU BELONG TO ANY CLUBS OR ORGANIZATIONS SUCH AS CHURCH GROUPS UNIONS, FRATERNAL OR ATHLETIC GROUPS, OR SCHOOL GROUPS?: NO

## 2024-07-27 SDOH — HEALTH STABILITY: PHYSICAL HEALTH: ON AVERAGE, HOW MANY DAYS PER WEEK DO YOU ENGAGE IN MODERATE TO STRENUOUS EXERCISE (LIKE A BRISK WALK)?: 0 DAYS

## 2024-07-27 SDOH — SOCIAL STABILITY: SOCIAL INSECURITY: ARE THERE ANY APPARENT SIGNS OF INJURIES/BEHAVIORS THAT COULD BE RELATED TO ABUSE/NEGLECT?: NO

## 2024-07-27 SDOH — SOCIAL STABILITY: SOCIAL INSECURITY: DO YOU FEEL ANYONE HAS EXPLOITED OR TAKEN ADVANTAGE OF YOU FINANCIALLY OR OF YOUR PERSONAL PROPERTY?: NO

## 2024-07-27 SDOH — HEALTH STABILITY: PHYSICAL HEALTH: ON AVERAGE, HOW MANY MINUTES DO YOU ENGAGE IN EXERCISE AT THIS LEVEL?: 0 MIN

## 2024-07-27 SDOH — SOCIAL STABILITY: SOCIAL NETWORK: HOW OFTEN DO YOU ATTEND CHURCH OR RELIGIOUS SERVICES?: NEVER

## 2024-07-27 SDOH — SOCIAL STABILITY: SOCIAL NETWORK: ARE YOU MARRIED, WIDOWED, DIVORCED, SEPARATED, NEVER MARRIED, OR LIVING WITH A PARTNER?: PATIENT DECLINED

## 2024-07-27 ASSESSMENT — PAIN SCALES - GENERAL
PAINLEVEL_OUTOF10: 6
PAINLEVEL_OUTOF10: 7
PAINLEVEL_OUTOF10: 0 - NO PAIN
PAINLEVEL_OUTOF10: 8
PAINLEVEL_OUTOF10: 8

## 2024-07-27 ASSESSMENT — COGNITIVE AND FUNCTIONAL STATUS - GENERAL
STANDING UP FROM CHAIR USING ARMS: A LITTLE
DRESSING REGULAR LOWER BODY CLOTHING: A LITTLE
MOBILITY SCORE: 20
CLIMB 3 TO 5 STEPS WITH RAILING: A LOT
MOVING TO AND FROM BED TO CHAIR: A LITTLE
DRESSING REGULAR UPPER BODY CLOTHING: A LITTLE
MOVING TO AND FROM BED TO CHAIR: A LITTLE
CLIMB 3 TO 5 STEPS WITH RAILING: A LOT
WALKING IN HOSPITAL ROOM: A LITTLE
MOBILITY SCORE: 19
DAILY ACTIVITIY SCORE: 20
WALKING IN HOSPITAL ROOM: A LITTLE
TOILETING: A LITTLE
PATIENT BASELINE BEDBOUND: NO
PERSONAL GROOMING: A LITTLE

## 2024-07-27 ASSESSMENT — LIFESTYLE VARIABLES
HOW MANY STANDARD DRINKS CONTAINING ALCOHOL DO YOU HAVE ON A TYPICAL DAY: PATIENT DOES NOT DRINK
PRESCIPTION_ABUSE_PAST_12_MONTHS: NO
AUDIT-C TOTAL SCORE: 0
HOW OFTEN DO YOU HAVE A DRINK CONTAINING ALCOHOL: NEVER
SUBSTANCE_ABUSE_PAST_12_MONTHS: NO
SKIP TO QUESTIONS 9-10: 1
AUDIT-C TOTAL SCORE: 0
HOW OFTEN DO YOU HAVE 6 OR MORE DRINKS ON ONE OCCASION: NEVER

## 2024-07-27 ASSESSMENT — PATIENT HEALTH QUESTIONNAIRE - PHQ9
1. LITTLE INTEREST OR PLEASURE IN DOING THINGS: NOT AT ALL
SUM OF ALL RESPONSES TO PHQ9 QUESTIONS 1 & 2: 0
2. FEELING DOWN, DEPRESSED OR HOPELESS: NOT AT ALL

## 2024-07-27 ASSESSMENT — ACTIVITIES OF DAILY LIVING (ADL)
DRESSING YOURSELF: INDEPENDENT
PATIENT'S MEMORY ADEQUATE TO SAFELY COMPLETE DAILY ACTIVITIES?: NO
TOILETING: INDEPENDENT
BATHING: INDEPENDENT
GROOMING: INDEPENDENT
ADEQUATE_TO_COMPLETE_ADL: YES
HEARING - RIGHT EAR: FUNCTIONAL
WALKS IN HOME: INDEPENDENT
HEARING - LEFT EAR: FUNCTIONAL
JUDGMENT_ADEQUATE_SAFELY_COMPLETE_DAILY_ACTIVITIES: YES
FEEDING YOURSELF: INDEPENDENT
ADL_ASSISTANCE: INDEPENDENT
LACK_OF_TRANSPORTATION: NO

## 2024-07-27 ASSESSMENT — PAIN DESCRIPTION - LOCATION
LOCATION: BACK
LOCATION: BACK

## 2024-07-27 ASSESSMENT — PAIN - FUNCTIONAL ASSESSMENT: PAIN_FUNCTIONAL_ASSESSMENT: 0-10

## 2024-07-27 ASSESSMENT — PAIN DESCRIPTION - DESCRIPTORS: DESCRIPTORS: BURNING

## 2024-07-27 NOTE — ED TRIAGE NOTES
[facility]. Pt states she was unaware she even took a shower or passed out until family told her. Pt states she has been feeling ill or not like herself x2 weeks. Pt has an entire 8.5x11 sheet of paper with 20+ medications listed on it, including a blood thinner (plavix).

## 2024-07-27 NOTE — PROGRESS NOTES
"Physical Therapy    Physical Therapy Evaluation & Treatment    Patient Name: Daniela Saez  MRN: [id number]  Today's Date: 7/27/2024   Time Calculation  Start Time: 0901  Stop Time: 0934  Time Calculation (min): 33 min    Assessment/Plan   PT Assessment  Rehab Prognosis: Excellent  Barriers to Discharge: pt support of family  Evaluation/Treatment Tolerance: Patient tolerated treatment well  Medical Staff Made Aware: Yes  Strengths: Ability to acquire knowledge, Access to adaptive/assistive products, Attitude of self, Housing layout, Insight into problems, Leisure activity, Living arrangement secure, Rehab experience, Support of Caregivers  End of Session Communication: Bedside nurse  Assessment Comment: pt displayed PLOF function as per pt reports  End of Session Patient Position: Up in chair, Alarm on   IP OR SWING BED PT PLAN  Inpatient or Swing Bed: Inpatient  PT Plan  PT Plan: PT Eval only  PT Eval Only Reason: At baseline function  PT Frequency: PT eval only  PT Discharge Recommendations: Low intensity level of continued care  PT Recommended Transfer Status: Independent  PT - OK to Discharge: Yes      Subjective     General Visit Information:  General  Referred By: Narinder Bergman DO  Past Medical History Relevant to Rehab: UTI Anemia, anxiety, AFIB,CAD, depression, fibromyalgia, Gastroparesis, HTN, hypothyroidism  Prior to Session Communication: Bedside nurse  Patient Position Received: Bed, 2 rail up  General Comment: pt was pleasant and reports of feeling tired  Home Living:  Home Living  Type of Home: House  Lives With: Siblings (and mother)  Home Adaptive Equipment: Walker rolling or standard, Cane  Home Layout: One level, Laundry in basement (has aide and home health at home)  Home Access: Stairs to enter with rails  Bathroom Shower/Tub: None  Bathroom Toilet: Standard  Bathroom Equipment: None  Home Living Comments: pt reports brother has a grab rail and been\"procrastinating on install\"  Prior Level of " Function:  Prior Function Per Pt/Caregiver Report  Level of [place]: Independent with ADLs and functional transfers, Needs assistance with homemaking  Receives Help From: Home health, Personal care attendant  ADL Assistance: Independent  Homemaking Assistance: Needs assistance  Ambulatory Assistance: Independent  Precautions:     Vital Signs:  Vital Signs  Heart Rate: 92  SpO2: 96 %    Objective   Pain:  Pain Assessment  0-10 (Numeric) Pain Score: 6  Pain Location: Neck  Cognition:  Cognition  Orientation Level: Oriented X4    General Assessments:     Activity Tolerance  Endurance: Tolerates 30 min exercise with multiple rests    Postural Control  Postural Control: Within Functional Limits  Functional Assessments:       Bed Mobility  Bed Mobility: Yes  Bed Mobility 1  Bed Mobility 1: Supine to sitting  Level of Assistance 1: Independent  Bed Mobility Comments 1: railing    Transfers  Transfer: Yes  Transfer 1  Technique 1: Sit to stand, Stand to sit  Transfer Device 1: Gait belt  Transfer Level of Assistance 1: Close supervision  Trials/Comments 1: x3  Transfers 2  Transfer From 2: Bed to  Transfer to 2: Chair with arms  Technique 2: Sit to stand  Transfer Device 2: Gait belt    Ambulation/Gait Training  Ambulation/Gait Training Performed: Yes  Ambulation/Gait Training 1  Surface 1: Level tile  Device 1: Rolling walker  Assistance 1: Close supervision    Stairs  Stairs: No  Extremity/Trunk Assessments:  RUE   RUE : Within Functional Limits  LUE   LUE: Within Functional Limits  RLE   RLE : Within Functional Limits  LLE   LLE : Within Functional Limits  Treatments:       Bed Mobility  Bed Mobility: Yes  Bed Mobility 1  Bed Mobility 1: Supine to sitting  Level of Assistance 1: Independent  Bed Mobility Comments 1: railing    Ambulation/Gait Training  Ambulation/Gait Training Performed: Yes  Ambulation/Gait Training 1  Surface 1: Level tile  Device 1: Rolling walker  Assistance 1: Close  supervision  Transfers  Transfer: Yes  Transfer 1  Technique 1: Sit to stand, Stand to sit  Transfer Device 1: Gait belt  Transfer Level of Assistance 1: Close supervision  Trials/Comments 1: x3  Transfers 2  Transfer From 2: Bed to  Transfer to 2: Chair with arms  Technique 2: Sit to stand  Transfer Device 2: Gait belt    Stairs  Stairs: No  Outcome Measures:  [facility] Basic Mobility  Turning from your back to your side while in a flat bed without using bedrails: None  Moving from lying on your back to sitting on the side of a flat bed without using bedrails: None  Moving to and from bed to chair (including a wheelchair): A little  Standing up from a chair using your arms (e.g. wheelchair or bedside chair): None  To walk in hospital room: A little  Climbing 3-5 steps with railing: A lot  Basic Mobility - Total Score: 20    Encounter Problems       Encounter Problems (Active)       Pain - Adult              Education Documentation  No documentation found.  Education Comments  No comments found.

## 2024-07-27 NOTE — PROGRESS NOTES
Pharmacy Medication History Review    Daniela Saez is a 64 y.o. female admitted for UTI (urinary tract infection). Pharmacy reviewed the patient's [id number] medications and allergies for accuracy.    The list below reflects the updated PTA list.   Prior to Admission Medications   Prescriptions Last Dose Informant   Lantus Solostar U-100 Insulin 100 unit/mL (3 mL) pen  Other   Sig: Inject 12 Units under the skin once daily at bedtime. Take as directed per insulin instructions.   NovoLOG Flexpen U-100 Insulin 100 unit/mL (3 mL) pen  Other   Sig: Inject 2-6 Units under the skin 3 times a day before meals.   Patient taking differently: Inject 2-6 Units under the skin 3 times a day before meals. 100-150 2 units  150-200- 4 units  200-250- 6 units  if higher than that it increases 1 unit per every 50 per patient   SUMAtriptan (Imitrex) 25 mg tablet  Other   Sig: Take 1 tablet (25 mg) by mouth if needed for migraine. May repeat in 2 hours if needed   Solu-CORTEF Act-O-Vial, PF, 100 mg/2 mL injection  Other   Sig: Inject 2 mL (100 mg) over 1 minutes into the muscle if needed for other (Adrenal crisis).   acetaminophen (Tylenol) 325 mg tablet  Other   Sig: Take 2 tablets (650 mg) by mouth every 4 hours if needed for mild pain (1 - 3).   albuterol 90 mcg/actuation inhaler  Other   Sig: Inhale 2 puffs 2 times a day.   alendronate (Fosamax) 70 mg tablet  Other   Sig: Take 1 tablet (70 mg) by mouth every 7 days. Take in the morning with a full glass of water, on an empty stomach, and do not take anything else by mouth or lie down for the next 30 min.   allopurinol (Zyloprim) 100 mg tablet  Other   Sig: TAKE ONE TABLET BY MOUTH EVERY DAY   amitriptyline (Elavil) 25 mg tablet  Other   Sig: Take 1 tablet (25 mg) by mouth as needed at bedtime for sleep. Can take in addition to 50 mg tablet. 50-75 mg nightly   amitriptyline (Elavil) 50 mg tablet  Other   Sig: Take 1 tablet (50 mg) by mouth once daily.   atorvastatin  (Lipitor) 40 mg tablet  Other   Sig: TAKE ONE TABLET BY MOUTH EVERY DAY   baclofen (Lioresal) 10 mg tablet  Other   Sig: Take 2 tablets (20 mg) by mouth 2 times a day.   bumetanide (Bumex) 1 mg tablet  Other   Sig: Take 1 tablet (1 mg) by mouth once daily.   carboxymethylcellulose (Refresh Tears) 0.5 % ophthalmic solution  Other   Si-2 drops each eye 3 times a day for 30 days   carvedilol (Coreg) 6.25 mg tablet  Other   Sig: TAKE ONE TABLET BY MOUTH TWICE DAILY   clopidogrel (Plavix) 75 mg tablet  Other   Sig: TAKE ONE TABLET BY MOUTH EVERY DAY   eplerenone (Inspra) 50 mg tablet  Other   Sig: Take 2 tablets (100 mg) by mouth 2 times a day.   Patient taking differently: Take 1 tablet (50 mg) by mouth 2 times a day.   famotidine (Pepcid) 20 mg tablet  Other   Sig: Take 1 tablet (20 mg) by mouth 2 times a day.   fluocinonide (Lidex) 0.05 % cream  Other   Sig: Apply 1 Application topically 2 times a day. To areas of psoriasis on trunk and extremities Monday thru Friday. Avoid face   fluticasone (Flonase Sensimist) 27.5 mcg/actuation nasal spray     Sig: Administer 2 sprays into each nostril once daily at bedtime.   fluticasone (Flovent) 110 mcg/actuation inhaler  Other   Sig: Inhale 2 puffs 2 times a day. Rinse mouth with water after use to reduce aftertaste and incidence of candidiasis. Do not swallow.   hydrocortisone (Cortef) 5 mg tablet  Other   Sig: Take 1-2 tablets (5-10 mg) by mouth 2 times a day.   hydroxychloroquine (Plaquenil) 200 mg tablet  Other   Sig: take ONE & ONE-HALF tablet BY MOUTH EVERY DAY   ipratropium (Atrovent) 42 mcg (0.06 %) nasal spray  Other   Sig: Administer 2 sprays into each nostril 3 times a day.   leflunomide (Arava) 20 mg tablet  Other   Sig: TAKE ONE TABLET BY MOUTH EVERY DAY   levothyroxine (Synthroid, Levoxyl) 88 mcg tablet  Other   Si tablet daily 6 days per week, 2 tablets one day per week   lidocaine (Lidoderm) 5 % patch  Other   Sig: apply up to 3 patches to skin once a  day, remove after 12 hours for 30 days   loratadine (Claritin) 10 mg tablet  Other   Sig: Take 1 tablet (10 mg) by mouth once daily.   losartan (Cozaar) 25 mg tablet  Other   Sig: TAKE ONE TABLET BY MOUTH EVERY DAY   miconazole (Micotin) 2 % powder  Other   Sig: Apply 1 Application topically 2 times a day as needed for itching.  To dry skin   morphine (MSIR) 30 mg tablet  Other   Sig: Take 1 tablet (30 mg) by mouth 2 times a day.   morphine CR (MS Contin) 15 mg 12 hr tablet  Other   Sig: Take 1 tablet (15 mg) by mouth 2 times a day.   morphine CR (MS Contin) 30 mg 12 hr tablet  Other   Sig: Take 1 tablet (30 mg) by mouth 2 times a day. Do not crush, chew, or split.   mupirocin (Bactroban) 2 % ointment  Other   Sig: Apply topically 3 times a day.   nystatin (Mycostatin) cream  Other   Sig: APPLY TOPICALLY TWICE DAILY   pancrelipase, Lip-Prot-Amyl, (Creon) 36,000-114,000- 180,000 unit capsule,delayed release(DR/EC) capsule  Other   Sig: Take 2 capsules by mouth 3 times a day. With meals, 1 capsule with snacks. Max 8 capsules per day   pantoprazole (ProtoNix) 40 mg EC tablet  Other   Sig: Take 1 tablet (40 mg) by mouth once daily.   polyethylene glycol (Miralax) 17 gram/dose powder  Other   Sig: Take 17 g by mouth once daily as needed (constipation).   potassium chloride CR (Klor-Con M20) 20 mEq ER tablet  Other   Sig: Take 1 tablet (20 mEq) by mouth 2 times a day for 10 days. Do not crush or chew.   sucralfate (Carafate) 1 gram tablet  Other   Sig: Take 1 tablet (1 g) by mouth 4 times a day before meals.   triamcinolone (Kenalog) 0.1 % cream  Other   Sig: APPLY ONE APPLICATION EXTERNALLY TWICE DAILY AS NEEDED      Facility-Administered Medications: None        The list below reflects the updated allergy list. Please review each documented allergy for additional clarification and justification.  Allergies  Reviewed by Juancarlos White MD on 7/26/2024        Severity Reactions Comments    Metoclopramide High  Anaphylaxis, Other Lockjaw    Cefuroxime Axetil Not Specified Diarrhea, GI Upset, Headache, Nausea/vomiting, Tinnitus     Fentanyl Not Specified Unknown     Gabapentin Not Specified Other, Unknown SUICIDAL THOUGHTS    Levetiracetam Not Specified Unknown     Metformin Not Specified Unknown     Torsemide Not Specified Unknown             Patient was unable to be assessed for M2B at discharge.     Sources:   Pt interview - not completed. Discussed with RN in CON ED - pt sleeping. Pt provides sufficient detailed medication list (scanned into media tab). This is compared against pharmacy dispense hx and medications in chart to confirm.   Dispense hx  OARRS    Additional Comments:  Medications on patient provided medication list but no fill history in past 1 year  Diphenoxylate-atropine QID  Butalbital-apap-caff Q6H PRN  Lovastatin 10 mg at bedtime   Ondansetron ODT Q4-6H PRN  Not on patient provided medication list   Potassium chloride 20 mEq BID for 10 days. 07/23/24-08/02/24  Fluticasone 110 mcg HFA 2 puff BID last filled 07/23/24  Solu-Cortef 100 mg IM PRN adrenal crisis   Ipratropium 0.06% nasal spray 1 spray IEN TID  Discrepancies   Sucralfate 1 g tablet - prescribed 1 g PO QID, medication on list is TID.         Bi Flores, PharmD  Transitions of Care Pharmacist  07/27/24     Medication history completed by Transitions of Care Pharmacist remotely from [facility].   Hours of operation: 24/7  Phone: [phone]

## 2024-07-27 NOTE — ED PROVIDER NOTES
HPI   Chief Complaint   Patient presents with    Syncope     Pt brought in by [facility] clement for syncopal episode after taking shower. Pt states she was unaware she even took a shower or passed out until family told her. Pt states she has been feeling ill or not like herself x2 weeks. Pt has an entire 8.5x11 sheet of paper with 20+ medications listed on it, including a blood thinner (plavix).       Patient is a chronically ill 64-year-old with multiple medical history who comes in with generalized weakness and bodyaches that started this afternoon.  She had some nausea but no vomiting or diarrhea.  No chest pain or abdominal pain.  Has not had any appetite today.  She has been recently treated for bronchitis and urinary tract infection with antibiotics.  Her symptoms from that seem to have improved.  On arrival here she had an 88% on 4 L but without oxygen she is at 94% sleeping soundly on her back.    The patient's workup including lab tests was essentially negative but she does have a urine still has a large esterase and nitrite positive.  This will be cultured and since she is allergic to cefuroxime, I am going to avoid the Rocephin at this time and give her IV Cipro 40 mg IV x 1 dose here in the ED.  She has been accepted for admission here at [facility] by Dr. Bergman.            Patient History   Past Medical History:   Diagnosis Date    Anemia     Anxiety     Atrial fibrillation (Multi)     Coronary artery disease     Depression     Fibromyalgia     Gastroparesis     Gastroparesis    Gout     Hypertension     Hypothyroidism     Thrombocythemia      Past Surgical History:   Procedure Laterality Date    CT GUIDED PERCUTANEOUS BIOPSY BONE DEEP  3/9/2022    CT GUIDED PERCUTANEOUS BIOPSY BONE DEEP 3/9/2022 [facility]    OTHER SURGICAL HISTORY  08/16/2019    Tonsillectomy with adenoidectomy    OTHER SURGICAL HISTORY  08/16/2019    Bunionectomy    OTHER SURGICAL HISTORY  08/16/2019    Hernia repair    OTHER SURGICAL  HISTORY  2019    Cholecystectomy    OTHER SURGICAL HISTORY  2019    Uterine nerve ablation    OTHER SURGICAL HISTORY  2019    Ovarian torsion repair    OTHER SURGICAL HISTORY  2019    Tubal ligation     Family History   Problem Relation Name Age of Onset    Heart disease Mother      Hypertension Mother      Arthritis Mother      Diabetes Mother      Hyperlipidemia Mother      Hypertension Father      Brain cancer Father      Diabetes Sister      Diabetes Brother       Social History     Tobacco Use    Smoking status: Former     Current packs/day: 0.00     Types: Cigarettes     Quit date: 2004     Years since quittin.1    Smokeless tobacco: Never   Substance Use Topics    Alcohol use: Not Currently    Drug use: Never       Physical Exam   ED Triage Vitals   Temperature Heart Rate Respirations BP   24   36.9 °C (98.4 °F) 82 14 136/61      SpO2 Temp Source Heart Rate Source Patient Position   24 -- 24   97 % Tympanic  Sitting      BP Location FiO2 (%)     24     Right arm 97 %       Physical Exam  Vitals and nursing note reviewed.   Constitutional:       Appearance: She is ill-appearing.   HENT:      Head: Normocephalic and atraumatic.      Right Ear: Tympanic membrane and ear canal normal.      Left Ear: Tympanic membrane and ear canal normal.      Nose: Nose normal.      Mouth/Throat:      Mouth: Mucous membranes are moist.   Cardiovascular:      Rate and Rhythm: Normal rate.   Pulmonary:      Effort: Pulmonary effort is normal.      Breath sounds: Normal breath sounds.   Abdominal:      General: Abdomen is flat.      Palpations: Abdomen is soft.   Musculoskeletal:         General: Normal range of motion.      Cervical back: Normal range of motion.   Skin:     General: Skin is warm and dry.   Neurological:      General: No focal deficit present.      Mental Status: She is  alert.           ED Course & MDM   Diagnoses as of 07/27/24 0255   Urinary tract infection in elderly patient                       Alberto Coma Scale Score: 15                        Medical Decision Making  EKG interpreted by me: Sinus rhythm rate of 94.  There is a left axis deviation.  Intervals however are normal.        Procedure  Procedures     Juancarlos White MD  07/26/24 2154       Juancarlos White MD  07/27/24 0036       Juancarlos White MD  07/27/24 0255

## 2024-07-27 NOTE — CARE PLAN
The patient's goals for the shift include      The clinical goals for the shift include Keep pain level < 7 for this shift    Over the shift, the patient did not make progress toward the following goals. Barriers to progression include . Recommendations to address these barriers include .  Pt is obtunded,  but when awake, is completely  alert and oriented , complains of pain 7; out of 10 always,  no shortness of breath

## 2024-07-27 NOTE — PROGRESS NOTES
"Occupational Therapy                 Therapy Communication Note    Patient Name: Daniela Saez  MRN: [id number]  Today's Date: 7/27/2024     Discipline: Occupational Therapy    Missed Visit Reason: Missed Visit Reason: Other (Comment) (OT Screen Only)    Comment: Pt needing to functionally ambulate to bathroom upon OT entrance to the room. PT reporting to OT pt moving very well though wanted OT to confirm safe bathroom set up. Pt requiring no assistance with all tolieting and dressing activities in the bathroom and reporting no OT needs after stating, \"I just finished with  OT\". Discussing safe practices at home with patient to good affect. Nursing notified. No further OT needs.     Attempt time: [id number]  "

## 2024-07-27 NOTE — CARE PLAN
The patient's goals for the shift include      The clinical goals for the shift include Keep pain level < 7 for this shift    Over the shift, the patient did not make progress toward the following goals. Barriers to progression include . Recommendations to address these barriers include .  Discharge instructions given, discussed new medications , denies pain at this time , indicates understanding

## 2024-07-29 ENCOUNTER — PATIENT OUTREACH (OUTPATIENT)
Dept: PRIMARY CARE | Facility: CLINIC | Age: 64
End: 2024-07-29
Payer: MEDICAID

## 2024-07-29 ENCOUNTER — HOSPITAL ENCOUNTER (OUTPATIENT)
Facility: HOSPITAL | Age: 64
Setting detail: OUTPATIENT SURGERY
End: 2024-07-29
Attending: ORTHOPAEDIC SURGERY | Admitting: ORTHOPAEDIC SURGERY
Payer: MEDICAID

## 2024-07-29 DIAGNOSIS — Z09 HOSPITAL DISCHARGE FOLLOW-UP: ICD-10-CM

## 2024-07-29 PROBLEM — M17.12 UNILATERAL PRIMARY OSTEOARTHRITIS, LEFT KNEE: Status: ACTIVE | Noted: 2024-07-29

## 2024-07-29 LAB
ATRIAL RATE: 94 BPM
P AXIS: 58 DEGREES
P OFFSET: 195 MS
P ONSET: 129 MS
PR INTERVAL: 166 MS
Q ONSET: 212 MS
QRS COUNT: 15 BEATS
QRS DURATION: 88 MS
QT INTERVAL: 400 MS
QTC CALCULATION(BAZETT): 500 MS
QTC FREDERICIA: 464 MS
R AXIS: -81 DEGREES
T AXIS: 63 DEGREES
T OFFSET: 412 MS
VENTRICULAR RATE: 94 BPM

## 2024-07-29 NOTE — PROGRESS NOTES
TCM initial outreach post discharge hospitalization completed. Patient states she already received a call earlier today and does not need TCM services. Patient has a PCP follow up appt scheduled, but it is outside TCM 14 day window. TCM program closed.

## 2024-07-31 ENCOUNTER — CLINICAL SUPPORT (OUTPATIENT)
Dept: PREADMISSION TESTING | Facility: HOSPITAL | Age: 64
End: 2024-07-31
Payer: MEDICAID

## 2024-07-31 ENCOUNTER — TELEPHONE (OUTPATIENT)
Dept: PRIMARY CARE | Facility: CLINIC | Age: 64
End: 2024-07-31

## 2024-07-31 ENCOUNTER — HOME CARE VISIT (OUTPATIENT)
Dept: HOME HEALTH SERVICES | Facility: HOME HEALTH | Age: 64
End: 2024-07-31
Payer: MEDICAID

## 2024-07-31 DIAGNOSIS — M17.12 UNILATERAL PRIMARY OSTEOARTHRITIS, LEFT KNEE: ICD-10-CM

## 2024-07-31 RX ORDER — LIDOCAINE 50 MG/G
3 PATCH TOPICAL DAILY
COMMUNITY

## 2024-07-31 RX ORDER — ONDANSETRON 4 MG/1
4 TABLET, ORALLY DISINTEGRATING ORAL EVERY 8 HOURS PRN
COMMUNITY

## 2024-07-31 RX ORDER — PLECANATIDE 3 MG/1
3 TABLET ORAL EVERY OTHER DAY
COMMUNITY

## 2024-08-01 ENCOUNTER — HOSPITAL ENCOUNTER (OUTPATIENT)
Dept: RADIOLOGY | Facility: CLINIC | Age: 64
Discharge: HOME | End: 2024-08-01
Payer: MEDICAID

## 2024-08-01 ENCOUNTER — OFFICE VISIT (OUTPATIENT)
Dept: ORTHOPEDIC SURGERY | Facility: CLINIC | Age: 64
End: 2024-08-01
Payer: MEDICAID

## 2024-08-01 ENCOUNTER — DOCUMENTATION (OUTPATIENT)
Dept: PREADMISSION TESTING | Facility: HOSPITAL | Age: 64
End: 2024-08-01

## 2024-08-01 ENCOUNTER — PRE-ADMISSION TESTING (OUTPATIENT)
Dept: PREADMISSION TESTING | Facility: HOSPITAL | Age: 64
End: 2024-08-01
Payer: MEDICAID

## 2024-08-01 VITALS
TEMPERATURE: 97.5 F | WEIGHT: 145.72 LBS | OXYGEN SATURATION: 96 % | RESPIRATION RATE: 18 BRPM | HEART RATE: 88 BPM | SYSTOLIC BLOOD PRESSURE: 120 MMHG | HEIGHT: 61 IN | DIASTOLIC BLOOD PRESSURE: 65 MMHG | BODY MASS INDEX: 27.51 KG/M2

## 2024-08-01 VITALS — WEIGHT: 147 LBS | BODY MASS INDEX: 27.75 KG/M2

## 2024-08-01 DIAGNOSIS — I48.91 ATRIAL FIBRILLATION, UNSPECIFIED TYPE (MULTI): ICD-10-CM

## 2024-08-01 DIAGNOSIS — E11.10 TYPE 2 DIABETES MELLITUS WITH KETOACIDOSIS WITHOUT COMA, WITHOUT LONG-TERM CURRENT USE OF INSULIN (MULTI): Primary | ICD-10-CM

## 2024-08-01 DIAGNOSIS — M17.12 PRIMARY OSTEOARTHRITIS OF LEFT KNEE: Primary | ICD-10-CM

## 2024-08-01 DIAGNOSIS — M17.12 UNILATERAL PRIMARY OSTEOARTHRITIS, LEFT KNEE: ICD-10-CM

## 2024-08-01 PROCEDURE — 93005 ELECTROCARDIOGRAM TRACING: CPT | Performed by: NURSE PRACTITIONER

## 2024-08-01 PROCEDURE — 1036F TOBACCO NON-USER: CPT | Performed by: ORTHOPAEDIC SURGERY

## 2024-08-01 PROCEDURE — 4010F ACE/ARB THERAPY RXD/TAKEN: CPT | Performed by: ORTHOPAEDIC SURGERY

## 2024-08-01 PROCEDURE — 3044F HG A1C LEVEL LT 7.0%: CPT | Performed by: ORTHOPAEDIC SURGERY

## 2024-08-01 PROCEDURE — 73562 X-RAY EXAM OF KNEE 3: CPT | Mod: LT

## 2024-08-01 PROCEDURE — 87081 CULTURE SCREEN ONLY: CPT | Mod: AHULAB | Performed by: NURSE PRACTITIONER

## 2024-08-01 PROCEDURE — 99214 OFFICE O/P EST MOD 30 MIN: CPT | Performed by: ORTHOPAEDIC SURGERY

## 2024-08-01 PROCEDURE — 99244 OFF/OP CNSLTJ NEW/EST MOD 40: CPT | Performed by: NURSE PRACTITIONER

## 2024-08-01 PROCEDURE — 77073 BONE LENGTH STUDIES: CPT

## 2024-08-01 PROCEDURE — 93010 ELECTROCARDIOGRAM REPORT: CPT | Performed by: INTERNAL MEDICINE

## 2024-08-01 RX ORDER — CHLORHEXIDINE GLUCONATE ORAL RINSE 1.2 MG/ML
SOLUTION DENTAL
Qty: 473 ML | Refills: 0 | Status: SHIPPED | OUTPATIENT
Start: 2024-08-01 | End: 2024-08-08

## 2024-08-01 ASSESSMENT — ENCOUNTER SYMPTOMS
CARDIOVASCULAR NEGATIVE: 1
RESPIRATORY NEGATIVE: 1
NECK NEGATIVE: 1
ENDOCRINE NEGATIVE: 1
CONSTITUTIONAL NEGATIVE: 1
WEAKNESS: 1

## 2024-08-01 NOTE — PREPROCEDURE INSTRUCTIONS
"   Medication List            Accurate as of August 1, 2024  1:45 PM. Always use your most recent med list.                acetaminophen 325 mg tablet  Commonly known as: Tylenol  Medication Adjustments for Surgery: Other (Comment)  Notes to patient: May take day of surgery     albuterol 90 mcg/actuation inhaler  Medication Adjustments for Surgery: Other (Comment)  Notes to patient: May take day of surgery     alendronate 70 mg tablet  Commonly known as: Fosamax  Take 1 tablet (70 mg) by mouth every 7 days. Take in the morning with a full glass of water, on an empty stomach, and do not take anything else by mouth or lie down for the next 30 min.  Medication Adjustments for Surgery: Stop 1 day before surgery     allopurinol 100 mg tablet  Commonly known as: Zyloprim  TAKE ONE TABLET BY MOUTH EVERY DAY  Medication Adjustments for Surgery: Other (Comment)  Notes to patient: May take day of surgery     * amitriptyline 25 mg tablet  Commonly known as: Elavil  Medication Adjustments for Surgery: Other (Comment)  Notes to patient: May take day of surgery     * amitriptyline 50 mg tablet  Commonly known as: Elavil  Medication Adjustments for Surgery: Other (Comment)  Notes to patient: May take day of surgery     atorvastatin 40 mg tablet  Commonly known as: Lipitor  TAKE ONE TABLET BY MOUTH EVERY DAY  Medication Adjustments for Surgery: Stop 1 day before surgery     baclofen 10 mg tablet  Commonly known as: Lioresal  Medication Adjustments for Surgery: Other (Comment)  Notes to patient: May take day of surgery     BD Saray 2nd Gen Pen Needle 32 gauge x 5/32\" needle  Generic drug: pen needle, diabetic  Four times daily     bumetanide 1 mg tablet  Commonly known as: Bumex  Take 1 tablet (1 mg) by mouth once daily.  Medication Adjustments for Surgery: Other (Comment)  Notes to patient: May take day of surgery     carvedilol 6.25 mg tablet  Commonly known as: Coreg  TAKE ONE TABLET BY MOUTH TWICE DAILY  Medication Adjustments " for Surgery: Other (Comment)  Notes to patient: May take day of surgery     chlorhexidine 0.12 % solution  Commonly known as: Peridex  SWISH AND SPIT 15ML FOR 30 SECONDS NIGHT PRIOR TO SURGERY AND MORNING OF SURGERY     clopidogrel 75 mg tablet  Commonly known as: Plavix  TAKE ONE TABLET BY MOUTH EVERY DAY  Medication Adjustments for Surgery: Stop 7 days before surgery  Notes to patient: Last dose 8/5/24     Creon 36,000-114,000- 180,000 unit capsule,delayed release(DR/EC) capsule  Generic drug: pancrelipase (Lip-Prot-Amyl)  Medication Adjustments for Surgery: Stop 1 day before surgery     Dexcom G7  misc  Generic drug: blood-glucose meter,continuous  Use as instructed     Dexcom G7 Sensor device  Generic drug: blood-glucose sensor  For continuous glucose monitoring.  Change every 10 days.     eplerenone 50 mg tablet  Commonly known as: Inspra  Take 2 tablets (100 mg) by mouth 2 times a day.  Medication Adjustments for Surgery: Other (Comment)  Notes to patient: May take day of surgery     famotidine 20 mg tablet  Commonly known as: Pepcid  Medication Adjustments for Surgery: Other (Comment)  Notes to patient: May take day of surgery     Flonase Sensimist 27.5 mcg/actuation nasal spray  Generic drug: fluticasone  Administer 2 sprays into each nostril once daily at bedtime.  Medication Adjustments for Surgery: Other (Comment)  Notes to patient: May use night before surgery     fluocinonide 0.05 % cream  Commonly known as: Lidex  Medication Adjustments for Surgery: Stop 1 day before surgery     guaiFENesin 600 mg 12 hr tablet  Commonly known as: Mucinex  Take 2 tablets (1,200 mg) by mouth 2 times a day for 7 days. Do not crush, chew, or split.  Medication Adjustments for Surgery: Stop 1 day before surgery     hydrocortisone 5 mg tablet  Commonly known as: Cortef  Take 1-2 tablets (5-10 mg) by mouth 2 times a day.  Medication Adjustments for Surgery: Other (Comment)  Notes to patient: May take day of surgery      hydroxychloroquine 200 mg tablet  Commonly known as: Plaquenil  take ONE & ONE-HALF tablet BY MOUTH EVERY DAY  Medication Adjustments for Surgery: Other (Comment)  Notes to patient: May take day of surgery     ipratropium 42 mcg (0.06 %) nasal spray  Commonly known as: Atrovent  Medication Adjustments for Surgery: Other (Comment)  Notes to patient: May use day of surgery     Lantus Solostar U-100 Insulin 100 unit/mL (3 mL) pen  Generic drug: insulin glargine  Inject 12 Units under the skin once daily at bedtime. Take as directed per insulin instructions.  Medication Adjustments for Surgery: Other (Comment)  Notes to patient: Take HALF of normal dose evening before surgery ( 6 units)     leflunomide 20 mg tablet  Commonly known as: Arava  TAKE ONE TABLET BY MOUTH EVERY DAY  Medication Adjustments for Surgery: Stop 1 day before surgery     levothyroxine 88 mcg tablet  Commonly known as: Synthroid, Levoxyl  1 tablet daily 6 days per week, 2 tablets one day per week  Medication Adjustments for Surgery: Other (Comment)  Notes to patient: Take day of surgery     lidocaine 5 % patch  Commonly known as: Lidoderm  Medication Adjustments for Surgery: Stop 1 day before surgery     losartan 25 mg tablet  Commonly known as: Cozaar  TAKE ONE TABLET BY MOUTH EVERY DAY  Medication Adjustments for Surgery: Other (Comment)  Notes to patient: Do NOT take evening before surgery or on the day of surgery     miconazole 2 % powder  Commonly known as: Micotin  Medication Adjustments for Surgery: Stop 1 day before surgery     Miralax 17 gram/dose powder  Generic drug: polyethylene glycol  Medication Adjustments for Surgery: Stop 1 day before surgery     * morphine 30 mg tablet  Commonly known as: MSIR  Medication Adjustments for Surgery: Other (Comment)  Notes to patient: May take day of surgery     * morphine CR 30 mg 12 hr tablet  Commonly known as: MS Contin  Medication Adjustments for Surgery: Other (Comment)  Notes to patient: May  take day of surgery     * morphine CR 15 mg 12 hr tablet  Commonly known as: MS Contin  Medication Adjustments for Surgery: Other (Comment)  Notes to patient: May take day of surgery     mupirocin 2 % ointment  Commonly known as: Bactroban  Medication Adjustments for Surgery: Stop 1 day before surgery     NovoLOG Flexpen U-100 Insulin 100 unit/mL (3 mL) pen  Generic drug: insulin aspart  Inject 2-6 Units under the skin 3 times a day before meals.  Medication Adjustments for Surgery: Other (Comment)  Notes to patient: Do NOT take this insulin on day of surgery     nystatin cream  Commonly known as: Mycostatin  APPLY TOPICALLY TWICE DAILY  Medication Adjustments for Surgery: Stop 1 day before surgery     ondansetron ODT 4 mg disintegrating tablet  Commonly known as: Zofran-ODT  Medication Adjustments for Surgery: Other (Comment)  Notes to patient: May take day of surgery     pantoprazole 40 mg EC tablet  Commonly known as: ProtoNix  Medication Adjustments for Surgery: Other (Comment)  Notes to patient: May take day of surgery     potassium chloride CR 20 mEq ER tablet  Commonly known as: Klor-Con M20  Take 1 tablet (20 mEq) by mouth 2 times a day for 10 days. Do not crush or chew.  Medication Adjustments for Surgery: Stop 1 day before surgery     Refresh Tears 0.5 % ophthalmic solution  Generic drug: carboxymethylcellulose  Medication Adjustments for Surgery: Stop 1 day before surgery     Solu-CORTEF Act-O-Vial (PF) 100 mg/2 mL injection  Generic drug: hydrocortisone sod succ (PF)  Inject 2 mL (100 mg) over 1 minutes into the muscle if needed for other (Adrenal crisis).  Medication Adjustments for Surgery: Stop 1 day before surgery     sucralfate 1 gram tablet  Commonly known as: Carafate  Take 1 tablet (1 g) by mouth 4 times a day before meals.  Medication Adjustments for Surgery: Stop 1 day before surgery     sulfamethoxazole-trimethoprim 800-160 mg tablet  Commonly known as: Bactrim DS  Take 1 tablet by mouth 2  times a day for 14 doses.  Medication Adjustments for Surgery: Other (Comment)  Notes to patient: May take day of surgery     SUMAtriptan 25 mg tablet  Commonly known as: Imitrex  Take 1 tablet (25 mg) by mouth if needed for migraine. May repeat in 2 hours if needed  Medication Adjustments for Surgery: Stop 1 day before surgery     triamcinolone 0.1 % cream  Commonly known as: Kenalog  APPLY ONE APPLICATION EXTERNALLY TWICE DAILY AS NEEDED  Medication Adjustments for Surgery: Stop 1 day before surgery     Trulance tablet tablet  Generic drug: plecanatide  Medication Adjustments for Surgery: Stop 1 day before surgery           * This list has 5 medication(s) that are the same as other medications prescribed for you. Read the directions carefully, and ask your doctor or other care provider to review them with you.                CONTACT SURGEON'S OFFICE IF YOU DEVELOP:  * Fever = 100.4 F   * New respiratory symptoms (e.g. cough, shortness of breath, respiratory distress, sore throat)  * Recent loss of taste or smell  *Flu like symptoms such as headache, fatigue or gastrointestinal symptoms  * You develop any open sores, shingles, burning or painful urination   AND/OR:  * You no longer wish to have the surgery.  * Any other personal circumstances change that may lead to the need to cancel or defer this surgery.  *You were admitted to any hospital within one week of your planned procedure.    SMOKING:  *Quitting smoking can make a huge difference to your health and recovery from surgery.    *If you need help with quitting, call 3-313-QUIT-NOW.    THE DAY BEFORE SURGERY:  *Do not eat any food or drink any liquids after midnight the night before your surgery/procedure.  You may have sips of water to take medications.    *DIABETICS:    Please check fasting blood sugar upon waking up.  If fasting sugar is <80 mg/dl, please drink 100ml/3oz of apple juice no later than 2 hours prior to arrival time to hospital.    SURGICAL  TIME:  *You will be contacted between 2 p.m. and 6 p.m. the business day before your surgery with your arrival time.  *If you haven't received a call by 6pm, call [phone].  *Scheduled surgery times may change and you will be notified if this occurs-check your personal voicemail for any updates.    ON THE MORNING OF SURGERY:  *Wear comfortable, loose fitting clothing.   *Do not use moisturizers, creams, lotions or perfume.  *All jewelry and valuables should be left at home.  *Prosthetic devices such as contact lenses, hearing aids, dentures, eyelash extensions, hairpins and body piercing must be removed before surgery.    BRING WITH YOU:  *Photo ID and insurance card  *Current list of medications and allergies  *Pacemaker/Defibrillator/Heart stent cards  *CPAP machine and mask  *Slings/splints/crutches  *Copy of your complete Advanced Directive/DHPOA-if applicable  *Neurostimulator implant remote    PARKING AND ARRIVAL:  *Check in at the Main Entrance desk and let them know you are here for surgery.  *You will be directed to the 2nd floor surgical waiting area.    IF YOU ARE HAVING OUTPATIENT/SAME DAY SURGERY:  *A responsible adult MUST accompany you at the time of discharge and stay with you for 24 hours after your surgery.  *You may NOT drive yourself home after surgery.  *You may use a taxi or ride sharing service ([employer], Uber) to return home ONLY if you are accompanied by a friend or family member.  *Instructions for resuming your medications will be provided by your surgeon.          Patient Information: Oral/Dental Rinse  **This is a prescription; pick it up at your preferred local pharmacy **  What is oral/dental rinse?   It is a mouthwash. It is a way of cleaning the mouth with a germ killing solution before your surgery.  The solution contains chlorhexidine, commonly known as CHG.   It is used inside the mouth to kill a bacteria known as Staphylococcus aureus.  Let your doctor know if you are allergic to  Chlorhexidine.    Why do I need to use CHG oral/dental rinse?  The CHG oral/dental rinse helps to kill a bacteria in your mouth known a Staphylococcus aureus.     This reduces the risk of infection at the surgical site.      Using your CHG oral/dental rinse  STEPS:  Use your CHG oral/dental rinse after you brush your teeth the night before (at bedtime) and the morning of your surgery.  Follow all directions on your prescription label.    Use the cap on the container to measure 15ml (fill cap to fill line)  Swish (gargle if you can) the mouthwash in your mouth for at least 30 seconds, (do not to swallow) spit out  After you use your CHG rinse, do not rinse your mouth with water, drink or eat.  Please refer to prescription label for the appropriate time to resume oral intake  Dental rinse comes in one size bottle: 473ml ~16oz.  You will have leftover    rinse, discard after this use.    What side effects might I have using the CHG oral/dental rinse?  CHG rinse will stick to plaque on the teeth.  Brush and floss just before use.  Teeth brushing will help avoid staining of plaque during use.    Who should I contact if I have questions about the CHG oral/dental rinse?  Please call [facility], Preadmission Testing at [phone] if you have any questions      Home Preoperative Antibacterial Shower     What is a home preoperative antibacterial shower?  This shower is a way of cleaning the skin with a germ killing soap before surgery.  The soap contains chlorhexidine, commonly known as CHG.  CHG is a soap for your skin with germ killing ability.  Let your doctor know if you are allergic to chlorhexidine.    Why do I need to take a preoperative antibacterial shower?  Skin is not sterile.  It is best to try to make your skin as free of germs as possible before surgery.  Proper cleansing with a germ killing soap before surgery can lower the number of germs on your skin.  This helps to reduce the  risk of infection at the surgical site.  Following the instructions listed below will help you prepare your skin for surgery.      How do I use the CHG skin cleanser?  Steps:  Begin using your CHG soap five days before your scheduled surgery on ________________________.    Days 1-4 Shower before bed:  Wash your face and genitals with your normal soap and rinse.    Wash and rinse your hair using the CHG soap. Rinse completely, do not condition your   Hair.          3.    Apply the CHG soap to a clean wet washcloth.  Turn the water off or move away                From the water spray to avoid premature rinsing of the CHG soap as you are applying.     4.   Lather your entire body from the neck down.  Do not use on your face or genitals.   Pay special attention to the area(s) where your incision(s) will be located unless they are on your face.  Avoid scrubbing your skin too hard.  The important point is to have the CHG soap sit on your skin for 3 minutes.    When the 3 minutes are up, turn on the water and rinse the CHG soap off your body completely.   Pat yourself dry with a clean, freshly-laundered towel.  Dress in clean, freshly laundered night clothes.    Be sure to sleep with clean, freshly laundered sheets.  Day 5:  Last shower is the morning before surgery: Follow above Instructions.    NOTE:    *Hair extensions should be removed    *Keep CHG soap out of eyes and ear canals   *DO NOT wash with regular soap on your body after you have used the CHG        soap solution  *DO NOT apply powders, lotions, or perfume.  *Deodorant may be used days 1-4, BUT NOT the day of surgery    Who should I contact if I have any questions regarding the use of CHG soap?  Call the [facility], Preadmission Testing at [phone] if you have any questions.              Patient Information: Pre-Operative Infection Prevention Measures     Why did I have my nose, under my arms and groin swabbed?  The purpose  of the swab is to identify Staphylococcus aureus inside your nose or on your skin.  The swab was sent to the laboratory for culture.  A positive swab/culture for Staphylococcus aureus is called colonization or carriage.      What is Staphylococcus aureus?  Staphylococcus aureus, also known as “staph”, is a germ found on the skin or in the nose of healthy people.  Sometimes Staphylococcus aureus can get into the body and cause an infection.  This can be minor (such as pimples, boils or other skin problems).  It might also be serious (such as blood infection, pneumonia or a surgical site infection).    What is Staphylococcus aureus colonization or carriage?  Colonization or carriage means that a person has the germ but is not sick from it.  These bacteria can be spread on the hands or when breathing or sneezing.    How is Staphylococcus aureus spread?  It is most often spread by close contact with a person or item that carries it.    What happens if my culture is positive for Staphylococcus aureus?  Your doctor/medical team will use this information to guide any antibiotic treatment which may be necessary.  Regardless of the culture results, we will clean the inside of your nose with a betadine swab just before you have your surgery.      Will I get an infection if I have Staphylococcus aureus in my nose or on my skin?  Anyone can get an infection with Staphylococcus aureus.  However, the best way to reduce your risk of infection is to follow the instructions provided to you for the use of your CHG soap and dental rinse.        Who should I contact if I have any questions?  Call the [facility], Preadmission Testing at [phone] if you have any questions.

## 2024-08-01 NOTE — DISCHARGE SUMMARY
Discharge Diagnosis  UTI (urinary tract infection)  Polypharmacy    Issues Requiring Follow-Up  PCP follow-up    Test Results Pending At Discharge  Pending Labs       No current pending labs.            Hospital Course  Daniela Saez is a 64-year-old female with PMHx significant for  seronegative RA, gout, psoriatic arthritis, left knee OA, FMS, RSD, chronic pain syndrome, hypothyroidism, adrenal insufficiency, diabetes, peripheral neuropathy, anxiety and depression who presented to [facility] ED due to syncopal episode.    Work-up revealed co2 retention on blood gas and urinalysis suggestive of UTI. Patient admits to taking muscle relaxer and her pain medications at the same time. She wasn't aware of the potential medication interactions leading to her drowsiness and dizziness.    Patient remained hemodynamically stable throughput hospitalization. She briefly required oxygen but was weaned. She was discharged with antibiotic to treat UTI and was instructed on spacing out her medications that could lead to dangerous side effects such as falls, excessive drowsiness, and overdose.    More than 35 minutes were spent in coordinating patient discharge.      Pertinent Physical Exam At Time of Discharge  Physical Exam  Vitals and nursing note reviewed.   Constitutional:       General: She is not in acute distress.  HENT:      Head: Normocephalic and atraumatic.      Mouth/Throat:      Mouth: Mucous membranes are moist.   Eyes:      Extraocular Movements: Extraocular movements intact.      Conjunctiva/sclera: Conjunctivae normal.   Cardiovascular:      Rate and Rhythm: Normal rate and regular rhythm.      Pulses: Normal pulses.      Heart sounds: Normal heart sounds.   Pulmonary:      Effort: Pulmonary effort is normal.      Breath sounds: Stridor present.   Abdominal:      General: Abdomen is flat. Bowel sounds are normal.      Palpations: Abdomen is soft.   Skin:     General: Skin is warm.   Neurological:      General:  "No focal deficit present.      Mental Status: She is alert and oriented to person, place, and time. Mental status is at baseline.   Psychiatric:         Mood and Affect: Mood normal.         Behavior: Behavior normal.         Home Medications     Medication List      START taking these medications     guaiFENesin 600 mg 12 hr tablet; Commonly known as: Mucinex; Take 2   tablets (1,200 mg) by mouth 2 times a day for 7 days. Do not crush, chew,   or split.   sulfamethoxazole-trimethoprim 800-160 mg tablet; Commonly known as:   Bactrim DS; Take 1 tablet by mouth 2 times a day for 14 doses.     CHANGE how you take these medications     Lantus Solostar U-100 Insulin 100 unit/mL (3 mL) pen; Generic drug:   insulin glargine; Inject 12 Units under the skin once daily at bedtime.   Take as directed per insulin instructions.; What changed: how much to take   losartan 25 mg tablet; Commonly known as: Cozaar; TAKE ONE TABLET BY   MOUTH EVERY DAY; What changed: how much to take     CONTINUE taking these medications     acetaminophen 325 mg tablet; Commonly known as: Tylenol   albuterol 90 mcg/actuation inhaler   alendronate 70 mg tablet; Commonly known as: Fosamax; Take 1 tablet (70   mg) by mouth every 7 days. Take in the morning with a full glass of water,   on an empty stomach, and do not take anything else by mouth or lie down   for the next 30 min.   allopurinol 100 mg tablet; Commonly known as: Zyloprim; TAKE ONE TABLET   BY MOUTH EVERY DAY   * amitriptyline 25 mg tablet; Commonly known as: Elavil   * amitriptyline 50 mg tablet; Commonly known as: Elavil   atorvastatin 40 mg tablet; Commonly known as: Lipitor; TAKE ONE TABLET   BY MOUTH EVERY DAY   baclofen 10 mg tablet; Commonly known as: Lioresal   BD Saray 2nd Gen Pen Needle 32 gauge x 5/32\" needle; Generic drug: pen   needle, diabetic; Four times daily   bumetanide 1 mg tablet; Commonly known as: Bumex; Take 1 tablet (1 mg)   by mouth once daily.   carvedilol 6.25 mg " tablet; Commonly known as: Coreg; TAKE ONE TABLET BY   MOUTH TWICE DAILY   clopidogrel 75 mg tablet; Commonly known as: Plavix; TAKE ONE TABLET BY   MOUTH EVERY DAY   Creon 36,000-114,000- 180,000 unit capsule,delayed release(DR/EC)   capsule; Generic drug: pancrelipase (Lip-Prot-Amyl)   Dexcom G7  misc; Generic drug: blood-glucose meter,continuous;   Use as instructed   Dexcom G7 Sensor device; Generic drug: blood-glucose sensor; For   continuous glucose monitoring.  Change every 10 days.   eplerenone 50 mg tablet; Commonly known as: Inspra; Take 2 tablets (100   mg) by mouth 2 times a day.   famotidine 20 mg tablet; Commonly known as: Pepcid   Flonase Sensimist 27.5 mcg/actuation nasal spray; Generic drug:   fluticasone; Administer 2 sprays into each nostril once daily at bedtime.   fluocinonide 0.05 % cream; Commonly known as: Lidex   hydrocortisone 5 mg tablet; Commonly known as: Cortef; Take 1-2 tablets   (5-10 mg) by mouth 2 times a day.   hydroxychloroquine 200 mg tablet; Commonly known as: Plaquenil; take ONE   & ONE-HALF tablet BY MOUTH EVERY DAY   ipratropium 42 mcg (0.06 %) nasal spray; Commonly known as: Atrovent   leflunomide 20 mg tablet; Commonly known as: Arava; TAKE ONE TABLET BY   MOUTH EVERY DAY   levothyroxine 88 mcg tablet; Commonly known as: Synthroid, Levoxyl; 1   tablet daily 6 days per week, 2 tablets one day per week   miconazole 2 % powder; Commonly known as: Micotin   Miralax 17 gram/dose powder; Generic drug: polyethylene glycol   * morphine 30 mg tablet; Commonly known as: MSIR   * morphine CR 30 mg 12 hr tablet; Commonly known as: MS Contin   * morphine CR 15 mg 12 hr tablet; Commonly known as: MS Contin   mupirocin 2 % ointment; Commonly known as: Bactroban   NovoLOG Flexpen U-100 Insulin 100 unit/mL (3 mL) pen; Generic drug:   insulin aspart; Inject 2-6 Units under the skin 3 times a day before   meals.   nystatin cream; Commonly known as: Mycostatin; APPLY TOPICALLY TWICE    DAILY   pantoprazole 40 mg EC tablet; Commonly known as: ProtoNix   potassium chloride CR 20 mEq ER tablet; Commonly known as: Klor-Con M20;   Take 1 tablet (20 mEq) by mouth 2 times a day for 10 days. Do not crush or   chew.   Refresh Tears 0.5 % ophthalmic solution; Generic drug:   carboxymethylcellulose   Solu-CORTEF Act-O-Vial (PF) 100 mg/2 mL injection; Generic drug:   hydrocortisone sod succ (PF); Inject 2 mL (100 mg) over 1 minutes into the   muscle if needed for other (Adrenal crisis).   sucralfate 1 gram tablet; Commonly known as: Carafate; Take 1 tablet (1   g) by mouth 4 times a day before meals.   SUMAtriptan 25 mg tablet; Commonly known as: Imitrex; Take 1 tablet (25   mg) by mouth if needed for migraine. May repeat in 2 hours if needed   triamcinolone 0.1 % cream; Commonly known as: Kenalog; APPLY ONE   APPLICATION EXTERNALLY TWICE DAILY AS NEEDED  * This list has 5 medication(s) that are the same as other medications   prescribed for you. Read the directions carefully, and ask your doctor or   other care provider to review them with you.       Outpatient Follow-Up  Future Appointments   Date Time Provider Department Chloe   8/1/2024  1:00 PM YASSINE Montgomery AHUPAT [facility]   8/1/2024  2:00 PM Milo Grant MD [id number] [facility]   8/2/2024 11:00 AM Macey Saez, PT [facility]   8/12/2024  2:20 PM Marjorie Saha DO [id number] [facility]   8/21/2024  2:00 PM Rodney Tsang DO [id number] [facility]   8/23/2024  2:00 PM MANSI Goldsmith-CNP [id number] [facility]   9/26/2024  2:30 PM Milo Grant MD [id number] [facility]   11/6/2024  1:20 PM Tony Phillips MD [id number] [facility]       Narinder Bergman DO

## 2024-08-01 NOTE — H&P
HPI    Daniela Saez is a 64-year-old female with PMHx significant for seronegative RA, gout, psoriatic arthritis, left knee OA, FMS, RSD, chronic pain syndrome, hypothyroidism, adrenal insufficiency, diabetes, peripheral neuropathy, anxiety and depression who presented to [facility] ED due to syncopal episode. Patient admits to taking muscle relaxer and her pain medications at the same time. She wasn't aware of the potential medication interactions leading to her drowsiness and dizziness. She also admits to experiencing urinary frequency but no dysuria. Currently denies headaches, vision changes, CP, SOB, N/V/D, abdominal pain, fever, chills, recent illness.     Work-up revealed co2 retention on blood gas and urinalysis suggestive of UTI.     12 Point ROS negative unless noted in above [facility]    ED Course   Vitals - /65 (BP Location: Left arm, Patient Position: Lying)   Pulse 92   Temp 35.9 °C (96.7 °F) (Temporal)   Resp 18   Wt 66.4 kg (146 lb 6.2 oz)   SpO2 96%   Labs - No results found for this or any previous visit (from the past 24 hour(s)).  Interventions -   Medications   hydrocortisone sod succ (PF) (Solu-CORTEF) injection 100 mg (100 mg intravenous Given 7/26/24 2144)   ciprofloxacin (Cipro) 400 mg in dextrose 5%  mL (0 mg intravenous Stopped 7/27/24 0150)       Past Medical History     Past Medical History:   Diagnosis Date    Adrenal insufficiency (Multi)     Anemia     7.27.24 - H/H 9.4/31.6    Anxiety     Asthma (HHS-HCC)     Atrial fibrillation (Multi)     on Plavix - stop instructions in chart    Cervical radiculopathy     Chronic diastolic heart failure (Multi)     saw cardiology 6/2024- f/u prn    Chronic pain syndrome     Chronic pancreatitis (Multi)     Coronary artery disease     old MI noted on cardiac testing, unknown when    Depression     Fibromyalgia     Gastroparesis     Gastroparesis    GERD (gastroesophageal reflux disease)     Gout     Hearing loss     Hepatic cirrhosis  (Multi)     24: alk phs 182    Hypertension     Hypocalcemia     24: ca 8.3    Hyponatremia     24: Na 134    Hypothyroidism     Insomnia     Irritable bowel syndrome with diarrhea     Liver lesion     Lumbar stenosis     Migraine     Monoclonal gammopathy     follows with heme - h/o bone marrow bx    Occipital neuralgia     Osteoarthritis     Rheumatoid arthritis (Multi)     RLS (restless legs syndrome)     RSD (reflex sympathetic dystrophy)     Syncope     admitted -24 with syncopal event - dx with UTI on Bactrim    Thrombocytopenia (CMS-HCC)     24: plt 59    Thrombocytopenia (CMS-HCC)     24: plt 59    Tremor     voice, hands, legs at times    Type 2 diabetes mellitus (Multi)     24: A1C 7.1%    UC (ulcerative colitis) (Multi)         Surgical History     Past Surgical History:   Procedure Laterality Date    BONE MARROW BIOPSY      BUNIONECTOMY Right      SECTION, LOW TRANSVERSE      x 5    CHOLECYSTECTOMY      CT GUIDED PERCUTANEOUS BIOPSY BONE DEEP  2022    CT GUIDED PERCUTANEOUS BIOPSY BONE DEEP 3/9/2022 [facility]    OTHER SURGICAL HISTORY  2019    Uterine nerve ablation    OTHER SURGICAL HISTORY Right     Ovarian torsion repair    SINUS SURGERY      TONSILLECTOMY      Tonsillectomy with adenoidectomy    TUBAL LIGATION      UMBILICAL HERNIA REPAIR         Family History     Family History   Problem Relation Name Age of Onset    Heart disease Mother      Hypertension Mother      Arthritis Mother      Diabetes Mother      Hyperlipidemia Mother      Hypertension Father      Brain cancer Father      Diabetes Sister      Diabetes Brother         Social History   She reports that she quit smoking about 20 years ago. Her smoking use included cigarettes. She has never used smokeless tobacco. She reports that she does not currently use alcohol. She reports that she does not use drugs.    Allergies   Metoclopramide, Cefuroxime axetil, Fentanyl,  "Gabapentin, Levetiracetam, Metformin, and Torsemide    Medications   No current facility-administered medications for this encounter.    Current Outpatient Medications:     acetaminophen (Tylenol) 325 mg tablet, Take 2 tablets (650 mg) by mouth every 4 hours if needed for mild pain (1 - 3)., Disp: , Rfl:     albuterol 90 mcg/actuation inhaler, Inhale 2 puffs 2 times a day., Disp: , Rfl:     alendronate (Fosamax) 70 mg tablet, Take 1 tablet (70 mg) by mouth every 7 days. Take in the morning with a full glass of water, on an empty stomach, and do not take anything else by mouth or lie down for the next 30 min., Disp: 12 tablet, Rfl: 3    allopurinol (Zyloprim) 100 mg tablet, TAKE ONE TABLET BY MOUTH EVERY DAY, Disp: 90 tablet, Rfl: 0    amitriptyline (Elavil) 25 mg tablet, Take 1 tablet (25 mg) by mouth as needed at bedtime for sleep. Can take in addition to 50 mg tablet. 50-75 mg nightly, Disp: , Rfl:     amitriptyline (Elavil) 50 mg tablet, Take 1 tablet (50 mg) by mouth once daily., Disp: , Rfl:     atorvastatin (Lipitor) 40 mg tablet, TAKE ONE TABLET BY MOUTH EVERY DAY, Disp: 90 tablet, Rfl: 1    baclofen (Lioresal) 10 mg tablet, Take 2 tablets (20 mg) by mouth 2 times a day., Disp: , Rfl:     BD Saray 2nd Gen Pen Needle 32 gauge x 5/32\" needle, Four times daily, Disp: 400 each, Rfl: 1    bumetanide (Bumex) 1 mg tablet, Take 1 tablet (1 mg) by mouth once daily., Disp: 90 tablet, Rfl: 3    carboxymethylcellulose (Refresh Tears) 0.5 % ophthalmic solution, 1-2 drops each eye 3 times a day for 30 days, Disp: , Rfl:     carvedilol (Coreg) 6.25 mg tablet, TAKE ONE TABLET BY MOUTH TWICE DAILY, Disp: 180 tablet, Rfl: 3    clopidogrel (Plavix) 75 mg tablet, TAKE ONE TABLET BY MOUTH EVERY DAY, Disp: 90 tablet, Rfl: 1    Dexcom G7  misc, Use as instructed, Disp: 1 each, Rfl: 0    Dexcom G7 Sensor device, For continuous glucose monitoring.  Change every 10 days., Disp: 9 each, Rfl: 3    eplerenone (Inspra) 50 mg " tablet, Take 2 tablets (100 mg) by mouth 2 times a day. (Patient taking differently: Take 1 tablet (50 mg) by mouth 2 times a day.), Disp: 360 tablet, Rfl: 1    famotidine (Pepcid) 20 mg tablet, Take 1 tablet (20 mg) by mouth 2 times a day., Disp: , Rfl:     fluocinonide (Lidex) 0.05 % cream, Apply 1 Application topically 2 times a day. To areas of psoriasis on trunk and extremities Monday thru Friday. Avoid face, Disp: , Rfl:     fluticasone (Flonase Sensimist) 27.5 mcg/actuation nasal spray, Administer 2 sprays into each nostril once daily at bedtime., Disp: 10 g, Rfl: 1    guaiFENesin (Mucinex) 600 mg 12 hr tablet, Take 2 tablets (1,200 mg) by mouth 2 times a day for 7 days. Do not crush, chew, or split., Disp: 28 tablet, Rfl: 0    hydrocortisone (Cortef) 5 mg tablet, Take 1-2 tablets (5-10 mg) by mouth 2 times a day., Disp: 360 tablet, Rfl: 3    hydroxychloroquine (Plaquenil) 200 mg tablet, take ONE & ONE-HALF tablet BY MOUTH EVERY DAY, Disp: 135 tablet, Rfl: 0    ipratropium (Atrovent) 42 mcg (0.06 %) nasal spray, Administer 2 sprays into each nostril 2 times a day., Disp: , Rfl:     Lantus Solostar U-100 Insulin 100 unit/mL (3 mL) pen, Inject 12 Units under the skin once daily at bedtime. Take as directed per insulin instructions. (Patient taking differently: Inject 7 Units under the skin once daily at bedtime. Take as directed per insulin instructions.), Disp: 15 mL, Rfl: 1    leflunomide (Arava) 20 mg tablet, TAKE ONE TABLET BY MOUTH EVERY DAY, Disp: 90 tablet, Rfl: 0    levothyroxine (Synthroid, Levoxyl) 88 mcg tablet, 1 tablet daily 6 days per week, 2 tablets one day per week, Disp: 105 tablet, Rfl: 3    lidocaine (Lidoderm) 5 % patch, Place 3 patches on the skin once daily. Remove & discard patch within 12 hours or as directed by MD., Disp: , Rfl:     losartan (Cozaar) 25 mg tablet, TAKE ONE TABLET BY MOUTH EVERY DAY (Patient taking differently: Take 2 tablets (50 mg) by mouth once daily.), Disp: 90  tablet, Rfl: 1    miconazole (Micotin) 2 % powder, Apply 1 Application topically 2 times a day as needed for itching.  To dry skin, Disp: , Rfl:     morphine (MSIR) 30 mg tablet, Take 1 tablet (30 mg) by mouth 2 times a day., Disp: , Rfl:     morphine CR (MS Contin) 15 mg 12 hr tablet, Take 1 tablet (15 mg) by mouth 2 times a day., Disp: , Rfl:     morphine CR (MS Contin) 30 mg 12 hr tablet, Take 1 tablet (30 mg) by mouth 2 times a day. Do not crush, chew, or split., Disp: , Rfl:     mupirocin (Bactroban) 2 % ointment, Apply topically once daily as needed., Disp: , Rfl:     NovoLOG Flexpen U-100 Insulin 100 unit/mL (3 mL) pen, Inject 2-6 Units under the skin 3 times a day before meals. (Patient taking differently: Inject 2-6 Units under the skin 3 times a day before meals. 100-150 2 units 150-200- 4 units 200-250- 6 units if higher than that it increases 1 unit per every 50 per patient), Disp: 15 mL, Rfl: 1    nystatin (Mycostatin) cream, APPLY TOPICALLY TWICE DAILY, Disp: 30 g, Rfl: 2    ondansetron ODT (Zofran-ODT) 4 mg disintegrating tablet, Take 1 tablet (4 mg) by mouth every 8 hours if needed for nausea or vomiting., Disp: , Rfl:     pancrelipase, Lip-Prot-Amyl, (Creon) 36,000-114,000- 180,000 unit capsule,delayed release(DR/EC) capsule, Take 2 capsules by mouth 3 times a day. With meals, 1 capsule with snacks. Max 8 capsules per day, Disp: , Rfl:     pantoprazole (ProtoNix) 40 mg EC tablet, Take 1 tablet (40 mg) by mouth once daily., Disp: , Rfl:     plecanatide (Trulance) tablet tablet, Take 1 tablet (3 mg) by mouth every other day., Disp: , Rfl:     polyethylene glycol (Miralax) 17 gram/dose powder, Take 17 g by mouth once daily as needed (constipation)., Disp: , Rfl:     potassium chloride CR (Klor-Con M20) 20 mEq ER tablet, Take 1 tablet (20 mEq) by mouth 2 times a day for 10 days. Do not crush or chew., Disp: 20 tablet, Rfl: 0    Solu-CORTEF Act-O-Vial, PF, 100 mg/2 mL injection, Inject 2 mL (100 mg) over  "1 minutes into the muscle if needed for other (Adrenal crisis). (Patient not taking: Reported on 7/31/2024), Disp: 1 each, Rfl: 3    sucralfate (Carafate) 1 gram tablet, Take 1 tablet (1 g) by mouth 4 times a day before meals., Disp: 360 tablet, Rfl: 1    sulfamethoxazole-trimethoprim (Bactrim DS) 800-160 mg tablet, Take 1 tablet by mouth 2 times a day for 14 doses., Disp: 14 tablet, Rfl: 0    SUMAtriptan (Imitrex) 25 mg tablet, Take 1 tablet (25 mg) by mouth if needed for migraine. May repeat in 2 hours if needed, Disp: 27 tablet, Rfl: 2    triamcinolone (Kenalog) 0.1 % cream, APPLY ONE APPLICATION EXTERNALLY TWICE DAILY AS NEEDED, Disp: 30 g, Rfl: 2    Objective   Vital Signs:  Blood pressure 122/65, pulse 92, temperature 35.9 °C (96.7 °F), temperature source Temporal, resp. rate 18, height 1.549 m (5' 0.98\"), weight 66.4 kg (146 lb 6.2 oz), SpO2 96%.    Physical Exam  Vitals and nursing note reviewed.   Constitutional:       General: She is not in acute distress.  HENT:      Head: Normocephalic and atraumatic.      Mouth/Throat:      Mouth: Mucous membranes are moist.   Eyes:      Extraocular Movements: Extraocular movements intact.      Conjunctiva/sclera: Conjunctivae normal.   Cardiovascular:      Rate and Rhythm: Normal rate and regular rhythm.      Pulses: Normal pulses.      Heart sounds: Normal heart sounds.   Pulmonary:      Effort: Pulmonary effort is normal.      Breath sounds: Stridor present.   Abdominal:      General: Abdomen is flat. Bowel sounds are normal.      Palpations: Abdomen is soft.   Skin:     General: Skin is warm.   Neurological:      General: No focal deficit present.      Mental Status: She is alert and oriented to person, place, and time. Mental status is at baseline.   Psychiatric:         Mood and Affect: Mood normal.         Behavior: Behavior normal.     Wt Readings from Last 6 Encounters:   07/27/24 66.4 kg (146 lb 6.2 oz)   07/23/24 68 kg (150 lb)   07/15/24 68 kg (150 lb) "   07/04/24 68.9 kg (152 lb)   06/26/24 69.4 kg (153 lb)   06/21/24 68.9 kg (152 lb)       I/Os:  No intake or output data in the 24 hours ending 08/01/24 0403    Labs:   No results found for this or any previous visit (from the past 24 hour(s)).    Imaging:  No results found.    Assessment & Plan    Daniela Saez is a 64 y.o. female [facility]    Syncope 2/2 Polypharmacy: No events on telemetry. Patient admits to taking muscle relaxer and her pain medications at the same time. She wasn't aware of the potential medication interactions leading to her drowsiness and dizziness.   UTI: Bactrim DS 1 tab BID for 7 days.    Disposition: Discharge home today.    I spent a total of 60 minutes on the date of the service which included preparing to see the patient, face-to-face patient care, completing clinical documentation, obtaining and/or reviewing separately obtained history, performing a medically appropriate examination, counseling and educating the patient/family/caregiver, ordering medications, tests, or procedures, independently interpreting results (not separately reported), and communicating results to the patient/family/caregiver.     Narinder Bergman, DO  Internal Medicine

## 2024-08-01 NOTE — CPM/PAT H&P
[facility] Evaluation       Name: Daniela Saez (Daniela Saez)  /Age: 1960/64 y.o.     In-Person       ----PATIENT ADMITTED 24 WITH ACUTE STEMI ----    -HYDROCORTISONE 50 MG IV PRIOR TO ANESTHESIA INDUCTION PER ENDOCRINOLOGIST DR. BERMUDEZ-      Date of Consult: 24    Referring Provider: Dr. Grant     Date, Surgery, and Length: 24, left total knee arthroplasty, 150 minutes    Patient presents to [facility] for perioperative risk assessment prior to scheduled surgery. Patient presents with end stage arthritis of the left knee and she has failed conservative non-operative risk assessment prior to scheduled surgery. Patient will proceed with left total knee arthroplasty.      This note was created in part upon personal review of patient's medical records.      Patient with hx of adrenal insufficiency and requires IV steroids prior to anesthesia induction.  Pt denies any past history of anesthetic complications such as PONV, awareness, prolonged sedation, dental damage, aspiration, cardiac arrest, difficult intubation, difficult I.V. access or unexpected hospital admissions.  No history of malignant hyperthermia and or pseudocholinesterase deficiency.    No history of blood transfusions.    The patient is not a [employer] and will accept blood and blood products if medically indicated. Type and screen not sent.      Past Medical History:   Diagnosis Date    Adrenal insufficiency (Multi)     Anemia     7.27.24 - H/H 9.4/31.6    Anxiety     Asthma (HHS-HCC)     Atrial fibrillation (Multi)     on Plavix - stop instructions in chart    Cervical radiculopathy     Chronic diastolic heart failure (Multi)     saw cardiology 2024- f/u prn    Chronic pain syndrome     Chronic pancreatitis (Multi)     Coronary artery disease     old MI noted on cardiac testing, unknown when    Depression     Fibromyalgia     Gastroparesis     Gastroparesis    GERD (gastroesophageal reflux disease)     Gout     Hearing  loss     Hepatic cirrhosis (Multi)     24: alk phs 182    Hypertension     Hypocalcemia     24: ca 8.3    Hyponatremia     24: Na 134    Hypothyroidism     Insomnia     Irritable bowel syndrome with diarrhea     Liver lesion     Lumbar stenosis     Migraine     Monoclonal gammopathy     follows with heme - h/o bone marrow bx    Occipital neuralgia     Osteoarthritis     Rheumatoid arthritis (Multi)     RLS (restless legs syndrome)     RSD (reflex sympathetic dystrophy)     Syncope     admitted -24 with syncopal event - dx with UTI on Bactrim    Thrombocytopenia (CMS-HCC)     24: plt 59    Thrombocytopenia (CMS-HCC)     24: plt 59    Tremor     voice, hands, legs at times    Type 2 diabetes mellitus (Multi)     24: A1C 7.1%    UC (ulcerative colitis) (Multi)        Past Surgical History:   Procedure Laterality Date    BONE MARROW BIOPSY      BUNIONECTOMY Right      SECTION, LOW TRANSVERSE      x 5    CHOLECYSTECTOMY      CT GUIDED PERCUTANEOUS BIOPSY BONE DEEP  2022    CT GUIDED PERCUTANEOUS BIOPSY BONE DEEP 3/9/2022 [facility]    OTHER SURGICAL HISTORY  2019    Uterine nerve ablation    OTHER SURGICAL HISTORY Right     Ovarian torsion repair    SINUS SURGERY      TONSILLECTOMY      Tonsillectomy with adenoidectomy    TUBAL LIGATION      UMBILICAL HERNIA REPAIR         Family History   Problem Relation Name Age of Onset    Heart disease Mother      Hypertension Mother      Arthritis Mother      Diabetes Mother      Hyperlipidemia Mother      Hypertension Father      Brain cancer Father      Diabetes Sister      Diabetes Brother         Allergies   Allergen Reactions    Metoclopramide Anaphylaxis and Other     Lockjaw    Cefuroxime Axetil Diarrhea, Tinnitus, Headache, GI Upset and Nausea/vomiting    Fentanyl Rash     Fentanyl patch     Gabapentin Hallucinations     SUICIDAL THOUGHTS    Levetiracetam GI Upset    Metformin Unknown     Does not remember      Torsemide Unknown     Does not remember      Social History     Tobacco Use   Smoking Status Former    Current packs/day: 0.00    Types: Cigarettes    Quit date: 2004    Years since quittin.1   Smokeless Tobacco Never   Tobacco Comments    1.5 PPD x 35 yrs      Social History     Substance and Sexual Activity   Drug Use Never     Social History     Substance and Sexual Activity   Alcohol Use Not Currently         Current Outpatient Medications:     acetaminophen (Tylenol) 325 mg tablet, Take 2 tablets (650 mg) by mouth every 4 hours if needed for mild pain (1 - 3)., Disp: , Rfl:     albuterol 90 mcg/actuation inhaler, Inhale 2 puffs 2 times a day., Disp: , Rfl:     alendronate (Fosamax) 70 mg tablet, Take 1 tablet (70 mg) by mouth every 7 days. Take in the morning with a full glass of water, on an empty stomach, and do not take anything else by mouth or lie down for the next 30 min., Disp: 12 tablet, Rfl: 3    allopurinol (Zyloprim) 100 mg tablet, TAKE ONE TABLET BY MOUTH EVERY DAY, Disp: 90 tablet, Rfl: 0    amitriptyline (Elavil) 25 mg tablet, Take 1 tablet (25 mg) by mouth as needed at bedtime for sleep. Can take in addition to 50 mg tablet. 50-75 mg nightly, Disp: , Rfl:     atorvastatin (Lipitor) 40 mg tablet, TAKE ONE TABLET BY MOUTH EVERY DAY, Disp: 90 tablet, Rfl: 1    baclofen (Lioresal) 10 mg tablet, Take 2 tablets (20 mg) by mouth 2 times a day., Disp: , Rfl:     bumetanide (Bumex) 1 mg tablet, Take 1 tablet (1 mg) by mouth once daily., Disp: 90 tablet, Rfl: 3    carboxymethylcellulose (Refresh Tears) 0.5 % ophthalmic solution, 1-2 drops each eye 3 times a day for 30 days, Disp: , Rfl:     carvedilol (Coreg) 6.25 mg tablet, TAKE ONE TABLET BY MOUTH TWICE DAILY, Disp: 180 tablet, Rfl: 3    clopidogrel (Plavix) 75 mg tablet, TAKE ONE TABLET BY MOUTH EVERY DAY, Disp: 90 tablet, Rfl: 1    eplerenone (Inspra) 50 mg tablet, Take 2 tablets (100 mg) by mouth 2 times a day. (Patient taking  differently: Take 1 tablet (50 mg) by mouth 2 times a day.), Disp: 360 tablet, Rfl: 1    famotidine (Pepcid) 20 mg tablet, Take 1 tablet (20 mg) by mouth 2 times a day., Disp: , Rfl:     fluocinonide (Lidex) 0.05 % cream, Apply 1 Application topically 2 times a day. To areas of psoriasis on trunk and extremities Monday thru Friday. Avoid face, Disp: , Rfl:     guaiFENesin (Mucinex) 600 mg 12 hr tablet, Take 2 tablets (1,200 mg) by mouth 2 times a day for 7 days. Do not crush, chew, or split., Disp: 28 tablet, Rfl: 0    hydrocortisone (Cortef) 5 mg tablet, Take 1-2 tablets (5-10 mg) by mouth 2 times a day., Disp: 360 tablet, Rfl: 3    hydroxychloroquine (Plaquenil) 200 mg tablet, take ONE & ONE-HALF tablet BY MOUTH EVERY DAY, Disp: 135 tablet, Rfl: 0    Lantus Solostar U-100 Insulin 100 unit/mL (3 mL) pen, Inject 12 Units under the skin once daily at bedtime. Take as directed per insulin instructions. (Patient taking differently: Inject 7 Units under the skin once daily at bedtime. Take as directed per insulin instructions.), Disp: 15 mL, Rfl: 1    leflunomide (Arava) 20 mg tablet, TAKE ONE TABLET BY MOUTH EVERY DAY, Disp: 90 tablet, Rfl: 0    levothyroxine (Synthroid, Levoxyl) 88 mcg tablet, 1 tablet daily 6 days per week, 2 tablets one day per week, Disp: 105 tablet, Rfl: 3    lidocaine (Lidoderm) 5 % patch, Place 3 patches on the skin once daily. Remove & discard patch within 12 hours or as directed by MD., Disp: , Rfl:     losartan (Cozaar) 25 mg tablet, TAKE ONE TABLET BY MOUTH EVERY DAY (Patient taking differently: Take 2 tablets (50 mg) by mouth once daily.), Disp: 90 tablet, Rfl: 1    morphine (MSIR) 30 mg tablet, Take 1 tablet (30 mg) by mouth 2 times a day., Disp: , Rfl:     morphine CR (MS Contin) 15 mg 12 hr tablet, Take 1 tablet (15 mg) by mouth 2 times a day., Disp: , Rfl:     morphine CR (MS Contin) 30 mg 12 hr tablet, Take 1 tablet (30 mg) by mouth 2 times a day. Do not crush, chew, or split., Disp: ,  "Rfl:     NovoLOG Flexpen U-100 Insulin 100 unit/mL (3 mL) pen, Inject 2-6 Units under the skin 3 times a day before meals. (Patient taking differently: Inject 2-6 Units under the skin 3 times a day before meals. 100-150 2 units 150-200- 4 units 200-250- 6 units if higher than that it increases 1 unit per every 50 per patient), Disp: 15 mL, Rfl: 1    pancrelipase, Lip-Prot-Amyl, (Creon) 36,000-114,000- 180,000 unit capsule,delayed release(DR/EC) capsule, Take 2 capsules by mouth 3 times a day. With meals, 1 capsule with snacks. Max 8 capsules per day, Disp: , Rfl:     pantoprazole (ProtoNix) 40 mg EC tablet, Take 1 tablet (40 mg) by mouth once daily., Disp: , Rfl:     plecanatide (Trulance) tablet tablet, Take 1 tablet (3 mg) by mouth every other day., Disp: , Rfl:     potassium chloride CR (Klor-Con M20) 20 mEq ER tablet, Take 1 tablet (20 mEq) by mouth 2 times a day for 10 days. Do not crush or chew., Disp: 20 tablet, Rfl: 0    sucralfate (Carafate) 1 gram tablet, Take 1 tablet (1 g) by mouth 4 times a day before meals., Disp: 360 tablet, Rfl: 1    sulfamethoxazole-trimethoprim (Bactrim DS) 800-160 mg tablet, Take 1 tablet by mouth 2 times a day for 14 doses., Disp: 14 tablet, Rfl: 0    SUMAtriptan (Imitrex) 25 mg tablet, Take 1 tablet (25 mg) by mouth if needed for migraine. May repeat in 2 hours if needed, Disp: 27 tablet, Rfl: 2    amitriptyline (Elavil) 50 mg tablet, Take 1 tablet (50 mg) by mouth once daily., Disp: , Rfl:     BD Saray 2nd Gen Pen Needle 32 gauge x 5/32\" needle, Four times daily, Disp: 400 each, Rfl: 1    chlorhexidine (Peridex) 0.12 % solution, SWISH AND SPIT 15ML FOR 30 SECONDS NIGHT PRIOR TO SURGERY AND MORNING OF SURGERY, Disp: 473 mL, Rfl: 0    Dexcom G7  misc, Use as instructed, Disp: 1 each, Rfl: 0    Dexcom G7 Sensor device, For continuous glucose monitoring.  Change every 10 days., Disp: 9 each, Rfl: 3    fluticasone (Flonase Sensimist) 27.5 mcg/actuation nasal spray, Administer " "2 sprays into each nostril once daily at bedtime. (Patient not taking: Reported on 8/1/2024), Disp: 10 g, Rfl: 1    ipratropium (Atrovent) 42 mcg (0.06 %) nasal spray, Administer 2 sprays into each nostril 2 times a day., Disp: , Rfl:     miconazole (Micotin) 2 % powder, Apply 1 Application topically 2 times a day as needed for itching.  To dry skin, Disp: , Rfl:     mupirocin (Bactroban) 2 % ointment, Apply topically once daily as needed., Disp: , Rfl:     nystatin (Mycostatin) cream, APPLY TOPICALLY TWICE DAILY (Patient not taking: Reported on 8/1/2024), Disp: 30 g, Rfl: 2    ondansetron ODT (Zofran-ODT) 4 mg disintegrating tablet, Take 1 tablet (4 mg) by mouth every 8 hours if needed for nausea or vomiting., Disp: , Rfl:     polyethylene glycol (Miralax) 17 gram/dose powder, Take 17 g by mouth once daily as needed (constipation)., Disp: , Rfl:     Solu-CORTEF Act-O-Vial, PF, 100 mg/2 mL injection, Inject 2 mL (100 mg) over 1 minutes into the muscle if needed for other (Adrenal crisis). (Patient not taking: Reported on 7/31/2024), Disp: 1 each, Rfl: 3    triamcinolone (Kenalog) 0.1 % cream, APPLY ONE APPLICATION EXTERNALLY TWICE DAILY AS NEEDED (Patient not taking: Reported on 8/1/2024), Disp: 30 g, Rfl: 2    PAT ROS:   Constitutional:   neg    Neuro/Psych:    fatigue   weakness  Eyes:    use of corrective lenses  Ears:   neg    Nose:   neg    Mouth:    Upper dentures  Throat:   neg    Neck:   neg    Cardio:   neg    Respiratory:   neg    Endocrine:   neg    GI:   :   Musculoskeletal:   Hematologic:   Skin:      Physical Exam  Vitals reviewed. Physical exam within normal limits.          PAT AIRWAY:   Airway:     Mallampati::  II    Neck ROM::  Full   upper dentures      Visit Vitals  /65   Pulse 88   Temp 36.4 °C (97.5 °F)   Resp 18   Ht 1.55 m (5' 1.02\")   Wt 66.1 kg (145 lb 11.6 oz)   SpO2 96%   BMI 27.51 kg/m²   OB Status Postmenopausal   Smoking Status Former   BSA 1.69 m²       Assessment and Plan: " "    Patient is a 64 year old female scheduled for left total knee arthroplasty on 8/13/24 with Dr. Grant.    Patient NOT considered optimized for surgery as she was admitted for acute STEMI ON 8/5/24.    Plan    Neuro:    Cardiovascular:    Fair functional capacity    Encounter Date: 08/05/24   ECG 12 lead   Result Value    Ventricular Rate 112    Atrial Rate 122    QRS Duration 162    QT Interval 478    QTC Calculation(Bazett) 652    R Axis 239    T Axis 41    QRS Count 18    Q Onset 207    T Offset 446    QTC Fredericia 588    Narrative    Wide QRS rhythm  Nonspecific intraventricular block  Lateral infarct , age undetermined  Inferior infarct , age undetermined  Abnormal ECG  When compared with ECG of 05-AUG-2024 11:37, (unconfirmed)  Previous ECG has undetermined rhythm, needs review         RCRI: 1 Risk of Mace: 6%    Pulm:  Known or suspected RYANN is considered an independent risk factor for difficult mask ventilation, difficult intubation or both.  Increased vigilance is recommended with the use of narcotics due to an increased risk for opioid induced respiratory depression.  The patient may benefit from continuous pulse oximetry to monitor for hypoxic events until baseline Sp02 is normal on room air.    Stop Bang= 3, obesity, age >50, HTN    Renal/endo:  Recommendations to avoid nephrotoxic drugs and carefully monitor fluid status to maintain euvolemia. Use dose adjusted medications as needed for the underlying level of renal function.    Followed by endocrinologist Dr. Phillips last seen 7/15/24 per note:  \"IMPRESSION  TYPE 2 DIABETES MELLITUS   LONG TERM CURRENT INSULIN USE     ADRENAL INSUFFICIENCY  Recent episode of adrenal crisis        RECOMMENDATIONS  Continue hydrocortisone 5-10 mg twice daily     Solu-Cortef Act-O-Vial 100 mg injection into muscle as needed adrenal crisis.  Must activate EMS (9-1-1) if you are sick enough to inject this.      Continue   Lantus 7 units at bedtime  Novolog 6-7 units " "at meal times.       Review DexCom at all appointments  Follow up 3-4 months. \"    Patient to hold insulin DOS      Heme:  Patient instructed to ambulate as soon as possible postoperatively to decrease thromboembolic risk.    Initiate mechanical DVT prophylaxis as soon as possible and initiate chemical prophylaxis when deemed safe from a bleeding standpoint post surgery.    Caprini=5    Risk assessment complete.  Patient is scheduled for a intermediate surgical risk procedure. She is NOT considered medically optimized for the planned procedure given that she had an acute STEMI 8/5/24.      Labs/testing obtained in PAT on 8/1/24: EKG, MRSA. Reviewed last CBC, BMP, A1C. Platelets improving. Patient with thrombocytopenia with recent UTI.    Lab Results   Component Value Date    WBC 16.0 (H) 08/05/2024    HGB 13.3 08/05/2024    HCT 41.4 08/05/2024    MCV 87 08/05/2024     08/05/2024     Lab Results   Component Value Date    GLUCOSE 73 (L) 08/05/2024    CALCIUM 10.0 08/05/2024     (LL) 08/05/2024    K 7.3 (HH) 08/05/2024    CO2 23 08/05/2024    CL 82 (L) 08/05/2024    BUN 53 (H) 08/05/2024    CREATININE 3.79 (H) 08/05/2024       Lab Results   Component Value Date    HGBA1C 6.5 (H) 07/27/2024     Follow up: MRSA pending      Communication: 50 MG IV HYDROCORTISONE PRIOR TO ANESTHESIA INDUCTION PER ENDOCRINOLOGIST DR. BERMUDEZ.    Preoperative medication instructions were provided and reviewed with the patient.  Any additional testing or evaluation was explained to the patient.  Nothing by mouth instructions were discussed and patient's questions were answered prior to conclusion to this encounter.  Patient verbalized understanding of preoperative instructions given in preadmission testing; discharge instructions available in EMR.    This note was dictated with speech recognition.  Minor errors may have been detected during use of speech recognition.            "

## 2024-08-01 NOTE — CPM/PAT NURSE NOTE
CPM/PAT Nurse Note      Name: Daniela Saez (Daniela Saez)  /Age: [birth year]//64 y.o.       Past Medical History:   Diagnosis Date    Adrenal insufficiency (Multi)     Anemia     7..24 - H/H 9.4/31.6    Anxiety     Asthma ([facility])     Atrial fibrillation (Multi)     on Plavix - stop instructions in chart    Cervical radiculopathy     Chronic diastolic heart failure (Multi)     saw cardiology 2024- f/u prn    Chronic pain syndrome     Chronic pancreatitis (Multi)     Coronary artery disease     old MI noted on cardiac testing, unknown when    Depression     Fibromyalgia     Gastroparesis     Gastroparesis    GERD (gastroesophageal reflux disease)     Gout     Hearing loss     Hepatic cirrhosis (Multi)     24: alk phs 182    Hypertension     Hypocalcemia     .: ca 8.3    Hyponatremia     24: Na 134    Hypothyroidism     Insomnia     Irritable bowel syndrome with diarrhea     Liver lesion     Lumbar stenosis     Migraine     Monoclonal gammopathy     follows with heme - h/o bone marrow bx    Occipital neuralgia     Osteoarthritis     Rheumatoid arthritis (Multi)     RLS (restless legs syndrome)     RSD (reflex sympathetic dystrophy)     Syncope     admitted -24 with syncopal event - dx with UTI on Bactrim    Thrombocytopenia ([facility])     7..24: plt 59    Thrombocytopenia ([facility])     7..24: plt 59    Tremor     voice, hands, legs at times    Type 2 diabetes mellitus (Multi)     .24: A1C 7.1%    UC (ulcerative colitis) (Multi)        Past Surgical History:   Procedure Laterality Date    BONE MARROW BIOPSY      BUNIONECTOMY Right      SECTION, LOW TRANSVERSE      x 5    CHOLECYSTECTOMY      CT GUIDED PERCUTANEOUS BIOPSY BONE DEEP  2022    CT GUIDED PERCUTANEOUS BIOPSY BONE DEEP 3/9/2022 [facility]    OTHER SURGICAL HISTORY  2019    Uterine nerve ablation    OTHER SURGICAL HISTORY Right     Ovarian torsion repair    SINUS SURGERY      TONSILLECTOMY       Tonsillectomy with adenoidectomy    TUBAL LIGATION      UMBILICAL HERNIA REPAIR         Patient  has no history on file for sexual activity.    Family History   Problem Relation Name Age of Onset    Heart disease Mother      Hypertension Mother      Arthritis Mother      Diabetes Mother      Hyperlipidemia Mother      Hypertension Father      Brain cancer Father      Diabetes Sister      Diabetes Brother         Allergies   Allergen Reactions    Metoclopramide Anaphylaxis and Other     Lockjaw    Cefuroxime Axetil Diarrhea, Tinnitus, Headache, GI Upset and Nausea/vomiting    Fentanyl Rash     Fentanyl patch     Gabapentin Hallucinations     SUICIDAL THOUGHTS    Levetiracetam GI Upset    Metformin Unknown     Does not remember     Torsemide Unknown     Does not remember        Prior to Admission medications    Medication Sig Start Date End Date Taking? Authorizing Provider   acetaminophen (Tylenol) 325 mg tablet Take 2 tablets (650 mg) by mouth every 4 hours if needed for mild pain (1 - 3). 5/13/21   Historical Provider, MD   albuterol 90 mcg/actuation inhaler Inhale 2 puffs 2 times a day. 2/15/20   Historical Provider, MD   alendronate (Fosamax) 70 mg tablet Take 1 tablet (70 mg) by mouth every 7 days. Take in the morning with a full glass of water, on an empty stomach, and do not take anything else by mouth or lie down for the next 30 min. 4/10/24 3/12/25  Marjorie Saha DO   allopurinol (Zyloprim) 100 mg tablet TAKE ONE TABLET BY MOUTH EVERY DAY 7/15/24   Lazaro La MD   amitriptyline (Elavil) 25 mg tablet Take 1 tablet (25 mg) by mouth as needed at bedtime for sleep. Can take in addition to 50 mg tablet. 50-75 mg nightly    Historical Provider, MD   amitriptyline (Elavil) 50 mg tablet Take 1 tablet (50 mg) by mouth once daily.    Historical Provider, MD   atorvastatin (Lipitor) 40 mg tablet TAKE ONE TABLET BY MOUTH EVERY DAY 5/30/24   Marjorie Saha DO   baclofen (Lioresal) 10 mg tablet Take 2 tablets (20 mg) by  "mouth 2 times a day.    Historical Provider, MD   BD Saray 2nd Gen Pen Needle 32 gauge x 5/32\" needle Four times daily 3/20/24   Tony Phillips MD   bumetanide (Bumex) 1 mg tablet Take 1 tablet (1 mg) by mouth once daily. 1/8/24 1/7/25  Rodney Tsang, DO   carboxymethylcellulose (Refresh Tears) 0.5 % ophthalmic solution 1-2 drops each eye 3 times a day for 30 days 5/5/23   Historical Provider, MD   carvedilol (Coreg) 6.25 mg tablet TAKE ONE TABLET BY MOUTH TWICE DAILY 1/15/24   Rodney Tsang DO   chlorhexidine (Peridex) 0.12 % solution SWISH AND SPIT 15ML FOR 30 SECONDS NIGHT PRIOR TO SURGERY AND MORNING OF SURGERY 8/1/24   YASSINE Montgomery   clopidogrel (Plavix) 75 mg tablet TAKE ONE TABLET BY MOUTH EVERY DAY 5/8/24   Marjorie Saha DO   Dexcom G7  misc Use as instructed 3/20/24   Tony Phillips MD   Dexcom G7 Sensor device For continuous glucose monitoring.  Change every 10 days. 3/20/24   Tony Phillips MD   eplerenone (Inspra) 50 mg tablet Take 2 tablets (100 mg) by mouth 2 times a day.  Patient taking differently: Take 1 tablet (50 mg) by mouth 2 times a day. 7/23/24 1/19/25  Marjorie Saha DO   famotidine (Pepcid) 20 mg tablet Take 1 tablet (20 mg) by mouth 2 times a day.    Historical Provider, MD   fluocinonide (Lidex) 0.05 % cream Apply 1 Application topically 2 times a day. To areas of psoriasis on trunk and extremities Monday thru Friday. Avoid face    Historical Provider, MD   fluticasone (Flonase Sensimist) 27.5 mcg/actuation nasal spray Administer 2 sprays into each nostril once daily at bedtime.  Patient not taking: Reported on 8/1/2024 12/11/23 3/20/24  MANSI Goldsmith-CNP   guaiFENesin (Mucinex) 600 mg 12 hr tablet Take 2 tablets (1,200 mg) by mouth 2 times a day for 7 days. Do not crush, chew, or split. 7/27/24 8/3/24  Narinder Bergman DO   hydrocortisone (Cortef) 5 mg tablet Take 1-2 tablets (5-10 mg) by mouth 2 times a day. 3/20/24 3/20/25  Tony Phillips MD "   hydroxychloroquine (Plaquenil) 200 mg tablet take ONE & ONE-HALF tablet BY MOUTH EVERY DAY 3/21/24   Lazaro La MD   ipratropium (Atrovent) 42 mcg (0.06 %) nasal spray Administer 2 sprays into each nostril 2 times a day.    Historical Provider, MD   Lanradhaus Solostar U-100 Insulin 100 unit/mL (3 mL) pen Inject 12 Units under the skin once daily at bedtime. Take as directed per insulin instructions.  Patient taking differently: Inject 7 Units under the skin once daily at bedtime. Take as directed per insulin instructions. 3/20/24   Tony Phillips MD   leflunomide (Arava) 20 mg tablet TAKE ONE TABLET BY MOUTH EVERY DAY 7/8/24   Lazaro La MD   levothyroxine (Synthroid, Levoxyl) 88 mcg tablet 1 tablet daily 6 days per week, 2 tablets one day per week 10/23/23   Tony Phillips MD   lidocaine (Lidoderm) 5 % patch Place 3 patches on the skin once daily. Remove & discard patch within 12 hours or as directed by MD.    Historical Provider, MD   losartan (Cozaar) 25 mg tablet TAKE ONE TABLET BY MOUTH EVERY DAY  Patient taking differently: Take 2 tablets (50 mg) by mouth once daily. 7/15/24   Marjorie Saha,    miconazole (Micotin) 2 % powder Apply 1 Application topically 2 times a day as needed for itching.  To dry skin    Historical Provider, MD   morphine (MSIR) 30 mg tablet Take 1 tablet (30 mg) by mouth 2 times a day.    Historical Provider, MD   morphine CR (MS Contin) 15 mg 12 hr tablet Take 1 tablet (15 mg) by mouth 2 times a day. 4/13/22   Historical Provider, MD   morphine CR (MS Contin) 30 mg 12 hr tablet Take 1 tablet (30 mg) by mouth 2 times a day. Do not crush, chew, or split.    Historical Provider, MD   mupirocin (Bactroban) 2 % ointment Apply topically once daily as needed.    Historical Provider, MD   NovoLOG Flexpen U-100 Insulin 100 unit/mL (3 mL) pen Inject 2-6 Units under the skin 3 times a day before meals.  Patient taking differently: Inject 2-6 Units under the skin 3 times a day before meals.  100-150 2 units  150-200- 4 units  200-250- 6 units  if higher than that it increases 1 unit per every 50 per patient 3/20/24   Tony Phillips MD   nystatin (Mycostatin) cream APPLY TOPICALLY TWICE DAILY  Patient not taking: Reported on 8/1/2024 6/17/24   Marjorie Saha DO   ondansetron ODT (Zofran-ODT) 4 mg disintegrating tablet Take 1 tablet (4 mg) by mouth every 8 hours if needed for nausea or vomiting.    Historical Provider, MD   pancrelipase, Lip-Prot-Amyl, (Creon) 36,000-114,000- 180,000 unit capsule,delayed release(DR/EC) capsule Take 2 capsules by mouth 3 times a day. With meals, 1 capsule with snacks. Max 8 capsules per day 12/20/19   Historical Provider, MD   pantoprazole (ProtoNix) 40 mg EC tablet Take 1 tablet (40 mg) by mouth once daily. 9/6/23   Historical Provider, MD   plecanatide (Trulance) tablet tablet Take 1 tablet (3 mg) by mouth every other day.    Historical Provider, MD   polyethylene glycol (Miralax) 17 gram/dose powder Take 17 g by mouth once daily as needed (constipation).    Historical Provider, MD   potassium chloride CR (Klor-Con M20) 20 mEq ER tablet Take 1 tablet (20 mEq) by mouth 2 times a day for 10 days. Do not crush or chew. 7/23/24 8/2/24  Arun Alvarado,    Solu-CORTEF Act-O-Vial, PF, 100 mg/2 mL injection Inject 2 mL (100 mg) over 1 minutes into the muscle if needed for other (Adrenal crisis).  Patient not taking: Reported on 7/31/2024 7/15/24   Tony Phillips MD   sucralfate (Carafate) 1 gram tablet Take 1 tablet (1 g) by mouth 4 times a day before meals. 6/20/24 12/17/24  Marjorie Saha DO   sulfamethoxazole-trimethoprim (Bactrim DS) 800-160 mg tablet Take 1 tablet by mouth 2 times a day for 14 doses. 7/27/24 8/3/24  Narinder Bergman DO   SUMAtriptan (Imitrex) 25 mg tablet Take 1 tablet (25 mg) by mouth if needed for migraine. May repeat in 2 hours if needed 6/12/24   Marjorie Saha, DO   triamcinolone (Kenalog) 0.1 % cream APPLY ONE APPLICATION EXTERNALLY TWICE DAILY  AS NEEDED  Patient not taking: Reported on 8/1/2024 6/17/24   Marjorie Saha, DO   blood sugar diagnostic (OneTouch Verio test strips) strip TEST 4 TIMES A DAY. MAY SUBSTITUTE WITH INSURANCE COVERED STRIPS. 2/2/21 7/27/24  Historical Provider, MD   butalbital-aspirin-caffeine (Fiorinal) -40 mg capsule Take 1 capsule by mouth every 6 hours if needed for migraine.  7/27/24  Historical Provider, MD   diphenoxylate-atropine (LomotiL) 2.5-0.025 mg tablet Take 1 tablet by mouth 4 times a day.  7/27/24  Historical Provider, MD   fluticasone (Flovent) 110 mcg/actuation inhaler Inhale 2 puffs 2 times a day. Rinse mouth with water after use to reduce aftertaste and incidence of candidiasis. Do not swallow.  7/31/24  Historical Provider, MD   ketotifen (Zaditor) 0.025 % (0.035 %) ophthalmic solution Administer 1 drop into both eyes 2 times a day. 12/5/23 7/27/24  Marjorie Saha, DO   levocetirizine (Xyzal) 5 mg tablet 1 tablet in the evening Orally Once a day for 30 day(s) 6/15/23 7/27/24  Historical Provider, MD   lidocaine (Lidoderm) 5 % patch apply up to 3 patches to skin once a day, remove after 12 hours for 30 days 5/4/20 7/31/24  Historical Provider, MD   loratadine (Claritin) 10 mg tablet Take 1 tablet (10 mg) by mouth once daily.  7/31/24  Historical Provider, MD   MORPHINE SULFATE ER PO Take 30 mg by mouth 2 times a day.  7/27/24  Historical Provider, MD   ondansetron (Zofran) 4 mg tablet Take 1 tablet (4 mg) by mouth every 8 hours if needed for nausea or vomiting.  7/27/24  Historical Provider, MD   tiotropium-olodateroL (Stiolto Respimat) 2.5-2.5 mcg/actuation mist inhaler 2 puffs Inhalation Once a day for 30 days 4/25/23 7/27/24  Historical Provider, MD SHEILA IBARRA     DASI Risk Score    No data to display       Caprini DVT Assessment    No data to display       Modified Frailty Index    No data to display       CHADS2 Stroke Risk  Current as of 3 hours ago        5.9% 3 to 100%: High Risk   2 to < 3%:  Medium Risk   0 to < 2%: Low Risk     No Change          This score determines the patient's risk of having a stroke if the patient has atrial fibrillation.          Points Metrics   1 Has Congestive Heart Failure:  Yes     Patients with congestive heart failure get 1 point.    Current as of 3 hours ago   1 Has Hypertension:  Yes     Patients with hypertension get 1 point.    Current as of 3 hours ago   0 Age:  64     Patients who are 75 years of age or older get 1 point.    Current as of 3 hours ago   1 Has Diabetes:  Yes     Patients with diabetes get 1 point.    Current as of 3 hours ago   0 Had Stroke:  No  Had TIA:  No  Had Thromboembolism:  No     Patients who have had a stroke, TIA, or thromboembolism get 2 points.    Current as of 3 hours ago             Revised Cardiac Risk Index    No data to display       Apfel Simplified Score    No data to display       Risk Analysis Index Results This Encounter    No data found in the last 1 encounters.       .mrsa  Nurse Plan of Action:

## 2024-08-01 NOTE — CPM/PAT NURSE NOTE
CPM/PAT Nurse Note      Name: Daniela Saez (Daniela Saez)  /Age: [birth year]//64 y.o.       Past Medical History:   Diagnosis Date    Adrenal insufficiency (Multi)     Anemia     7..24 - H/H 9.4/31.6    Anxiety     Asthma ([facility])     Atrial fibrillation (Multi)     on Plavix - stop instructions in chart    Cervical radiculopathy     Chronic diastolic heart failure (Multi)     saw cardiology 2024- f/u prn    Chronic pain syndrome     Chronic pancreatitis (Multi)     Coronary artery disease     old MI noted on cardiac testing, unknown when    Depression     Fibromyalgia     Gastroparesis     Gastroparesis    GERD (gastroesophageal reflux disease)     Gout     Hearing loss     Hepatic cirrhosis (Multi)     24: alk phs 182    Hypertension     Hypocalcemia     .: ca 8.3    Hyponatremia     24: Na 134    Hypothyroidism     Insomnia     Irritable bowel syndrome with diarrhea     Liver lesion     Lumbar stenosis     Migraine     Monoclonal gammopathy     follows with heme - h/o bone marrow bx    Occipital neuralgia     Osteoarthritis     Rheumatoid arthritis (Multi)     RLS (restless legs syndrome)     RSD (reflex sympathetic dystrophy)     Syncope     admitted -24 with syncopal event - dx with UTI on Bactrim    Thrombocytopenia ([facility])     7..24: plt 59    Thrombocytopenia ([facility])     7..24: plt 59    Tremor     voice, hands, legs at times    Type 2 diabetes mellitus (Multi)     .24: A1C 7.1%    UC (ulcerative colitis) (Multi)        Past Surgical History:   Procedure Laterality Date    BONE MARROW BIOPSY      BUNIONECTOMY Right      SECTION, LOW TRANSVERSE      x 5    CHOLECYSTECTOMY      CT GUIDED PERCUTANEOUS BIOPSY BONE DEEP  2022    CT GUIDED PERCUTANEOUS BIOPSY BONE DEEP 3/9/2022 [facility]    OTHER SURGICAL HISTORY  2019    Uterine nerve ablation    OTHER SURGICAL HISTORY Right     Ovarian torsion repair    SINUS SURGERY      TONSILLECTOMY       Tonsillectomy with adenoidectomy    TUBAL LIGATION      UMBILICAL HERNIA REPAIR         Patient  has no history on file for sexual activity.    Family History   Problem Relation Name Age of Onset    Heart disease Mother      Hypertension Mother      Arthritis Mother      Diabetes Mother      Hyperlipidemia Mother      Hypertension Father      Brain cancer Father      Diabetes Sister      Diabetes Brother         Allergies   Allergen Reactions    Metoclopramide Anaphylaxis and Other     Lockjaw    Cefuroxime Axetil Diarrhea, Tinnitus, Headache, GI Upset and Nausea/vomiting    Fentanyl Rash     Fentanyl patch     Gabapentin Hallucinations     SUICIDAL THOUGHTS    Levetiracetam GI Upset    Metformin Unknown     Does not remember     Torsemide Unknown     Does not remember        Prior to Admission medications    Medication Sig Start Date End Date Taking? Authorizing Provider   acetaminophen (Tylenol) 325 mg tablet Take 2 tablets (650 mg) by mouth every 4 hours if needed for mild pain (1 - 3). 5/13/21   Historical Provider, MD   albuterol 90 mcg/actuation inhaler Inhale 2 puffs 2 times a day. 2/15/20   Historical Provider, MD   alendronate (Fosamax) 70 mg tablet Take 1 tablet (70 mg) by mouth every 7 days. Take in the morning with a full glass of water, on an empty stomach, and do not take anything else by mouth or lie down for the next 30 min. 4/10/24 3/12/25  Marjorie Saha DO   allopurinol (Zyloprim) 100 mg tablet TAKE ONE TABLET BY MOUTH EVERY DAY 7/15/24   Lazaro La MD   amitriptyline (Elavil) 25 mg tablet Take 1 tablet (25 mg) by mouth as needed at bedtime for sleep. Can take in addition to 50 mg tablet. 50-75 mg nightly    Historical Provider, MD   amitriptyline (Elavil) 50 mg tablet Take 1 tablet (50 mg) by mouth once daily.    Historical Provider, MD   atorvastatin (Lipitor) 40 mg tablet TAKE ONE TABLET BY MOUTH EVERY DAY 5/30/24   Marjorie Saha DO   baclofen (Lioresal) 10 mg tablet Take 2 tablets (20 mg) by  "mouth 2 times a day.    Historical Provider, MD   BD Saray 2nd Gen Pen Needle 32 gauge x 5/32\" needle Four times daily 3/20/24   Tony Phillips MD   bumetanide (Bumex) 1 mg tablet Take 1 tablet (1 mg) by mouth once daily. 1/8/24 1/7/25  Rodney Tsang, DO   carboxymethylcellulose (Refresh Tears) 0.5 % ophthalmic solution 1-2 drops each eye 3 times a day for 30 days 5/5/23   Historical Provider, MD   carvedilol (Coreg) 6.25 mg tablet TAKE ONE TABLET BY MOUTH TWICE DAILY 1/15/24   Rodney Tsang DO   chlorhexidine (Peridex) 0.12 % solution SWISH AND SPIT 15ML FOR 30 SECONDS NIGHT PRIOR TO SURGERY AND MORNING OF SURGERY 8/1/24   YASSINE Montgomery   clopidogrel (Plavix) 75 mg tablet TAKE ONE TABLET BY MOUTH EVERY DAY 5/8/24   Marjorie Saha DO   Dexcom G7  misc Use as instructed 3/20/24   Tony Phillips MD   Dexcom G7 Sensor device For continuous glucose monitoring.  Change every 10 days. 3/20/24   Tony Phillips MD   eplerenone (Inspra) 50 mg tablet Take 2 tablets (100 mg) by mouth 2 times a day.  Patient taking differently: Take 1 tablet (50 mg) by mouth 2 times a day. 7/23/24 1/19/25  Marjorie Saha DO   famotidine (Pepcid) 20 mg tablet Take 1 tablet (20 mg) by mouth 2 times a day.    Historical Provider, MD   fluocinonide (Lidex) 0.05 % cream Apply 1 Application topically 2 times a day. To areas of psoriasis on trunk and extremities Monday thru Friday. Avoid face    Historical Provider, MD   fluticasone (Flonase Sensimist) 27.5 mcg/actuation nasal spray Administer 2 sprays into each nostril once daily at bedtime.  Patient not taking: Reported on 8/1/2024 12/11/23 3/20/24  MANSI Goldsmith-CNP   guaiFENesin (Mucinex) 600 mg 12 hr tablet Take 2 tablets (1,200 mg) by mouth 2 times a day for 7 days. Do not crush, chew, or split. 7/27/24 8/3/24  Narinder Bergman DO   hydrocortisone (Cortef) 5 mg tablet Take 1-2 tablets (5-10 mg) by mouth 2 times a day. 3/20/24 3/20/25  Tony Phillips MD "   hydroxychloroquine (Plaquenil) 200 mg tablet take ONE & ONE-HALF tablet BY MOUTH EVERY DAY 3/21/24   Lazaro La MD   ipratropium (Atrovent) 42 mcg (0.06 %) nasal spray Administer 2 sprays into each nostril 2 times a day.    Historical Provider, MD   Lanradhaus Solostar U-100 Insulin 100 unit/mL (3 mL) pen Inject 12 Units under the skin once daily at bedtime. Take as directed per insulin instructions.  Patient taking differently: Inject 7 Units under the skin once daily at bedtime. Take as directed per insulin instructions. 3/20/24   Tony Phillips MD   leflunomide (Arava) 20 mg tablet TAKE ONE TABLET BY MOUTH EVERY DAY 7/8/24   Lazaro La MD   levothyroxine (Synthroid, Levoxyl) 88 mcg tablet 1 tablet daily 6 days per week, 2 tablets one day per week 10/23/23   Tony Phillips MD   lidocaine (Lidoderm) 5 % patch Place 3 patches on the skin once daily. Remove & discard patch within 12 hours or as directed by MD.    Historical Provider, MD   losartan (Cozaar) 25 mg tablet TAKE ONE TABLET BY MOUTH EVERY DAY  Patient taking differently: Take 2 tablets (50 mg) by mouth once daily. 7/15/24   Marjorie Saha,    miconazole (Micotin) 2 % powder Apply 1 Application topically 2 times a day as needed for itching.  To dry skin    Historical Provider, MD   morphine (MSIR) 30 mg tablet Take 1 tablet (30 mg) by mouth 2 times a day.    Historical Provider, MD   morphine CR (MS Contin) 15 mg 12 hr tablet Take 1 tablet (15 mg) by mouth 2 times a day. 4/13/22   Historical Provider, MD   morphine CR (MS Contin) 30 mg 12 hr tablet Take 1 tablet (30 mg) by mouth 2 times a day. Do not crush, chew, or split.    Historical Provider, MD   mupirocin (Bactroban) 2 % ointment Apply topically once daily as needed.    Historical Provider, MD   NovoLOG Flexpen U-100 Insulin 100 unit/mL (3 mL) pen Inject 2-6 Units under the skin 3 times a day before meals.  Patient taking differently: Inject 2-6 Units under the skin 3 times a day before meals.  100-150 2 units  150-200- 4 units  200-250- 6 units  if higher than that it increases 1 unit per every 50 per patient 3/20/24   Tony Phillips MD   nystatin (Mycostatin) cream APPLY TOPICALLY TWICE DAILY  Patient not taking: Reported on 8/1/2024 6/17/24   Marjorie Saha DO   ondansetron ODT (Zofran-ODT) 4 mg disintegrating tablet Take 1 tablet (4 mg) by mouth every 8 hours if needed for nausea or vomiting.    Historical Provider, MD   pancrelipase, Lip-Prot-Amyl, (Creon) 36,000-114,000- 180,000 unit capsule,delayed release(DR/EC) capsule Take 2 capsules by mouth 3 times a day. With meals, 1 capsule with snacks. Max 8 capsules per day 12/20/19   Historical Provider, MD   pantoprazole (ProtoNix) 40 mg EC tablet Take 1 tablet (40 mg) by mouth once daily. 9/6/23   Historical Provider, MD   plecanatide (Trulance) tablet tablet Take 1 tablet (3 mg) by mouth every other day.    Historical Provider, MD   polyethylene glycol (Miralax) 17 gram/dose powder Take 17 g by mouth once daily as needed (constipation).    Historical Provider, MD   potassium chloride CR (Klor-Con M20) 20 mEq ER tablet Take 1 tablet (20 mEq) by mouth 2 times a day for 10 days. Do not crush or chew. 7/23/24 8/2/24  Arun Alvarado,    Solu-CORTEF Act-O-Vial, PF, 100 mg/2 mL injection Inject 2 mL (100 mg) over 1 minutes into the muscle if needed for other (Adrenal crisis).  Patient not taking: Reported on 7/31/2024 7/15/24   Tony Phillips MD   sucralfate (Carafate) 1 gram tablet Take 1 tablet (1 g) by mouth 4 times a day before meals. 6/20/24 12/17/24  Marjorie Saha DO   sulfamethoxazole-trimethoprim (Bactrim DS) 800-160 mg tablet Take 1 tablet by mouth 2 times a day for 14 doses. 7/27/24 8/3/24  Narinder Bergman DO   SUMAtriptan (Imitrex) 25 mg tablet Take 1 tablet (25 mg) by mouth if needed for migraine. May repeat in 2 hours if needed 6/12/24   Marjorie Saha, DO   triamcinolone (Kenalog) 0.1 % cream APPLY ONE APPLICATION EXTERNALLY TWICE DAILY  AS NEEDED  Patient not taking: Reported on 8/1/2024 6/17/24   Marjorie Saha, DO   blood sugar diagnostic (OneTouch Verio test strips) strip TEST 4 TIMES A DAY. MAY SUBSTITUTE WITH INSURANCE COVERED STRIPS. 2/2/21 7/27/24  Historical Provider, MD   butalbital-aspirin-caffeine (Fiorinal) -40 mg capsule Take 1 capsule by mouth every 6 hours if needed for migraine.  7/27/24  Historical Provider, MD   diphenoxylate-atropine (LomotiL) 2.5-0.025 mg tablet Take 1 tablet by mouth 4 times a day.  7/27/24  Historical Provider, MD   fluticasone (Flovent) 110 mcg/actuation inhaler Inhale 2 puffs 2 times a day. Rinse mouth with water after use to reduce aftertaste and incidence of candidiasis. Do not swallow.  7/31/24  Historical Provider, MD   ketotifen (Zaditor) 0.025 % (0.035 %) ophthalmic solution Administer 1 drop into both eyes 2 times a day. 12/5/23 7/27/24  Marjorie Saha, DO   levocetirizine (Xyzal) 5 mg tablet 1 tablet in the evening Orally Once a day for 30 day(s) 6/15/23 7/27/24  Historical Provider, MD   lidocaine (Lidoderm) 5 % patch apply up to 3 patches to skin once a day, remove after 12 hours for 30 days 5/4/20 7/31/24  Historical Provider, MD   loratadine (Claritin) 10 mg tablet Take 1 tablet (10 mg) by mouth once daily.  7/31/24  Historical Provider, MD   MORPHINE SULFATE ER PO Take 30 mg by mouth 2 times a day.  7/27/24  Historical Provider, MD   ondansetron (Zofran) 4 mg tablet Take 1 tablet (4 mg) by mouth every 8 hours if needed for nausea or vomiting.  7/27/24  Historical Provider, MD   tiotropium-olodateroL (Stiolto Respimat) 2.5-2.5 mcg/actuation mist inhaler 2 puffs Inhalation Once a day for 30 days 4/25/23 7/27/24  Historical Provider, MD SHEILA IBARRA     DASI Risk Score    No data to display       Caprini DVT Assessment    No data to display       Modified Frailty Index    No data to display       CHADS2 Stroke Risk  Current as of 3 hours ago        5.9% 3 to 100%: High Risk   2 to < 3%:  Medium Risk   0 to < 2%: Low Risk     No Change          This score determines the patient's risk of having a stroke if the patient has atrial fibrillation.          Points Metrics   1 Has Congestive Heart Failure:  Yes     Patients with congestive heart failure get 1 point.    Current as of 3 hours ago   1 Has Hypertension:  Yes     Patients with hypertension get 1 point.    Current as of 3 hours ago   0 Age:  64     Patients who are 75 years of age or older get 1 point.    Current as of 3 hours ago   1 Has Diabetes:  Yes     Patients with diabetes get 1 point.    Current as of 3 hours ago   0 Had Stroke:  No  Had TIA:  No  Had Thromboembolism:  No     Patients who have had a stroke, TIA, or thromboembolism get 2 points.    Current as of 3 hours ago             Revised Cardiac Risk Index    No data to display       Apfel Simplified Score    No data to display       Risk Analysis Index Results This Encounter    No data found in the last 1 encounters.       After Visit Summary (AVS) reviewed and patient verbalized good understanding of medications and NPO instructions.     Nurse Plan of Action:

## 2024-08-02 ENCOUNTER — HOME CARE VISIT (OUTPATIENT)
Dept: HOME HEALTH SERVICES | Facility: HOME HEALTH | Age: 64
End: 2024-08-02
Payer: MEDICAID

## 2024-08-02 LAB
ATRIAL RATE: 76 BPM
EST. AVERAGE GLUCOSE BLD GHB EST-MCNC: 140 MG/DL
HBA1C MFR BLD: 6.5 %
P AXIS: 48 DEGREES
P OFFSET: 185 MS
P ONSET: 119 MS
PR INTERVAL: 180 MS
Q ONSET: 209 MS
QRS COUNT: 12 BEATS
QRS DURATION: 92 MS
QT INTERVAL: 396 MS
QTC CALCULATION(BAZETT): 445 MS
QTC FREDERICIA: 428 MS
R AXIS: -88 DEGREES
T AXIS: 68 DEGREES
T OFFSET: 407 MS
VENTRICULAR RATE: 76 BPM

## 2024-08-03 LAB — STAPHYLOCOCCUS SPEC CULT: NORMAL

## 2024-08-04 SDOH — HEALTH STABILITY: PHYSICAL HEALTH: EXERCISE TYPE: HEP

## 2024-08-04 ASSESSMENT — ENCOUNTER SYMPTOMS
PAIN LOCATION: GENERALIZED
PAIN: 1
LOWEST PAIN SEVERITY IN PAST 24 HOURS: 8/10
HIGHEST PAIN SEVERITY IN PAST 24 HOURS: 8/10
SUBJECTIVE PAIN PROGRESSION: UNCHANGED
PERSON REPORTING PAIN: PATIENT
PAIN SEVERITY GOAL: 0/10

## 2024-08-04 ASSESSMENT — ACTIVITIES OF DAILY LIVING (ADL)
OASIS_M1830: 01
HOME_HEALTH_OASIS: 00

## 2024-08-04 NOTE — CASE COMMUNICATION
Patient discharged from PT services and home health services with pt to undergo a TKR in two weeks. pt currently undergoing pre op testing and will most likely require home health at that time post op. pt refused in person DC visit secondary to multiple appts on Thursday. phone discharged performed per patient request.

## 2024-08-05 ENCOUNTER — APPOINTMENT (OUTPATIENT)
Dept: CARDIOLOGY | Facility: HOSPITAL | Age: 64
End: 2024-08-05
Payer: MEDICAID

## 2024-08-05 ENCOUNTER — HOSPITAL ENCOUNTER (EMERGENCY)
Facility: HOSPITAL | Age: 64
Discharge: ANOTHER HOSPITAL | End: 2024-08-05
Attending: EMERGENCY MEDICINE
Payer: MEDICAID

## 2024-08-05 ENCOUNTER — APPOINTMENT (OUTPATIENT)
Dept: RADIOLOGY | Facility: HOSPITAL | Age: 64
End: 2024-08-05
Payer: MEDICAID

## 2024-08-05 ENCOUNTER — HOSPITAL ENCOUNTER (INPATIENT)
Facility: HOSPITAL | Age: 64
LOS: 2 days | Discharge: ANOTHER HOSPITAL | End: 2024-08-07
Attending: INTERNAL MEDICINE | Admitting: INTERNAL MEDICINE
Payer: MEDICAID

## 2024-08-05 DIAGNOSIS — R00.1 BRADYCARDIA: ICD-10-CM

## 2024-08-05 DIAGNOSIS — R06.02 SHORTNESS OF BREATH: ICD-10-CM

## 2024-08-05 DIAGNOSIS — R53.1 GENERALIZED WEAKNESS: Primary | ICD-10-CM

## 2024-08-05 DIAGNOSIS — I50.32 CHRONIC DIASTOLIC HEART FAILURE (MULTI): ICD-10-CM

## 2024-08-05 DIAGNOSIS — N17.9 ACUTE KIDNEY INJURY (CMS-HCC): Primary | ICD-10-CM

## 2024-08-05 DIAGNOSIS — E87.1 HYPONATREMIA: ICD-10-CM

## 2024-08-05 DIAGNOSIS — E87.5 HYPERKALEMIA: ICD-10-CM

## 2024-08-05 DIAGNOSIS — N17.9 ACUTE RENAL FAILURE, UNSPECIFIED ACUTE RENAL FAILURE TYPE (CMS-HCC): ICD-10-CM

## 2024-08-05 DIAGNOSIS — K55.9 ISCHEMIC COLITIS (MULTI): ICD-10-CM

## 2024-08-05 DIAGNOSIS — R42 DIZZINESS: ICD-10-CM

## 2024-08-05 DIAGNOSIS — I95.89 OTHER SPECIFIED HYPOTENSION: ICD-10-CM

## 2024-08-05 PROBLEM — I95.9 ARTERIAL HYPOTENSION: Status: ACTIVE | Noted: 2024-08-05

## 2024-08-05 LAB
ABO GROUP (TYPE) IN BLOOD: NORMAL
ALBUMIN SERPL BCP-MCNC: 4 G/DL (ref 3.4–5)
ALBUMIN SERPL-MCNC: 3 G/DL (ref 3.5–5)
ALP BLD-CCNC: 251 U/L (ref 35–125)
ALP SERPL-CCNC: 233 U/L (ref 33–136)
ALT SERPL W P-5'-P-CCNC: 26 U/L (ref 7–45)
ALT SERPL-CCNC: 29 U/L (ref 5–40)
ANION GAP BLDA CALCULATED.4IONS-SCNC: 12 MMO/L (ref 10–25)
ANION GAP BLDA CALCULATED.4IONS-SCNC: 13 MMO/L (ref 10–25)
ANION GAP BLDV CALCULATED.4IONS-SCNC: 11 MMOL/L (ref 10–25)
ANION GAP BLDV CALCULATED.4IONS-SCNC: 12 MMOL/L (ref 10–25)
ANION GAP SERPL CALC-SCNC: 15 MMOL/L
ANION GAP SERPL CALC-SCNC: 18 MMOL/L
ANION GAP SERPL CALC-SCNC: 20 MMOL/L
ANTIBODY SCREEN: NORMAL
APPARATUS: ABNORMAL
APPEARANCE UR: ABNORMAL
APPEARANCE UR: ABNORMAL
APTT PPP: 58 SECONDS (ref 22–32.5)
ARTERIAL PATENCY WRIST A: POSITIVE
ARTERIAL PATENCY WRIST A: POSITIVE
AST SERPL W P-5'-P-CCNC: 50 U/L (ref 9–39)
AST SERPL-CCNC: 62 U/L (ref 5–40)
ATRIAL RATE: 0 BPM
ATRIAL RATE: 122 BPM
ATRIAL RATE: 47 BPM
ATRIAL RATE: 86 BPM
BACTERIA #/AREA URNS AUTO: ABNORMAL /HPF
BACTERIA #/AREA URNS AUTO: ABNORMAL /HPF
BASE EXCESS BLDA CALC-SCNC: -8.4 MMOL/L (ref -2–3)
BASE EXCESS BLDA CALC-SCNC: -9.2 MMOL/L (ref -2–3)
BASE EXCESS BLDV CALC-SCNC: -7.1 MMOL/L (ref -2–3)
BASE EXCESS BLDV CALC-SCNC: -9.5 MMOL/L (ref -2–3)
BASOPHILS # BLD AUTO: 0.08 X10*3/UL (ref 0–0.1)
BASOPHILS # BLD AUTO: 0.09 X10*3/UL (ref 0–0.1)
BASOPHILS NFR BLD AUTO: 0.5 %
BASOPHILS NFR BLD AUTO: 0.5 %
BILIRUB SERPL-MCNC: 1 MG/DL (ref 0.1–1.2)
BILIRUB SERPL-MCNC: 1.2 MG/DL (ref 0–1.2)
BILIRUB UR STRIP.AUTO-MCNC: NEGATIVE MG/DL
BILIRUB UR STRIP.AUTO-MCNC: NEGATIVE MG/DL
BNP SERPL-MCNC: 25 PG/ML (ref 0–99)
BODY TEMPERATURE: 37 DEGREES CELSIUS
BUN SERPL-MCNC: 46 MG/DL (ref 8–25)
BUN SERPL-MCNC: 51 MG/DL (ref 8–25)
BUN SERPL-MCNC: 53 MG/DL (ref 6–23)
CA-I BLD-SCNC: 1.04 MMOL/L (ref 1.1–1.33)
CA-I BLDA-SCNC: 1.21 MMOL/L (ref 1.1–1.33)
CA-I BLDA-SCNC: 1.22 MMOL/L (ref 1.1–1.33)
CA-I BLDV-SCNC: 1.11 MMOL/L (ref 1.1–1.33)
CA-I BLDV-SCNC: 1.18 MMOL/L (ref 1.1–1.33)
CALCIUM SERPL-MCNC: 10 MG/DL (ref 8.6–10.3)
CALCIUM SERPL-MCNC: 8.7 MG/DL (ref 8.5–10.4)
CALCIUM SERPL-MCNC: 8.9 MG/DL (ref 8.5–10.4)
CARDIAC TROPONIN I PNL SERPL HS: 21 NG/L (ref 0–13)
CARDIAC TROPONIN I PNL SERPL HS: 21 NG/L (ref 0–13)
CARDIAC TROPONIN I PNL SERPL HS: 4 NG/L (ref 0–13)
CHLORIDE BLDA-SCNC: 94 MMOL/L (ref 98–107)
CHLORIDE BLDA-SCNC: 94 MMOL/L (ref 98–107)
CHLORIDE BLDV-SCNC: 92 MMOL/L (ref 98–107)
CHLORIDE BLDV-SCNC: 94 MMOL/L (ref 98–107)
CHLORIDE SERPL-SCNC: 82 MMOL/L (ref 98–107)
CHLORIDE SERPL-SCNC: 90 MMOL/L (ref 97–107)
CHLORIDE SERPL-SCNC: 92 MMOL/L (ref 97–107)
CO2 SERPL-SCNC: 16 MMOL/L (ref 24–31)
CO2 SERPL-SCNC: 17 MMOL/L (ref 24–31)
CO2 SERPL-SCNC: 23 MMOL/L (ref 21–32)
COLOR UR: ABNORMAL
COLOR UR: YELLOW
CREAT SERPL-MCNC: 2.4 MG/DL (ref 0.4–1.6)
CREAT SERPL-MCNC: 3.1 MG/DL (ref 0.4–1.6)
CREAT SERPL-MCNC: 3.79 MG/DL (ref 0.5–1.05)
CRITICAL CALL TIME: 2108
CRITICAL CALL TIME: 2228
CRITICAL CALLED BY: ABNORMAL
CRITICAL CALLED BY: ABNORMAL
CRITICAL CALLED TO: ABNORMAL
CRITICAL CALLED TO: ABNORMAL
CRITICAL NOTE: ABNORMAL
CRITICAL NOTE: ABNORMAL
CRITICAL READ BACK: ABNORMAL
CRITICAL READ BACK: ABNORMAL
EGFRCR SERPLBLD CKD-EPI 2021: 13 ML/MIN/1.73M*2
EGFRCR SERPLBLD CKD-EPI 2021: 16 ML/MIN/1.73M*2
EGFRCR SERPLBLD CKD-EPI 2021: 22 ML/MIN/1.73M*2
EOSINOPHIL # BLD AUTO: 0.07 X10*3/UL (ref 0–0.7)
EOSINOPHIL # BLD AUTO: 0.24 X10*3/UL (ref 0–0.7)
EOSINOPHIL NFR BLD AUTO: 0.4 %
EOSINOPHIL NFR BLD AUTO: 1.5 %
ERYTHROCYTE [DISTWIDTH] IN BLOOD BY AUTOMATED COUNT: 15.9 % (ref 11.5–14.5)
ERYTHROCYTE [DISTWIDTH] IN BLOOD BY AUTOMATED COUNT: 15.9 % (ref 11.5–14.5)
GLUCOSE BLD MANUAL STRIP-MCNC: 174 MG/DL (ref 74–99)
GLUCOSE BLD MANUAL STRIP-MCNC: 175 MG/DL (ref 74–99)
GLUCOSE BLD MANUAL STRIP-MCNC: 180 MG/DL (ref 74–99)
GLUCOSE BLD MANUAL STRIP-MCNC: 296 MG/DL (ref 74–99)
GLUCOSE BLDA-MCNC: 171 MG/DL (ref 74–99)
GLUCOSE BLDA-MCNC: 259 MG/DL (ref 74–99)
GLUCOSE BLDV-MCNC: 187 MG/DL (ref 74–99)
GLUCOSE BLDV-MCNC: 224 MG/DL (ref 74–99)
GLUCOSE SERPL-MCNC: 198 MG/DL (ref 65–99)
GLUCOSE SERPL-MCNC: 234 MG/DL (ref 65–99)
GLUCOSE SERPL-MCNC: 73 MG/DL (ref 74–99)
GLUCOSE UR STRIP.AUTO-MCNC: NEGATIVE MG/DL
GLUCOSE UR STRIP.AUTO-MCNC: NORMAL MG/DL
HCO3 BLDA-SCNC: 17.6 MMOL/L (ref 22–26)
HCO3 BLDA-SCNC: 17.9 MMOL/L (ref 22–26)
HCO3 BLDV-SCNC: 17.1 MMOL/L (ref 22–26)
HCO3 BLDV-SCNC: 18.7 MMOL/L (ref 22–26)
HCT VFR BLD AUTO: 33 % (ref 36–46)
HCT VFR BLD AUTO: 41.4 % (ref 36–46)
HCT VFR BLD EST: 30 % (ref 36–46)
HCT VFR BLD EST: 30 % (ref 36–46)
HCT VFR BLD EST: 33 % (ref 36–46)
HCT VFR BLD EST: 35 % (ref 36–46)
HGB BLD-MCNC: 11.1 G/DL (ref 12–16)
HGB BLD-MCNC: 13.3 G/DL (ref 12–16)
HGB BLDA-MCNC: 10.1 G/DL (ref 12–16)
HGB BLDA-MCNC: 9.9 G/DL (ref 12–16)
HGB BLDV-MCNC: 10.9 G/DL (ref 12–16)
HGB BLDV-MCNC: 11.6 G/DL (ref 12–16)
HOLD SPECIMEN: NORMAL
HOLD SPECIMEN: NORMAL
IMM GRANULOCYTES # BLD AUTO: 0.06 X10*3/UL (ref 0–0.7)
IMM GRANULOCYTES # BLD AUTO: 0.09 X10*3/UL (ref 0–0.7)
IMM GRANULOCYTES NFR BLD AUTO: 0.4 % (ref 0–0.9)
IMM GRANULOCYTES NFR BLD AUTO: 0.5 % (ref 0–0.9)
INHALED O2 CONCENTRATION: 32 %
INHALED O2 CONCENTRATION: 32 %
INHALED O2 CONCENTRATION: 4 %
INHALED O2 CONCENTRATION: 40 %
INR PPP: 1.4 (ref 0.9–1.2)
KETONES UR STRIP.AUTO-MCNC: NEGATIVE MG/DL
KETONES UR STRIP.AUTO-MCNC: NEGATIVE MG/DL
LACTATE BLDA-SCNC: 4 MMOL/L (ref 0.4–2)
LACTATE BLDA-SCNC: 4.6 MMOL/L (ref 0.4–2)
LACTATE BLDV-SCNC: 2.6 MMOL/L (ref 0.4–2)
LACTATE BLDV-SCNC: 5.1 MMOL/L (ref 0.4–2)
LACTATE SERPL-SCNC: 3.3 MMOL/L (ref 0.4–2)
LACTATE SERPL-SCNC: 4.4 MMOL/L (ref 0.4–2)
LEUKOCYTE ESTERASE UR QL STRIP.AUTO: ABNORMAL
LEUKOCYTE ESTERASE UR QL STRIP.AUTO: ABNORMAL
LYMPHOCYTES # BLD AUTO: 0.75 X10*3/UL (ref 1.2–4.8)
LYMPHOCYTES # BLD AUTO: 1.12 X10*3/UL (ref 1.2–4.8)
LYMPHOCYTES NFR BLD AUTO: 4.3 %
LYMPHOCYTES NFR BLD AUTO: 7 %
MAGNESIUM SERPL-MCNC: 2 MG/DL (ref 1.6–3.1)
MAGNESIUM SERPL-MCNC: 2.3 MG/DL (ref 1.6–3.1)
MAGNESIUM SERPL-MCNC: 2.64 MG/DL (ref 1.6–2.4)
MCH RBC QN AUTO: 28.1 PG (ref 26–34)
MCH RBC QN AUTO: 28.8 PG (ref 26–34)
MCHC RBC AUTO-ENTMCNC: 32.1 G/DL (ref 32–36)
MCHC RBC AUTO-ENTMCNC: 33.6 G/DL (ref 32–36)
MCV RBC AUTO: 86 FL (ref 80–100)
MCV RBC AUTO: 87 FL (ref 80–100)
MONOCYTES # BLD AUTO: 1.24 X10*3/UL (ref 0.1–1)
MONOCYTES # BLD AUTO: 1.37 X10*3/UL (ref 0.1–1)
MONOCYTES NFR BLD AUTO: 7.7 %
MONOCYTES NFR BLD AUTO: 7.8 %
MUCOUS THREADS #/AREA URNS AUTO: ABNORMAL /LPF
NEUTROPHILS # BLD AUTO: 13.29 X10*3/UL (ref 1.2–7.7)
NEUTROPHILS # BLD AUTO: 15.17 X10*3/UL (ref 1.2–7.7)
NEUTROPHILS NFR BLD AUTO: 82.9 %
NEUTROPHILS NFR BLD AUTO: 86.5 %
NITRITE UR QL STRIP.AUTO: NEGATIVE
NITRITE UR QL STRIP.AUTO: NEGATIVE
NRBC BLD-RTO: 0 /100 WBCS (ref 0–0)
NRBC BLD-RTO: 0 /100 WBCS (ref 0–0)
OXYHGB MFR BLDA: 95.9 % (ref 94–98)
OXYHGB MFR BLDA: 96.5 % (ref 94–98)
OXYHGB MFR BLDV: 96.2 % (ref 45–75)
OXYHGB MFR BLDV: 97.5 % (ref 45–75)
P AXIS: 36 DEGREES
P AXIS: 85 DEGREES
P OFFSET: 156 MS
P ONSET: 83 MS
PCO2 BLDA: 39 MM HG (ref 38–42)
PCO2 BLDA: 41 MM HG (ref 38–42)
PCO2 BLDV: 38 MM HG (ref 41–51)
PCO2 BLDV: 39 MM HG (ref 41–51)
PH BLDA: 7.24 PH (ref 7.38–7.42)
PH BLDA: 7.27 PH (ref 7.38–7.42)
PH BLDV: 7.25 PH (ref 7.33–7.43)
PH BLDV: 7.3 PH (ref 7.33–7.43)
PH UR STRIP.AUTO: 5 [PH]
PH UR STRIP.AUTO: 6 [PH]
PHOSPHATE SERPL-MCNC: 5.4 MG/DL (ref 2.5–4.5)
PHOSPHATE SERPL-MCNC: 6.4 MG/DL (ref 2.5–4.5)
PLATELET # BLD AUTO: 168 X10*3/UL (ref 150–450)
PLATELET # BLD AUTO: 189 X10*3/UL (ref 150–450)
PO2 BLDA: 104 MM HG (ref 85–95)
PO2 BLDA: 94 MM HG (ref 85–95)
PO2 BLDV: 131 MM HG (ref 35–45)
PO2 BLDV: 156 MM HG (ref 35–45)
POTASSIUM BLDA-SCNC: 5.2 MMOL/L (ref 3.5–5.3)
POTASSIUM BLDA-SCNC: 5.5 MMOL/L (ref 3.5–5.3)
POTASSIUM BLDV-SCNC: 5.3 MMOL/L (ref 3.5–5.3)
POTASSIUM BLDV-SCNC: 6.7 MMOL/L (ref 3.5–5.3)
POTASSIUM SERPL-SCNC: 5.5 MMOL/L (ref 3.4–5.1)
POTASSIUM SERPL-SCNC: 6.7 MMOL/L (ref 3.5–5.3)
POTASSIUM SERPL-SCNC: 6.8 MMOL/L (ref 3.4–5.1)
POTASSIUM SERPL-SCNC: 7.3 MMOL/L (ref 3.5–5.3)
POTASSIUM SERPL-SCNC: 7.6 MMOL/L (ref 3.5–5.3)
PR INTERVAL: 112 MS
PR INTERVAL: 270 MS
PROT SERPL-MCNC: 6 G/DL (ref 5.9–7.9)
PROT SERPL-MCNC: 8.2 G/DL (ref 6.4–8.2)
PROT UR STRIP.AUTO-MCNC: ABNORMAL MG/DL
PROT UR STRIP.AUTO-MCNC: ABNORMAL MG/DL
PROTHROMBIN TIME: 14.1 SECONDS (ref 9.3–12.7)
Q ONSET: 207 MS
Q ONSET: 212 MS
Q ONSET: 218 MS
Q ONSET: 220 MS
QRS COUNT: 15 BEATS
QRS COUNT: 18 BEATS
QRS COUNT: 29 BEATS
QRS COUNT: 8 BEATS
QRS DURATION: 138 MS
QRS DURATION: 142 MS
QRS DURATION: 162 MS
QRS DURATION: 76 MS
QT INTERVAL: 212 MS
QT INTERVAL: 410 MS
QT INTERVAL: 478 MS
QT INTERVAL: 668 MS
QTC CALCULATION(BAZETT): 362 MS
QTC CALCULATION(BAZETT): 490 MS
QTC CALCULATION(BAZETT): 591 MS
QTC CALCULATION(BAZETT): 652 MS
QTC FREDERICIA: 303 MS
QTC FREDERICIA: 462 MS
QTC FREDERICIA: 588 MS
QTC FREDERICIA: 615 MS
R AXIS: -66 DEGREES
R AXIS: 151 DEGREES
R AXIS: 239 DEGREES
R AXIS: 256 DEGREES
RBC # BLD AUTO: 3.86 X10*6/UL (ref 4–5.2)
RBC # BLD AUTO: 4.74 X10*6/UL (ref 4–5.2)
RBC # UR STRIP.AUTO: ABNORMAL /UL
RBC # UR STRIP.AUTO: NEGATIVE /UL
RBC #/AREA URNS AUTO: >20 /HPF
RBC #/AREA URNS AUTO: ABNORMAL /HPF
RH FACTOR (ANTIGEN D): NORMAL
SAO2 % BLDA: 98 % (ref 94–100)
SAO2 % BLDA: 98 % (ref 94–100)
SAO2 % BLDV: 100 % (ref 45–75)
SAO2 % BLDV: 98 % (ref 45–75)
SODIUM BLDA-SCNC: 118 MMOL/L (ref 136–145)
SODIUM BLDA-SCNC: 119 MMOL/L (ref 136–145)
SODIUM BLDV-SCNC: 116 MMOL/L (ref 136–145)
SODIUM BLDV-SCNC: 117 MMOL/L (ref 136–145)
SODIUM SERPL-SCNC: 117 MMOL/L (ref 136–145)
SODIUM SERPL-SCNC: 124 MMOL/L (ref 133–145)
SODIUM SERPL-SCNC: 124 MMOL/L (ref 133–145)
SP GR UR STRIP.AUTO: 1.01
SP GR UR STRIP.AUTO: 1.02
SPECIMEN DRAWN FROM PATIENT: ABNORMAL
SPECIMEN DRAWN FROM PATIENT: ABNORMAL
SQUAMOUS #/AREA URNS AUTO: ABNORMAL /HPF
T AXIS: 150 DEGREES
T AXIS: 4 DEGREES
T AXIS: 41 DEGREES
T AXIS: 78 DEGREES
T OFFSET: 318 MS
T OFFSET: 423 MS
T OFFSET: 446 MS
T OFFSET: 554 MS
T4 FREE SERPL-MCNC: 1.22 NG/DL (ref 0.61–1.12)
TROPONIN T SERPL-MCNC: 82 NG/L
TROPONIN T SERPL-MCNC: 83 NG/L
TROPONIN T SERPL-MCNC: 84 NG/L
TSH SERPL-ACNC: 0.51 MIU/L (ref 0.44–3.98)
UROBILINOGEN UR STRIP.AUTO-MCNC: 2 MG/DL
UROBILINOGEN UR STRIP.AUTO-MCNC: NORMAL MG/DL
VENTRICULAR RATE: 112 BPM
VENTRICULAR RATE: 176 BPM
VENTRICULAR RATE: 47 BPM
VENTRICULAR RATE: 86 BPM
WBC # BLD AUTO: 16 X10*3/UL (ref 4.4–11.3)
WBC # BLD AUTO: 17.5 X10*3/UL (ref 4.4–11.3)
WBC #/AREA URNS AUTO: >50 /HPF
WBC #/AREA URNS AUTO: >50 /HPF

## 2024-08-05 PROCEDURE — 82330 ASSAY OF CALCIUM: CPT | Performed by: INTERNAL MEDICINE

## 2024-08-05 PROCEDURE — C9113 INJ PANTOPRAZOLE SODIUM, VIA: HCPCS | Performed by: INTERNAL MEDICINE

## 2024-08-05 PROCEDURE — 99292 CRITICAL CARE ADDL 30 MIN: CPT | Performed by: EMERGENCY MEDICINE

## 2024-08-05 PROCEDURE — 80053 COMPREHEN METABOLIC PANEL: CPT | Performed by: EMERGENCY MEDICINE

## 2024-08-05 PROCEDURE — 82530 CORTISOL FREE: CPT | Performed by: EMERGENCY MEDICINE

## 2024-08-05 PROCEDURE — 94640 AIRWAY INHALATION TREATMENT: CPT

## 2024-08-05 PROCEDURE — 2500000005 HC RX 250 GENERAL PHARMACY W/O HCPCS: Performed by: INTERNAL MEDICINE

## 2024-08-05 PROCEDURE — 93005 ELECTROCARDIOGRAM TRACING: CPT

## 2024-08-05 PROCEDURE — 81001 URINALYSIS AUTO W/SCOPE: CPT | Performed by: EMERGENCY MEDICINE

## 2024-08-05 PROCEDURE — 86901 BLOOD TYPING SEROLOGIC RH(D): CPT | Performed by: INTERNAL MEDICINE

## 2024-08-05 PROCEDURE — 87186 SC STD MICRODIL/AGAR DIL: CPT | Mod: CONLAB | Performed by: EMERGENCY MEDICINE

## 2024-08-05 PROCEDURE — 99291 CRITICAL CARE FIRST HOUR: CPT | Performed by: INTERNAL MEDICINE

## 2024-08-05 PROCEDURE — 71045 X-RAY EXAM CHEST 1 VIEW: CPT | Mod: FOREIGN READ | Performed by: RADIOLOGY

## 2024-08-05 PROCEDURE — 83930 ASSAY OF BLOOD OSMOLALITY: CPT | Mod: WESLAB

## 2024-08-05 PROCEDURE — 2500000004 HC RX 250 GENERAL PHARMACY W/ HCPCS (ALT 636 FOR OP/ED): Mod: SE

## 2024-08-05 PROCEDURE — 85025 COMPLETE CBC W/AUTO DIFF WBC: CPT | Performed by: INTERNAL MEDICINE

## 2024-08-05 PROCEDURE — 2500000004 HC RX 250 GENERAL PHARMACY W/ HCPCS (ALT 636 FOR OP/ED)

## 2024-08-05 PROCEDURE — 99291 CRITICAL CARE FIRST HOUR: CPT | Mod: 25 | Performed by: EMERGENCY MEDICINE

## 2024-08-05 PROCEDURE — 82436 ASSAY OF URINE CHLORIDE: CPT | Mod: WESLAB

## 2024-08-05 PROCEDURE — 2500000001 HC RX 250 WO HCPCS SELF ADMINISTERED DRUGS (ALT 637 FOR MEDICARE OP): Mod: SE | Performed by: EMERGENCY MEDICINE

## 2024-08-05 PROCEDURE — 74176 CT ABD & PELVIS W/O CONTRAST: CPT | Mod: FOREIGN READ | Performed by: RADIOLOGY

## 2024-08-05 PROCEDURE — 83735 ASSAY OF MAGNESIUM: CPT | Performed by: EMERGENCY MEDICINE

## 2024-08-05 PROCEDURE — 36415 COLL VENOUS BLD VENIPUNCTURE: CPT | Performed by: EMERGENCY MEDICINE

## 2024-08-05 PROCEDURE — 2500000002 HC RX 250 W HCPCS SELF ADMINISTERED DRUGS (ALT 637 FOR MEDICARE OP, ALT 636 FOR OP/ED): Performed by: INTERNAL MEDICINE

## 2024-08-05 PROCEDURE — 36556 INSERT NON-TUNNEL CV CATH: CPT | Performed by: EMERGENCY MEDICINE

## 2024-08-05 PROCEDURE — 2500000005 HC RX 250 GENERAL PHARMACY W/O HCPCS: Mod: SE | Performed by: EMERGENCY MEDICINE

## 2024-08-05 PROCEDURE — 83735 ASSAY OF MAGNESIUM: CPT | Performed by: INTERNAL MEDICINE

## 2024-08-05 PROCEDURE — 70450 CT HEAD/BRAIN W/O DYE: CPT | Performed by: RADIOLOGY

## 2024-08-05 PROCEDURE — 84132 ASSAY OF SERUM POTASSIUM: CPT | Performed by: INTERNAL MEDICINE

## 2024-08-05 PROCEDURE — 2500000002 HC RX 250 W HCPCS SELF ADMINISTERED DRUGS (ALT 637 FOR MEDICARE OP, ALT 636 FOR OP/ED): Mod: SE

## 2024-08-05 PROCEDURE — 84439 ASSAY OF FREE THYROXINE: CPT | Performed by: EMERGENCY MEDICINE

## 2024-08-05 PROCEDURE — 71045 X-RAY EXAM CHEST 1 VIEW: CPT

## 2024-08-05 PROCEDURE — 36600 WITHDRAWAL OF ARTERIAL BLOOD: CPT

## 2024-08-05 PROCEDURE — 84100 ASSAY OF PHOSPHORUS: CPT | Performed by: INTERNAL MEDICINE

## 2024-08-05 PROCEDURE — 96361 HYDRATE IV INFUSION ADD-ON: CPT

## 2024-08-05 PROCEDURE — 87086 URINE CULTURE/COLONY COUNT: CPT | Mod: CONLAB | Performed by: EMERGENCY MEDICINE

## 2024-08-05 PROCEDURE — 9420000001 HC RT PATIENT EDUCATION 5 MIN

## 2024-08-05 PROCEDURE — 81001 URINALYSIS AUTO W/SCOPE: CPT | Performed by: INTERNAL MEDICINE

## 2024-08-05 PROCEDURE — 82947 ASSAY GLUCOSE BLOOD QUANT: CPT

## 2024-08-05 PROCEDURE — 85025 COMPLETE CBC W/AUTO DIFF WBC: CPT | Performed by: EMERGENCY MEDICINE

## 2024-08-05 PROCEDURE — 85610 PROTHROMBIN TIME: CPT | Performed by: INTERNAL MEDICINE

## 2024-08-05 PROCEDURE — 83880 ASSAY OF NATRIURETIC PEPTIDE: CPT | Performed by: EMERGENCY MEDICINE

## 2024-08-05 PROCEDURE — 70450 CT HEAD/BRAIN W/O DYE: CPT

## 2024-08-05 PROCEDURE — 2020000001 HC ICU ROOM DAILY

## 2024-08-05 PROCEDURE — 84132 ASSAY OF SERUM POTASSIUM: CPT

## 2024-08-05 PROCEDURE — 36620 INSERTION CATHETER ARTERY: CPT

## 2024-08-05 PROCEDURE — 83735 ASSAY OF MAGNESIUM: CPT

## 2024-08-05 PROCEDURE — 2500000002 HC RX 250 W HCPCS SELF ADMINISTERED DRUGS (ALT 637 FOR MEDICARE OP, ALT 636 FOR OP/ED): Mod: SE | Performed by: EMERGENCY MEDICINE

## 2024-08-05 PROCEDURE — 83605 ASSAY OF LACTIC ACID: CPT | Performed by: EMERGENCY MEDICINE

## 2024-08-05 PROCEDURE — 84484 ASSAY OF TROPONIN QUANT: CPT | Performed by: EMERGENCY MEDICINE

## 2024-08-05 PROCEDURE — 2500000004 HC RX 250 GENERAL PHARMACY W/ HCPCS (ALT 636 FOR OP/ED): Performed by: INTERNAL MEDICINE

## 2024-08-05 PROCEDURE — 83935 ASSAY OF URINE OSMOLALITY: CPT | Mod: WESLAB

## 2024-08-05 PROCEDURE — 84484 ASSAY OF TROPONIN QUANT: CPT | Performed by: INTERNAL MEDICINE

## 2024-08-05 PROCEDURE — 84100 ASSAY OF PHOSPHORUS: CPT

## 2024-08-05 PROCEDURE — 2500000005 HC RX 250 GENERAL PHARMACY W/O HCPCS: Mod: SE

## 2024-08-05 PROCEDURE — 74176 CT ABD & PELVIS W/O CONTRAST: CPT

## 2024-08-05 PROCEDURE — 84132 ASSAY OF SERUM POTASSIUM: CPT | Performed by: EMERGENCY MEDICINE

## 2024-08-05 PROCEDURE — 96365 THER/PROPH/DIAG IV INF INIT: CPT

## 2024-08-05 PROCEDURE — 2500000002 HC RX 250 W HCPCS SELF ADMINISTERED DRUGS (ALT 637 FOR MEDICARE OP, ALT 636 FOR OP/ED)

## 2024-08-05 PROCEDURE — 37799 UNLISTED PX VASCULAR SURGERY: CPT | Performed by: INTERNAL MEDICINE

## 2024-08-05 PROCEDURE — 96375 TX/PRO/DX INJ NEW DRUG ADDON: CPT

## 2024-08-05 PROCEDURE — 84443 ASSAY THYROID STIM HORMONE: CPT | Performed by: EMERGENCY MEDICINE

## 2024-08-05 PROCEDURE — 2500000001 HC RX 250 WO HCPCS SELF ADMINISTERED DRUGS (ALT 637 FOR MEDICARE OP): Mod: SE

## 2024-08-05 PROCEDURE — 2500000004 HC RX 250 GENERAL PHARMACY W/ HCPCS (ALT 636 FOR OP/ED): Mod: SE | Performed by: EMERGENCY MEDICINE

## 2024-08-05 PROCEDURE — 94664 DEMO&/EVAL PT USE INHALER: CPT

## 2024-08-05 PROCEDURE — 87086 URINE CULTURE/COLONY COUNT: CPT | Mod: WESLAB | Performed by: INTERNAL MEDICINE

## 2024-08-05 PROCEDURE — 96374 THER/PROPH/DIAG INJ IV PUSH: CPT | Mod: 59

## 2024-08-05 RX ORDER — PANTOPRAZOLE SODIUM 40 MG/10ML
40 INJECTION, POWDER, LYOPHILIZED, FOR SOLUTION INTRAVENOUS 2 TIMES DAILY
Status: DISCONTINUED | OUTPATIENT
Start: 2024-08-05 | End: 2024-08-07 | Stop reason: HOSPADM

## 2024-08-05 RX ORDER — CALCIUM CHLORIDE INJECTION 100 MG/ML
INJECTION, SOLUTION INTRAVENOUS
Status: COMPLETED
Start: 2024-08-05 | End: 2024-08-05

## 2024-08-05 RX ORDER — IPRATROPIUM BROMIDE AND ALBUTEROL SULFATE 2.5; .5 MG/3ML; MG/3ML
SOLUTION RESPIRATORY (INHALATION)
Status: COMPLETED
Start: 2024-08-05 | End: 2024-08-05

## 2024-08-05 RX ORDER — SODIUM CHLORIDE 9 MG/ML
100 INJECTION, SOLUTION INTRAVENOUS CONTINUOUS
Status: DISCONTINUED | OUTPATIENT
Start: 2024-08-05 | End: 2024-08-05

## 2024-08-05 RX ORDER — HEPARIN SODIUM 10000 [USP'U]/100ML
INJECTION, SOLUTION INTRAVENOUS
Status: COMPLETED
Start: 2024-08-05 | End: 2024-08-05

## 2024-08-05 RX ORDER — ESOMEPRAZOLE MAGNESIUM 40 MG/1
40 GRANULE, DELAYED RELEASE ORAL 2 TIMES DAILY
Status: DISCONTINUED | OUTPATIENT
Start: 2024-08-05 | End: 2024-08-07 | Stop reason: HOSPADM

## 2024-08-05 RX ORDER — ALLOPURINOL 100 MG/1
100 TABLET ORAL DAILY
Status: DISCONTINUED | OUTPATIENT
Start: 2024-08-05 | End: 2024-08-07 | Stop reason: HOSPADM

## 2024-08-05 RX ORDER — BACLOFEN 10 MG/1
10 TABLET ORAL 2 TIMES DAILY
Status: DISCONTINUED | OUTPATIENT
Start: 2024-08-05 | End: 2024-08-07 | Stop reason: HOSPADM

## 2024-08-05 RX ORDER — DEXTROSE MONOHYDRATE 100 MG/ML
50 INJECTION, SOLUTION INTRAVENOUS CONTINUOUS
Status: DISCONTINUED | OUTPATIENT
Start: 2024-08-05 | End: 2024-08-05

## 2024-08-05 RX ORDER — HEPARIN SODIUM 5000 [USP'U]/ML
60 INJECTION, SOLUTION INTRAVENOUS; SUBCUTANEOUS ONCE
Status: COMPLETED | OUTPATIENT
Start: 2024-08-05 | End: 2024-08-05

## 2024-08-05 RX ORDER — HYDROXYCHLOROQUINE SULFATE 200 MG/1
200 TABLET, FILM COATED ORAL DAILY
Status: DISCONTINUED | OUTPATIENT
Start: 2024-08-05 | End: 2024-08-07 | Stop reason: HOSPADM

## 2024-08-05 RX ORDER — INSULIN LISPRO 100 [IU]/ML
0-10 INJECTION, SOLUTION INTRAVENOUS; SUBCUTANEOUS EVERY 4 HOURS
Status: DISCONTINUED | OUTPATIENT
Start: 2024-08-05 | End: 2024-08-07

## 2024-08-05 RX ORDER — NOREPINEPHRINE BITARTRATE/D5W 8 MG/250ML
0-.2 PLASTIC BAG, INJECTION (ML) INTRAVENOUS CONTINUOUS
Status: DISCONTINUED | OUTPATIENT
Start: 2024-08-05 | End: 2024-08-05 | Stop reason: HOSPADM

## 2024-08-05 RX ORDER — AMITRIPTYLINE HYDROCHLORIDE 25 MG/1
25 TABLET, FILM COATED ORAL NIGHTLY PRN
Status: DISCONTINUED | OUTPATIENT
Start: 2024-08-05 | End: 2024-08-07 | Stop reason: HOSPADM

## 2024-08-05 RX ORDER — DEXTROSE 50 % IN WATER (D50W) INTRAVENOUS SYRINGE
25
Status: DISCONTINUED | OUTPATIENT
Start: 2024-08-05 | End: 2024-08-07 | Stop reason: HOSPADM

## 2024-08-05 RX ORDER — NOREPINEPHRINE BITARTRATE/D5W 8 MG/250ML
0-1 PLASTIC BAG, INJECTION (ML) INTRAVENOUS CONTINUOUS
Status: DISCONTINUED | OUTPATIENT
Start: 2024-08-06 | End: 2024-08-07 | Stop reason: HOSPADM

## 2024-08-05 RX ORDER — SODIUM BICARBONATE 1 MEQ/ML
SYRINGE (ML) INTRAVENOUS
Status: COMPLETED
Start: 2024-08-05 | End: 2024-08-05

## 2024-08-05 RX ORDER — NOREPINEPHRINE BITARTRATE/D5W 8 MG/250ML
PLASTIC BAG, INJECTION (ML) INTRAVENOUS
Status: COMPLETED
Start: 2024-08-05 | End: 2024-08-05

## 2024-08-05 RX ORDER — NITROGLYCERIN 0.4 MG/1
0.4 TABLET SUBLINGUAL EVERY 5 MIN PRN
Status: DISCONTINUED | OUTPATIENT
Start: 2024-08-05 | End: 2024-08-05 | Stop reason: HOSPADM

## 2024-08-05 RX ORDER — SODIUM POLYSTYRENE SULFONATE 15 G/60ML
SUSPENSION ORAL; RECTAL
Status: COMPLETED
Start: 2024-08-05 | End: 2024-08-05

## 2024-08-05 RX ORDER — FLUOCINONIDE 0.5 MG/G
1 OINTMENT TOPICAL 2 TIMES DAILY
Status: DISCONTINUED | OUTPATIENT
Start: 2024-08-05 | End: 2024-08-07 | Stop reason: HOSPADM

## 2024-08-05 RX ORDER — DEXTROSE 50 % IN WATER (D50W) INTRAVENOUS SYRINGE
Status: COMPLETED
Start: 2024-08-05 | End: 2024-08-05

## 2024-08-05 RX ORDER — IPRATROPIUM BROMIDE AND ALBUTEROL SULFATE 2.5; .5 MG/3ML; MG/3ML
3 SOLUTION RESPIRATORY (INHALATION) EVERY 20 MIN
Status: COMPLETED | OUTPATIENT
Start: 2024-08-05 | End: 2024-08-05

## 2024-08-05 RX ORDER — LEFLUNOMIDE 10 MG/1
20 TABLET ORAL DAILY
Status: DISCONTINUED | OUTPATIENT
Start: 2024-08-05 | End: 2024-08-07 | Stop reason: HOSPADM

## 2024-08-05 RX ORDER — INDOMETHACIN 25 MG/1
50 CAPSULE ORAL ONCE
Status: COMPLETED | OUTPATIENT
Start: 2024-08-05 | End: 2024-08-05

## 2024-08-05 RX ORDER — SUMATRIPTAN SUCCINATE 25 MG/1
25 TABLET ORAL AS NEEDED
Status: DISCONTINUED | OUTPATIENT
Start: 2024-08-05 | End: 2024-08-07 | Stop reason: HOSPADM

## 2024-08-05 RX ORDER — FAMOTIDINE 20 MG/1
20 TABLET, FILM COATED ORAL 2 TIMES DAILY
Status: DISCONTINUED | OUTPATIENT
Start: 2024-08-05 | End: 2024-08-07 | Stop reason: HOSPADM

## 2024-08-05 RX ORDER — ONDANSETRON HYDROCHLORIDE 2 MG/ML
4 INJECTION, SOLUTION INTRAVENOUS ONCE
Status: COMPLETED | OUTPATIENT
Start: 2024-08-05 | End: 2024-08-05

## 2024-08-05 RX ORDER — CALCIUM GLUCONATE 20 MG/ML
1 INJECTION, SOLUTION INTRAVENOUS ONCE
Status: COMPLETED | OUTPATIENT
Start: 2024-08-05 | End: 2024-08-05

## 2024-08-05 RX ORDER — EPINEPHRINE 0.1 MG/ML
INJECTION INTRACARDIAC; INTRAVENOUS CODE/TRAUMA/SEDATION MEDICATION
Status: COMPLETED | OUTPATIENT
Start: 2024-08-05 | End: 2024-08-05

## 2024-08-05 RX ORDER — MORPHINE SULFATE 2 MG/ML
2 INJECTION, SOLUTION INTRAMUSCULAR; INTRAVENOUS EVERY 4 HOURS PRN
Status: DISCONTINUED | OUTPATIENT
Start: 2024-08-05 | End: 2024-08-07 | Stop reason: HOSPADM

## 2024-08-05 RX ORDER — NAPROXEN SODIUM 220 MG/1
324 TABLET, FILM COATED ORAL ONCE
Status: COMPLETED | OUTPATIENT
Start: 2024-08-05 | End: 2024-08-05

## 2024-08-05 RX ORDER — NOREPINEPHRINE BITARTRATE/D5W 8 MG/250ML
0-.5 PLASTIC BAG, INJECTION (ML) INTRAVENOUS CONTINUOUS
Status: DISCONTINUED | OUTPATIENT
Start: 2024-08-05 | End: 2024-08-05

## 2024-08-05 RX ORDER — ACETAMINOPHEN 160 MG/5ML
650 SOLUTION ORAL EVERY 4 HOURS PRN
Status: DISCONTINUED | OUTPATIENT
Start: 2024-08-05 | End: 2024-08-07 | Stop reason: HOSPADM

## 2024-08-05 RX ORDER — ACETAMINOPHEN 325 MG/1
650 TABLET ORAL EVERY 4 HOURS PRN
Status: DISCONTINUED | OUTPATIENT
Start: 2024-08-05 | End: 2024-08-07 | Stop reason: HOSPADM

## 2024-08-05 RX ORDER — ATORVASTATIN CALCIUM 40 MG/1
40 TABLET, FILM COATED ORAL NIGHTLY
Status: DISCONTINUED | OUTPATIENT
Start: 2024-08-05 | End: 2024-08-07 | Stop reason: HOSPADM

## 2024-08-05 RX ORDER — GUAIFENESIN 600 MG/1
1200 TABLET, EXTENDED RELEASE ORAL 2 TIMES DAILY
Status: DISCONTINUED | OUTPATIENT
Start: 2024-08-05 | End: 2024-08-07 | Stop reason: HOSPADM

## 2024-08-05 RX ORDER — DEXTROSE 50 % IN WATER (D50W) INTRAVENOUS SYRINGE
25 ONCE
Status: COMPLETED | OUTPATIENT
Start: 2024-08-05 | End: 2024-08-05

## 2024-08-05 RX ORDER — ONDANSETRON HYDROCHLORIDE 2 MG/ML
4 INJECTION, SOLUTION INTRAVENOUS EVERY 4 HOURS PRN
Status: DISCONTINUED | OUTPATIENT
Start: 2024-08-05 | End: 2024-08-07 | Stop reason: HOSPADM

## 2024-08-05 RX ORDER — IPRATROPIUM BROMIDE 42 UG/1
2 SPRAY, METERED NASAL 2 TIMES DAILY
Status: DISCONTINUED | OUTPATIENT
Start: 2024-08-05 | End: 2024-08-07 | Stop reason: HOSPADM

## 2024-08-05 RX ORDER — ALBUTEROL SULFATE 0.83 MG/ML
3 SOLUTION RESPIRATORY (INHALATION) 2 TIMES DAILY
Status: DISCONTINUED | OUTPATIENT
Start: 2024-08-05 | End: 2024-08-07 | Stop reason: HOSPADM

## 2024-08-05 RX ORDER — LEVOTHYROXINE SODIUM 88 UG/1
88 TABLET ORAL DAILY
Status: DISCONTINUED | OUTPATIENT
Start: 2024-08-05 | End: 2024-08-07 | Stop reason: HOSPADM

## 2024-08-05 RX ORDER — ALBUTEROL SULFATE 0.83 MG/ML
10 SOLUTION RESPIRATORY (INHALATION) ONCE
Status: DISCONTINUED | OUTPATIENT
Start: 2024-08-05 | End: 2024-08-05

## 2024-08-05 RX ORDER — FLUTICASONE PROPIONATE 50 MCG
2 SPRAY, SUSPENSION (ML) NASAL DAILY
Status: DISCONTINUED | OUTPATIENT
Start: 2024-08-05 | End: 2024-08-07 | Stop reason: HOSPADM

## 2024-08-05 RX ORDER — NAPROXEN SODIUM 220 MG/1
TABLET, FILM COATED ORAL
Status: COMPLETED
Start: 2024-08-05 | End: 2024-08-05

## 2024-08-05 RX ORDER — CLOPIDOGREL BISULFATE 75 MG/1
75 TABLET ORAL DAILY
Status: DISCONTINUED | OUTPATIENT
Start: 2024-08-05 | End: 2024-08-07 | Stop reason: HOSPADM

## 2024-08-05 RX ORDER — FAMOTIDINE 20 MG/1
20 TABLET, FILM COATED ORAL ONCE
Status: COMPLETED | OUTPATIENT
Start: 2024-08-05 | End: 2024-08-05

## 2024-08-05 RX ORDER — EPINEPHRINE 1 MG/ML
0.5 INJECTION INTRAMUSCULAR; INTRAVENOUS; SUBCUTANEOUS ONCE
Status: DISCONTINUED | OUTPATIENT
Start: 2024-08-05 | End: 2024-08-05 | Stop reason: HOSPADM

## 2024-08-05 RX ORDER — INSULIN LISPRO 100 [IU]/ML
0-5 INJECTION, SOLUTION INTRAVENOUS; SUBCUTANEOUS EVERY 4 HOURS
Status: DISCONTINUED | OUTPATIENT
Start: 2024-08-05 | End: 2024-08-05

## 2024-08-05 RX ORDER — SODIUM POLYSTYRENE SULFONATE 15 G/60ML
15 SUSPENSION ORAL; RECTAL ONCE
Status: COMPLETED | OUTPATIENT
Start: 2024-08-05 | End: 2024-08-05

## 2024-08-05 RX ORDER — PANTOPRAZOLE SODIUM 40 MG/1
40 TABLET, DELAYED RELEASE ORAL 2 TIMES DAILY
Status: DISCONTINUED | OUTPATIENT
Start: 2024-08-05 | End: 2024-08-07 | Stop reason: HOSPADM

## 2024-08-05 RX ORDER — DEXTROSE 50 % IN WATER (D50W) INTRAVENOUS SYRINGE
12.5
Status: DISCONTINUED | OUTPATIENT
Start: 2024-08-05 | End: 2024-08-07 | Stop reason: HOSPADM

## 2024-08-05 RX ADMIN — HEPARIN SODIUM 4000 UNITS: 5000 INJECTION, SOLUTION INTRAVENOUS; SUBCUTANEOUS at 11:45

## 2024-08-05 RX ADMIN — NOREPINEPHRINE BITARTRATE 0.1 MCG/KG/MIN: 8 INJECTION, SOLUTION INTRAVENOUS at 15:17

## 2024-08-05 RX ADMIN — ONDANSETRON 4 MG: 2 INJECTION INTRAMUSCULAR; INTRAVENOUS at 09:49

## 2024-08-05 RX ADMIN — DEXTROSE 50 % IN WATER (D50W) INTRAVENOUS SYRINGE 25 G: at 12:07

## 2024-08-05 RX ADMIN — SODIUM CHLORIDE 1000 ML: 9 INJECTION, SOLUTION INTRAVENOUS at 10:34

## 2024-08-05 RX ADMIN — IPRATROPIUM BROMIDE AND ALBUTEROL SULFATE 3 ML: .5; 3 SOLUTION RESPIRATORY (INHALATION) at 12:50

## 2024-08-05 RX ADMIN — FAMOTIDINE 20 MG: 20 TABLET ORAL at 09:49

## 2024-08-05 RX ADMIN — Medication 4 L/MIN: at 12:00

## 2024-08-05 RX ADMIN — IPRATROPIUM BROMIDE AND ALBUTEROL SULFATE 3 ML: .5; 3 SOLUTION RESPIRATORY (INHALATION) at 12:35

## 2024-08-05 RX ADMIN — EPINEPHRINE 1 MG: 0.1 INJECTION, SOLUTION INTRAVENOUS at 11:33

## 2024-08-05 RX ADMIN — NAPROXEN SODIUM 324 MG: 220 TABLET, FILM COATED ORAL at 11:51

## 2024-08-05 RX ADMIN — SODIUM BICARBONATE 50 MEQ: 84 INJECTION INTRAVENOUS at 11:59

## 2024-08-05 RX ADMIN — TICAGRELOR 180 MG: 90 TABLET ORAL at 11:43

## 2024-08-05 RX ADMIN — ASPIRIN 81 MG CHEWABLE TABLET 324 MG: 81 TABLET CHEWABLE at 11:51

## 2024-08-05 RX ADMIN — INDOMETHACIN 50 MEQ: 25 CAPSULE ORAL at 11:59

## 2024-08-05 RX ADMIN — SODIUM CHLORIDE 500 ML: 9 INJECTION, SOLUTION INTRAVENOUS at 11:25

## 2024-08-05 RX ADMIN — SODIUM POLYSTYRENE SULFONATE 15 G: 15 SUSPENSION ORAL; RECTAL at 12:10

## 2024-08-05 RX ADMIN — DEXTROSE MONOHYDRATE 25 G: 25 INJECTION, SOLUTION INTRAVENOUS at 12:07

## 2024-08-05 RX ADMIN — INSULIN HUMAN 5 UNITS: 100 INJECTION, SOLUTION PARENTERAL at 11:59

## 2024-08-05 RX ADMIN — PIPERACILLIN SODIUM AND TAZOBACTAM SODIUM 3.38 G: 3; .375 INJECTION, SOLUTION INTRAVENOUS at 13:53

## 2024-08-05 RX ADMIN — IPRATROPIUM BROMIDE AND ALBUTEROL SULFATE 3 ML: .5; 3 SOLUTION RESPIRATORY (INHALATION) at 13:10

## 2024-08-05 RX ADMIN — Medication 0.1 MCG/KG/MIN: at 15:17

## 2024-08-05 SDOH — SOCIAL STABILITY: SOCIAL INSECURITY: DO YOU FEEL UNSAFE GOING BACK TO THE PLACE WHERE YOU ARE LIVING?: NO

## 2024-08-05 SDOH — SOCIAL STABILITY: SOCIAL INSECURITY: DOES ANYONE TRY TO KEEP YOU FROM HAVING/CONTACTING OTHER FRIENDS OR DOING THINGS OUTSIDE YOUR HOME?: NO

## 2024-08-05 SDOH — SOCIAL STABILITY: SOCIAL INSECURITY: WERE YOU ABLE TO COMPLETE ALL THE BEHAVIORAL HEALTH SCREENINGS?: YES

## 2024-08-05 SDOH — SOCIAL STABILITY: SOCIAL INSECURITY: HAVE YOU HAD THOUGHTS OF HARMING ANYONE ELSE?: NO

## 2024-08-05 SDOH — SOCIAL STABILITY: SOCIAL INSECURITY: ARE YOU OR HAVE YOU BEEN THREATENED OR ABUSED PHYSICALLY, EMOTIONALLY, OR SEXUALLY BY ANYONE?: NO

## 2024-08-05 SDOH — SOCIAL STABILITY: SOCIAL INSECURITY: HAS ANYONE EVER THREATENED TO HURT YOUR FAMILY OR YOUR PETS?: NO

## 2024-08-05 SDOH — SOCIAL STABILITY: SOCIAL INSECURITY: DO YOU FEEL ANYONE HAS EXPLOITED OR TAKEN ADVANTAGE OF YOU FINANCIALLY OR OF YOUR PERSONAL PROPERTY?: NO

## 2024-08-05 SDOH — SOCIAL STABILITY: SOCIAL INSECURITY: ABUSE: ADULT

## 2024-08-05 SDOH — SOCIAL STABILITY: SOCIAL INSECURITY: ARE THERE ANY APPARENT SIGNS OF INJURIES/BEHAVIORS THAT COULD BE RELATED TO ABUSE/NEGLECT?: NO

## 2024-08-05 SDOH — SOCIAL STABILITY: SOCIAL INSECURITY: HAVE YOU HAD ANY THOUGHTS OF HARMING ANYONE ELSE?: NO

## 2024-08-05 ASSESSMENT — ACTIVITIES OF DAILY LIVING (ADL)
BATHING: INDEPENDENT
TOILETING: INDEPENDENT
FEEDING YOURSELF: INDEPENDENT
HEARING - LEFT EAR: FUNCTIONAL
LACK_OF_TRANSPORTATION: NO
ASSISTIVE_DEVICE: CANE;WALKER
JUDGMENT_ADEQUATE_SAFELY_COMPLETE_DAILY_ACTIVITIES: YES
HEARING - RIGHT EAR: FUNCTIONAL
DRESSING YOURSELF: INDEPENDENT
GROOMING: INDEPENDENT
WALKS IN HOME: INDEPENDENT
ADEQUATE_TO_COMPLETE_ADL: YES
PATIENT'S MEMORY ADEQUATE TO SAFELY COMPLETE DAILY ACTIVITIES?: NO

## 2024-08-05 ASSESSMENT — LIFESTYLE VARIABLES
HOW OFTEN DO YOU HAVE 6 OR MORE DRINKS ON ONE OCCASION: NEVER
AUDIT-C TOTAL SCORE: -1
HOW OFTEN DO YOU HAVE A DRINK CONTAINING ALCOHOL: NEVER
SUBSTANCE_ABUSE_PAST_12_MONTHS: NO
SKIP TO QUESTIONS 9-10: 0
AUDIT-C TOTAL SCORE: -1
PRESCIPTION_ABUSE_PAST_12_MONTHS: NO
HOW MANY STANDARD DRINKS CONTAINING ALCOHOL DO YOU HAVE ON A TYPICAL DAY: PATIENT DECLINED

## 2024-08-05 ASSESSMENT — COGNITIVE AND FUNCTIONAL STATUS - GENERAL
PATIENT BASELINE BEDBOUND: NO
CLIMB 3 TO 5 STEPS WITH RAILING: A LOT
DAILY ACTIVITIY SCORE: 24
WALKING IN HOSPITAL ROOM: A LOT
MOBILITY SCORE: 20

## 2024-08-05 ASSESSMENT — PAIN - FUNCTIONAL ASSESSMENT
PAIN_FUNCTIONAL_ASSESSMENT: 0-10
PAIN_FUNCTIONAL_ASSESSMENT: 0-10

## 2024-08-05 ASSESSMENT — PAIN SCALES - GENERAL
PAINLEVEL_OUTOF10: 0 - NO PAIN
PAINLEVEL_OUTOF10: 0 - NO PAIN

## 2024-08-05 NOTE — H&P
History Of Present Illness  Daniela Saez is a 64 y.o. female with h/o CAD, HFpEF, Afib, HTN, asthma, seronegative RA, gout, psoriatic arthritis, L knee OA, fibromyalgia, chronic pain syndrome, GERD, liver cirrhosis, hypothyroidism, adrenal insufficiency, T2DM, anxiety, depression, peripheral neuropathy, recent admission for UTI and hypercarbia after presenting with a syncopal episode, who now presents with generalized weakness to OSH ED. Workup revealed OLIVA, lactic acidosis, hyponatremia and hyperkalemia. Also hypotensive. Received 3L of IVF bolus, however, remained hypotensive, subsequently a femoral central line was placed and started on Levophed and then vasopressin. Now is being admitted to the ICU.   Currently is complaining of a gradual onset, intermittent generalized weakness that started a week ago. Symptoms also include N/V and dysuria. Also chronic body aches.. Denies CP, SOB, excessive cough, sputum, diarrhea/constipation. No other complains.   Past Medical History  Past Medical History:   Diagnosis Date    Adrenal insufficiency (Multi)     Anemia     7.27.24 - H/H 9.4/31.6    Anxiety     Asthma (HHS-HCC)     Atrial fibrillation (Multi)     on Plavix - stop instructions in chart    Cervical radiculopathy     Chronic diastolic heart failure (Multi)     saw cardiology 6/2024- f/u prn    Chronic pain syndrome     Chronic pancreatitis (Multi)     Coronary artery disease     old MI noted on cardiac testing, unknown when    Depression     Fibromyalgia     Gastroparesis     Gastroparesis    GERD (gastroesophageal reflux disease)     Gout     Hearing loss     Hepatic cirrhosis (Multi)     7.26.24: alk phs 182    Hypertension     Hypocalcemia     7.27.24: ca 8.3    Hyponatremia     7.27.24: Na 134    Hypothyroidism     Insomnia     Irritable bowel syndrome with diarrhea     Liver lesion     Lumbar stenosis     Migraine     Monoclonal gammopathy     follows with heme - h/o bone marrow bx    Occipital neuralgia      Osteoarthritis     Rheumatoid arthritis (Multi)     RLS (restless legs syndrome)     RSD (reflex sympathetic dystrophy)     Syncope     admitted -24 with syncopal event - dx with UTI on Bactrim    Thrombocytopenia (CMS-HCC)     24: plt 59    Thrombocytopenia (CMS-HCC)     24: plt 59    Tremor     voice, hands, legs at times    Type 2 diabetes mellitus (Multi)     24: A1C 7.1%    UC (ulcerative colitis) (Multi)    Surgical History  Past Surgical History:   Procedure Laterality Date    BONE MARROW BIOPSY      BUNIONECTOMY Right      SECTION, LOW TRANSVERSE      x 5    CHOLECYSTECTOMY      CT GUIDED PERCUTANEOUS BIOPSY BONE DEEP  2022    CT GUIDED PERCUTANEOUS BIOPSY BONE DEEP 3/9/2022 [facility]    OTHER SURGICAL HISTORY  2019    Uterine nerve ablation    OTHER SURGICAL HISTORY Right     Ovarian torsion repair    SINUS SURGERY      TONSILLECTOMY      Tonsillectomy with adenoidectomy    TUBAL LIGATION      UMBILICAL HERNIA REPAIR     Social History  She reports that she quit smoking about 20 years ago. Her smoking use included cigarettes. She has never used smokeless tobacco. She reports that she does not currently use alcohol. She reports that she does not use drugs.  Family History  Family History   Problem Relation Name Age of Onset    Heart disease Mother      Hypertension Mother      Arthritis Mother      Diabetes Mother      Hyperlipidemia Mother      Hypertension Father      Brain cancer Father      Diabetes Sister      Diabetes Brother       Current Outpatient Medications   Medication Instructions    acetaminophen (TYLENOL) 650 mg, oral, Every 4 hours PRN    albuterol 90 mcg/actuation inhaler Inhale 2 puffs 2 times a day.    alendronate (FOSAMAX) 70 mg, oral, Every 7 days, Take in the morning with a full glass of water, on an empty stomach, and do not take anything else by mouth or lie down for the next 30 min.    allopurinol (ZYLOPRIM) 100 mg, oral, Daily     "amitriptyline (ELAVIL) 50 mg, oral, Daily    amitriptyline (ELAVIL) 25 mg, oral, Nightly PRN, Can take in addition to 50 mg tablet. 50-75 mg nightly    atorvastatin (LIPITOR) 40 mg, oral, Daily    baclofen (Lioresal) 10 mg tablet 2 tablets, oral, 2 times daily    BD Saray 2nd Gen Pen Needle 32 gauge x 5/32\" needle Four times daily    bumetanide (BUMEX) 1 mg, oral, Daily    carboxymethylcellulose (Refresh Tears) 0.5 % ophthalmic solution 1-2 drops each eye 3 times a day for 30 days    carvedilol (Coreg) 6.25 mg tablet TAKE ONE TABLET BY MOUTH TWICE DAILY    chlorhexidine (Peridex) 0.12 % solution SWISH AND SPIT 15ML FOR 30 SECONDS NIGHT PRIOR TO SURGERY AND MORNING OF SURGERY    clopidogrel (Plavix) 75 mg tablet TAKE ONE TABLET BY MOUTH EVERY DAY    Dexcom G7  misc Use as instructed    Dexcom G7 Sensor device For continuous glucose monitoring.  Change every 10 days.    eplerenone (INSPRA) 100 mg, oral, 2 times daily    famotidine (Pepcid) 20 mg tablet 1 tablet, oral, 2 times daily    fluocinonide (Lidex) 0.05 % cream 1 Application, Topical, 2 times daily, To areas of psoriasis on trunk and extremities Monday thru Friday. Avoid face    fluticasone (Flonase Sensimist) 27.5 mcg/actuation nasal spray 2 sprays, Each Nostril, Nightly    hydrocortisone (CORTEF) 5-10 mg, oral, 2 times daily    hydroxychloroquine (Plaquenil) 200 mg tablet take ONE & ONE-HALF tablet BY MOUTH EVERY DAY    ipratropium (Atrovent) 42 mcg (0.06 %) nasal spray 2 sprays, Each Nostril, 2 times daily    Lantus Solostar U-100 Insulin 12 Units, subcutaneous, Nightly, Take as directed per insulin instructions.    leflunomide (ARAVA) 20 mg, oral, Daily    levothyroxine (Synthroid, Levoxyl) 88 mcg tablet 1 tablet daily 6 days per week, 2 tablets one day per week    lidocaine (Lidoderm) 5 % patch 3 patches, transdermal, Daily, Remove & discard patch within 12 hours or as directed by MD.    losartan (COZAAR) 25 mg, oral, Daily    miconazole (Micotin) 2 " % powder 1 Application, Topical, 2 times daily PRN,  To dry skin    morphine (MSIR) 30 mg tablet 1 tablet, oral, 2 times daily    morphine CR (MS Contin) 15 mg 12 hr tablet 1 tablet, oral, 2 times daily    morphine CR (MS CONTIN) 30 mg, oral, 2 times daily, Do not crush, chew, or split.    mupirocin (Bactroban) 2 % ointment Topical, Daily PRN    nystatin (Mycostatin) cream Topical, 2 times daily    ondansetron ODT (ZOFRAN-ODT) 4 mg, oral, Every 8 hours PRN    pancrelipase, Lip-Prot-Amyl, (Creon) 36,000-114,000- 180,000 unit capsule,delayed release(DR/EC) capsule 2 capsules, oral, 3 times daily, With meals, 1 capsule with snacks. Max 8 capsules per day    pantoprazole (ProtoNix) 40 mg EC tablet 1 tablet, oral, Daily RT    polyethylene glycol (MIRALAX) 17 g, oral, Daily PRN    Solu-CORTEF Act-O-Vial (PF) 100 mg, intramuscular, As needed    sucralfate (CARAFATE) 1 g, oral, 4 times daily before meals and nightly    SUMAtriptan (IMITREX) 25 mg, oral, As needed, May repeat in 2 hours if needed    triamcinolone (Kenalog) 0.1 % cream APPLY ONE APPLICATION EXTERNALLY TWICE DAILY AS NEEDED    Trulance 3 mg, oral, Every other day   Allergies  Metoclopramide, Cefuroxime axetil, Fentanyl, Gabapentin, Levetiracetam, Metformin, and Torsemide  Physical Exam  Constitutional:       General: She is not in acute distress.     Appearance: She is obese. She is ill-appearing. She is not toxic-appearing.   HENT:      Head: Normocephalic and atraumatic.      Nose:      Comments: On 4L NC.      Mouth/Throat:      Mouth: Mucous membranes are dry.      Comments: Mallampati 2.   Eyes:      General: No scleral icterus.     Extraocular Movements: Extraocular movements intact.      Pupils: Pupils are equal, round, and reactive to light.   Cardiovascular:      Rate and Rhythm: Normal rate and regular rhythm.      Heart sounds: No murmur heard.     No friction rub. No gallop.   Pulmonary:      Effort: Pulmonary effort is normal. No respiratory  distress.      Breath sounds: Normal breath sounds. No wheezing or rales.   Abdominal:      General: Bowel sounds are normal. There is no distension.      Palpations: Abdomen is soft.      Tenderness: There is no abdominal tenderness.   Musculoskeletal:         General: Tenderness present. Normal range of motion.      Cervical back: Normal range of motion and neck supple. No rigidity or tenderness.      Right lower leg: No edema.      Left lower leg: No edema.   Lymphadenopathy:      Cervical: No cervical adenopathy.   Skin:     General: Skin is warm and dry.      Coloration: Skin is not jaundiced.      Findings: Rash (chronic discoloration b/l LE) present. No bruising.   Neurological:      General: No focal deficit present.      Mental Status: She is alert and oriented to person, place, and time.      Cranial Nerves: No cranial nerve deficit.      Motor: No weakness.   Psychiatric:         Mood and Affect: Mood normal.         Behavior: Behavior normal.   Last Recorded Vitals  Blood pressure 90/53, pulse 91, temperature 34 °C (93.2 °F), temperature source Axillary, resp. rate 15.  Relevant Results  Results for orders placed or performed during the hospital encounter of 08/05/24 (from the past 24 hour(s))   ECG 12 lead   Result Value Ref Range    Ventricular Rate 86 BPM    Atrial Rate 86 BPM    WV Interval 270 ms    QRS Duration 142 ms    QT Interval 410 ms    QTC Calculation(Bazett) 490 ms    P Axis 36 degrees    R Axis 256 degrees    T Axis 78 degrees    QRS Count 15 beats    Q Onset 218 ms    P Onset 83 ms    P Offset 156 ms    T Offset 423 ms    QTC Fredericia 462 ms   CBC and Auto Differential   Result Value Ref Range    WBC 16.0 (H) 4.4 - 11.3 x10*3/uL    nRBC 0.0 0.0 - 0.0 /100 WBCs    RBC 4.74 4.00 - 5.20 x10*6/uL    Hemoglobin 13.3 12.0 - 16.0 g/dL    Hematocrit 41.4 36.0 - 46.0 %    MCV 87 80 - 100 fL    MCH 28.1 26.0 - 34.0 pg    MCHC 32.1 32.0 - 36.0 g/dL    RDW 15.9 (H) 11.5 - 14.5 %    Platelets 189  150 - 450 x10*3/uL    Neutrophils % 82.9 40.0 - 80.0 %    Immature Granulocytes %, Automated 0.4 0.0 - 0.9 %    Lymphocytes % 7.0 13.0 - 44.0 %    Monocytes % 7.7 2.0 - 10.0 %    Eosinophils % 1.5 0.0 - 6.0 %    Basophils % 0.5 0.0 - 2.0 %    Neutrophils Absolute 13.29 (H) 1.20 - 7.70 x10*3/uL    Immature Granulocytes Absolute, Automated 0.06 0.00 - 0.70 x10*3/uL    Lymphocytes Absolute 1.12 (L) 1.20 - 4.80 x10*3/uL    Monocytes Absolute 1.24 (H) 0.10 - 1.00 x10*3/uL    Eosinophils Absolute 0.24 0.00 - 0.70 x10*3/uL    Basophils Absolute 0.08 0.00 - 0.10 x10*3/uL   Comprehensive metabolic panel   Result Value Ref Range    Glucose 73 (L) 74 - 99 mg/dL    Sodium 117 (LL) 136 - 145 mmol/L    Potassium 7.6 (HH) 3.5 - 5.3 mmol/L    Chloride 82 (L) 98 - 107 mmol/L    Bicarbonate 23 21 - 32 mmol/L    Anion Gap 20 mmol/L    Urea Nitrogen 53 (H) 6 - 23 mg/dL    Creatinine 3.79 (H) 0.50 - 1.05 mg/dL    eGFR 13 (L) >60 mL/min/1.73m*2    Calcium 10.0 8.6 - 10.3 mg/dL    Albumin 4.0 3.4 - 5.0 g/dL    Alkaline Phosphatase 233 (H) 33 - 136 U/L    Total Protein 8.2 6.4 - 8.2 g/dL    AST 50 (H) 9 - 39 U/L    Bilirubin, Total 1.2 0.0 - 1.2 mg/dL    ALT 26 7 - 45 U/L   Magnesium   Result Value Ref Range    Magnesium 2.64 (H) 1.60 - 2.40 mg/dL   B-Type Natriuretic Peptide   Result Value Ref Range    BNP 25 0 - 99 pg/mL   Lactate   Result Value Ref Range    Lactate 4.4 (HH) 0.4 - 2.0 mmol/L   Troponin I, High Sensitivity, Initial   Result Value Ref Range    Troponin I, High Sensitivity 21 (H) 0 - 13 ng/L   Troponin, High Sensitivity, 1 Hour   Result Value Ref Range    Troponin I, High Sensitivity 21 (H) 0 - 13 ng/L   T4, free   Result Value Ref Range    Thyroxine, Free 1.22 (H) 0.61 - 1.12 ng/dL   Thyroid Stimulating Hormone   Result Value Ref Range    Thyroid Stimulating Hormone 0.51 0.44 - 3.98 mIU/L   Light Blue Top   Result Value Ref Range    Extra Tube Hold for add-ons.    Troponin I, High Sensitivity   Result Value Ref Range     Troponin I, High Sensitivity 4 0 - 13 ng/L   ECG 12 lead   Result Value Ref Range    Ventricular Rate 47 BPM    Atrial Rate 47 BPM    OR Interval 112 ms    QRS Duration 138 ms    QT Interval 668 ms    QTC Calculation(Bazett) 591 ms    P Axis 85 degrees    R Axis -66 degrees    T Axis 4 degrees    QRS Count 8 beats    Q Onset 220 ms    T Offset 554 ms    QTC Fredericia 615 ms   ECG 12 lead   Result Value Ref Range    Ventricular Rate 176 BPM    Atrial Rate 0 BPM    QRS Duration 76 ms    QT Interval 212 ms    QTC Calculation(Bazett) 362 ms    R Axis 151 degrees    T Axis 150 degrees    QRS Count 29 beats    Q Onset 212 ms    T Offset 318 ms    QTC Fredericia 303 ms   Potassium   Result Value Ref Range    Potassium 7.3 (HH) 3.5 - 5.3 mmol/L   ECG 12 lead   Result Value Ref Range    Ventricular Rate 112 BPM    Atrial Rate 122 BPM    QRS Duration 162 ms    QT Interval 478 ms    QTC Calculation(Bazett) 652 ms    R Axis 239 degrees    T Axis 41 degrees    QRS Count 18 beats    Q Onset 207 ms    T Offset 446 ms    QTC Fredericia 588 ms   Urinalysis with Reflex Culture and Microscopic   Result Value Ref Range    Color, Urine Lizzy (N) Straw, Yellow    Appearance, Urine Hazy (N) Clear    Specific Gravity, Urine 1.020 1.005 - 1.035    pH, Urine 5.0 5.0, 5.5, 6.0, 6.5, 7.0, 7.5, 8.0    Protein, Urine 100 (2+) (N) NEGATIVE mg/dL    Glucose, Urine NEGATIVE NEGATIVE mg/dL    Blood, Urine NEGATIVE NEGATIVE    Ketones, Urine NEGATIVE NEGATIVE mg/dL    Bilirubin, Urine NEGATIVE NEGATIVE    Urobilinogen, Urine 2.0 (N) <2.0 mg/dL    Nitrite, Urine NEGATIVE NEGATIVE    Leukocyte Esterase, Urine MODERATE (2+) (A) NEGATIVE   Microscopic Only, Urine   Result Value Ref Range    WBC, Urine >50 (A) 1-5, NONE /HPF    RBC, Urine NONE NONE, 1-2, 3-5 /HPF    Squamous Epithelial Cells, Urine 1-9 (SPARSE) Reference range not established. /HPF    Bacteria, Urine 4+ (A) NONE SEEN /HPF   Lactate   Result Value Ref Range    Lactate 3.3 (H) 0.4 - 2.0  mmol/L   Potassium   Result Value Ref Range    Potassium 6.7 (HH) 3.5 - 5.3 mmol/L      Assessment/Plan   Principal Problem:    Acute kidney injury (CMS-HCC)  64 YOF with h/o CAD, HFpEF, Afib, HTN, asthma, seronegative RA, gout, psoriatic arthritis, L knee OA, fibromyalgia, chronic pain syndrome, GERD, liver cirrhosis, hypothyroidism, adrenal insufficiency, T2DM, anxiety, depression, peripheral neuropathy, recent admission for UTI and hypercarbia after presenting with a syncopal episode, who now presents with generalized weakness to OSH ED. Workup revealed OLIVA, lactic acidosis, hyponatremia and hyperkalemia. Also hypotensive. Received 3L of IVF bolus, however, remained hypotensive, subsequently a femoral central line was placed and started on Levophed and then vasopressin. Now is being admitted to the ICU.     Neuro: chronic pain, currently A&Ox3.       Pain management (will try to avoid NSAIDS and minimize opioids)       Supportive measures       Continue Baclofen    CV: h/o CAD, HFpEF, Afib, asthma, patient with episode of bradycardia likely due to hyperkalemia, required Epi push in the ED. Currently in shock, hypovolemic vs septic requiring two pressors.        Continue Levophed and Vasopressin, wean off as tolerates       Volume resuscitation as indicated       Continue Plavix       Hold anti-hypertensive medications       Hold Bumex       Continue Atorvastatin    Respiratory: h/o asthma, mild hypoxia       Continue supplemental O2, wean off as tolerates       Continue inhalers    : OLIVA c/b hyperkalemia, also lactic acidosis, both likely due hypovolemia. Improving with IVF        Continue IVF        Is/Os        Frequent RFP        Treat hyperkalemia as indicated.     GI: h/o GERD, now concern for GI bleed. On Pepcid and Protonix       Protonix BID       Hold home medications       NPO for now except for sips of water with medications       Will monitor for signs of bleeding.     Endo: h/o DM, adrenal  insufficiency and hypothyroidism       Hold home Solu-cortef, will place on IV 50 mg QID        Hold home Lantus, will start on SSI       Continue home Synthroid       Monitor BS       Hypoglycemic protocol    Hem/Onc: patient with chronic anemia, Hb currently higher than baseline likely due to hemoconcentration.        Repeat CBC       Continue to monitor with daily CBC          ID: leukocytosis, also dysuria and +ve UA. Recent admission for UTI.        Urine culture       Will start on Zosyn       Continue to monitor for S&S infection    MSK: h/o RA, psoriatic arthritis       Continue HCQ       Pain control    DVT prophylaxis: SCD, will resume heparin in am.     This was a critical care visit for shock and OLIVA. Total time 68 min.     Miguel Ángel Joya MD

## 2024-08-05 NOTE — ED TRIAGE NOTES
Pt here via [facility] with generalized weakness that has been progressively getting worse for the past three weeks, pt states today she is unable to walk without assistance.

## 2024-08-05 NOTE — PROCEDURES
"Arterial Puncture/Cannulation    Date/Time: 8/5/2024 5:49 PM    Performed by: YASSINE Nieto  Authorized by: YASSINE Nieto  Consent: Written consent obtained.  Risks and benefits: risks, benefits and alternatives were discussed  Consent given by: patient  Patient understanding: patient states understanding of the procedure being performed  Patient consent: the patient's understanding of the procedure matches consent given  Procedure consent: procedure consent matches procedure scheduled  Relevant documents: relevant documents present and verified  Test results: test results available and properly labeled  Site marked: the operative site was marked  Imaging studies: imaging studies available  Required items: required blood products, implants, devices, and special equipment available  Patient identity confirmed: arm band and verbally with patient  Time out: Immediately prior to procedure a \"time out\" was called to verify the correct patient, procedure, equipment, support staff and site/side marked as required.  Preparation: Patient was prepped and draped in the usual sterile fashion.  Local anesthesia used: yes  Anesthesia: local infiltration    Anesthesia:  Local anesthesia used: yes  Local Anesthetic: lidocaine 2% without epinephrine  Anesthetic total: 1 mL  Patient tolerance: patient tolerated the procedure well with no immediate complications              "

## 2024-08-05 NOTE — ED PROVIDER NOTES
HPI   Chief Complaint   Patient presents with   • Weakness, Gen       Complaint generalized weakness  History of present illness this patient states that for the past week she has been feeling progressively weak.  She has been able to ambulate up until today.  Today she was in bed and just could not get out of bed due to generalized weakness.  Patient states she has a mild headache not worse than usual the headache is bitemporal.  Patient states she was feeling dizzy yesterday but not today.  Today the patient was unable to walk however the paramedics were able to assist her with ambulation and she had a slow gait.  Patient states she feels somewhat unsteady.  She denies any chest pain no shortness of breath no unilateral weakness to the face arm or leg.  Patient denies any syncope.  She has had a slight cough minimally productive of clear sputum.  She has on and off abdominal pain and today she is having a flareup of mid abdominal pain which is mild with nausea but no vomiting.  The patient states that she has been having history of prior UTI and does have history of small amounts of urine being produced and the patient feels like she is not able to empty her bladder well.  She denies any hematuria or urinary urgency.  Patient states she has not been drinking much fluids and is thirsty.  She lives with her .  The patient has a history of prior diabetes type 2, she is on insulin, she has a history of UTI depression hypokalemia and osteoarthritis.  She states she also has a cortisone deficiency but states she has been taking her prednisone on a regular basis not skipping any doses.   physical exam:    General: Vitals noted, no distress.  Patient appears diffusely weak speaks in a soft voice answers questions appropriately but slowly.  Afebrile. Alert and oriented  x 4 .  Pupils equal and reactive bilaterally    EENT: TMs clear. Posterior oropharynx unremarkable. No meningismus. No LAD.     Cardiac: Regular,  rate, rhythm, no murmurs rubs or gallops.     Pulmonary: Lungs clear bilaterally with good aeration. No adventitious breath sounds. No wheezes rales or rhonchi.     Abdomen: Soft, nonsurgical.  Tender mid abdomen and epigastric area no peritoneal signs. Normoactive bowel sounds. No pulsatile masses.     Extremities: No peripheral edema. Negative Homans bilaterally, no cords.    Skin: No rash. Intact.     Neuro: No focal neurologic deficits, NIH score of 0. Cranial nerves normal as tested from II through XII.                   Patient History   Past Medical History:   Diagnosis Date   • Adrenal insufficiency (Multi)    • Anemia     7.27.24 - H/H 9.4/31.6   • Anxiety    • Asthma ([facility])    • Atrial fibrillation (Multi)     on Plavix - stop instructions in chart   • Cervical radiculopathy    • Chronic diastolic heart failure ([facility])     saw cardiology 6/2024- f/u prn   • Chronic pain syndrome    • Chronic pancreatitis (Multi)    • Coronary artery disease     old MI noted on cardiac testing, unknown when   • Depression    • Fibromyalgia    • Gastroparesis     Gastroparesis   • GERD (gastroesophageal reflux disease)    • Gout    • Hearing loss    • Hepatic cirrhosis (Multi)     7.26.24: alk phs 182   • Hypertension    • Hypocalcemia     7.27.24: ca 8.3   • Hyponatremia     7.27.24: Na 134   • Hypothyroidism    • Insomnia    • Irritable bowel syndrome with diarrhea    • Liver lesion    • Lumbar stenosis    • Migraine    • Monoclonal gammopathy     follows with heme - h/o bone marrow bx   • Occipital neuralgia    • Osteoarthritis    • Rheumatoid arthritis (Multi)    • RLS (restless legs syndrome)    • RSD (reflex sympathetic dystrophy)    • Syncope     admitted 7/26-7/27/24 with syncopal event - dx with UTI on Bactrim   • Thrombocytopenia (CMS-HCC)     7.27.24: plt 59   • Thrombocytopenia (CMS-HCC)     7.27.24: plt 59   • Tremor     voice, hands, legs at times   • Type 2 diabetes mellitus (Multi)     5.24.24: A1C 7.1%    • UC (ulcerative colitis) (Multi)      Past Surgical History:   Procedure Laterality Date   • BONE MARROW BIOPSY     • BUNIONECTOMY Right    •  SECTION, LOW TRANSVERSE      x 5   • CHOLECYSTECTOMY     • CT GUIDED PERCUTANEOUS BIOPSY BONE DEEP  2022    CT GUIDED PERCUTANEOUS BIOPSY BONE DEEP 3/9/2022 [facility]   • OTHER SURGICAL HISTORY  2019    Uterine nerve ablation   • OTHER SURGICAL HISTORY Right     Ovarian torsion repair   • SINUS SURGERY     • TONSILLECTOMY      Tonsillectomy with adenoidectomy   • TUBAL LIGATION     • UMBILICAL HERNIA REPAIR       Family History   Problem Relation Name Age of Onset   • Heart disease Mother     • Hypertension Mother     • Arthritis Mother     • Diabetes Mother     • Hyperlipidemia Mother     • Hypertension Father     • Brain cancer Father     • Diabetes Sister     • Diabetes Brother       Social History     Tobacco Use   • Smoking status: Former     Current packs/day: 0.00     Types: Cigarettes     Quit date: 2004     Years since quittin.1   • Smokeless tobacco: Never   • Tobacco comments:     1.5 PPD x 35 yrs    Substance Use Topics   • Alcohol use: Not Currently   • Drug use: Never       Physical Exam   ED Triage Vitals [24 0921]   Temperature Heart Rate Respirations BP   36.4 °C (97.6 °F) 87 18 101/69      SpO2 Temp src Heart Rate Source Patient Position   98 % -- -- --      BP Location FiO2 (%)     -- --       Physical Exam      ED Course & MDM   ED Course as of 08/05/24 2041   Mon Aug 05, 2024   0934 EKG interpreted by me.  Sinus rhythm first-degree AV block.  Intraventricular conduction delay.  Heart rate 86.  QT interval within normal limits.  Nonspecific ST-T wave changes.  No acute STEMI.  Poor R wave progression noted anteriorly [AG]   1051 CBC and Auto Differential(!)  White blood count 16,000 hemoglobin 13.3 platelets 189,000 [AG]   1056 CT abdomen pelvis wo IV contrast [AG]   1057 o acute fracture or aggressive  osseous lesion.  Postsurgical changes  of posterior fusion at L4-5.  Chronic compression deformity of T11  IMPRESSION:  1. No acute process.  2. Severe coronary artery calcifications.  3. Hepatic steatosis with morphologic changes of underlying cirrhosis.  No ascites.  4. Colonic diverticulos   [AG]   1057 XR chest 1 view  No acute sig changes [AG]   1058 CT head wo IV contrast  No acute changes [AG]   1124 Patient alert and oriented but states that she still feels very weak her heart rate did drop to 45-second EKG has been ordered her blood pressure after orthostatic testing is 85/57.  We will give her another 500 cc of fluids and repeat the troponin [AG]   1141 EKG at 1135 reveals obvious STEMI STEMI alert notified [AG]   1141 EKG #3 reveals very wide complexes of QRS possibly due to hyperkalemia ST elevations wide QRS complexes STEMI alert notified heart rate is 170 Axis indeterminate [AG]   1146 Patient experienced sudden drop in heart rate to 45 she was still awake and able to verbalize somewhat but quite lethargic and diaphoretic. [AG]   1153 Discussed with Dr. Dumont the patient potassium is 7.6 and she has wide complexes throughout the EKG.  We will treat this patient with the potassium lowering emergency medication regimen which I have ordered and the also will monitor this patient's EKG situation closely [AG]   1154 EKG #2 interpreted by me sinus bradycardia with wide complexes left axis deviation and right bundle branch block pattern no obvious STEMI the wide complexes could be due to hyperkalemia   [AG]   1155 Dr. JAMES after long discussion we decided to cancel the STEMI alert send hyperkalemia is the obvious situation here and the patient has no chest pain or shortness of breath [AG]   1245 Comprehensive metabolic panel(!!) [AG]   1246 Discussed with transfer center patient will be transferred either to [facility] ICU or [facility] ICU patient also has hyponatremia the patient has now been on her third  liter of normal saline [AG]   1246 Comprehensive profile also reveals potassium level of 7.6 creatinine 3.79 and BUN of 53 glucose 73 [AG]   1246 EKG #4 heart rate 112 wide QRS secondary to hyperkalemia with associated ST changes since last EKG the rate has improved QRS widening has slightly improved [AG]   1247 Lactate(!!)  Lactate level 4.4   waiting for urine specimen [AG]   1247 Troponin 21-second troponin is 21 [AG]   1247 Troponin I Series, High Sensitivity (0, 1 HR)(!)  21 and second troponin 21 [AG]   1315 Discussed with Dr. Joya ICU specialist at [facility] he has excepted this patient to the CCU patient is currently stable blood pressure 83/48 heart rate  [AG]   1319 Calcium: 10.0  Heart rate is down to 91 which is an improvement patient is feeling no chest pain or shortness of breath [AG]   1319 Urinalysis with Reflex Culture and Microscopic(!)  Urinalysis reveals moderate leukocytes 50 white cells per high-power field and 4+ bacteriuria we will provide Rocephin this is a straight cath specimen [AG]   1439 Patient is currently stable overall improved waiting for transfer soon to [facility] ICU to the service of Dr. Li [AG]   1514 Blood pressure 42/15 the patient is still awake and alert maintaining her own airway quite well.  I placed a central line in the right femoral vein due to an emergency the patient had no IV access we lost both her IV lines the right femoral vein insertion was successful with ports all functioning fine [AG]      ED Course User Index  [AG] David Goodwin MD         Diagnoses as of 08/05/24 2041   Generalized weakness   Hyponatremia   Hyperkalemia   Bradycardia   Acute renal failure, unspecified acute renal failure type (CMS-HCC)   Other specified hypotension                       No data recorded                      Medical Decision Making  I am considering serious causes of generalized weakness which includes hypo or hyperglycemia, neurological problems, coronary  artery related diseases, electrolyte imbalance, sepsis and infection, as well as other causes.  By utilizing the history physical examination and ancillary data we will attempt to isolate the problem experienced by this patient.    Procedure  Procedures     David Goodwin MD  08/05/24 2041

## 2024-08-05 NOTE — ED PROCEDURE NOTE
Procedure  Central Line    Performed by: David Goodwin MD  Authorized by: David Goodwin MD    Consent:     Consent obtained:  Verbal    Consent given by:  Patient    Risks discussed:  Arterial puncture and bleeding    Alternatives discussed:  Delayed treatment  Universal protocol:     Patient identity confirmed:  Verbally with patient  Pre-procedure details:     Indication(s): central venous access and insufficient peripheral access      Hand hygiene: Hand hygiene performed prior to insertion      Sterile barrier technique: All elements of maximal sterile technique followed      Skin preparation:  Chlorhexidine    Skin preparation agent: Skin preparation agent completely dried prior to procedure    Sedation:     Sedation type:  None  Anesthesia:     Anesthesia method:  Local infiltration    Local anesthetic:  Lidocaine 2% WITH epi  Procedure details:     Location:  R femoral    Site selection rationale:  Internal jugular attempt failed x 1    Patient position:  Supine    Procedural supplies:  Triple lumen    Catheter size:  7 Fr    Landmarks identified: yes      Ultrasound guidance: yes      Number of attempts:  2    Successful placement: yes    Post-procedure details:     Post-procedure:  Dressing applied and line sutured    Procedure completion:  Tolerated well, no immediate complications  Comments:      Patient's blood pressure is low at 41 systolic Levophed drip will be initiated through the central line               David Goodwin MD  08/05/24 [id number]

## 2024-08-05 NOTE — NURSING NOTE
Patient received from [facility].  She was oriented to the ICU.  She was given a CHG bath.

## 2024-08-05 NOTE — PROGRESS NOTES
Patient is a 64-year-old female who presents today for discussion of possible left knee arthroplasty.  She has had cortisone injections, takes Tylenol and wears a brace.  She has a history of rheumatoid arthritis and is on Plaquenil.  She sees pain management and is on a significant dose of narcotic pain medication.  She has difficulty walking certain distance.  Her surgery would have to be at [facility].  They would have to meet agreement with her pain management specialist that he would manage her pain postoperatively due to the degree of pain medication she is on.      tisha presents today for discussion of upcoming left total knee arthroplasty.    AAOx3, NAD, walks with a moderate antalgic gait  Varus allignment  Range of motion lacks 10 degrees of full extension and flexes to 110 degrees  Stable to varus/valgus/anterior/posterior stress through out the range of motion  Slight laxity with varus stress  Diffuse medial  joint line tenderness to palpation  Moderate effusion  SILT in a darrell/saph/per/tib distribution  5/5 knee extension/df/pf/ehl  ½ dorsalis pedis and posterior tibial pulse  no popliteal lymphadenopathy  no other overlying lesions  mood: euthymic  Respirations non labored    Plain films were reviewed by myself in clinic today.  They have advanced osteoarthritis of their knee with significant joint space narrowing, bone on bone changes, underlying sclerosis and tricompartmental osteophytic change.    Patient is now failed a greater than 3-month trial of reasonable conservative treatment and wishes proceed with surgery which is indicated at this time.  Discussed the risk benefits alternatives to surgery.  All of their questions were answered.    Procedure: left total knee  Location: [facility]  ICD10: M17.12  CPT: 65959  Stay: overnight  Equipment: [facility]    I talked with the patient at length about risks, limitations, benefits and alternatives to total knee replacement today. I reviewed concerns  about implant wear, loosening, breakage, infection and infection prophylaxis, DVT, PE, death and other medical and anesthetic complications of surgery. We talked about the potential for persistent pain following surgery since there are many possible causes for knee and leg pain. The patient was advised that knee replacement will only relieve pain that is coming from the knee. We talked about limited range of motion following knee replacement and the importance of physical therapy and their motivation. We talked about improvements in pain management post-operatively and our accelerated rehab program. We talked about wound healing issues and neurovascular complications of surgery. I reviewed functional and activity restrictions in detail. We discussed the fact that many of our patients are able to go home in 1 day or less depending on their health, mobility, pre-op preparation, individual home situation and personal preference.  The patient has identified their personal goals of their joint replacement surgery and recovery and we have discussed them. These are documented in our [employer] patient engagement platform. In addition, we have discussed the advantages and disadvantages of various implant and fixation options, as well as various surgical approaches.  The basic concepts of the joint replacement procedure has been reviewed with the patient and the patient has been provided the opportunity to see an actual implant either in the office or in our pre-op education class.  All of the patients questions were answered. The patient can call my office to schedule surgery and the pre-op teaching class. I told the patient that they should contact their primary care physician to discuss fitness for surgery.    This note was created using voice recognition software and was not corrected for typographical or grammatical errors.

## 2024-08-05 NOTE — ED PROCEDURE NOTE
Procedure  Critical Care    Performed by: David Goodwin MD  Authorized by: David Goodwin MD    Critical care provider statement:     Critical care time (minutes):  100    Critical care time was exclusive of:  Separately billable procedures and treating other patients    Critical care was time spent personally by me on the following activities:  Blood draw for specimens, discussions with consultants, examination of patient, ordering and performing treatments and interventions, ordering and review of laboratory studies, ordering and review of radiographic studies, pulse oximetry, re-evaluation of patient's condition, evaluation of patient's response to treatment and vascular access procedures               David Goodwin MD  08/05/24 3905

## 2024-08-05 NOTE — NURSING NOTE
Pr refusing to answer any more questions for the admission history stating she is too tired to answer any more questions and wants to go to sleep

## 2024-08-06 ENCOUNTER — APPOINTMENT (OUTPATIENT)
Dept: RADIOLOGY | Facility: HOSPITAL | Age: 64
End: 2024-08-06
Payer: MEDICAID

## 2024-08-06 VITALS
BODY MASS INDEX: 27.6 KG/M2 | RESPIRATION RATE: 17 BRPM | HEART RATE: 94 BPM | WEIGHT: 146.16 LBS | TEMPERATURE: 97.6 F | SYSTOLIC BLOOD PRESSURE: 71 MMHG | OXYGEN SATURATION: 100 % | HEIGHT: 61 IN | DIASTOLIC BLOOD PRESSURE: 58 MMHG

## 2024-08-06 LAB
ALBUMIN SERPL-MCNC: 2.4 G/DL (ref 3.5–5)
ANION GAP BLDA CALCULATED.4IONS-SCNC: 12 MMO/L (ref 10–25)
ANION GAP BLDA CALCULATED.4IONS-SCNC: 12 MMO/L (ref 10–25)
ANION GAP BLDA CALCULATED.4IONS-SCNC: 14 MMO/L (ref 10–25)
ANION GAP BLDV CALCULATED.4IONS-SCNC: 8 MMOL/L (ref 10–25)
ANION GAP SERPL CALC-SCNC: 15 MMOL/L
ANION GAP SERPL CALC-SCNC: 16 MMOL/L
ANION GAP SERPL CALC-SCNC: 16 MMOL/L
ANION GAP SERPL CALC-SCNC: 17 MMOL/L
ARTERIAL PATENCY WRIST A: POSITIVE
BASE EXCESS BLDA CALC-SCNC: -5.9 MMOL/L (ref -2–3)
BASE EXCESS BLDA CALC-SCNC: -6.6 MMOL/L (ref -2–3)
BASE EXCESS BLDA CALC-SCNC: -8.6 MMOL/L (ref -2–3)
BASE EXCESS BLDV CALC-SCNC: -5.8 MMOL/L (ref -2–3)
BASOPHILS # BLD MANUAL: 0 X10*3/UL (ref 0–0.1)
BASOPHILS NFR BLD MANUAL: 0 %
BODY TEMPERATURE: 37 DEGREES CELSIUS
BUN SERPL-MCNC: 31 MG/DL (ref 8–25)
BUN SERPL-MCNC: 32 MG/DL (ref 8–25)
BUN SERPL-MCNC: 37 MG/DL (ref 8–25)
BUN SERPL-MCNC: 38 MG/DL (ref 8–25)
BURR CELLS BLD QL SMEAR: ABNORMAL
CA-I BLDA-SCNC: 1.13 MMOL/L (ref 1.1–1.33)
CA-I BLDA-SCNC: 1.15 MMOL/L (ref 1.1–1.33)
CA-I BLDA-SCNC: 1.16 MMOL/L (ref 1.1–1.33)
CA-I BLDV-SCNC: 1.14 MMOL/L (ref 1.1–1.33)
CALCIUM SERPL-MCNC: 8.5 MG/DL (ref 8.5–10.4)
CALCIUM SERPL-MCNC: 8.7 MG/DL (ref 8.5–10.4)
CALCIUM SERPL-MCNC: 8.7 MG/DL (ref 8.5–10.4)
CALCIUM SERPL-MCNC: 8.8 MG/DL (ref 8.5–10.4)
CHLORIDE BLDA-SCNC: 94 MMOL/L (ref 98–107)
CHLORIDE BLDA-SCNC: 94 MMOL/L (ref 98–107)
CHLORIDE BLDA-SCNC: 95 MMOL/L (ref 98–107)
CHLORIDE BLDV-SCNC: 96 MMOL/L (ref 98–107)
CHLORIDE SERPL-SCNC: 90 MMOL/L (ref 97–107)
CHLORIDE SERPL-SCNC: 92 MMOL/L (ref 97–107)
CHLORIDE SERPL-SCNC: 92 MMOL/L (ref 97–107)
CHLORIDE SERPL-SCNC: 95 MMOL/L (ref 97–107)
CHLORIDE UR-SCNC: 78 MMOL/L
CHLORIDE/CREATININE (MMOL/G) IN URINE: 216 MMOL/G CREAT (ref 38–318)
CO2 SERPL-SCNC: 17 MMOL/L (ref 24–31)
CO2 SERPL-SCNC: 18 MMOL/L (ref 24–31)
CO2 SERPL-SCNC: 18 MMOL/L (ref 24–31)
CO2 SERPL-SCNC: 19 MMOL/L (ref 24–31)
CREAT SERPL-MCNC: 1.4 MG/DL (ref 0.4–1.6)
CREAT SERPL-MCNC: 1.7 MG/DL (ref 0.4–1.6)
CREAT SERPL-MCNC: 2 MG/DL (ref 0.4–1.6)
CREAT SERPL-MCNC: 2.2 MG/DL (ref 0.4–1.6)
CREAT UR-MCNC: 36.1 MG/DL (ref 20–320)
CRITICAL CALL TIME: 1822
CRITICAL CALL TIME: 2315
CRITICAL CALL TIME: 448
CRITICAL CALLED BY: ABNORMAL
CRITICAL CALLED TO: ABNORMAL
CRITICAL NOTE: ABNORMAL
CRITICAL READ BACK: ABNORMAL
EGFRCR SERPLBLD CKD-EPI 2021: 25 ML/MIN/1.73M*2
EGFRCR SERPLBLD CKD-EPI 2021: 28 ML/MIN/1.73M*2
EGFRCR SERPLBLD CKD-EPI 2021: 34 ML/MIN/1.73M*2
EGFRCR SERPLBLD CKD-EPI 2021: 43 ML/MIN/1.73M*2
EOSINOPHIL # BLD MANUAL: 0 X10*3/UL (ref 0–0.7)
EOSINOPHIL NFR BLD MANUAL: 0 %
ERYTHROCYTE [DISTWIDTH] IN BLOOD BY AUTOMATED COUNT: 15.9 % (ref 11.5–14.5)
GLUCOSE BLD MANUAL STRIP-MCNC: 154 MG/DL (ref 74–99)
GLUCOSE BLD MANUAL STRIP-MCNC: 159 MG/DL (ref 74–99)
GLUCOSE BLD MANUAL STRIP-MCNC: 161 MG/DL (ref 74–99)
GLUCOSE BLD MANUAL STRIP-MCNC: 178 MG/DL (ref 74–99)
GLUCOSE BLD MANUAL STRIP-MCNC: 243 MG/DL (ref 74–99)
GLUCOSE BLD MANUAL STRIP-MCNC: 254 MG/DL (ref 74–99)
GLUCOSE BLDA-MCNC: 127 MG/DL (ref 74–99)
GLUCOSE BLDA-MCNC: 157 MG/DL (ref 74–99)
GLUCOSE BLDA-MCNC: 240 MG/DL (ref 74–99)
GLUCOSE BLDV-MCNC: 151 MG/DL (ref 74–99)
GLUCOSE SERPL-MCNC: 164 MG/DL (ref 65–99)
GLUCOSE SERPL-MCNC: 172 MG/DL (ref 65–99)
GLUCOSE SERPL-MCNC: 204 MG/DL (ref 65–99)
GLUCOSE SERPL-MCNC: 246 MG/DL (ref 65–99)
HCO3 BLDA-SCNC: 15.1 MMOL/L (ref 22–26)
HCO3 BLDA-SCNC: 17.3 MMOL/L (ref 22–26)
HCO3 BLDA-SCNC: 18.2 MMOL/L (ref 22–26)
HCO3 BLDV-SCNC: 20.7 MMOL/L (ref 22–26)
HCT VFR BLD AUTO: 29.3 % (ref 36–46)
HCT VFR BLD EST: 29 % (ref 36–46)
HCT VFR BLD EST: 30 % (ref 36–46)
HCT VFR BLD EST: 31 % (ref 36–46)
HCT VFR BLD EST: 31 % (ref 36–46)
HGB BLD-MCNC: 9.8 G/DL (ref 12–16)
HGB BLDA-MCNC: 10.2 G/DL (ref 12–16)
HGB BLDA-MCNC: 10.2 G/DL (ref 12–16)
HGB BLDA-MCNC: 9.7 G/DL (ref 12–16)
HGB BLDV-MCNC: 10 G/DL (ref 12–16)
HOLD SPECIMEN: NORMAL
IMM GRANULOCYTES # BLD AUTO: 0.1 X10*3/UL (ref 0–0.7)
IMM GRANULOCYTES NFR BLD AUTO: 0.6 % (ref 0–0.9)
INHALED O2 CONCENTRATION: 28 %
INHALED O2 CONCENTRATION: 28 %
INHALED O2 CONCENTRATION: 32 %
INHALED O2 CONCENTRATION: 40 %
LACTATE BLDA-SCNC: 4.6 MMOL/L (ref 0.4–2)
LACTATE BLDA-SCNC: 4.9 MMOL/L (ref 0.4–2)
LACTATE BLDA-SCNC: 7.8 MMOL/L (ref 0.4–2)
LACTATE BLDV-SCNC: 4.3 MMOL/L (ref 0.4–2)
LYMPHOCYTES # BLD MANUAL: 0.47 X10*3/UL (ref 1.2–4.8)
LYMPHOCYTES NFR BLD MANUAL: 3 %
MAGNESIUM SERPL-MCNC: 1.8 MG/DL (ref 1.6–3.1)
MCH RBC QN AUTO: 28.9 PG (ref 26–34)
MCHC RBC AUTO-ENTMCNC: 33.4 G/DL (ref 32–36)
MCV RBC AUTO: 86 FL (ref 80–100)
METAMYELOCYTES # BLD MANUAL: 1.55 X10*3/UL
METAMYELOCYTES NFR BLD MANUAL: 10 %
MONOCYTES # BLD MANUAL: 1.24 X10*3/UL (ref 0.1–1)
MONOCYTES NFR BLD MANUAL: 8 %
NEUTROPHILS # BLD MANUAL: 12.25 X10*3/UL (ref 1.2–7.7)
NEUTS BAND # BLD MANUAL: 3.72 X10*3/UL (ref 0–0.7)
NEUTS BAND NFR BLD MANUAL: 24 %
NEUTS SEG # BLD MANUAL: 8.53 X10*3/UL (ref 1.2–7)
NEUTS SEG NFR BLD MANUAL: 55 %
NEUTS VAC BLD QL SMEAR: PRESENT
NRBC BLD-RTO: 0 /100 WBCS (ref 0–0)
OSMOLALITY SERPL: 279 MOSM/KG (ref 280–300)
OSMOLALITY UR: 362 MOSM/KG (ref 200–1200)
OXYHGB MFR BLDA: 96.4 % (ref 94–98)
OXYHGB MFR BLDA: 96.6 % (ref 94–98)
OXYHGB MFR BLDA: 96.8 % (ref 94–98)
OXYHGB MFR BLDV: 61.3 % (ref 45–75)
PCO2 BLDA: 25 MM HG (ref 38–42)
PCO2 BLDA: 26 MM HG (ref 38–42)
PCO2 BLDA: 33 MM HG (ref 38–42)
PCO2 BLDV: 44 MM HG (ref 41–51)
PH BLDA: 7.35 PH (ref 7.38–7.42)
PH BLDA: 7.39 PH (ref 7.38–7.42)
PH BLDA: 7.43 PH (ref 7.38–7.42)
PH BLDV: 7.28 PH (ref 7.33–7.43)
PHOSPHATE SERPL-MCNC: 3.7 MG/DL (ref 2.5–4.5)
PHOSPHATE SERPL-MCNC: 4.2 MG/DL (ref 2.5–4.5)
PLATELET # BLD AUTO: 130 X10*3/UL (ref 150–450)
PO2 BLDA: 91 MM HG (ref 85–95)
PO2 BLDA: 99 MM HG (ref 85–95)
PO2 BLDA: 99 MM HG (ref 85–95)
PO2 BLDV: 43 MM HG (ref 35–45)
POLYCHROMASIA BLD QL SMEAR: ABNORMAL
POTASSIUM BLDA-SCNC: 5 MMOL/L (ref 3.5–5.3)
POTASSIUM BLDA-SCNC: 5.1 MMOL/L (ref 3.5–5.3)
POTASSIUM BLDA-SCNC: 5.3 MMOL/L (ref 3.5–5.3)
POTASSIUM BLDV-SCNC: 5.6 MMOL/L (ref 3.5–5.3)
POTASSIUM SERPL-SCNC: 4.9 MMOL/L (ref 3.4–5.1)
POTASSIUM SERPL-SCNC: 5.1 MMOL/L (ref 3.4–5.1)
POTASSIUM SERPL-SCNC: 5.5 MMOL/L (ref 3.4–5.1)
POTASSIUM SERPL-SCNC: 5.9 MMOL/L (ref 3.4–5.1)
POTASSIUM UR-SCNC: 40 MMOL/L
POTASSIUM/CREAT UR-RTO: 111 MMOL/G CREAT
RBC # BLD AUTO: 3.39 X10*6/UL (ref 4–5.2)
RBC MORPH BLD: ABNORMAL
SAO2 % BLDA: 98 % (ref 94–100)
SAO2 % BLDA: 99 % (ref 94–100)
SAO2 % BLDA: 99 % (ref 94–100)
SAO2 % BLDV: 63 % (ref 45–75)
SODIUM BLDA-SCNC: 118 MMOL/L (ref 136–145)
SODIUM BLDA-SCNC: 119 MMOL/L (ref 136–145)
SODIUM BLDA-SCNC: 119 MMOL/L (ref 136–145)
SODIUM BLDV-SCNC: 119 MMOL/L (ref 136–145)
SODIUM SERPL-SCNC: 125 MMOL/L (ref 133–145)
SODIUM SERPL-SCNC: 125 MMOL/L (ref 133–145)
SODIUM SERPL-SCNC: 126 MMOL/L (ref 133–145)
SODIUM SERPL-SCNC: 129 MMOL/L (ref 133–145)
SODIUM UR-SCNC: 74 MMOL/L
SODIUM/CREAT UR-RTO: 205 MMOL/G CREAT
SPECIMEN DRAWN FROM PATIENT: ABNORMAL
TOTAL CELLS COUNTED BLD: 100
TOXIC GRANULES BLD QL SMEAR: PRESENT
WBC # BLD AUTO: 15.5 X10*3/UL (ref 4.4–11.3)

## 2024-08-06 PROCEDURE — 85027 COMPLETE CBC AUTOMATED: CPT

## 2024-08-06 PROCEDURE — 87040 BLOOD CULTURE FOR BACTERIA: CPT | Mod: WESLAB | Performed by: INTERNAL MEDICINE

## 2024-08-06 PROCEDURE — 74176 CT ABD & PELVIS W/O CONTRAST: CPT

## 2024-08-06 PROCEDURE — 9420000001 HC RT PATIENT EDUCATION 5 MIN

## 2024-08-06 PROCEDURE — 82947 ASSAY GLUCOSE BLOOD QUANT: CPT

## 2024-08-06 PROCEDURE — 85007 BL SMEAR W/DIFF WBC COUNT: CPT

## 2024-08-06 PROCEDURE — 71045 X-RAY EXAM CHEST 1 VIEW: CPT

## 2024-08-06 PROCEDURE — 84132 ASSAY OF SERUM POTASSIUM: CPT

## 2024-08-06 PROCEDURE — 74176 CT ABD & PELVIS W/O CONTRAST: CPT | Performed by: STUDENT IN AN ORGANIZED HEALTH CARE EDUCATION/TRAINING PROGRAM

## 2024-08-06 PROCEDURE — 99291 CRITICAL CARE FIRST HOUR: CPT | Performed by: INTERNAL MEDICINE

## 2024-08-06 PROCEDURE — C9113 INJ PANTOPRAZOLE SODIUM, VIA: HCPCS | Performed by: INTERNAL MEDICINE

## 2024-08-06 PROCEDURE — 84100 ASSAY OF PHOSPHORUS: CPT

## 2024-08-06 PROCEDURE — 36415 COLL VENOUS BLD VENIPUNCTURE: CPT | Performed by: INTERNAL MEDICINE

## 2024-08-06 PROCEDURE — 2500000001 HC RX 250 WO HCPCS SELF ADMINISTERED DRUGS (ALT 637 FOR MEDICARE OP): Performed by: INTERNAL MEDICINE

## 2024-08-06 PROCEDURE — 2500000002 HC RX 250 W HCPCS SELF ADMINISTERED DRUGS (ALT 637 FOR MEDICARE OP, ALT 636 FOR OP/ED): Performed by: INTERNAL MEDICINE

## 2024-08-06 PROCEDURE — 83735 ASSAY OF MAGNESIUM: CPT

## 2024-08-06 PROCEDURE — 2500000002 HC RX 250 W HCPCS SELF ADMINISTERED DRUGS (ALT 637 FOR MEDICARE OP, ALT 636 FOR OP/ED)

## 2024-08-06 PROCEDURE — 37799 UNLISTED PX VASCULAR SURGERY: CPT

## 2024-08-06 PROCEDURE — 84132 ASSAY OF SERUM POTASSIUM: CPT | Performed by: INTERNAL MEDICINE

## 2024-08-06 PROCEDURE — 71045 X-RAY EXAM CHEST 1 VIEW: CPT | Performed by: RADIOLOGY

## 2024-08-06 PROCEDURE — 2500000005 HC RX 250 GENERAL PHARMACY W/O HCPCS: Performed by: INTERNAL MEDICINE

## 2024-08-06 PROCEDURE — 2500000004 HC RX 250 GENERAL PHARMACY W/ HCPCS (ALT 636 FOR OP/ED): Performed by: INTERNAL MEDICINE

## 2024-08-06 PROCEDURE — 2500000004 HC RX 250 GENERAL PHARMACY W/ HCPCS (ALT 636 FOR OP/ED)

## 2024-08-06 PROCEDURE — 2020000001 HC ICU ROOM DAILY

## 2024-08-06 PROCEDURE — 36600 WITHDRAWAL OF ARTERIAL BLOOD: CPT

## 2024-08-06 PROCEDURE — 94640 AIRWAY INHALATION TREATMENT: CPT

## 2024-08-06 PROCEDURE — 2500000001 HC RX 250 WO HCPCS SELF ADMINISTERED DRUGS (ALT 637 FOR MEDICARE OP)

## 2024-08-06 PROCEDURE — 2500000005 HC RX 250 GENERAL PHARMACY W/O HCPCS

## 2024-08-06 RX ORDER — DIPHENOXYLATE HYDROCHLORIDE AND ATROPINE SULFATE 2.5; .025 MG/1; MG/1
1 TABLET ORAL 4 TIMES DAILY PRN
COMMUNITY

## 2024-08-06 RX ORDER — DEXTROSE 50 % IN WATER (D50W) INTRAVENOUS SYRINGE
12.5 ONCE
Status: COMPLETED | OUTPATIENT
Start: 2024-08-06 | End: 2024-08-06

## 2024-08-06 RX ORDER — AMITRIPTYLINE HYDROCHLORIDE 50 MG/1
50 TABLET, FILM COATED ORAL DAILY
Status: DISCONTINUED | OUTPATIENT
Start: 2024-08-06 | End: 2024-08-07 | Stop reason: HOSPADM

## 2024-08-06 RX ORDER — DOCUSATE SODIUM 50 MG/5ML
100 LIQUID ORAL DAILY
Status: DISCONTINUED | OUTPATIENT
Start: 2024-08-06 | End: 2024-08-07 | Stop reason: HOSPADM

## 2024-08-06 RX ORDER — HEPARIN SODIUM 5000 [USP'U]/ML
5000 INJECTION, SOLUTION INTRAVENOUS; SUBCUTANEOUS EVERY 8 HOURS
Status: DISCONTINUED | OUTPATIENT
Start: 2024-08-06 | End: 2024-08-07 | Stop reason: HOSPADM

## 2024-08-06 RX ORDER — BISACODYL 10 MG/1
10 SUPPOSITORY RECTAL DAILY
Status: DISCONTINUED | OUTPATIENT
Start: 2024-08-06 | End: 2024-08-07 | Stop reason: HOSPADM

## 2024-08-06 RX ORDER — LOVASTATIN 10 MG/1
10 TABLET ORAL NIGHTLY
COMMUNITY

## 2024-08-06 RX ORDER — MORPHINE SULFATE 15 MG/1
15 TABLET ORAL 2 TIMES DAILY
Status: DISCONTINUED | OUTPATIENT
Start: 2024-08-06 | End: 2024-08-07 | Stop reason: HOSPADM

## 2024-08-06 RX ORDER — INSULIN ASPART 100 [IU]/ML
2-8 INJECTION, SOLUTION INTRAVENOUS; SUBCUTANEOUS
COMMUNITY

## 2024-08-06 RX ORDER — LIDOCAINE HYDROCHLORIDE 10 MG/ML
5 INJECTION INFILTRATION; PERINEURAL ONCE
Status: DISCONTINUED | OUTPATIENT
Start: 2024-08-06 | End: 2024-08-07 | Stop reason: HOSPADM

## 2024-08-06 RX ORDER — DEXTROSE MONOHYDRATE 50 MG/ML
100 INJECTION, SOLUTION INTRAVENOUS CONTINUOUS
Status: DISCONTINUED | OUTPATIENT
Start: 2024-08-06 | End: 2024-08-06

## 2024-08-06 RX ORDER — DEXTROSE MONOHYDRATE 50 MG/ML
150 INJECTION, SOLUTION INTRAVENOUS CONTINUOUS
Status: ACTIVE | OUTPATIENT
Start: 2024-08-06 | End: 2024-08-06

## 2024-08-06 RX ORDER — MAGNESIUM SULFATE 1 G/100ML
1 INJECTION INTRAVENOUS ONCE
Status: COMPLETED | OUTPATIENT
Start: 2024-08-06 | End: 2024-08-06

## 2024-08-06 RX ORDER — AZELASTINE HYDROCHLORIDE 0.5 MG/ML
1 SOLUTION/ DROPS OPHTHALMIC 2 TIMES DAILY
COMMUNITY

## 2024-08-06 SDOH — ECONOMIC STABILITY: FOOD INSECURITY
WITHIN THE PAST 12 MONTHS, THE FOOD YOU BOUGHT JUST DIDN'T LAST AND YOU DIDN'T HAVE MONEY TO GET MORE.: PATIENT UNABLE TO ANSWER

## 2024-08-06 SDOH — HEALTH STABILITY: MENTAL HEALTH: HOW OFTEN DO YOU HAVE 6 OR MORE DRINKS ON ONE OCCASION?: PATIENT UNABLE TO ANSWER

## 2024-08-06 SDOH — SOCIAL STABILITY: SOCIAL NETWORK: HOW OFTEN DO YOU GET TOGETHER WITH FRIENDS OR RELATIVES?: PATIENT UNABLE TO ANSWER

## 2024-08-06 SDOH — ECONOMIC STABILITY: TRANSPORTATION INSECURITY
IN THE PAST 12 MONTHS, HAS THE LACK OF TRANSPORTATION KEPT YOU FROM MEDICAL APPOINTMENTS OR FROM GETTING MEDICATIONS?: PATIENT UNABLE TO ANSWER

## 2024-08-06 SDOH — SOCIAL STABILITY: SOCIAL INSECURITY
WITHIN THE LAST YEAR, HAVE YOU BEEN HUMILIATED OR EMOTIONALLY ABUSED IN OTHER WAYS BY YOUR PARTNER OR EX-PARTNER?: PATIENT UNABLE TO ANSWER

## 2024-08-06 SDOH — ECONOMIC STABILITY: TRANSPORTATION INSECURITY
IN THE PAST 12 MONTHS, HAS LACK OF TRANSPORTATION KEPT YOU FROM MEETINGS, WORK, OR FROM GETTING THINGS NEEDED FOR DAILY LIVING?: PATIENT UNABLE TO ANSWER

## 2024-08-06 SDOH — SOCIAL STABILITY: SOCIAL NETWORK: HOW OFTEN DO YOU ATTENT MEETINGS OF THE CLUB OR ORGANIZATION YOU BELONG TO?: PATIENT UNABLE TO ANSWER

## 2024-08-06 SDOH — ECONOMIC STABILITY: INCOME INSECURITY
HOW HARD IS IT FOR YOU TO PAY FOR THE VERY BASICS LIKE FOOD, HOUSING, MEDICAL CARE, AND HEATING?: PATIENT UNABLE TO ANSWER

## 2024-08-06 SDOH — HEALTH STABILITY: MENTAL HEALTH
HOW OFTEN DO YOU NEED TO HAVE SOMEONE HELP YOU WHEN YOU READ INSTRUCTIONS, PAMPHLETS, OR OTHER WRITTEN MATERIAL FROM YOUR DOCTOR OR PHARMACY?: PATIENT UNABLE TO RESPOND

## 2024-08-06 SDOH — HEALTH STABILITY: PHYSICAL HEALTH: ON AVERAGE, HOW MANY MINUTES DO YOU ENGAGE IN EXERCISE AT THIS LEVEL?: 0 MIN

## 2024-08-06 SDOH — SOCIAL STABILITY: SOCIAL INSECURITY: WITHIN THE LAST YEAR, HAVE YOU BEEN AFRAID OF YOUR PARTNER OR EX-PARTNER?: PATIENT UNABLE TO ANSWER

## 2024-08-06 SDOH — SOCIAL STABILITY: SOCIAL INSECURITY
WITHIN THE LAST YEAR, HAVE YOU BEEN KICKED, HIT, SLAPPED, OR OTHERWISE PHYSICALLY HURT BY YOUR PARTNER OR EX-PARTNER?: PATIENT UNABLE TO ANSWER

## 2024-08-06 SDOH — SOCIAL STABILITY: SOCIAL NETWORK: HOW OFTEN DO YOU ATTEND CHURCH OR RELIGIOUS SERVICES?: PATIENT UNABLE TO ANSWER

## 2024-08-06 SDOH — ECONOMIC STABILITY: HOUSING INSECURITY: AT ANY TIME IN THE PAST 12 MONTHS, WERE YOU HOMELESS OR LIVING IN A SHELTER (INCLUDING NOW)?: PATIENT UNABLE TO ANSWER

## 2024-08-06 SDOH — SOCIAL STABILITY: SOCIAL NETWORK: IN A TYPICAL WEEK, HOW MANY TIMES DO YOU TALK ON THE PHONE WITH FAMILY, FRIENDS, OR NEIGHBORS?: PATIENT UNABLE TO ANSWER

## 2024-08-06 SDOH — ECONOMIC STABILITY: INCOME INSECURITY
IN THE PAST 12 MONTHS, HAS THE ELECTRIC, GAS, OIL, OR WATER COMPANY THREATENED TO SHUT OFF SERVICE IN YOUR HOME?: PATIENT UNABLE TO ANSWER

## 2024-08-06 SDOH — HEALTH STABILITY: MENTAL HEALTH: HOW MANY STANDARD DRINKS CONTAINING ALCOHOL DO YOU HAVE ON A TYPICAL DAY?: PATIENT UNABLE TO ANSWER

## 2024-08-06 SDOH — SOCIAL STABILITY: SOCIAL NETWORK
DO YOU BELONG TO ANY CLUBS OR ORGANIZATIONS SUCH AS CHURCH GROUPS UNIONS, FRATERNAL OR ATHLETIC GROUPS, OR SCHOOL GROUPS?: PATIENT UNABLE TO ANSWER

## 2024-08-06 SDOH — ECONOMIC STABILITY: FOOD INSECURITY
WITHIN THE PAST 12 MONTHS, YOU WORRIED THAT YOUR FOOD WOULD RUN OUT BEFORE YOU GOT MONEY TO BUY MORE.: PATIENT UNABLE TO ANSWER

## 2024-08-06 SDOH — SOCIAL STABILITY: SOCIAL INSECURITY
WITHIN THE LAST YEAR, HAVE TO BEEN RAPED OR FORCED TO HAVE ANY KIND OF SEXUAL ACTIVITY BY YOUR PARTNER OR EX-PARTNER?: PATIENT UNABLE TO ANSWER

## 2024-08-06 SDOH — HEALTH STABILITY: MENTAL HEALTH: HOW OFTEN DO YOU HAVE A DRINK CONTAINING ALCOHOL?: PATIENT UNABLE TO ANSWER

## 2024-08-06 SDOH — ECONOMIC STABILITY: INCOME INSECURITY
IN THE LAST 12 MONTHS, WAS THERE A TIME WHEN YOU WERE NOT ABLE TO PAY THE MORTGAGE OR RENT ON TIME?: PATIENT UNABLE TO ANSWER

## 2024-08-06 SDOH — SOCIAL STABILITY: SOCIAL NETWORK: ARE YOU MARRIED, WIDOWED, DIVORCED, SEPARATED, NEVER MARRIED, OR LIVING WITH A PARTNER?: PATIENT UNABLE TO ANSWER

## 2024-08-06 SDOH — HEALTH STABILITY: PHYSICAL HEALTH: ON AVERAGE, HOW MANY DAYS PER WEEK DO YOU ENGAGE IN MODERATE TO STRENUOUS EXERCISE (LIKE A BRISK WALK)?: 0 DAYS

## 2024-08-06 ASSESSMENT — PAIN DESCRIPTION - DESCRIPTORS
DESCRIPTORS: PATIENT UNABLE TO DESCRIBE
DESCRIPTORS: PATIENT UNABLE TO DESCRIBE
DESCRIPTORS: ACHING;BURNING
DESCRIPTORS: PATIENT UNABLE TO DESCRIBE

## 2024-08-06 ASSESSMENT — ACTIVITIES OF DAILY LIVING (ADL): LACK_OF_TRANSPORTATION: PATIENT UNABLE TO ANSWER

## 2024-08-06 ASSESSMENT — LIFESTYLE VARIABLES
SKIP TO QUESTIONS 9-10: 0
AUDIT-C TOTAL SCORE: -1

## 2024-08-06 ASSESSMENT — PAIN - FUNCTIONAL ASSESSMENT
PAIN_FUNCTIONAL_ASSESSMENT: CPOT (CRITICAL CARE PAIN OBSERVATION TOOL)
PAIN_FUNCTIONAL_ASSESSMENT: CPOT (CRITICAL CARE PAIN OBSERVATION TOOL)
PAIN_FUNCTIONAL_ASSESSMENT: 0-10
PAIN_FUNCTIONAL_ASSESSMENT: CPOT (CRITICAL CARE PAIN OBSERVATION TOOL)
PAIN_FUNCTIONAL_ASSESSMENT: CPOT (CRITICAL CARE PAIN OBSERVATION TOOL)
PAIN_FUNCTIONAL_ASSESSMENT: 0-10
PAIN_FUNCTIONAL_ASSESSMENT: CPOT (CRITICAL CARE PAIN OBSERVATION TOOL)

## 2024-08-06 ASSESSMENT — PAIN SCALES - GENERAL
PAINLEVEL_OUTOF10: 5 - MODERATE PAIN
PAINLEVEL_OUTOF10: 3
PAINLEVEL_OUTOF10: 7
PAINLEVEL_OUTOF10: 5 - MODERATE PAIN

## 2024-08-06 ASSESSMENT — PAIN DESCRIPTION - ORIENTATION
ORIENTATION: LEFT
ORIENTATION: LEFT;RIGHT

## 2024-08-06 ASSESSMENT — PAIN DESCRIPTION - LOCATION
LOCATION: BACK
LOCATION: BACK
LOCATION: OTHER (COMMENT)

## 2024-08-06 NOTE — NURSING NOTE
Upon rounding right femoral TLC with all ports in use at this time. Pressure dressing intact d/t bleeding at the insertion site. RN spoke with MD, plan for PICC insertion. Plan to wait for blood cultures prior to insertion. Will reassess tomorrow.

## 2024-08-06 NOTE — CARE PLAN
The patient's goals for the shift include  Get Rest  Problem: Respiratory  Goal: Clear secretions with interventions this shift  8/6/2024 0823 by Fide Monae RN  Outcome: Progressing  8/6/2024 0822 by Fide Monae RN  Outcome: Progressing  Goal: Minimize anxiety/maximize coping throughout shift  8/6/2024 0823 by Fide Monae RN  Outcome: Progressing  8/6/2024 0822 by Fide Monae RN  Outcome: Progressing  Goal: Minimal/no exertional discomfort or dyspnea this shift  8/6/2024 0823 by Fide Monae RN  Outcome: Progressing  8/6/2024 0822 by Fide Monae RN  Outcome: Progressing     Problem: Pain - Adult  Goal: Verbalizes/displays adequate comfort level or baseline comfort level  8/6/2024 0823 by Fide Monae RN  Outcome: Progressing  8/6/2024 0822 by Fide Monae RN  Outcome: Progressing     Problem: Safety - Adult  Goal: Free from fall injury  8/6/2024 0823 by Fide Monae RN  Outcome: Progressing  8/6/2024 0822 by Fide Monae RN  Outcome: Progressing     Problem: Discharge Planning  Goal: Discharge to home or other facility with appropriate resources  8/6/2024 0823 by Fide Monae RN  Outcome: Progressing  8/6/2024 0822 by Fide Monae RN  Outcome: Progressing     Problem: Chronic Conditions and Co-morbidities  Goal: Patient's chronic conditions and co-morbidity symptoms are monitored and maintained or improved  8/6/2024 0823 by Fide Monae RN  Outcome: Progressing  8/6/2024 0822 by Fide Monae RN  Outcome: Progressing     Problem: Skin  Goal: Participates in plan/prevention/treatment measures  8/6/2024 0823 by Fide Monae RN  Outcome: Progressing  8/6/2024 0822 by Fide Monae RN  Outcome: Progressing  Flowsheets (Taken 8/6/2024 0822)  Participates in plan/prevention/treatment measures:   Discuss with provider PT/OT consult   Elevate heels  Goal: Prevent/manage excess moisture  8/6/2024 0823 by Fide Monae RN  Outcome:  Progressing  8/6/2024 0822 by Fide Monae RN  Outcome: Progressing  Flowsheets (Taken 8/6/2024 0822)  Prevent/manage excess moisture:   Cleanse incontinence/protect with barrier cream   Moisturize dry skin   Monitor for/manage infection if present  Goal: Prevent/minimize sheer/friction injuries  8/6/2024 0823 by Fide Monae RN  Outcome: Progressing  8/6/2024 0822 by Fide Monae RN  Outcome: Progressing  Flowsheets (Taken 8/6/2024 0822)  Prevent/minimize sheer/friction injuries:   Turn/reposition every 2 hours/use positioning/transfer devices   Use pull sheet   Utilize specialty bed per algorithm  Goal: Promote/optimize nutrition  8/6/2024 0823 by Fide Monae RN  Outcome: Progressing  8/6/2024 0822 by Fide Monae RN  Outcome: Progressing  Flowsheets (Taken 8/6/2024 0822)  Promote/optimize nutrition:   Assist with feeding   Monitor/record intake including meals   Offer water/supplements/favorite foods  Goal: Promote skin healing  8/6/2024 0823 by Fide Monae RN  Outcome: Progressing  8/6/2024 0822 by Fide Monae RN  Outcome: Progressing  Flowsheets (Taken 8/6/2024 0822)  Promote skin healing:   Turn/reposition every 2 hours/use positioning/transfer devices   Protective dressings over bony prominences     Problem: Diabetes  Goal: Achieve decreasing blood glucose levels by end of shift  8/6/2024 0823 by Fide Monae RN  Outcome: Progressing  8/6/2024 0822 by Fide Monae RN  Outcome: Progressing  Goal: Increase stability of blood glucose readings by end of shift  8/6/2024 0823 by Fide Monae RN  Outcome: Progressing  8/6/2024 0822 by Fide Monae RN  Outcome: Progressing  Goal: Decrease in ketones present in urine by end of shift  8/6/2024 0823 by Fide Monae RN  Outcome: Progressing  8/6/2024 0822 by Fide Monae RN  Outcome: Progressing  Goal: Maintain electrolyte levels within acceptable range throughout shift  8/6/2024 0823 by Fide SIMPSON  REG Monae  Outcome: Progressing  8/6/2024 0822 by Fide Monae RN  Outcome: Progressing  Goal: Maintain glucose levels >70mg/dl to <250mg/dl throughout shift  8/6/2024 0823 by Fide Monae RN  Outcome: Progressing  8/6/2024 0822 by Fide Monae RN  Outcome: Progressing  Goal: No changes in neurological exam by end of shift  8/6/2024 0823 by Fide Monae RN  Outcome: Progressing  8/6/2024 0822 by Fide Monae RN  Outcome: Progressing  Goal: Vital signs within normal range for age by end of shift  8/6/2024 0823 by Fide Monae RN  Outcome: Progressing  8/6/2024 0822 by Fide Monae RN  Outcome: Progressing  Goal: Increase self care and/or family involovement by end of shift  8/6/2024 0823 by Fide Monae RN  Outcome: Progressing  8/6/2024 0822 by Fide Monae RN  Outcome: Progressing       The clinical goals for the shift include Patient to remain hemodynamically stable

## 2024-08-06 NOTE — CONSULTS
Nutrition Assessement Note    Nutrition Assessment    Reason for Assessment: Admission nursing screening (MST 3)    Unable to wake pt at time of visit. Will continue to monitor PO intake/ need for nutritional supplementation.     Reason for Hospital Admission:  Daniela Saez is a 64 y.o. female who is admitted for acute kidney injury. patient states that for the past week she has been feeling progressively weak. She has been able to ambulate up until today.     Past Medical History:   Diagnosis Date    Adrenal insufficiency (Multi)     Anemia     7.27.24 - H/H 9.4/31.6    Anxiety     Asthma ([facility])     Atrial fibrillation (Multi)     on Plavix - stop instructions in chart    Cervical radiculopathy     Chronic diastolic heart failure (Multi)     saw cardiology 6/2024- f/u prn    Chronic pain syndrome     Chronic pancreatitis (Multi)     Coronary artery disease     old MI noted on cardiac testing, unknown when    Depression     Fibromyalgia     Gastroparesis     Gastroparesis    GERD (gastroesophageal reflux disease)     Gout     Hearing loss     Hepatic cirrhosis (Multi)     7.26.24: alk phs 182    Hypertension     Hypocalcemia     7.27.24: ca 8.3    Hyponatremia     7.27.24: Na 134    Hypothyroidism     Insomnia     Irritable bowel syndrome with diarrhea     Liver lesion     Lumbar stenosis     Migraine     Monoclonal gammopathy     follows with heme - h/o bone marrow bx    Occipital neuralgia     Osteoarthritis     Rheumatoid arthritis (Multi)     RLS (restless legs syndrome)     RSD (reflex sympathetic dystrophy)     Syncope     admitted 7/26-7/27/24 with syncopal event - dx with UTI on Bactrim    Thrombocytopenia (CMS-HCC)     7.27.24: plt 59    Thrombocytopenia (CMS-HCC)     7.27.24: plt 59    Tremor     voice, hands, legs at times    Type 2 diabetes mellitus (Multi)     5.24.24: A1C 7.1%    UC (ulcerative colitis) (Multi)       Past Surgical History:   Procedure Laterality Date    BONE MARROW BIOPSY       "BUNIONECTOMY Right      SECTION, LOW TRANSVERSE      x 5    CHOLECYSTECTOMY      CT GUIDED PERCUTANEOUS BIOPSY BONE DEEP  2022    CT GUIDED PERCUTANEOUS BIOPSY BONE DEEP 3/9/2022 [facility]    OTHER SURGICAL HISTORY  2019    Uterine nerve ablation    OTHER SURGICAL HISTORY Right     Ovarian torsion repair    SINUS SURGERY      TONSILLECTOMY      Tonsillectomy with adenoidectomy    TUBAL LIGATION      UMBILICAL HERNIA REPAIR         Nutrition History:  Food and Nutrient History: Pt is currently NPO     Food Allergies/Intolerances:  None  GI Symptoms: Nausea and Abdominal pain    Anthropometrics:  Ht: 154.9 cm (5' 0.98\"), Wt: 72.3 kg (159 lb 6.3 oz), BMI: 30.13  IBW/kg (Dietitian Calculated): 47.7 kg  Percent of IBW: 150 %  Adjusted Body Weight (kg): 53.6 kg    Weight Change:  Daily Weight  24 : 72.3 kg (159 lb 6.3 oz)  24 : 66.3 kg (146 lb 2.6 oz)  24 : 66.7 kg (147 lb)  24 : 66.1 kg (145 lb 11.6 oz)  24 : 66.4 kg (146 lb 6.2 oz)  24 : 68 kg (150 lb)  07/15/24 : 68 kg (150 lb)  24 : 68.9 kg (152 lb)  24 : 69.4 kg (153 lb)  24 : 68.9 kg (152 lb)     Weight History / % Weight Change: Weight has been stable between 146-153# for the past 1.5 months with an exception of weight reading on 24 of 159#             Nutrition Focused Physical Exam Findings:                       Nutrition Significant Labs:  Lab Results   Component Value Date    WBC 15.5 (H) 2024    HGB 9.8 (L) 2024    HCT 29.3 (L) 2024     (L) 2024    CHOL 178 2023    TRIG 153 (H) 2023    HDL 58.3 2023    ALT 29 2024    AST 62 (H) 2024     (L) 2024    K 4.9 2024    CL 90 (L) 2024    CREATININE 1.70 (H) 2024    BUN 32 (H) 2024    CO2 18 (L) 2024    TSH 0.51 2024    INR 1.4 (H) 2024    HGBA1C 6.5 (H) 2024     Nutrition Specific Medications:  albuterol, 3 mL, " nebulization, BID  [Held by provider] allopurinol, 100 mg, oral, Daily  amitriptyline, 50 mg, oral, Daily  atorvastatin, 40 mg, oral, Nightly  baclofen, 10 mg, oral, BID  clopidogrel, 75 mg, oral, Daily  pantoprazole, 40 mg, oral, BID   Or  esomeprazole, 40 mg, nasoduodenal tube, BID   Or  pantoprazole, 40 mg, intravenous, BID  [Held by provider] famotidine, 20 mg, oral, BID  [Held by provider] fluocinonide, 1 Application, Topical, BID  fluticasone, 2 spray, Each Nostril, Daily  [Held by provider] guaiFENesin, 1,200 mg, oral, BID  heparin (porcine), 5,000 Units, subcutaneous, q8h  hydrocortisone sodium succinate, 50 mg, intravenous, q6h  hydroxychloroquine, 200 mg, oral, Daily  insulin lispro, 0-10 Units, subcutaneous, q4h  [Held by provider] ipratropium, 2 spray, Each Nostril, BID  [Held by provider] leflunomide, 20 mg, oral, Daily  levothyroxine, 88 mcg, oral, Daily  lidocaine, 5 mL, infiltration, Once  oxygen, , inhalation, Continuous - Inhalation  piperacillin-tazobactam, 2.25 g, intravenous, q8h  sodium zirconium cyclosilicate, 10 g, oral, q8h      norepinephrine, 0-0.5 mcg/kg/min, Last Rate: 0.27 mcg/kg/min (08/06/24 1100)      Dietary Orders (From admission, onward)       Start     Ordered    08/05/24 1943  NPO Diet Except: Sips with meds, Ice chips, Sips of clear liquids; Effective now  Diet effective now        Question Answer Comment   Except: Sips with meds    Except: Ice chips    Except: Sips of clear liquids        08/05/24 1943                  Estimated Needs:   Estimated Energy Needs  Total Energy Estimated Needs (kCal): 1340 kCal  Total Estimated Energy Need per Day (kCal/kg): 25 kCal/kg  Method for Estimating Needs: ABW    Estimated Protein Needs  Total Protein Estimated Needs (g): 64 g  Total Protein Estimated Needs (g/kg): 1.2 g/kg  Method for Estimating Needs: ABW    Estimated Fluid Needs  Total Fluid Estimated Needs (mL): 1340 mL  Method for Estimating Needs: 1 mL/kcal        Nutrition  Diagnosis   Nutrition Diagnosis:       Nutrition Diagnosis  Patient has Nutrition Diagnosis: Yes  Diagnosis Status (1): New  Nutrition Diagnosis 1: Increased energy expenditure  Related to (1): increased demand for nutrient  As Evidenced by (1): cirrhosis  Additional Nutrition Diagnosis: Diagnosis 2  Diagnosis Status (2): New  Nutrition Diagnosis 2: Inadequate energy intake  Related to (2): decreased ability to consume sufficient energy  As Evidenced by (2): NPO       Nutrition Interventions/Recommendations   Nutrition Interventions and Recommendations:    Nutrition Prescription:  Individualized Nutrition Prescription Provided for : 1340 kcals, 64 g protein via diet    Nutrition Interventions:   Food and/or Nutrient Delivery Interventions  Interventions: Meals and snacks  Meals and Snacks: Mineral-modified diet  Goal: recommend a low sodium diet as able    Education Documentation  No documentation found.           Nutrition Monitoring and Evaluation   Monitoring/Evaluation:   Food/Nutrient Related History Monitoring  Monitoring and Evaluation Plan: Energy intake  Energy Intake: Estimated energy intake  Criteria: monitor for diet advancement         Time Spent/Follow-up:   Follow Up  Time Spent (min): 30 minutes  Last Date of Nutrition Visit: 08/06/24  Nutrition Follow-Up Needed?: 3-5 days  Follow up Comment: 8/9/24

## 2024-08-06 NOTE — PROGRESS NOTES
TCC spoke to patient's brother. Assessment complete. Patient lives with her brother and their mother. Patient with a history of TBI. Patient has HHC coming into the home and has an aide. TCC to meet with patient once she is more alert. At this time there is not a safe discharge plan in place. Will follow.     08/06/24 1528   Discharge Planning   Living Arrangements Family members;Parent   Support Systems Family members   Type of Residence Private residence   Home or Post Acute Services Other (Comment)  (TBD)   Expected Discharge Disposition Other  (TBD)   Does the patient need discharge transport arranged? No   Financial Resource Strain   How hard is it for you to pay for the very basics like food, housing, medical care, and heating? Pt Unable   Housing Stability   In the last 12 months, was there a time when you were not able to pay the mortgage or rent on time? Pt Unable   At any time in the past 12 months, were you homeless or living in a shelter (including now)? Pt Unable   Transportation Needs   In the past 12 months, has lack of transportation kept you from medical appointments or from getting medications? Pt Unable   In the past 12 months, has lack of transportation kept you from meetings, work, or from getting things needed for daily living? Pt Unable

## 2024-08-06 NOTE — PROGRESS NOTES
08/06/24 1522   Physical Activity   On average, how many days per week do you engage in moderate to strenuous exercise (like a brisk walk)? 0 days   On average, how many minutes do you engage in exercise at this level? 0 min   Financial Resource Strain   How hard is it for you to pay for the very basics like food, housing, medical care, and heating? Pt Unable   Housing Stability   In the last 12 months, was there a time when you were not able to pay the mortgage or rent on time? Pt Unable   At any time in the past 12 months, were you homeless or living in a shelter (including now)? Pt Unable   Transportation Needs   In the past 12 months, has lack of transportation kept you from medical appointments or from getting medications? Pt Unable   In the past 12 months, has lack of transportation kept you from meetings, work, or from getting things needed for daily living? Pt Unable   Food Insecurity   Within the past 12 months, you worried that your food would run out before you got the money to buy more. Pt Unable   Within the past 12 months, the food you bought just didn't last and you didn't have money to get more. Pt Unable   Social Connections   In a typical week, how many times do you talk on the phone with family, friends, or neighbors? Pt Unable   How often do you get together with friends or relatives? Pt Unable   How often do you attend Pentecostalism or Hindu services? Pt Unable   Do you belong to any clubs or organizations such as Pentecostalism groups, unions, fraternal or athletic groups, or school groups? Pt Unable   How often do you attend meetings of the clubs or organizations you belong to? Pt Unable   Are you , , , , never , or living with a partner? Pt Unable   Intimate Partner Violence   Within the last year, have you been afraid of your partner or ex-partner? Pt Unable   Within the last year, have you been humiliated or emotionally abused in other ways by your partner or  ex-partner? Pt Unable   Within the last year, have you been kicked, hit, slapped, or otherwise physically hurt by your partner or ex-partner? Pt Unable   Within the last year, have you been raped or forced to have any kind of sexual activity by your partner or ex-partner? Pt Unable   Alcohol Use   Q1: How often do you have a drink containing alcohol? Pt Unable   Q2: How many drinks containing alcohol do you have on a typical day when you are drinking? Pt Unable   Q3: How often do you have six or more drinks on one occasion? Pt Unable   Utilities   In the past 12 months has the electric, gas, oil, or water company threatened to shut off services in your home? Pt Unable   Health Literacy   How often do you need to have someone help you when you read instructions, pamphlets, or other written material from your doctor or pharmacy? Patient unable to respond

## 2024-08-06 NOTE — PROGRESS NOTES
Daniela Saez is a 64 y.o. female on day 1 of admission presenting with Acute kidney injury (CMS-HCC).  with h/o CAD, HFpEF, Afib, HTN, asthma, seronegative RA, gout, psoriatic arthritis, L knee OA, fibromyalgia, chronic pain syndrome, GERD, liver cirrhosis, hypothyroidism, adrenal insufficiency, T2DM, anxiety, depression, peripheral neuropathy, recent admission for UTI and hypercarbia after presenting with a syncopal episode, who now presents with generalized weakness to OSH ED. Workup revealed OLIVA, lactic acidosis, hyponatremia and hyperkalemia. Also hypotensive. Received 3L of IVF bolus, however, remained hypotensive, subsequently a femoral central line was placed and started on Levophed and then vasopressin. Now is being admitted to the ICU   Subjective   No acute over night events. Cr. And K improved with IVF. LA continue to remain around 4, but no findings suggestive of focal ischemia. Pressor needs are improving.      Currently with some confusion, complains of whole body aches.   Objective   Scheduled medications  albuterol, 3 mL, nebulization, BID  [Held by provider] allopurinol, 100 mg, oral, Daily  [Held by provider] atorvastatin, 40 mg, oral, Nightly  [Held by provider] baclofen, 10 mg, oral, BID  [Held by provider] clopidogrel, 75 mg, oral, Daily  pantoprazole, 40 mg, oral, BID   Or  esomeprazole, 40 mg, nasoduodenal tube, BID   Or  pantoprazole, 40 mg, intravenous, BID  [Held by provider] famotidine, 20 mg, oral, BID  [Held by provider] fluocinonide, 1 Application, Topical, BID  [Held by provider] fluticasone, 2 spray, Each Nostril, Daily  [Held by provider] guaiFENesin, 1,200 mg, oral, BID  hydrocortisone sodium succinate, 50 mg, intravenous, q6h  hydroxychloroquine, 200 mg, oral, Daily  insulin lispro, 0-10 Units, subcutaneous, q4h  [Held by provider] ipratropium, 2 spray, Each Nostril, BID  lactated Ringer's, 1,000 mL, intravenous, Once  [Held by provider] leflunomide, 20 mg, oral,  Daily  levothyroxine, 88 mcg, oral, Daily  oxygen, , inhalation, Continuous - Inhalation  piperacillin-tazobactam, 2.25 g, intravenous, q8h  sodium zirconium cyclosilicate, 10 g, oral, q8h    Continuous medications  norepinephrine, 0-0.5 mcg/kg/min, Last Rate: 0.27 mcg/kg/min (08/06/24 1100)    PRN medications  PRN medications: acetaminophen **OR** acetaminophen, amitriptyline, dextrose, dextrose, glucagon, glucagon, morphine, ondansetron, [Held by provider] SUMAtriptan   Physical Exam  Constitutional:       General: She is not in acute distress.     Appearance: She is obese. She is ill-appearing and toxic-appearing.   HENT:      Head: Normocephalic and atraumatic.      Nose:      Comments: On 3L NC     Mouth/Throat:      Mouth: Mucous membranes are dry.   Eyes:      General: No scleral icterus.     Extraocular Movements: Extraocular movements intact.      Pupils: Pupils are equal, round, and reactive to light.   Cardiovascular:      Rate and Rhythm: Regular rhythm. Tachycardia present.      Heart sounds: No murmur heard.     No friction rub. No gallop.   Pulmonary:      Effort: Pulmonary effort is normal. No respiratory distress.      Breath sounds: Normal breath sounds. No wheezing or rales.   Abdominal:      General: There is distension.      Palpations: Abdomen is soft.      Tenderness: There is abdominal tenderness. There is no guarding.   Musculoskeletal:         General: Tenderness present. Normal range of motion.      Cervical back: Normal range of motion and neck supple. No rigidity or tenderness.      Right lower leg: No edema.      Left lower leg: No edema.   Lymphadenopathy:      Cervical: No cervical adenopathy.   Skin:     General: Skin is warm and dry.      Coloration: Skin is not jaundiced.      Findings: Erythema (chronic discoloration b/l LE) present.   Neurological:      General: No focal deficit present.      Cranial Nerves: No cranial nerve deficit.      Motor: No weakness.      Comments: Awake  "and alert, but confused.    Psychiatric:         Mood and Affect: Mood normal.         Behavior: Behavior normal.     Last Recorded Vitals  Blood pressure 86/50, pulse 106, temperature 36 °C (96.8 °F), temperature source Oral, resp. rate 22, height 1.549 m (5' 0.98\"), weight 72.3 kg (159 lb 6.3 oz), SpO2 95%.  Intake/Output last 3 Shifts:  I/O last 3 completed shifts:  In: 4558.5 (63.3 mL/kg) [I.V.:1217.8 (16.9 mL/kg); IV Piggyback:3340.7]  Out: 2170 (30.1 mL/kg) [Urine:2170 (0.8 mL/kg/hr)]  Weight: 72 kg   Relevant Results  Results for orders placed or performed during the hospital encounter of 08/05/24 (from the past 24 hour(s))   Blood Gas Venous Full Panel   Result Value Ref Range    POCT pH, Venous 7.30 (L) 7.33 - 7.43 pH    POCT pCO2, Venous 38 (L) 41 - 51 mm Hg    POCT pO2, Venous 156 (H) 35 - 45 mm Hg    POCT SO2, Venous 98 (H) 45 - 75 %    POCT Oxy Hemoglobin, Venous 96.2 (H) 45.0 - 75.0 %    POCT Hematocrit Calculated, Venous 35.0 (L) 36.0 - 46.0 %    POCT Sodium, Venous 116 (LL) 136 - 145 mmol/L    POCT Potassium, Venous 6.7 (HH) 3.5 - 5.3 mmol/L    POCT Chloride, Venous 92 (L) 98 - 107 mmol/L    POCT Ionized Calicum, Venous 1.11 1.10 - 1.33 mmol/L    POCT Glucose, Venous 187 (H) 74 - 99 mg/dL    POCT Lactate, Venous 2.6 (H) 0.4 - 2.0 mmol/L    POCT Base Excess, Venous -7.1 (L) -2.0 - 3.0 mmol/L    POCT HCO3 Calculated, Venous 18.7 (L) 22.0 - 26.0 mmol/L    POCT Hemoglobin, Venous 11.6 (L) 12.0 - 16.0 g/dL    POCT Anion Gap, Venous 12.0 10.0 - 25.0 mmol/L    Patient Temperature 37.0 degrees Celsius    FiO2 4 %    Apparatus CANNULA    Calcium, Ionized   Result Value Ref Range    POCT Calcium, Ionized 1.04 (L) 1.1 - 1.33 mmol/L   Magnesium   Result Value Ref Range    Magnesium 2.30 1.60 - 3.10 mg/dL   Phosphorus   Result Value Ref Range    Phosphorus 6.4 (H) 2.5 - 4.5 mg/dL   Coagulation Screen   Result Value Ref Range    Protime 14.1 (H) 9.3 - 12.7 seconds    INR 1.4 (H) 0.9 - 1.2    aPTT 58.0 (H) 22.0 - " 32.5 seconds   CBC and Auto Differential   Result Value Ref Range    WBC 17.5 (H) 4.4 - 11.3 x10*3/uL    nRBC 0.0 0.0 - 0.0 /100 WBCs    RBC 3.86 (L) 4.00 - 5.20 x10*6/uL    Hemoglobin 11.1 (L) 12.0 - 16.0 g/dL    Hematocrit 33.0 (L) 36.0 - 46.0 %    MCV 86 80 - 100 fL    MCH 28.8 26.0 - 34.0 pg    MCHC 33.6 32.0 - 36.0 g/dL    RDW 15.9 (H) 11.5 - 14.5 %    Platelets 168 150 - 450 x10*3/uL    Neutrophils % 86.5 40.0 - 80.0 %    Immature Granulocytes %, Automated 0.5 0.0 - 0.9 %    Lymphocytes % 4.3 13.0 - 44.0 %    Monocytes % 7.8 2.0 - 10.0 %    Eosinophils % 0.4 0.0 - 6.0 %    Basophils % 0.5 0.0 - 2.0 %    Neutrophils Absolute 15.17 (H) 1.20 - 7.70 x10*3/uL    Immature Granulocytes Absolute, Automated 0.09 0.00 - 0.70 x10*3/uL    Lymphocytes Absolute 0.75 (L) 1.20 - 4.80 x10*3/uL    Monocytes Absolute 1.37 (H) 0.10 - 1.00 x10*3/uL    Eosinophils Absolute 0.07 0.00 - 0.70 x10*3/uL    Basophils Absolute 0.09 0.00 - 0.10 x10*3/uL   Comprehensive metabolic panel   Result Value Ref Range    Glucose 198 (H) 65 - 99 mg/dL    Sodium 124 (L) 133 - 145 mmol/L    Potassium 6.8 (HH) 3.4 - 5.1 mmol/L    Chloride 92 (L) 97 - 107 mmol/L    Bicarbonate 17 (L) 24 - 31 mmol/L    Urea Nitrogen 51 (H) 8 - 25 mg/dL    Creatinine 3.10 (H) 0.40 - 1.60 mg/dL    eGFR 16 (L) >60 mL/min/1.73m*2    Calcium 8.9 8.5 - 10.4 mg/dL    Albumin 3.0 (L) 3.5 - 5.0 g/dL    Alkaline Phosphatase 251 (H) 35 - 125 U/L    Total Protein 6.0 5.9 - 7.9 g/dL    AST 62 (H) 5 - 40 U/L    Bilirubin, Total 1.0 0.1 - 1.2 mg/dL    ALT 29 5 - 40 U/L    Anion Gap 15 <=19 mmol/L   Serial Troponin, Initial ([facility])   Result Value Ref Range    Troponin T, High Sensitivity 82 (HH) <=14 ng/L   Type and screen   Result Value Ref Range    ABO TYPE A     Rh TYPE POS     ANTIBODY SCREEN NEG    Osmolality   Result Value Ref Range    Osmolality, Serum 279 (L) 280 - 300 mOsm/kg   POCT GLUCOSE   Result Value Ref Range    POCT Glucose 175 (H) 74 - 99 mg/dL   POCT GLUCOSE   Result  Value Ref Range    POCT Glucose 180 (H) 74 - 99 mg/dL   Urinalysis with Reflex Culture and Microscopic   Result Value Ref Range    Color, Urine Yellow Light-Yellow, Yellow, Dark-Yellow    Appearance, Urine Turbid (N) Clear    Specific Gravity, Urine 1.012 1.005 - 1.035    pH, Urine 6.0 5.0, 5.5, 6.0, 6.5, 7.0, 7.5, 8.0    Protein, Urine 10 (TRACE) NEGATIVE, 10 (TRACE), 20 (TRACE) mg/dL    Glucose, Urine Normal Normal mg/dL    Blood, Urine OVER (3+) (A) NEGATIVE    Ketones, Urine NEGATIVE NEGATIVE mg/dL    Bilirubin, Urine NEGATIVE NEGATIVE    Urobilinogen, Urine Normal Normal mg/dL    Nitrite, Urine NEGATIVE NEGATIVE    Leukocyte Esterase, Urine 250 Ervin/µL (A) NEGATIVE   Extra Urine Gray Tube   Result Value Ref Range    Extra Tube Hold for add-ons.    Microscopic Only, Urine   Result Value Ref Range    WBC, Urine >50 (A) 1-5, NONE /HPF    RBC, Urine >20 (A) NONE, 1-2, 3-5 /HPF    Bacteria, Urine 1+ (A) NONE SEEN /HPF    Mucus, Urine FEW Reference range not established. /LPF   Osmolality, urine   Result Value Ref Range    Osmolality, Urine Random 362 200 - 1,200 mOsm/kg   Urine electrolytes   Result Value Ref Range    Sodium, Urine Random 74 mmol/L    Sodium/Creatinine Ratio 205 Not established. mmol/g Creat    Potassium, Urine Random 40 mmol/L    Potassium/Creatinine Ratio 111 Not established mmol/g Creat    Chloride, Urine Random 78 mmol/L    Chloride/Creatinine Ratio 216 38 - 318 mmol/g creat    Creatinine, Urine Random 36.1 20.0 - 320.0 mg/dL   Serial Troponin, 2 Hour ([facility])   Result Value Ref Range    Troponin T, High Sensitivity 83 (HH) <=14 ng/L   POCT GLUCOSE   Result Value Ref Range    POCT Glucose 296 (H) 74 - 99 mg/dL   Blood Gas Arterial Full Panel   Result Value Ref Range    POCT pH, Arterial 7.24 (LL) 7.38 - 7.42 pH    POCT pCO2, Arterial 41 38 - 42 mm Hg    POCT pO2, Arterial 94 85 - 95 mm Hg    POCT SO2, Arterial 98 94 - 100 %    POCT Oxy Hemoglobin, Arterial 95.9 94.0 - 98.0 %    POCT Hematocrit  Calculated, Arterial 30.0 (L) 36.0 - 46.0 %    POCT Sodium, Arterial 118 (LL) 136 - 145 mmol/L    POCT Potassium, Arterial 5.2 3.5 - 5.3 mmol/L    POCT Chloride, Arterial 94 (L) 98 - 107 mmol/L    POCT Ionized Calcium, Arterial 1.21 1.10 - 1.33 mmol/L    POCT Glucose, Arterial 259 (H) 74 - 99 mg/dL    POCT Lactate, Arterial 4.6 (HH) 0.4 - 2.0 mmol/L    POCT Base Excess, Arterial -9.2 (L) -2.0 - 3.0 mmol/L    POCT HCO3 Calculated, Arterial 17.6 (L) 22.0 - 26.0 mmol/L    POCT Hemoglobin, Arterial 9.9 (L) 12.0 - 16.0 g/dL    POCT Anion Gap, Arterial 12 10 - 25 mmo/L    Patient Temperature 37.0 degrees Celsius    FiO2 32 %    Critical Called By [facility]     Critical Called To [facility]     Critical Call Time 2108     Critical Read Back Y     Critical Note CRITICAL     Site of Arterial Puncture Arterial Line     Artie's Test Positive    Serial Troponin, 6 Hour ([facility])   Result Value Ref Range    Troponin T, High Sensitivity 84 (HH) <=14 ng/L   Basic metabolic panel   Result Value Ref Range    Glucose 234 (H) 65 - 99 mg/dL    Sodium 124 (L) 133 - 145 mmol/L    Potassium 5.5 (H) 3.4 - 5.1 mmol/L    Chloride 90 (L) 97 - 107 mmol/L    Bicarbonate 16 (L) 24 - 31 mmol/L    Urea Nitrogen 46 (H) 8 - 25 mg/dL    Creatinine 2.40 (H) 0.40 - 1.60 mg/dL    eGFR 22 (L) >60 mL/min/1.73m*2    Calcium 8.7 8.5 - 10.4 mg/dL    Anion Gap 18 <=19 mmol/L   Blood Gas Venous Full Panel   Result Value Ref Range    POCT pH, Venous 7.25 (LL) 7.33 - 7.43 pH    POCT pCO2, Venous 39 (L) 41 - 51 mm Hg    POCT pO2, Venous 131 (H) 35 - 45 mm Hg    POCT SO2, Venous 100 (H) 45 - 75 %    POCT Oxy Hemoglobin, Venous 97.5 (H) 45.0 - 75.0 %    POCT Hematocrit Calculated, Venous 33.0 (L) 36.0 - 46.0 %    POCT Sodium, Venous 117 (LL) 136 - 145 mmol/L    POCT Potassium, Venous 5.3 3.5 - 5.3 mmol/L    POCT Chloride, Venous 94 (L) 98 - 107 mmol/L    POCT Ionized Calicum, Venous 1.18 1.10 - 1.33 mmol/L    POCT Glucose, Venous 224 (H) 74 - 99 mg/dL    POCT Lactate, Venous  5.1 (HH) 0.4 - 2.0 mmol/L    POCT Base Excess, Venous -9.5 (L) -2.0 - 3.0 mmol/L    POCT HCO3 Calculated, Venous 17.1 (L) 22.0 - 26.0 mmol/L    POCT Hemoglobin, Venous 10.9 (L) 12.0 - 16.0 g/dL    POCT Anion Gap, Venous 11.0 10.0 - 25.0 mmol/L    Patient Temperature 37.0 degrees Celsius    FiO2 40 %   Magnesium   Result Value Ref Range    Magnesium 2.00 1.60 - 3.10 mg/dL   Phosphorus   Result Value Ref Range    Phosphorus 5.4 (H) 2.5 - 4.5 mg/dL   Blood Gas Arterial Full Panel   Result Value Ref Range    POCT pH, Arterial 7.27 (L) 7.38 - 7.42 pH    POCT pCO2, Arterial 39 38 - 42 mm Hg    POCT pO2, Arterial 104 (H) 85 - 95 mm Hg    POCT SO2, Arterial 98 94 - 100 %    POCT Oxy Hemoglobin, Arterial 96.5 94.0 - 98.0 %    POCT Hematocrit Calculated, Arterial 30.0 (L) 36.0 - 46.0 %    POCT Sodium, Arterial 119 (LL) 136 - 145 mmol/L    POCT Potassium, Arterial 5.5 (H) 3.5 - 5.3 mmol/L    POCT Chloride, Arterial 94 (L) 98 - 107 mmol/L    POCT Ionized Calcium, Arterial 1.22 1.10 - 1.33 mmol/L    POCT Glucose, Arterial 171 (H) 74 - 99 mg/dL    POCT Lactate, Arterial 4.0 (HH) 0.4 - 2.0 mmol/L    POCT Base Excess, Arterial -8.4 (L) -2.0 - 3.0 mmol/L    POCT HCO3 Calculated, Arterial 17.9 (L) 22.0 - 26.0 mmol/L    POCT Hemoglobin, Arterial 10.1 (L) 12.0 - 16.0 g/dL    POCT Anion Gap, Arterial 13 10 - 25 mmo/L    Patient Temperature 37.0 degrees Celsius    FiO2 32 %    Critical Called By [facility]     Critical Called To [facility]     Critical Call Time 2228     Critical Read Back Y     Critical Note CRITICAL     Site of Arterial Puncture Arterial Line     Artie's Test Positive    POCT GLUCOSE   Result Value Ref Range    POCT Glucose 174 (H) 74 - 99 mg/dL   Basic metabolic panel   Result Value Ref Range    Glucose 164 (H) 65 - 99 mg/dL    Sodium 129 (L) 133 - 145 mmol/L    Potassium 5.9 (H) 3.4 - 5.1 mmol/L    Chloride 95 (L) 97 - 107 mmol/L    Bicarbonate 19 (L) 24 - 31 mmol/L    Urea Nitrogen 38 (H) 8 - 25 mg/dL    Creatinine 2.20 (H)  0.40 - 1.60 mg/dL    eGFR 25 (L) >60 mL/min/1.73m*2    Calcium 8.8 8.5 - 10.4 mg/dL    Anion Gap 15 <=19 mmol/L   Blood Gas Venous Full Panel   Result Value Ref Range    POCT pH, Venous 7.28 (L) 7.33 - 7.43 pH    POCT pCO2, Venous 44 41 - 51 mm Hg    POCT pO2, Venous 43 35 - 45 mm Hg    POCT SO2, Venous 63 45 - 75 %    POCT Oxy Hemoglobin, Venous 61.3 45.0 - 75.0 %    POCT Hematocrit Calculated, Venous 30.0 (L) 36.0 - 46.0 %    POCT Sodium, Venous 119 (LL) 136 - 145 mmol/L    POCT Potassium, Venous 5.6 (H) 3.5 - 5.3 mmol/L    POCT Chloride, Venous 96 (L) 98 - 107 mmol/L    POCT Ionized Calicum, Venous 1.14 1.10 - 1.33 mmol/L    POCT Glucose, Venous 151 (H) 74 - 99 mg/dL    POCT Lactate, Venous 4.3 (HH) 0.4 - 2.0 mmol/L    POCT Base Excess, Venous -5.8 (L) -2.0 - 3.0 mmol/L    POCT HCO3 Calculated, Venous 20.7 (L) 22.0 - 26.0 mmol/L    POCT Hemoglobin, Venous 10.0 (L) 12.0 - 16.0 g/dL    POCT Anion Gap, Venous 8.0 (L) 10.0 - 25.0 mmol/L    Patient Temperature 37.0 degrees Celsius    FiO2 40 %   POCT GLUCOSE   Result Value Ref Range    POCT Glucose 243 (H) 74 - 99 mg/dL   Phosphorus   Result Value Ref Range    Phosphorus 4.2 2.5 - 4.5 mg/dL   Magnesium   Result Value Ref Range    Magnesium 1.80 1.60 - 3.10 mg/dL   Basic Metabolic Panel   Result Value Ref Range    Glucose 246 (H) 65 - 99 mg/dL    Sodium 126 (L) 133 - 145 mmol/L    Potassium 5.5 (H) 3.4 - 5.1 mmol/L    Chloride 92 (L) 97 - 107 mmol/L    Bicarbonate 18 (L) 24 - 31 mmol/L    Urea Nitrogen 37 (H) 8 - 25 mg/dL    Creatinine 2.00 (H) 0.40 - 1.60 mg/dL    eGFR 28 (L) >60 mL/min/1.73m*2    Calcium 8.5 8.5 - 10.4 mg/dL    Anion Gap 16 <=19 mmol/L   CBC and Auto Differential   Result Value Ref Range    WBC 15.5 (H) 4.4 - 11.3 x10*3/uL    nRBC 0.0 0.0 - 0.0 /100 WBCs    RBC 3.39 (L) 4.00 - 5.20 x10*6/uL    Hemoglobin 9.8 (L) 12.0 - 16.0 g/dL    Hematocrit 29.3 (L) 36.0 - 46.0 %    MCV 86 80 - 100 fL    MCH 28.9 26.0 - 34.0 pg    MCHC 33.4 32.0 - 36.0 g/dL    RDW  15.9 (H) 11.5 - 14.5 %    Platelets 130 (L) 150 - 450 x10*3/uL    Immature Granulocytes %, Automated 0.6 0.0 - 0.9 %    Immature Granulocytes Absolute, Automated 0.10 0.00 - 0.70 x10*3/uL   Manual Differential   Result Value Ref Range    Neutrophils %, Manual 55.0 40.0 - 80.0 %    Bands %, Manual 24.0 0.0 - 5.0 %    Lymphocytes %, Manual 3.0 13.0 - 44.0 %    Monocytes %, Manual 8.0 2.0 - 10.0 %    Eosinophils %, Manual 0.0 0.0 - 6.0 %    Basophils %, Manual 0.0 0.0 - 2.0 %    Metamyelocytes %, Manual 10.0 0.0 - 0.0 %    Seg Neutrophils Absolute, Manual 8.53 (H) 1.20 - 7.00 x10*3/uL    Bands Absolute, Manual 3.72 (H) 0.00 - 0.70 x10*3/uL    Lymphocytes Absolute, Manual 0.47 (L) 1.20 - 4.80 x10*3/uL    Monocytes Absolute, Manual 1.24 (H) 0.10 - 1.00 x10*3/uL    Eosinophils Absolute, Manual 0.00 0.00 - 0.70 x10*3/uL    Basophils Absolute, Manual 0.00 0.00 - 0.10 x10*3/uL    Metamyelocytes Absolute, Manual 1.55 0.00 - 0.00 x10*3/uL    Total Cells Counted 100     Neutrophils Absolute, Manual 12.25 (H) 1.20 - 7.70 x10*3/uL    RBC Morphology See Below     Polychromasia Mild     [place] Cells Many     Toxic Granulation Present     Vacuolated Neutrophils Present    Blood Gas Arterial Full Panel   Result Value Ref Range    POCT pH, Arterial 7.35 (L) 7.38 - 7.42 pH    POCT pCO2, Arterial 33 (L) 38 - 42 mm Hg    POCT pO2, Arterial 99 (H) 85 - 95 mm Hg    POCT SO2, Arterial 99 94 - 100 %    POCT Oxy Hemoglobin, Arterial 96.6 94.0 - 98.0 %    POCT Hematocrit Calculated, Arterial 31.0 (L) 36.0 - 46.0 %    POCT Sodium, Arterial 119 (LL) 136 - 145 mmol/L    POCT Potassium, Arterial 5.3 3.5 - 5.3 mmol/L    POCT Chloride, Arterial 94 (L) 98 - 107 mmol/L    POCT Ionized Calcium, Arterial 1.13 1.10 - 1.33 mmol/L    POCT Glucose, Arterial 240 (H) 74 - 99 mg/dL    POCT Lactate, Arterial 4.6 (HH) 0.4 - 2.0 mmol/L    POCT Base Excess, Arterial -6.6 (L) -2.0 - 3.0 mmol/L    POCT HCO3 Calculated, Arterial 18.2 (L) 22.0 - 26.0 mmol/L    POCT  Hemoglobin, Arterial 10.2 (L) 12.0 - 16.0 g/dL    POCT Anion Gap, Arterial 12 10 - 25 mmo/L    Patient Temperature 37.0 degrees Celsius    FiO2 32 %    Critical Called By [facility]     Critical Called To [facility]     Critical Call Time 448     Critical Read Back Y     Critical Note CRITICAL     Site of Arterial Puncture Arterial Line     Artie's Test Positive    POCT GLUCOSE   Result Value Ref Range    POCT Glucose 254 (H) 74 - 99 mg/dL   Basic metabolic panel   Result Value Ref Range    Glucose 204 (H) 65 - 99 mg/dL    Sodium 125 (L) 133 - 145 mmol/L    Potassium 4.9 3.4 - 5.1 mmol/L    Chloride 90 (L) 97 - 107 mmol/L    Bicarbonate 18 (L) 24 - 31 mmol/L    Urea Nitrogen 32 (H) 8 - 25 mg/dL    Creatinine 1.70 (H) 0.40 - 1.60 mg/dL    eGFR 34 (L) >60 mL/min/1.73m*2    Calcium 8.7 8.5 - 10.4 mg/dL    Anion Gap 17 <=19 mmol/L   POCT GLUCOSE   Result Value Ref Range    POCT Glucose 161 (H) 74 - 99 mg/dL   POCT GLUCOSE   Result Value Ref Range    POCT Glucose 178 (H) 74 - 99 mg/dL      Assessment/Plan   Principal Problem:    Acute kidney injury (CMS-HCC)  64 YOF with h/o CAD, HFpEF, Afib, HTN, asthma, seronegative RA, gout, psoriatic arthritis, L knee OA, fibromyalgia, chronic pain syndrome, GERD, liver cirrhosis, hypothyroidism, adrenal insufficiency, T2DM, anxiety, depression, peripheral neuropathy, recent admission for UTI and hypercarbia after presenting with a syncopal episode, who now presents with generalized weakness to [facility] ED. Workup revealed OLIVA, lactic acidosis, hyponatremia and hyperkalemia. Also hypotensive. Received 3L of IVF bolus, however, remained hypotensive, subsequently a femoral central line was placed and started on Levophed and then vasopressin. Now is being admitted to the ICU.      Neuro: chronic pain, currently awake and alert, but confused, concerning for opioid withdrawal       Pain management, MS Morphine 15 mg BID and morphine 2 mg every 4 hours for        Supportive measures       Continue  Baclofen     CV: h/o CAD, HFpEF, Afib, asthma, patient with episode of bradycardia likely due to hyperkalemia, required Epi push in the ED. Currently in shock, hypovolemic vs septic requiring two pressors. BP initially improved and was taken off the Vasopressin, but now Levophed requirements increasing again.        Continue Levophed,, wean off as tolerates       Volume resuscitation as indicated       Continue Plavix       Hold anti-hypertensive medications       Hold Bumex       Continue Atorvastatin     Respiratory: h/o asthma, mild hypoxia       Continue supplemental O2, wean off as tolerates       Continue inhalers     : OLIVA c/b hyperkalemia, also lactic acidosis, both likely due hypovolemia. Improving with IVF. Lactic acid now stable around 4.        Repeat LA this afternoon        Continue volume resuscitation        Is/Os        Frequent RFP        Treat hyperkalemia as indicated.      GI: h/o GERD, now concern for GI bleed. On Pepcid and Protonix       Protonix BID       Hold home medications       NPO for now except for sips of water with medications       Will monitor for signs of bleeding.      Endo: h/o DM, adrenal insufficiency and hypothyroidism       Hold home Solu-cortef, will place on IV 50 mg QID        Hold home Lantus, continue SSI       Continue home Synthroid       Monitor BS       Hypoglycemic protocol     Hem/Onc: patient with chronic anemia, Hb currently higher than baseline likely due to hemoconcentration, but now back to her baseline       Repeat CBC       Continue to monitor with daily CBC          ID: leukocytosis, also dysuria and +ve UA. Recent admission for UTI.        Urine culture       Continue renally dosed Zosyn     MSK: h/o RA, psoriatic arthritis       Continue HCQ       Pain control     DVT prophylaxis: SCD, heparin     This was a critical care visit for shock and OLIVA. Total time 60 min.     Miguel Ángel Joya MD

## 2024-08-07 ENCOUNTER — APPOINTMENT (OUTPATIENT)
Dept: RADIOLOGY | Facility: HOSPITAL | Age: 64
End: 2024-08-07
Payer: MEDICAID

## 2024-08-07 ENCOUNTER — ANESTHESIA EVENT (OUTPATIENT)
Dept: OPERATING ROOM | Facility: HOSPITAL | Age: 64
End: 2024-08-07
Payer: MEDICAID

## 2024-08-07 ENCOUNTER — DOCUMENTATION (OUTPATIENT)
Dept: INTENSIVE CARE | Facility: HOSPITAL | Age: 64
End: 2024-08-07

## 2024-08-07 ENCOUNTER — HOSPITAL ENCOUNTER (INPATIENT)
Facility: HOSPITAL | Age: 64
LOS: 1 days | End: 2024-08-08
Attending: STUDENT IN AN ORGANIZED HEALTH CARE EDUCATION/TRAINING PROGRAM | Admitting: PHYSICIAN ASSISTANT
Payer: MEDICAID

## 2024-08-07 ENCOUNTER — ANESTHESIA (OUTPATIENT)
Dept: OPERATING ROOM | Facility: HOSPITAL | Age: 64
End: 2024-08-07
Payer: MEDICAID

## 2024-08-07 ENCOUNTER — APPOINTMENT (OUTPATIENT)
Dept: CARDIOLOGY | Facility: HOSPITAL | Age: 64
End: 2024-08-07
Payer: MEDICAID

## 2024-08-07 VITALS
SYSTOLIC BLOOD PRESSURE: 88 MMHG | OXYGEN SATURATION: 83 % | TEMPERATURE: 99.9 F | WEIGHT: 161.6 LBS | HEART RATE: 137 BPM | BODY MASS INDEX: 30.51 KG/M2 | HEIGHT: 61 IN | RESPIRATION RATE: 31 BRPM | DIASTOLIC BLOOD PRESSURE: 59 MMHG

## 2024-08-07 DIAGNOSIS — K55.9 ISCHEMIC COLITIS (MULTI): Primary | ICD-10-CM

## 2024-08-07 LAB
ABO GROUP (TYPE) IN BLOOD: NORMAL
ACANTHOCYTES BLD QL SMEAR: ABNORMAL
ANION GAP BLDA CALCULATED.4IONS-SCNC: 14 MMO/L (ref 10–25)
ANION GAP BLDA CALCULATED.4IONS-SCNC: 16 MMO/L (ref 10–25)
ANION GAP BLDA CALCULATED.4IONS-SCNC: 17 MMO/L (ref 10–25)
ANION GAP BLDA CALCULATED.4IONS-SCNC: 20 MMO/L (ref 10–25)
ANION GAP BLDA CALCULATED.4IONS-SCNC: 21 MMO/L (ref 10–25)
ANION GAP BLDA CALCULATED.4IONS-SCNC: 23 MMO/L (ref 10–25)
ANION GAP SERPL CALC-SCNC: >19 MMOL/L
ANTIBODY SCREEN: NORMAL
ANTIBODY SCREEN: NORMAL
APPARATUS: ABNORMAL
APTT PPP: 86 SECONDS (ref 27–38)
APTT PPP: 97.9 SECONDS (ref 22–32.5)
ARTERIAL PATENCY WRIST A: NEGATIVE
ARTERIAL PATENCY WRIST A: NEGATIVE
ARTERIAL PATENCY WRIST A: POSITIVE
B-OH-BUTYR+ACETOACET BLD-SCNC: 0.2 MMOL/L (ref 0.1–0.3)
B-OH-BUTYR+ACETOACET BLD-SCNC: 0.2 MMOL/L (ref 0.1–0.3)
BACTERIA UR CULT: NORMAL
BASE EXCESS BLDA CALC-SCNC: -13.9 MMOL/L (ref -2–3)
BASE EXCESS BLDA CALC-SCNC: -14.1 MMOL/L (ref -2–3)
BASE EXCESS BLDA CALC-SCNC: -14.6 MMOL/L (ref -2–3)
BASE EXCESS BLDA CALC-SCNC: -17.7 MMOL/L (ref -2–3)
BASE EXCESS BLDA CALC-SCNC: -19.2 MMOL/L (ref -2–3)
BASE EXCESS BLDA CALC-SCNC: -6.8 MMOL/L (ref -2–3)
BASOPHILS # BLD AUTO: 0.16 X10*3/UL (ref 0–0.1)
BASOPHILS # BLD MANUAL: 0 X10*3/UL (ref 0–0.1)
BASOPHILS NFR BLD AUTO: 0.7 %
BASOPHILS NFR BLD MANUAL: 0 %
BLOOD EXPIRATION DATE: NORMAL
BODY TEMPERATURE: 37 DEGREES CELSIUS
BUN SERPL-MCNC: 35 MG/DL (ref 8–25)
BURR CELLS BLD QL SMEAR: ABNORMAL
BURR CELLS BLD QL SMEAR: NORMAL
CA-I BLDA-SCNC: 0.98 MMOL/L (ref 1.1–1.33)
CA-I BLDA-SCNC: 1.05 MMOL/L (ref 1.1–1.33)
CA-I BLDA-SCNC: 1.06 MMOL/L (ref 1.1–1.33)
CA-I BLDA-SCNC: 1.11 MMOL/L (ref 1.1–1.33)
CA-I BLDA-SCNC: 1.18 MMOL/L (ref 1.1–1.33)
CA-I BLDA-SCNC: 1.36 MMOL/L (ref 1.1–1.33)
CALCIUM SERPL-MCNC: 8.4 MG/DL (ref 8.5–10.4)
CFT FORM KAOLIN IND BLD RES TEG: 1.6 MIN (ref 0.8–2.1)
CHLORIDE BLDA-SCNC: 102 MMOL/L (ref 98–107)
CHLORIDE BLDA-SCNC: 103 MMOL/L (ref 98–107)
CHLORIDE BLDA-SCNC: 93 MMOL/L (ref 98–107)
CHLORIDE BLDA-SCNC: 96 MMOL/L (ref 98–107)
CHLORIDE BLDA-SCNC: 98 MMOL/L (ref 98–107)
CHLORIDE BLDA-SCNC: 99 MMOL/L (ref 98–107)
CHLORIDE SERPL-SCNC: 93 MMOL/L (ref 97–107)
CK SERPL-CCNC: 149 U/L (ref 24–195)
CLOT ANGLE.KAOLIN INDUCED BLD RES TEG: 68 DEG (ref 63–78)
CLOT INIT KAO IND P HEP NEUT BLD RES TEG: 12.3 MIN (ref 4.6–9.1)
CLOT INIT KAO IND P HEP NEUT BLD RES TEG: 9.4 MIN (ref 4.3–8.3)
CO2 SERPL-SCNC: 9 MMOL/L (ref 24–31)
CREAT SERPL-MCNC: 1.5 MG/DL (ref 0.4–1.6)
CRITICAL CALL TIME: 1003
CRITICAL CALL TIME: 1345
CRITICAL CALL TIME: 510
CRITICAL CALLED BY: ABNORMAL
CRITICAL CALLED TO: ABNORMAL
CRITICAL NOTE: ABNORMAL
CRITICAL READ BACK: ABNORMAL
D DIMER PPP FEU-MCNC: 32.95 MG/L FEU (ref 0.19–0.5)
DACRYOCYTES BLD QL SMEAR: ABNORMAL
DISPENSE STATUS: NORMAL
DOHLE BOD BLD QL SMEAR: PRESENT
EGFRCR SERPLBLD CKD-EPI 2021: 39 ML/MIN/1.73M*2
EOSINOPHIL # BLD AUTO: 0.16 X10*3/UL (ref 0–0.7)
EOSINOPHIL # BLD MANUAL: 0 X10*3/UL (ref 0–0.7)
EOSINOPHIL NFR BLD AUTO: 0.7 %
EOSINOPHIL NFR BLD MANUAL: 0 %
ERYTHROCYTE [DISTWIDTH] IN BLOOD BY AUTOMATED COUNT: 16.6 % (ref 11.5–14.5)
ERYTHROCYTE [DISTWIDTH] IN BLOOD BY AUTOMATED COUNT: 16.9 % (ref 11.5–14.5)
FIBRINOGEN BLD CALC-MCNC: 420 MG/DL (ref 278–581)
FIBRINOGEN PPP-MCNC: 392 MG/DL (ref 200–400)
FOLATE SERPL-MCNC: 13.7 NG/ML (ref 4.2–19.9)
GLUCOSE BLD MANUAL STRIP-MCNC: 104 MG/DL (ref 74–99)
GLUCOSE BLD MANUAL STRIP-MCNC: 111 MG/DL (ref 74–99)
GLUCOSE BLD MANUAL STRIP-MCNC: 112 MG/DL (ref 74–99)
GLUCOSE BLD MANUAL STRIP-MCNC: 115 MG/DL (ref 74–99)
GLUCOSE BLD MANUAL STRIP-MCNC: 118 MG/DL (ref 74–99)
GLUCOSE BLD MANUAL STRIP-MCNC: 119 MG/DL (ref 74–99)
GLUCOSE BLD MANUAL STRIP-MCNC: 193 MG/DL (ref 74–99)
GLUCOSE BLDA-MCNC: 131 MG/DL (ref 74–99)
GLUCOSE BLDA-MCNC: 153 MG/DL (ref 74–99)
GLUCOSE BLDA-MCNC: 181 MG/DL (ref 74–99)
GLUCOSE BLDA-MCNC: 216 MG/DL (ref 74–99)
GLUCOSE BLDA-MCNC: 260 MG/DL (ref 74–99)
GLUCOSE BLDA-MCNC: 80 MG/DL (ref 74–99)
GLUCOSE SERPL-MCNC: 108 MG/DL (ref 65–99)
HAPTOGLOB SERPL-MCNC: 30 MG/DL (ref 37–246)
HCO3 BLDA-SCNC: 13 MMOL/L (ref 22–26)
HCO3 BLDA-SCNC: 13.1 MMOL/L (ref 22–26)
HCO3 BLDA-SCNC: 18.5 MMOL/L (ref 22–26)
HCO3 BLDA-SCNC: 7.6 MMOL/L (ref 22–26)
HCO3 BLDA-SCNC: 7.8 MMOL/L (ref 22–26)
HCO3 BLDA-SCNC: 8.8 MMOL/L (ref 22–26)
HCT VFR BLD AUTO: 24.2 % (ref 36–46)
HCT VFR BLD AUTO: 26.7 % (ref 36–46)
HCT VFR BLD EST: 18 % (ref 36–46)
HCT VFR BLD EST: 19 % (ref 36–46)
HCT VFR BLD EST: 19 % (ref 36–46)
HCT VFR BLD EST: 21 % (ref 36–46)
HCT VFR BLD EST: 24 % (ref 36–46)
HCT VFR BLD EST: 43 % (ref 36–46)
HGB BLD-MCNC: 7.7 G/DL (ref 12–16)
HGB BLD-MCNC: 8.8 G/DL (ref 12–16)
HGB BLDA-MCNC: 14.2 G/DL (ref 12–16)
HGB BLDA-MCNC: 6.1 G/DL (ref 12–16)
HGB BLDA-MCNC: 6.4 G/DL (ref 12–16)
HGB BLDA-MCNC: 6.4 G/DL (ref 12–16)
HGB BLDA-MCNC: 7.1 G/DL (ref 12–16)
HGB BLDA-MCNC: 7.9 G/DL (ref 12–16)
IMM GRANULOCYTES # BLD AUTO: 0.51 X10*3/UL (ref 0–0.7)
IMM GRANULOCYTES # BLD AUTO: 0.53 X10*3/UL (ref 0–0.7)
IMM GRANULOCYTES NFR BLD AUTO: 2.1 % (ref 0–0.9)
IMM GRANULOCYTES NFR BLD AUTO: 2.2 % (ref 0–0.9)
INHALED O2 CONCENTRATION: 100 %
INHALED O2 CONCENTRATION: 100 %
INHALED O2 CONCENTRATION: 28 %
INHALED O2 CONCENTRATION: 28 %
INR PPP: 3.3 (ref 0.9–1.2)
INR PPP: 3.9 (ref 0.9–1.1)
LACTATE BLDA-SCNC: 11.5 MMOL/L (ref 0.4–2)
LACTATE BLDA-SCNC: 12.3 MMOL/L (ref 0.4–2)
LACTATE BLDA-SCNC: 12.4 MMOL/L (ref 0.4–2)
LACTATE BLDA-SCNC: 14.8 MMOL/L (ref 0.4–2)
LACTATE BLDA-SCNC: 15.3 MMOL/L (ref 0.4–2)
LACTATE BLDA-SCNC: 16.3 MMOL/L (ref 0.4–2)
LACTATE BLDV-SCNC: 9.4 MMOL/L (ref 0.4–2)
LDH SERPL-CCNC: 367 U/L (ref 91–227)
LYMPHOCYTES # BLD AUTO: 1.22 X10*3/UL (ref 1.2–4.8)
LYMPHOCYTES # BLD MANUAL: 1.46 X10*3/UL (ref 1.2–4.8)
LYMPHOCYTES NFR BLD AUTO: 5.3 %
LYMPHOCYTES NFR BLD MANUAL: 6 %
MA KAOLIN BLD RES TEG: 63 MM (ref 52–69)
MA KAOLIN+TF BLD RES TEG: 61 MM (ref 52–70)
MA TF IND+IIB-IIIA INH BLD RES TEG: 23 MM (ref 15–32)
MAGNESIUM SERPL-MCNC: 2.1 MG/DL (ref 1.6–3.1)
MCH RBC QN AUTO: 28.4 PG (ref 26–34)
MCH RBC QN AUTO: 28.4 PG (ref 26–34)
MCHC RBC AUTO-ENTMCNC: 31.8 G/DL (ref 32–36)
MCHC RBC AUTO-ENTMCNC: 33 G/DL (ref 32–36)
MCV RBC AUTO: 86 FL (ref 80–100)
MCV RBC AUTO: 89 FL (ref 80–100)
METAMYELOCYTES # BLD MANUAL: 0.24 X10*3/UL
METAMYELOCYTES NFR BLD MANUAL: 1 %
MONOCYTES # BLD AUTO: 2.25 X10*3/UL (ref 0.1–1)
MONOCYTES # BLD MANUAL: 0.73 X10*3/UL (ref 0.1–1)
MONOCYTES NFR BLD AUTO: 9.7 %
MONOCYTES NFR BLD MANUAL: 3 %
NEUTROPHILS # BLD AUTO: 18.87 X10*3/UL (ref 1.2–7.7)
NEUTROPHILS # BLD MANUAL: 21.71 X10*3/UL (ref 1.2–7.7)
NEUTROPHILS NFR BLD AUTO: 81.4 %
NEUTS BAND # BLD MANUAL: 1.46 X10*3/UL (ref 0–0.7)
NEUTS BAND NFR BLD MANUAL: 6 %
NEUTS SEG # BLD MANUAL: 20.25 X10*3/UL (ref 1.2–7)
NEUTS SEG NFR BLD MANUAL: 83 %
NEUTS VAC BLD QL SMEAR: PRESENT
NEUTS VAC BLD QL SMEAR: PRESENT
NRBC BLD MANUAL-RTO: 3 % (ref 0–0)
NRBC BLD-RTO: 0.3 /100 WBCS (ref 0–0)
NRBC BLD-RTO: 0.6 /100 WBCS (ref 0–0)
OVALOCYTES BLD QL SMEAR: ABNORMAL
OXYHGB MFR BLDA: 96.5 % (ref 94–98)
OXYHGB MFR BLDA: 96.6 % (ref 94–98)
OXYHGB MFR BLDA: 97.5 % (ref 94–98)
OXYHGB MFR BLDA: 98 % (ref 94–98)
OXYHGB MFR BLDA: 98.3 % (ref 94–98)
OXYHGB MFR BLDA: 98.6 % (ref 94–98)
PCO2 BLDA: 17 MM HG (ref 38–42)
PCO2 BLDA: 17 MM HG (ref 38–42)
PCO2 BLDA: 22 MM HG (ref 38–42)
PCO2 BLDA: 34 MM HG (ref 38–42)
PCO2 BLDA: 35 MM HG (ref 38–42)
PCO2 BLDA: 35 MM HG (ref 38–42)
PEEP CMH2O: 5 CM H2O
PH BLDA: 7.16 PH (ref 7.38–7.42)
PH BLDA: 7.18 PH (ref 7.38–7.42)
PH BLDA: 7.19 PH (ref 7.38–7.42)
PH BLDA: 7.26 PH (ref 7.38–7.42)
PH BLDA: 7.32 PH (ref 7.38–7.42)
PH BLDA: 7.33 PH (ref 7.38–7.42)
PHOSPHATE SERPL-MCNC: 4 MG/DL (ref 2.5–4.5)
PLATELET # BLD AUTO: 132 X10*3/UL (ref 150–450)
PLATELET # BLD AUTO: 140 X10*3/UL (ref 150–450)
PO2 BLDA: 146 MM HG (ref 85–95)
PO2 BLDA: 184 MM HG (ref 85–95)
PO2 BLDA: 211 MM HG (ref 85–95)
PO2 BLDA: 382 MM HG (ref 85–95)
PO2 BLDA: 89 MM HG (ref 85–95)
PO2 BLDA: 93 MM HG (ref 85–95)
POLYCHROMASIA BLD QL SMEAR: ABNORMAL
POLYCHROMASIA BLD QL SMEAR: NORMAL
POTASSIUM BLDA-SCNC: 4.9 MMOL/L (ref 3.5–5.3)
POTASSIUM BLDA-SCNC: 5.2 MMOL/L (ref 3.5–5.3)
POTASSIUM BLDA-SCNC: 5.9 MMOL/L (ref 3.5–5.3)
POTASSIUM BLDA-SCNC: 5.9 MMOL/L (ref 3.5–5.3)
POTASSIUM BLDA-SCNC: 6.3 MMOL/L (ref 3.5–5.3)
POTASSIUM BLDA-SCNC: 6.4 MMOL/L (ref 3.5–5.3)
POTASSIUM SERPL-SCNC: 6 MMOL/L (ref 3.4–5.1)
PRODUCT BLOOD TYPE: 5100
PRODUCT BLOOD TYPE: 6200
PRODUCT CODE: NORMAL
PROTHROMBIN TIME: 32.3 SECONDS (ref 9.3–12.7)
PROTHROMBIN TIME: 44.3 SECONDS (ref 9.8–12.8)
RBC # BLD AUTO: 2.71 X10*6/UL (ref 4–5.2)
RBC # BLD AUTO: 3.1 X10*6/UL (ref 4–5.2)
RBC MORPH BLD: ABNORMAL
RBC MORPH BLD: NORMAL
RH FACTOR (ANTIGEN D): NORMAL
SAO2 % BLDA: 100 % (ref 94–100)
SAO2 % BLDA: 98 % (ref 94–100)
SAO2 % BLDA: 99 % (ref 94–100)
SAO2 % BLDA: 99 % (ref 94–100)
SCHISTOCYTES BLD QL SMEAR: ABNORMAL
SODIUM BLDA-SCNC: 117 MMOL/L (ref 136–145)
SODIUM BLDA-SCNC: 119 MMOL/L (ref 136–145)
SODIUM BLDA-SCNC: 121 MMOL/L (ref 136–145)
SODIUM BLDA-SCNC: 125 MMOL/L (ref 136–145)
SODIUM BLDA-SCNC: 127 MMOL/L (ref 136–145)
SODIUM BLDA-SCNC: 127 MMOL/L (ref 136–145)
SODIUM SERPL-SCNC: 127 MMOL/L (ref 133–145)
SPECIMEN DRAWN FROM PATIENT: ABNORMAL
T4 FREE SERPL-MCNC: 0.7 NG/DL (ref 0.9–1.7)
TIDAL VOLUME: 420 ML
TOTAL CELLS COUNTED BLD: 100
TOXIC GRANULES BLD QL SMEAR: PRESENT
TOXIC GRANULES BLD QL SMEAR: PRESENT
TSH SERPL DL<=0.05 MIU/L-ACNC: 0.17 MIU/L (ref 0.27–4.2)
UNIT ABO: NORMAL
UNIT NUMBER: NORMAL
UNIT RH: NORMAL
UNIT VOLUME: 215
UNIT VOLUME: 274
UNIT VOLUME: 278
UNIT VOLUME: 300
UNIT VOLUME: 303
UNIT VOLUME: 313
UNIT VOLUME: 323
UNIT VOLUME: 329
UNIT VOLUME: 350
VARIANT LYMPHS # BLD MANUAL: 0.24 X10*3/UL (ref 0–0.5)
VARIANT LYMPHS NFR BLD: 1 %
VENTILATOR MODE: ABNORMAL
VENTILATOR RATE: 20 BPM
VIT B12 SERPL-MCNC: >2000 PG/ML (ref 211–946)
WBC # BLD AUTO: 23.2 X10*3/UL (ref 4.4–11.3)
WBC # BLD AUTO: 24.4 X10*3/UL (ref 4.4–11.3)
XM INTEP: NORMAL

## 2024-08-07 PROCEDURE — 83605 ASSAY OF LACTIC ACID: CPT

## 2024-08-07 PROCEDURE — 84439 ASSAY OF FREE THYROXINE: CPT | Performed by: STUDENT IN AN ORGANIZED HEALTH CARE EDUCATION/TRAINING PROGRAM

## 2024-08-07 PROCEDURE — 2500000005 HC RX 250 GENERAL PHARMACY W/O HCPCS

## 2024-08-07 PROCEDURE — 3E043XZ INTRODUCTION OF VASOPRESSOR INTO CENTRAL VEIN, PERCUTANEOUS APPROACH: ICD-10-PCS | Performed by: PHYSICIAN ASSISTANT

## 2024-08-07 PROCEDURE — P9016 RBC LEUKOCYTES REDUCED: HCPCS

## 2024-08-07 PROCEDURE — 84132 ASSAY OF SERUM POTASSIUM: CPT

## 2024-08-07 PROCEDURE — 2500000002 HC RX 250 W HCPCS SELF ADMINISTERED DRUGS (ALT 637 FOR MEDICARE OP, ALT 636 FOR OP/ED): Performed by: INTERNAL MEDICINE

## 2024-08-07 PROCEDURE — 30233R1 TRANSFUSION OF NONAUTOLOGOUS PLATELETS INTO PERIPHERAL VEIN, PERCUTANEOUS APPROACH: ICD-10-PCS | Performed by: INTERNAL MEDICINE

## 2024-08-07 PROCEDURE — 74018 RADEX ABDOMEN 1 VIEW: CPT

## 2024-08-07 PROCEDURE — 82010 KETONE BODYS QUAN: CPT

## 2024-08-07 PROCEDURE — 30233K1 TRANSFUSION OF NONAUTOLOGOUS FROZEN PLASMA INTO PERIPHERAL VEIN, PERCUTANEOUS APPROACH: ICD-10-PCS | Performed by: INTERNAL MEDICINE

## 2024-08-07 PROCEDURE — 30233N1 TRANSFUSION OF NONAUTOLOGOUS RED BLOOD CELLS INTO PERIPHERAL VEIN, PERCUTANEOUS APPROACH: ICD-10-PCS | Performed by: INTERNAL MEDICINE

## 2024-08-07 PROCEDURE — 2550000001 HC RX 255 CONTRASTS: Performed by: INTERNAL MEDICINE

## 2024-08-07 PROCEDURE — 2020000001 HC ICU ROOM DAILY

## 2024-08-07 PROCEDURE — 99291 CRITICAL CARE FIRST HOUR: CPT | Performed by: INTERNAL MEDICINE

## 2024-08-07 PROCEDURE — 2500000004 HC RX 250 GENERAL PHARMACY W/ HCPCS (ALT 636 FOR OP/ED): Mod: SE | Performed by: NURSE ANESTHETIST, CERTIFIED REGISTERED

## 2024-08-07 PROCEDURE — P9017 PLASMA 1 DONOR FRZ W/IN 8 HR: HCPCS

## 2024-08-07 PROCEDURE — 2500000005 HC RX 250 GENERAL PHARMACY W/O HCPCS: Mod: SE | Performed by: NURSE ANESTHETIST, CERTIFIED REGISTERED

## 2024-08-07 PROCEDURE — C9113 INJ PANTOPRAZOLE SODIUM, VIA: HCPCS | Performed by: INTERNAL MEDICINE

## 2024-08-07 PROCEDURE — 82746 ASSAY OF FOLIC ACID SERUM: CPT | Performed by: STUDENT IN AN ORGANIZED HEALTH CARE EDUCATION/TRAINING PROGRAM

## 2024-08-07 PROCEDURE — 84443 ASSAY THYROID STIM HORMONE: CPT | Performed by: STUDENT IN AN ORGANIZED HEALTH CARE EDUCATION/TRAINING PROGRAM

## 2024-08-07 PROCEDURE — 2500000004 HC RX 250 GENERAL PHARMACY W/ HCPCS (ALT 636 FOR OP/ED)

## 2024-08-07 PROCEDURE — 36600 WITHDRAWAL OF ARTERIAL BLOOD: CPT

## 2024-08-07 PROCEDURE — 82550 ASSAY OF CK (CPK): CPT | Performed by: INTERNAL MEDICINE

## 2024-08-07 PROCEDURE — 86901 BLOOD TYPING SEROLOGIC RH(D): CPT | Performed by: STUDENT IN AN ORGANIZED HEALTH CARE EDUCATION/TRAINING PROGRAM

## 2024-08-07 PROCEDURE — 86920 COMPATIBILITY TEST SPIN: CPT

## 2024-08-07 PROCEDURE — 82010 KETONE BODYS QUAN: CPT | Performed by: INTERNAL MEDICINE

## 2024-08-07 PROCEDURE — 5A1935Z RESPIRATORY VENTILATION, LESS THAN 24 CONSECUTIVE HOURS: ICD-10-PCS | Performed by: INTERNAL MEDICINE

## 2024-08-07 PROCEDURE — 3700000002 HC GENERAL ANESTHESIA TIME - EACH INCREMENTAL 1 MINUTE: Performed by: STUDENT IN AN ORGANIZED HEALTH CARE EDUCATION/TRAINING PROGRAM

## 2024-08-07 PROCEDURE — 0BH17EZ INSERTION OF ENDOTRACHEAL AIRWAY INTO TRACHEA, VIA NATURAL OR ARTIFICIAL OPENING: ICD-10-PCS | Performed by: INTERNAL MEDICINE

## 2024-08-07 PROCEDURE — 85384 FIBRINOGEN ACTIVITY: CPT

## 2024-08-07 PROCEDURE — 2500000002 HC RX 250 W HCPCS SELF ADMINISTERED DRUGS (ALT 637 FOR MEDICARE OP, ALT 636 FOR OP/ED): Mod: SE | Performed by: NURSE ANESTHETIST, CERTIFIED REGISTERED

## 2024-08-07 PROCEDURE — 85610 PROTHROMBIN TIME: CPT

## 2024-08-07 PROCEDURE — 31500 INSERT EMERGENCY AIRWAY: CPT

## 2024-08-07 PROCEDURE — 37799 UNLISTED PX VASCULAR SURGERY: CPT

## 2024-08-07 PROCEDURE — 94002 VENT MGMT INPAT INIT DAY: CPT

## 2024-08-07 PROCEDURE — 2500000005 HC RX 250 GENERAL PHARMACY W/O HCPCS: Performed by: INTERNAL MEDICINE

## 2024-08-07 PROCEDURE — 2720000007 HC OR 272 NO HCPCS: Performed by: STUDENT IN AN ORGANIZED HEALTH CARE EDUCATION/TRAINING PROGRAM

## 2024-08-07 PROCEDURE — 36430 TRANSFUSION BLD/BLD COMPNT: CPT

## 2024-08-07 PROCEDURE — 82607 VITAMIN B-12: CPT | Performed by: STUDENT IN AN ORGANIZED HEALTH CARE EDUCATION/TRAINING PROGRAM

## 2024-08-07 PROCEDURE — 74018 RADEX ABDOMEN 1 VIEW: CPT | Performed by: RADIOLOGY

## 2024-08-07 PROCEDURE — 84520 ASSAY OF UREA NITROGEN: CPT

## 2024-08-07 PROCEDURE — 2500000004 HC RX 250 GENERAL PHARMACY W/ HCPCS (ALT 636 FOR OP/ED): Performed by: NURSE PRACTITIONER

## 2024-08-07 PROCEDURE — 99233 SBSQ HOSP IP/OBS HIGH 50: CPT | Performed by: NURSE PRACTITIONER

## 2024-08-07 PROCEDURE — 2500000002 HC RX 250 W HCPCS SELF ADMINISTERED DRUGS (ALT 637 FOR MEDICARE OP, ALT 636 FOR OP/ED)

## 2024-08-07 PROCEDURE — 86923 COMPATIBILITY TEST ELECTRIC: CPT

## 2024-08-07 PROCEDURE — 85025 COMPLETE CBC W/AUTO DIFF WBC: CPT

## 2024-08-07 PROCEDURE — 2500000005 HC RX 250 GENERAL PHARMACY W/O HCPCS: Mod: SE | Performed by: SURGERY

## 2024-08-07 PROCEDURE — 86901 BLOOD TYPING SEROLOGIC RH(D): CPT | Performed by: INTERNAL MEDICINE

## 2024-08-07 PROCEDURE — P9037 PLATE PHERES LEUKOREDU IRRAD: HCPCS

## 2024-08-07 PROCEDURE — 94640 AIRWAY INHALATION TREATMENT: CPT

## 2024-08-07 PROCEDURE — 9420000001 HC RT PATIENT EDUCATION 5 MIN

## 2024-08-07 PROCEDURE — 71045 X-RAY EXAM CHEST 1 VIEW: CPT

## 2024-08-07 PROCEDURE — 85730 THROMBOPLASTIN TIME PARTIAL: CPT

## 2024-08-07 PROCEDURE — 0DNU0ZZ RELEASE OMENTUM, OPEN APPROACH: ICD-10-PCS | Performed by: STUDENT IN AN ORGANIZED HEALTH CARE EDUCATION/TRAINING PROGRAM

## 2024-08-07 PROCEDURE — 2500000001 HC RX 250 WO HCPCS SELF ADMINISTERED DRUGS (ALT 637 FOR MEDICARE OP)

## 2024-08-07 PROCEDURE — 2500000001 HC RX 250 WO HCPCS SELF ADMINISTERED DRUGS (ALT 637 FOR MEDICARE OP): Performed by: INTERNAL MEDICINE

## 2024-08-07 PROCEDURE — 85027 COMPLETE CBC AUTOMATED: CPT

## 2024-08-07 PROCEDURE — 84100 ASSAY OF PHOSPHORUS: CPT

## 2024-08-07 PROCEDURE — 83735 ASSAY OF MAGNESIUM: CPT

## 2024-08-07 PROCEDURE — 2500000005 HC RX 250 GENERAL PHARMACY W/O HCPCS: Performed by: NURSE PRACTITIONER

## 2024-08-07 PROCEDURE — 94799 UNLISTED PULMONARY SVC/PX: CPT

## 2024-08-07 PROCEDURE — 85610 PROTHROMBIN TIME: CPT | Performed by: STUDENT IN AN ORGANIZED HEALTH CARE EDUCATION/TRAINING PROGRAM

## 2024-08-07 PROCEDURE — 74174 CTA ABD&PLVS W/CONTRAST: CPT | Performed by: RADIOLOGY

## 2024-08-07 PROCEDURE — 74174 CTA ABD&PLVS W/CONTRAST: CPT

## 2024-08-07 PROCEDURE — 99292 CRITICAL CARE ADDL 30 MIN: CPT | Performed by: INTERNAL MEDICINE

## 2024-08-07 PROCEDURE — 3700000001 HC GENERAL ANESTHESIA TIME - INITIAL BASE CHARGE: Performed by: STUDENT IN AN ORGANIZED HEALTH CARE EDUCATION/TRAINING PROGRAM

## 2024-08-07 PROCEDURE — 85384 FIBRINOGEN ACTIVITY: CPT | Performed by: STUDENT IN AN ORGANIZED HEALTH CARE EDUCATION/TRAINING PROGRAM

## 2024-08-07 PROCEDURE — 83010 ASSAY OF HAPTOGLOBIN QUANT: CPT | Performed by: INTERNAL MEDICINE

## 2024-08-07 PROCEDURE — 93010 ELECTROCARDIOGRAM REPORT: CPT | Performed by: INTERNAL MEDICINE

## 2024-08-07 PROCEDURE — 85007 BL SMEAR W/DIFF WBC COUNT: CPT

## 2024-08-07 PROCEDURE — 99253 IP/OBS CNSLTJ NEW/EST LOW 45: CPT | Performed by: PHYSICIAN ASSISTANT

## 2024-08-07 PROCEDURE — 82947 ASSAY GLUCOSE BLOOD QUANT: CPT

## 2024-08-07 PROCEDURE — 84132 ASSAY OF SERUM POTASSIUM: CPT | Performed by: NURSE PRACTITIONER

## 2024-08-07 PROCEDURE — 85300 ANTITHROMBIN III ACTIVITY: CPT

## 2024-08-07 PROCEDURE — 83615 LACTATE (LD) (LDH) ENZYME: CPT | Performed by: INTERNAL MEDICINE

## 2024-08-07 PROCEDURE — 3600000008 HC OR TIME - EACH INCREMENTAL 1 MINUTE - PROCEDURE LEVEL THREE: Performed by: STUDENT IN AN ORGANIZED HEALTH CARE EDUCATION/TRAINING PROGRAM

## 2024-08-07 PROCEDURE — 49000 EXPLORATION OF ABDOMEN: CPT | Performed by: SURGERY

## 2024-08-07 PROCEDURE — 3600000003 HC OR TIME - INITIAL BASE CHARGE - PROCEDURE LEVEL THREE: Performed by: STUDENT IN AN ORGANIZED HEALTH CARE EDUCATION/TRAINING PROGRAM

## 2024-08-07 PROCEDURE — 93005 ELECTROCARDIOGRAM TRACING: CPT

## 2024-08-07 PROCEDURE — 2500000004 HC RX 250 GENERAL PHARMACY W/ HCPCS (ALT 636 FOR OP/ED): Performed by: INTERNAL MEDICINE

## 2024-08-07 RX ORDER — DEXTROSE 50 % IN WATER (D50W) INTRAVENOUS SYRINGE
25 ONCE
Status: COMPLETED | OUTPATIENT
Start: 2024-08-07 | End: 2024-08-07

## 2024-08-07 RX ORDER — EPINEPHRINE HCL IN 0.9 % NACL 4MG/250ML
PLASTIC BAG, INJECTION (ML) INTRAVENOUS CONTINUOUS PRN
Status: DISCONTINUED | OUTPATIENT
Start: 2024-08-07 | End: 2024-08-07

## 2024-08-07 RX ORDER — VASOPRESSIN 20 U/ML
INJECTION PARENTERAL CONTINUOUS PRN
Status: DISCONTINUED | OUTPATIENT
Start: 2024-08-07 | End: 2024-08-07

## 2024-08-07 RX ORDER — NOREPINEPHRINE BITARTRATE/D5W 8 MG/250ML
0-.5 PLASTIC BAG, INJECTION (ML) INTRAVENOUS CONTINUOUS
Status: DISCONTINUED | OUTPATIENT
Start: 2024-08-07 | End: 2024-08-08

## 2024-08-07 RX ORDER — CALCIUM CHLORIDE INJECTION 100 MG/ML
INJECTION, SOLUTION INTRAVENOUS AS NEEDED
Status: DISCONTINUED | OUTPATIENT
Start: 2024-08-07 | End: 2024-08-07

## 2024-08-07 RX ORDER — FENTANYL CITRATE 50 UG/ML
50 INJECTION, SOLUTION INTRAMUSCULAR; INTRAVENOUS ONCE
Status: COMPLETED | OUTPATIENT
Start: 2024-08-07 | End: 2024-08-07

## 2024-08-07 RX ORDER — NOREPINEPHRINE BITARTRATE/D5W 8 MG/250ML
PLASTIC BAG, INJECTION (ML) INTRAVENOUS CONTINUOUS PRN
Status: DISCONTINUED | OUTPATIENT
Start: 2024-08-07 | End: 2024-08-07

## 2024-08-07 RX ORDER — ROCURONIUM BROMIDE 10 MG/ML
INJECTION, SOLUTION INTRAVENOUS AS NEEDED
Status: DISCONTINUED | OUTPATIENT
Start: 2024-08-07 | End: 2024-08-07

## 2024-08-07 RX ORDER — ROCURONIUM BROMIDE 10 MG/ML
50 INJECTION, SOLUTION INTRAVENOUS ONCE
Status: DISCONTINUED | OUTPATIENT
Start: 2024-08-07 | End: 2024-08-07 | Stop reason: HOSPADM

## 2024-08-07 RX ORDER — DEXTROSE 50 % IN WATER (D50W) INTRAVENOUS SYRINGE
AS NEEDED
Status: DISCONTINUED | OUTPATIENT
Start: 2024-08-07 | End: 2024-08-07

## 2024-08-07 RX ORDER — MIDAZOLAM HYDROCHLORIDE 1 MG/ML
2 INJECTION, SOLUTION INTRAMUSCULAR; INTRAVENOUS ONCE
Status: COMPLETED | OUTPATIENT
Start: 2024-08-07 | End: 2024-08-07

## 2024-08-07 RX ORDER — EPINEPHRINE 0.1 MG/ML
INJECTION INTRACARDIAC; INTRAVENOUS AS NEEDED
Status: DISCONTINUED | OUTPATIENT
Start: 2024-08-07 | End: 2024-08-07

## 2024-08-07 RX ORDER — CALCIUM GLUCONATE 20 MG/ML
2 INJECTION, SOLUTION INTRAVENOUS ONCE
Status: COMPLETED | OUTPATIENT
Start: 2024-08-07 | End: 2024-08-07

## 2024-08-07 RX ORDER — EPINEPHRINE 1 MG/ML
INJECTION INTRAMUSCULAR; INTRAVENOUS; SUBCUTANEOUS
Status: DISCONTINUED
Start: 2024-08-07 | End: 2024-08-08 | Stop reason: HOSPADM

## 2024-08-07 RX ORDER — DEXTROSE MONOHYDRATE AND SODIUM CHLORIDE 5; .9 G/100ML; G/100ML
125 INJECTION, SOLUTION INTRAVENOUS CONTINUOUS
Status: DISCONTINUED | OUTPATIENT
Start: 2024-08-07 | End: 2024-08-07 | Stop reason: HOSPADM

## 2024-08-07 RX ORDER — SODIUM BICARBONATE 1 MEQ/ML
50 SYRINGE (ML) INTRAVENOUS ONCE
Status: COMPLETED | OUTPATIENT
Start: 2024-08-07 | End: 2024-08-07

## 2024-08-07 RX ORDER — MIDAZOLAM HYDROCHLORIDE 1 MG/ML
INJECTION INTRAMUSCULAR; INTRAVENOUS AS NEEDED
Status: DISCONTINUED | OUTPATIENT
Start: 2024-08-07 | End: 2024-08-07

## 2024-08-07 RX ORDER — ETOMIDATE 2 MG/ML
40 INJECTION INTRAVENOUS ONCE
Status: COMPLETED | OUTPATIENT
Start: 2024-08-07 | End: 2024-08-07

## 2024-08-07 RX ORDER — INSULIN LISPRO 100 [IU]/ML
0-5 INJECTION, SOLUTION INTRAVENOUS; SUBCUTANEOUS EVERY 4 HOURS
Status: DISCONTINUED | OUTPATIENT
Start: 2024-08-07 | End: 2024-08-07 | Stop reason: HOSPADM

## 2024-08-07 RX ORDER — INSULIN LISPRO 100 [IU]/ML
INJECTION, SOLUTION INTRAVENOUS; SUBCUTANEOUS AS NEEDED
Status: DISCONTINUED | OUTPATIENT
Start: 2024-08-07 | End: 2024-08-07

## 2024-08-07 RX ORDER — SODIUM CHLORIDE 0.9 G/100ML
IRRIGANT IRRIGATION AS NEEDED
Status: DISCONTINUED | OUTPATIENT
Start: 2024-08-07 | End: 2024-08-07 | Stop reason: HOSPADM

## 2024-08-07 RX ORDER — SODIUM BICARBONATE 1 MEQ/ML
SYRINGE (ML) INTRAVENOUS
Status: COMPLETED
Start: 2024-08-07 | End: 2024-08-07

## 2024-08-07 RX ORDER — EPINEPHRINE HCL IN DEXTROSE 5% 4MG/250ML
0-1 PLASTIC BAG, INJECTION (ML) INTRAVENOUS CONTINUOUS
Status: DISCONTINUED | OUTPATIENT
Start: 2024-08-07 | End: 2024-08-08

## 2024-08-07 RX ORDER — DEXTROSE MONOHYDRATE 100 MG/ML
50 INJECTION, SOLUTION INTRAVENOUS CONTINUOUS
Status: DISCONTINUED | OUTPATIENT
Start: 2024-08-07 | End: 2024-08-07 | Stop reason: HOSPADM

## 2024-08-07 RX ORDER — SODIUM BICARBONATE 1 MEQ/ML
SYRINGE (ML) INTRAVENOUS AS NEEDED
Status: DISCONTINUED | OUTPATIENT
Start: 2024-08-07 | End: 2024-08-07

## 2024-08-07 RX ORDER — PROPOFOL 10 MG/ML
5-50 INJECTION, EMULSION INTRAVENOUS CONTINUOUS
Status: DISCONTINUED | OUTPATIENT
Start: 2024-08-07 | End: 2024-08-07 | Stop reason: HOSPADM

## 2024-08-07 RX ORDER — MORPHINE SULFATE 4 MG/ML
4 INJECTION, SOLUTION INTRAMUSCULAR; INTRAVENOUS ONCE
Status: COMPLETED | OUTPATIENT
Start: 2024-08-07 | End: 2024-08-07

## 2024-08-07 ASSESSMENT — PAIN SCALES - GENERAL
PAINLEVEL_OUTOF10: 0 - NO PAIN
PAINLEVEL_OUTOF10: 0 - NO PAIN
PAIN_LEVEL: 0

## 2024-08-07 ASSESSMENT — PAIN - FUNCTIONAL ASSESSMENT
PAIN_FUNCTIONAL_ASSESSMENT: CPOT (CRITICAL CARE PAIN OBSERVATION TOOL)
PAIN_FUNCTIONAL_ASSESSMENT: CPOT (CRITICAL CARE PAIN OBSERVATION TOOL)
PAIN_FUNCTIONAL_ASSESSMENT: FLACC (FACE, LEGS, ACTIVITY, CRY, CONSOLABILITY)
PAIN_FUNCTIONAL_ASSESSMENT: CPOT (CRITICAL CARE PAIN OBSERVATION TOOL)
PAIN_FUNCTIONAL_ASSESSMENT: CPOT (CRITICAL CARE PAIN OBSERVATION TOOL)

## 2024-08-07 ASSESSMENT — PAIN DESCRIPTION - LOCATION: LOCATION: OTHER (COMMENT)

## 2024-08-07 ASSESSMENT — PAIN DESCRIPTION - ORIENTATION: ORIENTATION: LEFT;RIGHT

## 2024-08-07 NOTE — ANESTHESIA PREPROCEDURE EVALUATION
Patient: Daniela Saez    Procedure Information       Anesthesia Start Date/Time: 08/07/24 2292    Procedure: Exploration Laparotomy (Abdomen)    Location: [facility] OR 11 / [facility] OR    Surgeons: Dina Dunn MD            Relevant Problems   Cardiac   (+) Atrial fibrillation (Multi)   (+) Coronary artery disease of native artery of native heart with stable angina pectoris (CMS-HCC)   (+) Hyperlipidemia   (+) Mixed hyperlipidemia   (+) Uncontrolled hypertension      Pulmonary   (+) Mild persistent asthma without complication (HHS-HCC)      Neuro   (+) Anxiety   (+) Bilateral occipital neuralgia   (+) Cervical radiculopathy   (+) Complex regional pain syndrome type 1 of right upper extremity   (+) Depression with anxiety   (+) Idiopathic peripheral neuropathy   (+) Lumbago with sciatica, left side   (+) Lumbago with sciatica, right side   (+) Major depression, recurrent, chronic (CMS-HCC)   (+) Peripheral neuropathy      GI   (+) Gastroesophageal reflux disease   (+) Ulcerative colitis (Multi)      /Renal   (+) Hyponatremia   (+) Pyelonephritis   (+) UTI (urinary tract infection)      Liver   (+) Chronic pancreatitis (Multi)   (+) Cirrhosis of liver without ascites (Multi)   (+) Liver lesion      Endocrine   (+) Hypothyroidism   (+) Type 2 diabetes mellitus with ketoacidosis without coma, without long-term current use of insulin (Multi)   (+) Type 2 diabetes mellitus without complications (Multi)      Hematology   (+) Iron deficiency anemia      Musculoskeletal   (+) Chronic neck pain   (+) Complex regional pain syndrome type 1 of right upper extremity   (+) Fibromyalgia   (+) Lumbar stenosis with neurogenic claudication   (+) RSD (reflex sympathetic dystrophy)   (+) Rheumatoid arthritis involving multiple sites (Multi)   (+) Unilateral primary osteoarthritis, left knee      ID   (+) Herpes simplex vulvovaginitis   (+) Pyelonephritis   (+) UTI (urinary tract infection)   (+) Yeast infection      Past Surgical History:   Procedure Laterality Date    BONE MARROW BIOPSY      BUNIONECTOMY Right      SECTION, LOW TRANSVERSE      x 5    CHOLECYSTECTOMY      CT GUIDED PERCUTANEOUS BIOPSY BONE DEEP  2022    CT GUIDED PERCUTANEOUS BIOPSY BONE DEEP 3/9/2022 [facility]    OTHER SURGICAL HISTORY  2019    Uterine nerve ablation    OTHER SURGICAL HISTORY Right     Ovarian torsion repair    SINUS SURGERY      TONSILLECTOMY      Tonsillectomy with adenoidectomy    TUBAL LIGATION      UMBILICAL HERNIA REPAIR          Chemistry    Lab Results   Component Value Date/Time     (L) 2024 0352    K 6.0 (H) 2024 0352    CL 93 (L) 2024 0352    CO2 9 (LL) 2024 0352    BUN 35 (H) 2024 035    CREATININE 1.50 2024 035    Lab Results   Component Value Date/Time    CALCIUM 8.4 (L) 2024 0352    ALKPHOS 251 (H) 2024 1734    AST 62 (H) 2024 1734    ALT 29 2024 1734    BILITOT 1.0 2024 1734          Lab Results   Component Value Date/Time    WBC 24.4 (H) 2024 1000    HGB 7.7 (L) 2024 1000    HCT 24.2 (L) 2024 1000     (L) 2024 1000     Lab Results   Component Value Date/Time    PROTIME 44.3 (H) 2024 1800    INR 3.9 (H) 2024 1800     Encounter Date: 24   ECG 12 lead   Result Value    Ventricular Rate 112    Atrial Rate 122    QRS Duration 162    QT Interval 478    QTC Calculation(Bazett) 652    R Axis 239    T Axis 41    QRS Count 18    Q Onset 207    T Offset 446    QTC Fredericia 588    Narrative    Wide QRS rhythm  Nonspecific intraventricular block  Lateral infarct , age undetermined  Inferior infarct , age undetermined  Abnormal ECG  When compared with ECG of 05-AUG-2024 11:37, (unconfirmed)  Previous ECG has undetermined rhythm, needs review  See ED provider note for full interpretation and clinical correlation  Confirmed by Shanda Law ([id number]) on 2024 2:55:59 PM     No  results found for this or any previous visit from the past 1095 days.   Clinical information reviewed:                   NPO Detail:  No data recorded     Physical Exam    Airway  Mallampati: unable to assess     Cardiovascular    Dental    Pulmonary    Abdominal            Anesthesia Plan    History of general anesthesia?: unknown/emergency  History of complications of general anesthesia?: unknown/emergency    ASA 4 - emergent     general   (Surgical team talked to her brother)  inhalational and intravenous induction

## 2024-08-07 NOTE — ANESTHESIA PROCEDURE NOTES
Central Venous Line:    Date/Time: 8/7/2024 5:50 PM    A central venous line was placed in the OR for the following indication(s): central venous access and emergency.  Staffing  Performed: attending   Authorized by: Vanita Ferreira MD    Performed by: MANSI Lo-CRNA    Sterility preparation included the following: provider hand hygiene performed prior to central venous catheter insertion, all 5 sterile barriers used (gloves, gown, cap, mask, large sterile drape) during central venous catheter insertion, antiseptic used during central venous catheter insertion and skin prep agent completely dried prior to procedure.  Medical reason for not performing maximal sterile barrier technique: no  The patient was placed in Trendelenburg position.    Right internal jugular vein was prepped.    The site was prepped with Chlorhexidine.  Size: 8.5 Fr   Length: 15  Catheter type: introducer   Number of Lumens: double lumen      During the procedure, the following specific steps were taken: target vein identified, needle advanced into vein and blood aspirated and guidewire advanced into vein.Procedure performed using ultrasound guidance.  Sterile gel and probe cover used in ultrasound-guided central venous catheter insertion.    Intravenous verification was obtained by ultrasound and image did not save.      Post insertion care included: all ports aspirated, all ports flushed easily, guidewire removed intact, Biopatch applied, line sutured in place and dressing applied.    During the procedure the patient experienced: patient tolerated procedure well with no complications.          Additional notes:  Using Ultrasound

## 2024-08-07 NOTE — SIGNIFICANT EVENT
"I was present in the operating room as an observer will my partner Dr. Aragon performed the exploratory laparotomy.  He encountered ischemic, nonviable small intestine, beginning at about 50 cm from ligament of Treitz all the way through the hepatic flexure.  A portion of the transverse colon was viable.  Descending colon was also ischemic.  Within the small bowel there was patchy transmural necrosis.  It was unanimous that given the patient's comorbidities and the significant burden of ischemia, findings were incompatible with life and surgical resection would be futile.  I proceeded to call the patient's brother and healthcare proxy Narinder Saez at [phone].  He stated he is unable to come to the hospital as he is home taking care of his mother.  I conveyed to him the findings of ischemia of both the small and large bowel, and our determination that this problem was unfortunately unsurvivable and that this would signify the end of Daniela's life.  Narinder stated he understood and he felt like this was coming.  He said she has \"been very sick for at least 30 years\".  He stated that he and his sister born again Christians and he feels very reassured by that.  I offered a  to visit the bedside, but he declined.  I discussed that further medical interventions such as dialysis, CPR, or coding would be futile.  Narinder said that all he wants is for us to make her comfortable.  He did state that her daughter Ashley and son Rasta are coming into the hospital and would like to be present at bedside when she passes.  We will maintain all current forms of life support (eg, ventilator, pressors which we can titrate) but would not perform CPR should her heart stop before they are present.  Narinder asked me to reach out to Ashley to update her on the current situation.  "

## 2024-08-07 NOTE — NURSING NOTE
Pt has a R femoral TLC, drsg has mod amt of dried bloody drainage, dated 8/6 no redness, swelling or drainage noted, all lumens currently infusing without any difficulty. Pt does have an order for a picc line, BC were drawn yesterday, awaiting sterile BC prior to picc line insertion.

## 2024-08-07 NOTE — NURSING NOTE
Spoke to Narinder Saez, pts purse found in closet in room at [facility], states he or daughter will  belongings sometime this week. Purse and belongings given to nursing .

## 2024-08-07 NOTE — SIGNIFICANT EVENT
I received a call from the transfer center at about 3:40 PM regarding request for urgent transfer of Narinder Saez to facility with surgical capabilities.  I was able to receive a summary of the patient's care today from intensivist to Dr. Joya.  Briefly, this is a 64-year-old patient with a history of CAD, heart failure, A-fib, hypertension, GERD, cirrhosis, adrenal insufficiency, and type 2 diabetes, with multiple recent admissions for deconditioning and hypercarbia who is now admitted with generalized weakness and a syncopal episode.  She has developed rising lactic acidosis, respiratory failure, and acute renal failure.  She has remained hypotensive despite multiple liters of fluid administration and is on pressors.  CT scan done today demonstrates thickening of her ascending and transverse colon, consistent with ischemic colitis.  She has a cirrhotic appearing liver.  Apparently she had been reporting abdominal pain.  She has severe electrolyte derangements, including severe acidosis with pH 7.1 and bicarb 9, hyperkalemia with potassium of 6, coagulopathy with INR 3.3.    I accepted the patient for transfer and emergent surgery, as I believe her multisystem organ failure is being driven by ischemic colitis.   I requested transfer of the patient here at the soonest feasible time. There are currently no open ICU beds, and so the patient will be going directly to the operating room, and then to an ICU bed thereafter.  I discussed the case with [facility] anesthesiologist Dr. Ferreira.    I spoke with her brother Narinder Saez over the telephone at [phone].  I explained to him, that while I have not met his sister, reviewing her chart and speaking with her other physicians it is evident that she is extraordinarily sick.  I discussed with him the recommendation for emergent exploratory surgery, most likely to remove a segment of ischemic bowel.  I discussed recommendation for likely staged procedure, including  return to the OR in 24 to 48 hours after initial damage control surgery.  We discussed risks of bleeding, infection, need for additional procedures, or damage to surrounding structures.  This is especially heightened in the risk of her liver dysfunction.  We also discussed the possibility of death and or immediately after surgery, as well as the high likelihood that she will not survive this hospitalization.  I discussed with Narinder that one of my partners may do all or part of the surgery, depending on timing of Daniela's arrival.  Narinder verbalized understanding.  He says that he and his sister are born again Christians and want to do everything possible to give her chance to survive.  I discussed the likelihood that we would have an ostomy bag if she were to survive.  He says that he has actually had most of his colon removed and is doing just fine.  He stated that Daniela has a long history of irritable bowels, and that he himself has inflammatory bowel disease, and that inflammatory bowel disease runs in their family.  He did seem to understand the severity of the situation.  Daniela will be going to the ICU postoperatively, she will be on the ventilator and pressors, and most likely will need dialysis.    In the meantime, I asked Dr. Joya to administer 2 units of FFP as well as 10 mg of IV vitamin K to attempt to correct the coagulopathy.  We will send a full set of labs upon patient's arrival here.    Dina Dunn MD  Trauma, Critical Care, and Acute Care Surgery  [facility]

## 2024-08-07 NOTE — CARE PLAN
Problem: Nutrition  Goal: Less than 5 days NPO/clear liquids  Outcome: Not Progressing     Problem: Respiratory  Goal: Clear secretions with interventions this shift  Outcome: Progressing  Goal: Minimize anxiety/maximize coping throughout shift  Outcome: Progressing  Goal: Minimal/no exertional discomfort or dyspnea this shift  Outcome: Progressing     Problem: Pain - Adult  Goal: Verbalizes/displays adequate comfort level or baseline comfort level  Outcome: Progressing     Problem: Safety - Adult  Goal: Free from fall injury  Outcome: Progressing     Problem: Discharge Planning  Goal: Discharge to home or other facility with appropriate resources  Outcome: Progressing     Problem: Chronic Conditions and Co-morbidities  Goal: Patient's chronic conditions and co-morbidity symptoms are monitored and maintained or improved  Outcome: Progressing     Problem: Skin  Goal: Participates in plan/prevention/treatment measures  Outcome: Progressing  Goal: Prevent/manage excess moisture  Outcome: Progressing  Goal: Prevent/minimize sheer/friction injuries  Outcome: Progressing  Goal: Promote/optimize nutrition  Outcome: Progressing  Goal: Promote skin healing  Outcome: Progressing     Problem: Diabetes  Goal: Achieve decreasing blood glucose levels by end of shift  Outcome: Progressing  Goal: Increase stability of blood glucose readings by end of shift  Outcome: Progressing  Goal: Decrease in ketones present in urine by end of shift  Outcome: Progressing  Goal: Maintain electrolyte levels within acceptable range throughout shift  Outcome: Progressing  Goal: Maintain glucose levels >70mg/dl to <250mg/dl throughout shift  Outcome: Progressing  Goal: No changes in neurological exam by end of shift  Outcome: Progressing  Goal: Vital signs within normal range for age by end of shift  Outcome: Progressing  Goal: Increase self care and/or family involovement by end of shift  Outcome: Progressing     Problem: Pain  Goal: Takes deep  breaths with improved pain control throughout the shift  Outcome: Progressing  Goal: Turns in bed with improved pain control throughout the shift  Outcome: Progressing  Goal: Walks with improved pain control throughout the shift  Outcome: Progressing  Goal: Performs ADL's with improved pain control throughout shift  Outcome: Progressing  Goal: Participates in PT with improved pain control throughout the shift  Outcome: Progressing  Goal: Free from opioid side effects throughout the shift  Outcome: Progressing  Goal: Free from acute confusion related to pain meds throughout the shift  Outcome: Progressing         The patient's goals for the shift include      The clinical goals for the shift include Wean pressors as tolerated

## 2024-08-07 NOTE — ANESTHESIA POSTPROCEDURE EVALUATION
Patient: Daniela Saez    Procedure Summary       Date: 08/07/24 Room / Location: [facility] OR 11 / Virtual [facility] OR    Anesthesia Start: 1735 Anesthesia Stop: 1959    Procedure: Exploration Laparotomy (Abdomen) Diagnosis:       Ischemic colitis (Multi)      (Ischemic colitis (Multi) [K55.9])    Surgeons: Dina Dunn MD Responsible Provider: Vanita Ferreira MD    Anesthesia Type: general ASA Status: 4 - Emergent            Anesthesia Type: general    Vitals Value Taken Time   BP 90/58 08/07/24 1959   Temp 37.2 08/07/24 1959   Pulse 110 08/07/24 1959   Resp 18 08/07/24 1959   SpO2 92 08/07/24 1959       Anesthesia Post Evaluation    Patient location during evaluation: ICU  Patient participation: complete - patient cannot participate  Level of consciousness: sedated  Pain score: 0  Pain management: adequate  Airway patency: patent  Cardiovascular status: acceptable  Respiratory status: acceptable  Hydration status: acceptable  Postoperative Nausea and Vomiting: none        No notable events documented.

## 2024-08-07 NOTE — ANESTHESIA POSTPROCEDURE EVALUATION
Patient: Daniela Saez    Procedure Summary       Date: 08/07/24 Room / Location: [facility] OR 11 / Virtual [facility] OR    Anesthesia Start: 1735 Anesthesia Stop:     Procedure: Exploration Laparotomy (Abdomen) Diagnosis:       Ischemic colitis (Multi)      (Ischemic colitis (Multi) [K55.9])    Surgeons: Dina Dunn MD Responsible Provider: Vanita Ferreira MD    Anesthesia Type: general ASA Status: 4 - Emergent            Anesthesia Type: general    Vitals Value Taken Time   BP 40/24 08/07/24 1955   Temp  08/07/24 1955   Pulse 114 08/07/24 1955   Resp 18 08/07/24 1955   SpO2  08/07/24 1955       Anesthesia Post Evaluation    Patient location during evaluation: ICU  Patient participation: complete - patient cannot participate  Level of consciousness: sedated  Pain management: adequate  Airway patency: patent  Cardiovascular status: hemodynamically unstable and hypotensive  Respiratory status: intubated  Hydration status: acceptable  Postoperative Nausea and Vomiting: none  Comments: ON PRESSORS     No notable events documented.

## 2024-08-07 NOTE — PROGRESS NOTES
Daniela Saez is a 64 y.o. female on day 2 of admission presenting with Acute kidney injury (CMS-HCC).  Patient with h/o CAD, HFpEF, Afib, HTN, asthma, seronegative RA, gout, psoriatic arthritis, L knee OA, fibromyalgia, chronic pain syndrome, GERD, liver cirrhosis, hypothyroidism, adrenal insufficiency, T2DM, anxiety, depression, peripheral neuropathy, recent admission for UTI and hypercarbia after presenting with a syncopal episode, who now presents with generalized weakness to OSH ED. Workup revealed OLIVA, lactic acidosis, hyponatremia and hyperkalemia. Also hypotensive. Received 3L of IVF bolus, however, remained hypotensive, subsequently a femoral central line was placed and started on Levophed and then vasopressin. Now is being admitted to the ICU   Subjective   Worsening shock overnight, requiring increased vasopressor needs. Also significantly worsening lactic acidosis. Urine output reduced this am.      Very confused, obtunded, cannot provide history.   Objective    Scheduled medications  Scheduled Medications   albuterol, 3 mL, nebulization, BID  [Held by provider] allopurinol, 100 mg, oral, Daily  [Held by provider] atorvastatin, 40 mg, oral, Nightly  [Held by provider] baclofen, 10 mg, oral, BID  [Held by provider] clopidogrel, 75 mg, oral, Daily  pantoprazole, 40 mg, oral, BID   Or  esomeprazole, 40 mg, nasoduodenal tube, BID   Or  pantoprazole, 40 mg, intravenous, BID  [Held by provider] famotidine, 20 mg, oral, BID  [Held by provider] fluocinonide, 1 Application, Topical, BID  [Held by provider] fluticasone, 2 spray, Each Nostril, Daily  [Held by provider] guaiFENesin, 1,200 mg, oral, BID  hydrocortisone sodium succinate, 50 mg, intravenous, q6h  hydroxychloroquine, 200 mg, oral, Daily  insulin lispro, 0-10 Units, subcutaneous, q4h  [Held by provider] ipratropium, 2 spray, Each Nostril, BID  lactated Ringer's, 1,000 mL, intravenous, Once  [Held by provider] leflunomide, 20 mg, oral,  Daily  levothyroxine, 88 mcg, oral, Daily  oxygen, , inhalation, Continuous - Inhalation  piperacillin-tazobactam, 2.25 g, intravenous, q8h  sodium zirconium cyclosilicate, 10 g, oral, q8h      Continuous medications    Continuous Medications[]Expand by Default   norepinephrine, 0-0.5 mcg/kg/min, Last Rate: 0.27 mcg/kg/min (08/06/24 1100)      PRN medications  PRN Medications   PRN medications: acetaminophen **OR** acetaminophen, amitriptyline, dextrose, dextrose, glucagon, glucagon, morphine, ondansetron, [Held by provider] SUMAtriptan   Physical Exam  Constitutional:       General: She is in acute distress.      Appearance: She is obese. She is ill-appearing and toxic-appearing.   HENT:      Head: Normocephalic and atraumatic.      Nose:      Comments: On 3L NC     Mouth/Throat:      Mouth: Mucous membranes are dry.   Eyes:      General: No scleral icterus.     Pupils: Pupils are equal, round, and reactive to light.   Cardiovascular:      Rate and Rhythm: Regular rhythm. Tachycardia present.      Heart sounds: No murmur heard.     No friction rub. No gallop.   Pulmonary:      Effort: Respiratory distress present.      Breath sounds: Normal breath sounds. No wheezing or rales.   Abdominal:      General: There is distension.      Palpations: Abdomen is soft.      Tenderness: There is abdominal tenderness. There is no guarding.   Musculoskeletal:         General: Tenderness present.      Cervical back: Neck supple. No rigidity or tenderness.      Right lower leg: No edema.      Left lower leg: No edema.   Lymphadenopathy:      Cervical: No cervical adenopathy.   Skin:     General: Skin is warm and dry.      Coloration: Skin is not jaundiced.      Findings: Erythema (chronic discoloration b/l LE) present.   Neurological:      Comments: Cannot fully assess. Very obtunded.    Psychiatric:      Comments: Confused, obtunded.      Last Recorded Vitals  Blood pressure 88/59, pulse (!) 137, temperature 37.7 °C (99.9 °F),  "temperature source Axillary, resp. rate (!) 31, height 1.549 m (5' 0.98\"), weight 73.3 kg (161 lb 9.6 oz), SpO2 (!) 83%.  Intake/Output last 3 Shifts:  I/O last 3 completed shifts:  In: 6770.3 (92.4 mL/kg) [I.V.:1482.9 (20.2 mL/kg); NG/GT:205; IV Piggyback:5082.3]  Out: 2785 (38 mL/kg) [Urine:2785 (1.1 mL/kg/hr)]  Weight: 73.3 kg   Relevant Results  Results for orders placed or performed during the hospital encounter of 08/05/24 (from the past 24 hour(s))   Renal function panel   Result Value Ref Range    Glucose 172 (H) 65 - 99 mg/dL    Sodium 125 (L) 133 - 145 mmol/L    Potassium 5.1 3.4 - 5.1 mmol/L    Chloride 92 (L) 97 - 107 mmol/L    Bicarbonate 17 (L) 24 - 31 mmol/L    Urea Nitrogen 31 (H) 8 - 25 mg/dL    Creatinine 1.40 0.40 - 1.60 mg/dL    eGFR 43 (L) >60 mL/min/1.73m*2    Calcium 8.7 8.5 - 10.4 mg/dL    Phosphorus 3.7 2.5 - 4.5 mg/dL    Albumin 2.4 (L) 3.5 - 5.0 g/dL    Anion Gap 16 <=19 mmol/L   Blood Gas Arterial Full Panel   Result Value Ref Range    POCT pH, Arterial 7.43 (H) 7.38 - 7.42 pH    POCT pCO2, Arterial 26 (L) 38 - 42 mm Hg    POCT pO2, Arterial 99 (H) 85 - 95 mm Hg    POCT SO2, Arterial 98 94 - 100 %    POCT Oxy Hemoglobin, Arterial 96.4 94.0 - 98.0 %    POCT Hematocrit Calculated, Arterial 31.0 (L) 36.0 - 46.0 %    POCT Sodium, Arterial 119 (LL) 136 - 145 mmol/L    POCT Potassium, Arterial 5.0 3.5 - 5.3 mmol/L    POCT Chloride, Arterial 95 (L) 98 - 107 mmol/L    POCT Ionized Calcium, Arterial 1.16 1.10 - 1.33 mmol/L    POCT Glucose, Arterial 157 (H) 74 - 99 mg/dL    POCT Lactate, Arterial 4.9 (HH) 0.4 - 2.0 mmol/L    POCT Base Excess, Arterial -5.9 (L) -2.0 - 3.0 mmol/L    POCT HCO3 Calculated, Arterial 17.3 (L) 22.0 - 26.0 mmol/L    POCT Hemoglobin, Arterial 10.2 (L) 12.0 - 16.0 g/dL    POCT Anion Gap, Arterial 12 10 - 25 mmo/L    Patient Temperature 37.0 degrees Celsius    FiO2 28 %    Critical Called By [facility]     Critical Called To [facility]     Critical Call Time 9372     Critical Read Back " Y     Critical Note CRITICAL     Site of Arterial Puncture Arterial Line     Artie's Test Positive    POCT GLUCOSE   Result Value Ref Range    POCT Glucose 154 (H) 74 - 99 mg/dL   Blood Gas Arterial Full Panel   Result Value Ref Range    POCT pH, Arterial 7.39 7.38 - 7.42 pH    POCT pCO2, Arterial 25 (L) 38 - 42 mm Hg    POCT pO2, Arterial 91 85 - 95 mm Hg    POCT SO2, Arterial 99 94 - 100 %    POCT Oxy Hemoglobin, Arterial 96.8 94.0 - 98.0 %    POCT Hematocrit Calculated, Arterial 29.0 (L) 36.0 - 46.0 %    POCT Sodium, Arterial 118 (LL) 136 - 145 mmol/L    POCT Potassium, Arterial 5.1 3.5 - 5.3 mmol/L    POCT Chloride, Arterial 94 (L) 98 - 107 mmol/L    POCT Ionized Calcium, Arterial 1.15 1.10 - 1.33 mmol/L    POCT Glucose, Arterial 127 (H) 74 - 99 mg/dL    POCT Lactate, Arterial 7.8 (HH) 0.4 - 2.0 mmol/L    POCT Base Excess, Arterial -8.6 (L) -2.0 - 3.0 mmol/L    POCT HCO3 Calculated, Arterial 15.1 (L) 22.0 - 26.0 mmol/L    POCT Hemoglobin, Arterial 9.7 (L) 12.0 - 16.0 g/dL    POCT Anion Gap, Arterial 14 10 - 25 mmo/L    Patient Temperature 37.0 degrees Celsius    FiO2 28 %    Critical Called By NH     Critical Called To PEREZ     Critical Call Time 2315     Critical Read Back Y     Critical Note CRITICAL     Site of Arterial Puncture Arterial Line     Artie's Test Positive    POCT GLUCOSE   Result Value Ref Range    POCT Glucose 119 (H) 74 - 99 mg/dL   Blood Gas Lactic Acid, Venous   Result Value Ref Range    POCT Lactate, Venous 9.4 (HH) 0.4 - 2.0 mmol/L   Phosphorus   Result Value Ref Range    Phosphorus 4.0 2.5 - 4.5 mg/dL   Magnesium   Result Value Ref Range    Magnesium 2.10 1.60 - 3.10 mg/dL   Basic Metabolic Panel   Result Value Ref Range    Glucose 108 (H) 65 - 99 mg/dL    Sodium 127 (L) 133 - 145 mmol/L    Potassium 6.0 (H) 3.4 - 5.1 mmol/L    Chloride 93 (L) 97 - 107 mmol/L    Bicarbonate 9 (LL) 24 - 31 mmol/L    Urea Nitrogen 35 (H) 8 - 25 mg/dL    Creatinine 1.50 0.40 - 1.60 mg/dL    eGFR 39 (L)  >60 mL/min/1.73m*2    Calcium 8.4 (L) 8.5 - 10.4 mg/dL    Anion Gap >19 (H) <=19 mmol/L   CBC and Auto Differential   Result Value Ref Range    WBC 23.2 (H) 4.4 - 11.3 x10*3/uL    nRBC 0.3 (H) 0.0 - 0.0 /100 WBCs    RBC 3.10 (L) 4.00 - 5.20 x10*6/uL    Hemoglobin 8.8 (L) 12.0 - 16.0 g/dL    Hematocrit 26.7 (L) 36.0 - 46.0 %    MCV 86 80 - 100 fL    MCH 28.4 26.0 - 34.0 pg    MCHC 33.0 32.0 - 36.0 g/dL    RDW 16.6 (H) 11.5 - 14.5 %    Platelets 140 (L) 150 - 450 x10*3/uL    Neutrophils % 81.4 40.0 - 80.0 %    Immature Granulocytes %, Automated 2.2 (H) 0.0 - 0.9 %    Lymphocytes % 5.3 13.0 - 44.0 %    Monocytes % 9.7 2.0 - 10.0 %    Eosinophils % 0.7 0.0 - 6.0 %    Basophils % 0.7 0.0 - 2.0 %    Neutrophils Absolute 18.87 (H) 1.20 - 7.70 x10*3/uL    Immature Granulocytes Absolute, Automated 0.51 0.00 - 0.70 x10*3/uL    Lymphocytes Absolute 1.22 1.20 - 4.80 x10*3/uL    Monocytes Absolute 2.25 (H) 0.10 - 1.00 x10*3/uL    Eosinophils Absolute 0.16 0.00 - 0.70 x10*3/uL    Basophils Absolute 0.16 (H) 0.00 - 0.10 x10*3/uL   Morphology   Result Value Ref Range    RBC Morphology See Below     Polychromasia Mild     [place] Cells Many     Toxic Granulation Present     Vacuolated Neutrophils Present    Beta Hydroxybutyrate   Result Value Ref Range    Beta-Hydroxybutyrate 0.20 0.10 - 0.30 mmol/L   POCT GLUCOSE   Result Value Ref Range    POCT Glucose 104 (H) 74 - 99 mg/dL   BLOOD GAS ARTERIAL FULL PANEL   Result Value Ref Range    POCT pH, Arterial 7.32 (L) 7.38 - 7.42 pH    POCT pCO2, Arterial 17 (L) 38 - 42 mm Hg    POCT pO2, Arterial 89 85 - 95 mm Hg    POCT SO2, Arterial 99 94 - 100 %    POCT Oxy Hemoglobin, Arterial 96.5 94.0 - 98.0 %    POCT Hematocrit Calculated, Arterial 43.0 36.0 - 46.0 %    POCT Sodium, Arterial 117 (LL) 136 - 145 mmol/L    POCT Potassium, Arterial 5.9 (H) 3.5 - 5.3 mmol/L    POCT Chloride, Arterial 93 (L) 98 - 107 mmol/L    POCT Ionized Calcium, Arterial 1.06 (L) 1.10 - 1.33 mmol/L    POCT Glucose,  Arterial 80 74 - 99 mg/dL    POCT Lactate, Arterial 12.4 (HH) 0.4 - 2.0 mmol/L    POCT Base Excess, Arterial -14.6 (L) -2.0 - 3.0 mmol/L    POCT HCO3 Calculated, Arterial 8.8 (L) 22.0 - 26.0 mmol/L    POCT Hemoglobin, Arterial 14.2 12.0 - 16.0 g/dL    POCT Anion Gap, Arterial 21 10 - 25 mmo/L    Patient Temperature 37.0 degrees Celsius    FiO2 28 %    Critical Called By [facility]     Critical Called To PEREZ     Critical Call Time 510     Critical Read Back Y     Critical Note CRITICAL     Site of Arterial Puncture Arterial Line     Artie's Test Positive    POCT GLUCOSE   Result Value Ref Range    POCT Glucose 118 (H) 74 - 99 mg/dL   Beta Hydroxybutyrate   Result Value Ref Range    Beta-Hydroxybutyrate 0.20 0.10 - 0.30 mmol/L   POCT GLUCOSE   Result Value Ref Range    POCT Glucose 112 (H) 74 - 99 mg/dL   POCT GLUCOSE   Result Value Ref Range    POCT Glucose 111 (H) 74 - 99 mg/dL   Blood Gas Arterial Full Panel   Result Value Ref Range    POCT pH, Arterial 7.26 (L) 7.38 - 7.42 pH    POCT pCO2, Arterial 17 (L) 38 - 42 mm Hg    POCT pO2, Arterial 93 85 - 95 mm Hg    POCT SO2, Arterial 98 94 - 100 %    POCT Oxy Hemoglobin, Arterial 96.6 94.0 - 98.0 %    POCT Hematocrit Calculated, Arterial 24.0 (L) 36.0 - 46.0 %    POCT Sodium, Arterial 121 (L) 136 - 145 mmol/L    POCT Potassium, Arterial 5.9 (H) 3.5 - 5.3 mmol/L    POCT Chloride, Arterial 96 (L) 98 - 107 mmol/L    POCT Ionized Calcium, Arterial 1.05 (L) 1.10 - 1.33 mmol/L    POCT Glucose, Arterial 131 (H) 74 - 99 mg/dL    POCT Lactate, Arterial 16.3 (HH) 0.4 - 2.0 mmol/L    POCT Base Excess, Arterial -17.7 (L) -2.0 - 3.0 mmol/L    POCT HCO3 Calculated, Arterial 7.6 (L) 22.0 - 26.0 mmol/L    POCT Hemoglobin, Arterial 7.9 (L) 12.0 - 16.0 g/dL    POCT Anion Gap, Arterial 23 10 - 25 mmo/L    Patient Temperature 37.0 degrees Celsius    FiO2 28 %    [employer] CANNULA     Critical Called By LT RRT     Critical Called To DR. OCONNELL     Critical Call Time 1003     Critical  Read Back Y     Site of Arterial Puncture Arterial Line     Artie's Test Negative    CBC and Auto Differential   Result Value Ref Range    WBC 24.4 (H) 4.4 - 11.3 x10*3/uL    nRBC 0.6 (H) 0.0 - 0.0 /100 WBCs    RBC 2.71 (L) 4.00 - 5.20 x10*6/uL    Hemoglobin 7.7 (L) 12.0 - 16.0 g/dL    Hematocrit 24.2 (L) 36.0 - 46.0 %    MCV 89 80 - 100 fL    MCH 28.4 26.0 - 34.0 pg    MCHC 31.8 (L) 32.0 - 36.0 g/dL    RDW 16.9 (H) 11.5 - 14.5 %    Platelets 132 (L) 150 - 450 x10*3/uL    Immature Granulocytes %, Automated 2.1 (H) 0.0 - 0.9 %    Immature Granulocytes Absolute, Automated 0.53 0.00 - 0.70 x10*3/uL   Creatine Kinase   Result Value Ref Range    Creatine Kinase 149 24 - 195 U/L   Lactate dehydrogenase   Result Value Ref Range     (H) 91 - 227 U/L   Haptoglobin   Result Value Ref Range    Haptoglobin 30 (L) 37 - 246 mg/dL   Manual Differential   Result Value Ref Range    Neutrophils %, Manual 83.0 40.0 - 80.0 %    Bands %, Manual 6.0 0.0 - 5.0 %    Lymphocytes %, Manual 6.0 13.0 - 44.0 %    Monocytes %, Manual 3.0 2.0 - 10.0 %    Eosinophils %, Manual 0.0 0.0 - 6.0 %    Basophils %, Manual 0.0 0.0 - 2.0 %    Atypical Lymphocytes %, Manual 1.0 0.0 - 2.0 %    Metamyelocytes %, Manual 1.0 0.0 - 0.0 %    Seg Neutrophils Absolute, Manual 20.25 (H) 1.20 - 7.00 x10*3/uL    Bands Absolute, Manual 1.46 (H) 0.00 - 0.70 x10*3/uL    Lymphocytes Absolute, Manual 1.46 1.20 - 4.80 x10*3/uL    Monocytes Absolute, Manual 0.73 0.10 - 1.00 x10*3/uL    Eosinophils Absolute, Manual 0.00 0.00 - 0.70 x10*3/uL    Basophils Absolute, Manual 0.00 0.00 - 0.10 x10*3/uL    Atypical Lymphs Absolute, Manual 0.24 0.00 - 0.50 x10*3/uL    Metamyelocytes Absolute, Manual 0.24 0.00 - 0.00 x10*3/uL    Total Cells Counted 100     Neutrophils Absolute, Manual 21.71 (H) 1.20 - 7.70 x10*3/uL    Manual nRBC per 100 Cells 3.0 (H) 0.0 - 0.0 %    RBC Morphology See Below     Polychromasia Mild     RBC Fragments Few     Ovalocytes Few     [place] Cells Few      [place] Cells Many     Acanthocytes Few     [place] Bodies Present     Toxic Granulation Present     Vacuolated Neutrophils Present    POCT GLUCOSE   Result Value Ref Range    POCT Glucose 115 (H) 74 - 99 mg/dL   Protime-INR   Result Value Ref Range    Protime 32.3 (H) 9.3 - 12.7 seconds    INR 3.3 (H) 0.9 - 1.2   Fibrinogen   Result Value Ref Range    Fibrinogen 392 200 - 400 mg/dL   D-dimer, Non VTE   Result Value Ref Range    D-Dimer Non VTE, Quant (mg/L FEU) 32.95 (H) 0.19 - 0.50 mg/L FEU   APTT   Result Value Ref Range    aPTT 97.9 (H) 22.0 - 32.5 seconds   Blood Gas Arterial Full Panel   Result Value Ref Range    POCT pH, Arterial 7.16 (LL) 7.38 - 7.42 pH    POCT pCO2, Arterial 22 (L) 38 - 42 mm Hg    POCT pO2, Arterial 382 (H) 85 - 95 mm Hg    POCT SO2, Arterial 99 94 - 100 %    POCT Oxy Hemoglobin, Arterial 98.0 94.0 - 98.0 %    POCT Hematocrit Calculated, Arterial 19.0 (L) 36.0 - 46.0 %    POCT Sodium, Arterial 119 (LL) 136 - 145 mmol/L    POCT Potassium, Arterial 6.4 (HH) 3.5 - 5.3 mmol/L    POCT Chloride, Arterial 98 98 - 107 mmol/L    POCT Ionized Calcium, Arterial 0.98 (L) 1.10 - 1.33 mmol/L    POCT Glucose, Arterial 153 (H) 74 - 99 mg/dL    POCT Lactate, Arterial 14.8 (HH) 0.4 - 2.0 mmol/L    POCT Base Excess, Arterial -19.2 (L) -2.0 - 3.0 mmol/L    POCT HCO3 Calculated, Arterial 7.8 (L) 22.0 - 26.0 mmol/L    POCT Hemoglobin, Arterial 6.4 (LL) 12.0 - 16.0 g/dL    POCT Anion Gap, Arterial 20 10 - 25 mmo/L    Patient Temperature 37.0 degrees Celsius    FiO2 100 %    Ventilator Mode      Ventilator Rate 20 bpm    Tidal Volume 420 mL    Peep CHM2O 5.0 cm H2O    Critical Called By LT RRT     Critical Called To      Critical Call Time 1345     Critical Read Back Y     Site of Arterial Puncture Arterial Line     Artie's Test Negative    Prepare RBC: 2 Units   Result Value Ref Range    PRODUCT CODE K2444I94     Unit Number [id number]     Unit ABO A     Unit RH POS     XM INTEP COMP     Dispense  Status XM     Blood Expiration Date 8/26/2024 11:59:00 PM EDT     PRODUCT BLOOD TYPE 6200     UNIT VOLUME 350     PRODUCT CODE B1720W52     Unit Number [id number]     Unit ABO A     Unit RH POS     XM INTEP COMP     Dispense Status IS     Blood Expiration Date 8/23/2024 11:59:00 PM EDT     PRODUCT BLOOD TYPE 6200     UNIT VOLUME 350    Type and screen   Result Value Ref Range    ABO TYPE A     Rh TYPE POS     ANTIBODY SCREEN NEG    Prepare Plasma: 2 Units   Result Value Ref Range    PRODUCT CODE I6388X28     Unit Number [id number]     Unit ABO A     Unit RH POS     Dispense Status IS     Blood Expiration Date 8/12/2024  3:50:00 PM EDT     PRODUCT BLOOD TYPE 6200     UNIT VOLUME 279    POCT GLUCOSE   Result Value Ref Range    POCT Glucose 193 (H) 74 - 99 mg/dL   CT angio abdomen pelvis w and or wo IV IV contrast    Result Date: 8/7/2024  Interpreted By:  Claude Portillo,  and Navarro Posey Jeffrey STUDY: CT ANGIO ABDOMEN PELVIS W AND/OR WO IV IV CONTRAST;  8/7/2024 12:05 pm   INDICATION: Signs/Symptoms:worsening lactic acidosis.   COMPARISON: CT abdomen pelvis 08/06/2024.   ACCESSION NUMBER(S): [id number]   ORDERING CLINICIAN: CHALINO HENRY   TECHNIQUE: Contiguous non-contrast axial images of the abdomen and pelvis were initially obtained.   Thin-section axial images of the abdomen and pelvis were obtained in the arterial phase after intravenous administration of 75 mL Omnipaque 350 contrast. Sagittal and coronal reformatted images were provided. MIP images were created on an independent workstation and reviewed.   FINDINGS: Images are degraded by motion artifact.   VASCULATURE:   ABDOMINAL AORTA: No abdominal aortic aneurysm or dissection. Moderate-to-severe abdominal aortic atherosclerosis with areas of mild-to-moderate stenosis.   ABDOMINAL and PELVIC ARTERIES:   There is atherosclerosis of the proximal celiac artery without significant stenosis. There is irregular appearance of the splenic artery with  multiple foci of stenosis along the course of the pancreatic body. Distal splenic artery appears to be patent.   Multifocal atherosclerosis of the superior mesenteric artery most prominently involving the origin with mild-to-moderate stenosis. Additional foci of moderate stenosis are also evident.   Inferior mesenteric artery has a diminutive appearance.   There is atherosclerosis of the proximal left renal artery with areas of mild-to-moderate stenosis. There is non-opacification of the mid segment of the left renal artery with reconstitution of the flow distally. However, overall the left renal artery has a diminutive appearance. Multifocal atherosclerosis of the right renal artery with significantly decreased opacification at the proximal to mid segment with reconstitution of the flow distally.   CT ABDOMEN/PELVIS:   LOWER CHEST: No acute abnormality. Coronary artery calcifications are partially visualized. An enteric tube courses through the esophagus with the tip in the stomach.   ABDOMINAL WALL: There is high attenuating density in the anterior subcutaneous abdominal wall measuring 4.9 cm likely representing hematoma.   LIVER: Liver has a nodular contour without focal lesions.   BILE DUCTS: No significant intrahepatic or extrahepatic dilatation.   GALLBLADDER: Surgically absent.   SPLEEN: No significant abnormality.   PANCREAS: No significant abnormality.   ADRENALS: No significant abnormality.   KIDNEYS, URETERS, BLADDER: There is heterogeneous renal parenchyma with multiple foci of mostly wedge-shaped hypoattenuation. No nephrolithiasis or hydronephrosis. Diehl catheter in place with a decompressed bladder.   REPRODUCTIVE ORGANS: No significant abnormality.   VESSELS: (See above). There are a few tortuous vessels in the left pelvic region likely representing dilated venous structures. Right femoral approach central venous catheter tip projects over right external iliac artery.   LYMPH NODES/RETROPERITONEUM:  There is a soft tissue density in the aortocaval region measuring 1.8 cm which might represent a lymph node versus a dilated venous structure. No acute retroperitoneal abnormality.   BOWEL/MESENTERY/PERITONEUM: There is hyperdense material layering in the stomach without significant changes between noncontrast and arterial phases, likely representing contrast from prior study. Stomach is unremarkable. There is no significant small bowel dilatation or wall thickening. There is circumferential wall thickening of the cecum extending to the transverse colon with submucosal edema, similar to the prior exam. There are air-fluid levels within the proximal colon. There is moderate stool burden within the distal colon. There is diverticulosis of the sigmoid colon. No definite evidence of surrounding inflammatory changes to suggest acute diverticulitis.  Normal appendix. There is a rectal tube in place.   Trace amount of free fluid in the perihepatic region. No loculated fluid collections. No free flow     OSSEOUS STRUCTURES: No acute osseous abnormality. Postsurgical changes of posterior fusion in the lower lumbar spine. There is a 9 mm anterolisthesis of L4 on L5. Unchanged chronic compression deformity T11 vertebral body and diffuse osteopenia.       1. Multifocal atherosclerotic disease of the abdominal aorta and its branches. There is segmental non-opacification/diminutive appearance of the bilateral mid segment renal arteries with heterogeneous appearance of the renal parenchyma and multiple foci of wedge-shaped hypoattenuation. However, evaluation is limited due to motion artifact. Findings are concerning for an ischemic process. An infectious process is felt to be less likely. 2. There is circumferential wall thickening of the ascending colon extending to the transverse colon with submucosal edema, likely representing colitis. Findings in the setting of extensive atherosclerosis in the abdomen and pelvis may represent  an ischemic process. An infectious process is felt to be less likely. 3. Cirrhotic appearance of the liver. 4. Round density in the aortocaval region as described above is nonspecific and may represent a dilated vascular structure versus an enlarged lymph node. 5. Additional findings as above.     I personally reviewed the images/study and I agree with the findings as stated by resident physician Dr. Jeffrey Posey . This study was interpreted at [facility], [place], [place].   MACRO: Jeffrey Posey discussed the significance and urgency of this critical finding by telephone with  CHALINO HENRY on 8/7/2024 at 1:54 pm.  (**-RCF-**) Findings:  See findings.   Signed by: Claude Portillo 8/7/2024 2:33 PM Dictation workstation:   [id number]    XR abdomen 1 view    Result Date: 8/7/2024  Interpreted By:  Paulina Ferrari, STUDY: XR ABDOMEN 1 VIEW 8/7/2024 11:15 am   INDICATION: Signs/Symptoms:abdominal pain, increased lactic acid levels   COMPARISON: None available.   ACCESSION NUMBER(S): [id number]   ORDERING CLINICIAN: CHALINO HENRY   TECHNIQUE: Recumbent abdominal view   FINDINGS: Recumbent view of the abdomen presence of a nasogastric tube with the tip terminating in the gastric body. There is postoperative change from ventral hernia repair as well as posterior spinal fusion at the L4-5 level.   A right femoral CVP line terminates within the right external iliac vein.   The bowel gas pattern is nonspecific. There is some increased density within the gastric fundus. This may be the result of medication ingestion.       No significant intra-abdominal abnormality.   Signed by: Paulina Ferrari 8/7/2024 12:02 PM Dictation workstation:   [id number]    XR chest 1 view    Result Date: 8/7/2024  Interpreted By:  Paulina Ferrari, STUDY: XR CHEST 1 VIEW; ;  8/7/2024 10:50 am   INDICATION: Signs/Symptoms:et tube placement.   COMPARISON: 08/06/2024   ACCESSION NUMBER(S): [id number]   ORDERING  CLINICIAN: SELMA LOPEZ   TECHNIQUE: AP semi-erect view of the chest   FINDINGS: Endotracheal tube has been placed with tip positioned at the level of the robert. This should be retracted a distance of 2-3 cm.   Nasogastric tube is located within the stomach.   The heart is not enlarged. There is left basilar discoid atelectasis and right perihilar discoid atelectasis. No pleural abnormality is seen.         Tip of the endotracheal tube at robert and should be retracted 2-3 cm.   Left basilar discoid atelectasis with additional right perihilar discoid atelectasis.   Nasogastric tube terminating in gastric body.   Secure chat was read by the physician at 11:41 a.m..   MACRO: Paulina Ferrari discussed the significance and urgency of this critical finding by secure chat with Dr. Astorga On 8/7/2024 at 11:40 am. (**-RCF-**) Findings:  See findings.   Signed by: Paulina Ferrari 8/7/2024 11:41 AM Dictation workstation:   [id number]    XR chest 1 view    Result Date: 8/6/2024  Interpreted By:  Kyle Coyne, STUDY: Chest dated  8/6/2024.   INDICATION: Signs/Symptoms:Verify NG tube placement   COMPARISON: Chest dated 08/05/2024.   ACCESSION NUMBER(S): [id number]   ORDERING CLINICIAN: CHALINO HENRY   TECHNIQUE: One view of the chest.   FINDINGS: There is an enteric tube with the side port in the tip projecting over the left upper quadrant.  No pneumothorax or effusion is evident. The cardiomediastinal silhouette is  not enlarged.Degenerative change is seen of the spine and shoulders.       Enteric tube as above without acute cardiopulmonary process evident. Please see same date CT for discussion findings on that examination.   MACRO: None   Signed by: Kyle Coyne 8/6/2024 8:46 PM Dictation workstation:   [id number]    CT abdomen pelvis wo IV contrast    Result Date: 8/6/2024  Interpreted By:  Pelon Florez, STUDY: CT ABDOMEN PELVIS WO IV CONTRAST;  8/6/2024 1:43 pm   INDICATION: Signs/Symptoms:lactic acidosis, abdominal  pain.   COMPARISON: CT abdomen pelvis dated 08/05/2024.   ACCESSION NUMBER(S): [id number]   ORDERING CLINICIAN: LORI OCONNELL   TECHNIQUE: CT of the abdomen and pelvis was performed. Contiguous axial images were obtained through the abdomen and pelvis. Coronal and sagittal reconstructions at 3 mm slice thickness were performed.  No intravenous contrast was administered.   FINDINGS: Please note that the evaluation of vessels, lymph nodes and organs is limited without intravenous contrast.   LOWER CHEST: There is no acute abnormality in the lower chest. The heart is normal in size without evidence of pericardial effusion. No pleural effusion is present. Heavy coronary artery atherosclerotic calcifications are present.   ABDOMEN:   LIVER: The liver is nodular in contour compatible with cirrhosis similar to prior. There is a subcentimeter hypodensity in the medial left hepatic lobe similar to prior likely a hepatic cyst, axial image 35.   BILE DUCTS: The intrahepatic and extrahepatic ducts are not dilated.   GALLBLADDER: The gallbladder is surgically absent.   PANCREAS: The pancreas is not enlarged.   SPLEEN: Within normal limits.   ADRENAL GLANDS: Within normal limits.   KIDNEYS AND URETERS: The kidneys are normal in size and unremarkable in appearance.  No hydroureteronephrosis or nephroureterolithiasis is identified.   PELVIS:   BLADDER: The urinary bladder is incompletely decompressed by a Diehl catheter new from prior and contains gas likely due to recent instrumentation.   REPRODUCTIVE ORGANS: No pelvic masses. Status post hysterectomy.   BOWEL: The stomach is partially opacified with dense ingested oral contrast and grossly normal. No bowel dilatation or obstruction. There is large amount of formed stool throughout the colon and there is wall thickening of the distal transverse and proximal descending colon suggestive of colitis. Appendix is normal.   VESSELS: The abdominal aorta is normal in caliber. There  is a right common femoral central venous catheter terminating in the right external iliac vein, new from prior.   PERITONEUM/RETROPERITONEUM/LYMPH NODES: There is trace ascites in the pelvis, new from prior. There is no intraperitoneal free air. There is no lymphadenopathy by CT criteria.   ABDOMINAL WALL: The abdominal wall soft tissues appear normal.   BONES: No suspicious osseous lesions are identified. There is a moderate compression deformity of T11 vertebral body unchanged from 2022. Postsurgical change of L4-L5 spinal fusion with unchanged 0.8 cm degenerative anterolisthesis of L4 over L5. Osteopenia.       1.  Interval placement of right common femoral vein central venous catheter and interval placement of Diehl catheter into the urinary bladder. Large amount of formed stool throughout the colon, correlation with urinalysis recommended. There is thickening of the distal transverse and proximal descending colon suggestive of colitis. 2. Trace amount of pelvic ascites new from prior. Correlation with fluid volume status is recommended. Cirrhotic liver morphology similar to prior. 3. Unchanged chronic compression deformity of T11 vertebral body and osteopenia.     Signed by: Pelon Florez 8/6/2024 2:25 PM Dictation workstation:   [id number]    Assessment/Plan   Principal Problem:    Acute kidney injury (CMS-HCC)  64 YOF with h/o CAD, HFpEF, Afib, HTN, asthma, seronegative RA, gout, psoriatic arthritis, L knee OA, fibromyalgia, chronic pain syndrome, GERD, liver cirrhosis, hypothyroidism, adrenal insufficiency, T2DM, anxiety, depression, peripheral neuropathy, recent admission for UTI and hypercarbia after presenting with a syncopal episode, who now presents with generalized weakness to OSH ED. Workup revealed OLIVA, lactic acidosis, hyponatremia and hyperkalemia. Also hypotensive. Received 3L of IVF bolus, however, remained hypotensive, subsequently a femoral central line was placed and started on Levophed and  then vasopressin. Now is being admitted to the ICU.      Neuro: chronic pain, now with severe metabolic encephalopathy, requiring intubation and MV       Propofol for sedation       Supportive measures       Continue Baclofen     CV: h/o CAD, HFpEF, Afib, asthma, patient with episode of bradycardia likely due to hyperkalemia, required Epi push in the ED. Currently in shock, hypovolemic vs septic requiring two pressors. BP initially improved and was taken off the Vasopressin, but now significantly worse, associated with significant worsening tachycardia       Continue Levophed and vasopressin wean off as tolerates       Volume resuscitation as indicated       Continue Plavix       Hold anti-hypertensive medications       Hold Bumex       Continue Atorvastatin     Respiratory: h/o asthma, mild hypoxia. But now with hypercarbic respiratory failure requiring intubation.        Continue /5/100%, wean off as tolerates       Continue inhalers     : OLIVA c/b hyperkalemia, initially improved, but now is worsening with significant worsening of lactic acidosis.         Continue volume resuscitation        Continue to trend lactic acid        Is/Os        Frequent RFP        Treat hyperkalemia as indicated.      GI: h/o GERD, now concern for GI bleed. On Pepcid and Protonix. Also concern for intra-abdominal pathology with ischemia given increasing LA       CT angio abdomen       Protonix BID       Hold home medications       NPO for now       Surgery consult for acute abdomen      Endo: h/o DM, adrenal insufficiency and hypothyroidism       Solu-cortef will increase to 100 mg TID       Hold home Lantus, continue SSI       Continue home Synthroid       Monitor BS       Hypoglycemic protocol     Hem/Onc: patient with chronic anemia, now with significant drop of H/H. Also coagulopathy concerning for DIC.        Transfuse with 2 U of FFP       Transfuse with 2 U of PRBC       Continue to monitor with frequent CBC           ID: +e UTI        Follow up urine culture       Continue renally dosed Zosyn     MSK: h/o RA, psoriatic arthritis       Continue HCQ       Pain control     DVT prophylaxis: SCD, heparin     This was a critical care visit for shock and OLIVA. Total time 110 min.     Miguel Ángel Joya MD

## 2024-08-07 NOTE — DISCHARGE SUMMARY
Daniela Saez is a 64 y.o. female on day 2 of admission presenting with Acute kidney injury (CMS-HCC).  Patient with h/o CAD, HFpEF, Afib, HTN, asthma, seronegative RA, gout, psoriatic arthritis, L knee OA, fibromyalgia, chronic pain syndrome, GERD, liver cirrhosis, hypothyroidism, adrenal insufficiency, T2DM, anxiety, depression, peripheral neuropathy, recent admission for UTI and hypercarbia after presenting with a syncopal episode, who now presents with generalized weakness to OSH ED. Workup revealed OLIVA, lactic acidosis, hyponatremia and hyperkalemia. Also hypotensive. Received 3L of IVF bolus, however, remained hypotensive, subsequently a femoral central line was placed and started on Levophed and then vasopressin. Now is being admitted to the ICU   Subjective   Worsening shock overnight, requiring increased vasopressor needs. Also significantly worsening lactic acidosis. Urine output reduced this am.      Very confused, obtunded, cannot provide history.   Objective    Scheduled medications  Scheduled Medications   albuterol, 3 mL, nebulization, BID  [Held by provider] allopurinol, 100 mg, oral, Daily  [Held by provider] atorvastatin, 40 mg, oral, Nightly  [Held by provider] baclofen, 10 mg, oral, BID  [Held by provider] clopidogrel, 75 mg, oral, Daily  pantoprazole, 40 mg, oral, BID   Or  esomeprazole, 40 mg, nasoduodenal tube, BID   Or  pantoprazole, 40 mg, intravenous, BID  [Held by provider] famotidine, 20 mg, oral, BID  [Held by provider] fluocinonide, 1 Application, Topical, BID  [Held by provider] fluticasone, 2 spray, Each Nostril, Daily  [Held by provider] guaiFENesin, 1,200 mg, oral, BID  hydrocortisone sodium succinate, 50 mg, intravenous, q6h  hydroxychloroquine, 200 mg, oral, Daily  insulin lispro, 0-10 Units, subcutaneous, q4h  [Held by provider] ipratropium, 2 spray, Each Nostril, BID  lactated Ringer's, 1,000 mL, intravenous, Once  [Held by provider] leflunomide, 20 mg, oral,  Daily  levothyroxine, 88 mcg, oral, Daily  oxygen, , inhalation, Continuous - Inhalation  piperacillin-tazobactam, 2.25 g, intravenous, q8h  sodium zirconium cyclosilicate, 10 g, oral, q8h      Continuous medications    Continuous Medications[]Expand by Default   norepinephrine, 0-0.5 mcg/kg/min, Last Rate: 0.27 mcg/kg/min (08/06/24 1100)      PRN medications  PRN Medications   PRN medications: acetaminophen **OR** acetaminophen, amitriptyline, dextrose, dextrose, glucagon, glucagon, morphine, ondansetron, [Held by provider] SUMAtriptan   Physical Exam  Constitutional:       General: She is in acute distress.      Appearance: She is obese. She is ill-appearing and toxic-appearing.   HENT:      Head: Normocephalic and atraumatic.      Nose:      Comments: On 3L NC     Mouth/Throat:      Mouth: Mucous membranes are dry.   Eyes:      General: No scleral icterus.     Pupils: Pupils are equal, round, and reactive to light.   Cardiovascular:      Rate and Rhythm: Regular rhythm. Tachycardia present.      Heart sounds: No murmur heard.     No friction rub. No gallop.   Pulmonary:      Effort: Respiratory distress present.      Breath sounds: Normal breath sounds. No wheezing or rales.   Abdominal:      General: There is distension.      Palpations: Abdomen is soft.      Tenderness: There is abdominal tenderness. There is no guarding.   Musculoskeletal:         General: Tenderness present.      Cervical back: Neck supple. No rigidity or tenderness.      Right lower leg: No edema.      Left lower leg: No edema.   Lymphadenopathy:      Cervical: No cervical adenopathy.   Skin:     General: Skin is warm and dry.      Coloration: Skin is not jaundiced.      Findings: Erythema (chronic discoloration b/l LE) present.   Neurological:      Comments: Cannot fully assess. Very obtunded.    Psychiatric:      Comments: Confused, obtunded.      Last Recorded Vitals  Blood pressure 88/59, pulse (!) 137, temperature 37.7 °C (99.9 °F),  "temperature source Axillary, resp. rate (!) 31, height 1.549 m (5' 0.98\"), weight 73.3 kg (161 lb 9.6 oz), SpO2 (!) 83%.  Intake/Output last 3 Shifts:  I/O last 3 completed shifts:  In: 6770.3 (92.4 mL/kg) [I.V.:1482.9 (20.2 mL/kg); NG/GT:205; IV Piggyback:5082.3]  Out: 2785 (38 mL/kg) [Urine:2785 (1.1 mL/kg/hr)]  Weight: 73.3 kg   Relevant Results  Results for orders placed or performed during the hospital encounter of 08/05/24 (from the past 24 hour(s))   POCT GLUCOSE   Result Value Ref Range    POCT Glucose 154 (H) 74 - 99 mg/dL   Blood Gas Arterial Full Panel   Result Value Ref Range    POCT pH, Arterial 7.39 7.38 - 7.42 pH    POCT pCO2, Arterial 25 (L) 38 - 42 mm Hg    POCT pO2, Arterial 91 85 - 95 mm Hg    POCT SO2, Arterial 99 94 - 100 %    POCT Oxy Hemoglobin, Arterial 96.8 94.0 - 98.0 %    POCT Hematocrit Calculated, Arterial 29.0 (L) 36.0 - 46.0 %    POCT Sodium, Arterial 118 (LL) 136 - 145 mmol/L    POCT Potassium, Arterial 5.1 3.5 - 5.3 mmol/L    POCT Chloride, Arterial 94 (L) 98 - 107 mmol/L    POCT Ionized Calcium, Arterial 1.15 1.10 - 1.33 mmol/L    POCT Glucose, Arterial 127 (H) 74 - 99 mg/dL    POCT Lactate, Arterial 7.8 (HH) 0.4 - 2.0 mmol/L    POCT Base Excess, Arterial -8.6 (L) -2.0 - 3.0 mmol/L    POCT HCO3 Calculated, Arterial 15.1 (L) 22.0 - 26.0 mmol/L    POCT Hemoglobin, Arterial 9.7 (L) 12.0 - 16.0 g/dL    POCT Anion Gap, Arterial 14 10 - 25 mmo/L    Patient Temperature 37.0 degrees Celsius    FiO2 28 %    Critical Called By [facility]     Critical Called To PEREZ     Critical Call Time 2315     Critical Read Back Y     Critical Note CRITICAL     Site of Arterial Puncture Arterial Line     Artie's Test Positive    POCT GLUCOSE   Result Value Ref Range    POCT Glucose 119 (H) 74 - 99 mg/dL   Blood Gas Lactic Acid, Venous   Result Value Ref Range    POCT Lactate, Venous 9.4 (HH) 0.4 - 2.0 mmol/L   Phosphorus   Result Value Ref Range    Phosphorus 4.0 2.5 - 4.5 mg/dL   Magnesium   Result Value Ref " Range    Magnesium 2.10 1.60 - 3.10 mg/dL   Basic Metabolic Panel   Result Value Ref Range    Glucose 108 (H) 65 - 99 mg/dL    Sodium 127 (L) 133 - 145 mmol/L    Potassium 6.0 (H) 3.4 - 5.1 mmol/L    Chloride 93 (L) 97 - 107 mmol/L    Bicarbonate 9 (LL) 24 - 31 mmol/L    Urea Nitrogen 35 (H) 8 - 25 mg/dL    Creatinine 1.50 0.40 - 1.60 mg/dL    eGFR 39 (L) >60 mL/min/1.73m*2    Calcium 8.4 (L) 8.5 - 10.4 mg/dL    Anion Gap >19 (H) <=19 mmol/L   CBC and Auto Differential   Result Value Ref Range    WBC 23.2 (H) 4.4 - 11.3 x10*3/uL    nRBC 0.3 (H) 0.0 - 0.0 /100 WBCs    RBC 3.10 (L) 4.00 - 5.20 x10*6/uL    Hemoglobin 8.8 (L) 12.0 - 16.0 g/dL    Hematocrit 26.7 (L) 36.0 - 46.0 %    MCV 86 80 - 100 fL    MCH 28.4 26.0 - 34.0 pg    MCHC 33.0 32.0 - 36.0 g/dL    RDW 16.6 (H) 11.5 - 14.5 %    Platelets 140 (L) 150 - 450 x10*3/uL    Neutrophils % 81.4 40.0 - 80.0 %    Immature Granulocytes %, Automated 2.2 (H) 0.0 - 0.9 %    Lymphocytes % 5.3 13.0 - 44.0 %    Monocytes % 9.7 2.0 - 10.0 %    Eosinophils % 0.7 0.0 - 6.0 %    Basophils % 0.7 0.0 - 2.0 %    Neutrophils Absolute 18.87 (H) 1.20 - 7.70 x10*3/uL    Immature Granulocytes Absolute, Automated 0.51 0.00 - 0.70 x10*3/uL    Lymphocytes Absolute 1.22 1.20 - 4.80 x10*3/uL    Monocytes Absolute 2.25 (H) 0.10 - 1.00 x10*3/uL    Eosinophils Absolute 0.16 0.00 - 0.70 x10*3/uL    Basophils Absolute 0.16 (H) 0.00 - 0.10 x10*3/uL   Morphology   Result Value Ref Range    RBC Morphology See Below     Polychromasia Mild     [place] Cells Many     Toxic Granulation Present     Vacuolated Neutrophils Present    Beta Hydroxybutyrate   Result Value Ref Range    Beta-Hydroxybutyrate 0.20 0.10 - 0.30 mmol/L   POCT GLUCOSE   Result Value Ref Range    POCT Glucose 104 (H) 74 - 99 mg/dL   BLOOD GAS ARTERIAL FULL PANEL   Result Value Ref Range    POCT pH, Arterial 7.32 (L) 7.38 - 7.42 pH    POCT pCO2, Arterial 17 (L) 38 - 42 mm Hg    POCT pO2, Arterial 89 85 - 95 mm Hg    POCT SO2, Arterial 99 94  - 100 %    POCT Oxy Hemoglobin, Arterial 96.5 94.0 - 98.0 %    POCT Hematocrit Calculated, Arterial 43.0 36.0 - 46.0 %    POCT Sodium, Arterial 117 (LL) 136 - 145 mmol/L    POCT Potassium, Arterial 5.9 (H) 3.5 - 5.3 mmol/L    POCT Chloride, Arterial 93 (L) 98 - 107 mmol/L    POCT Ionized Calcium, Arterial 1.06 (L) 1.10 - 1.33 mmol/L    POCT Glucose, Arterial 80 74 - 99 mg/dL    POCT Lactate, Arterial 12.4 (HH) 0.4 - 2.0 mmol/L    POCT Base Excess, Arterial -14.6 (L) -2.0 - 3.0 mmol/L    POCT HCO3 Calculated, Arterial 8.8 (L) 22.0 - 26.0 mmol/L    POCT Hemoglobin, Arterial 14.2 12.0 - 16.0 g/dL    POCT Anion Gap, Arterial 21 10 - 25 mmo/L    Patient Temperature 37.0 degrees Celsius    FiO2 28 %    Critical Called By [facility]     Critical Called To PEREZ     Critical Call Time 510     Critical Read Back Y     Critical Note CRITICAL     Site of Arterial Puncture Arterial Line     Artie's Test Positive    POCT GLUCOSE   Result Value Ref Range    POCT Glucose 118 (H) 74 - 99 mg/dL   Beta Hydroxybutyrate   Result Value Ref Range    Beta-Hydroxybutyrate 0.20 0.10 - 0.30 mmol/L   POCT GLUCOSE   Result Value Ref Range    POCT Glucose 112 (H) 74 - 99 mg/dL   POCT GLUCOSE   Result Value Ref Range    POCT Glucose 111 (H) 74 - 99 mg/dL   Blood Gas Arterial Full Panel   Result Value Ref Range    POCT pH, Arterial 7.26 (L) 7.38 - 7.42 pH    POCT pCO2, Arterial 17 (L) 38 - 42 mm Hg    POCT pO2, Arterial 93 85 - 95 mm Hg    POCT SO2, Arterial 98 94 - 100 %    POCT Oxy Hemoglobin, Arterial 96.6 94.0 - 98.0 %    POCT Hematocrit Calculated, Arterial 24.0 (L) 36.0 - 46.0 %    POCT Sodium, Arterial 121 (L) 136 - 145 mmol/L    POCT Potassium, Arterial 5.9 (H) 3.5 - 5.3 mmol/L    POCT Chloride, Arterial 96 (L) 98 - 107 mmol/L    POCT Ionized Calcium, Arterial 1.05 (L) 1.10 - 1.33 mmol/L    POCT Glucose, Arterial 131 (H) 74 - 99 mg/dL    POCT Lactate, Arterial 16.3 (HH) 0.4 - 2.0 mmol/L    POCT Base Excess, Arterial -17.7 (L) -2.0 - 3.0  mmol/L    POCT HCO3 Calculated, Arterial 7.6 (L) 22.0 - 26.0 mmol/L    POCT Hemoglobin, Arterial 7.9 (L) 12.0 - 16.0 g/dL    POCT Anion Gap, Arterial 23 10 - 25 mmo/L    Patient Temperature 37.0 degrees Celsius    FiO2 28 %    Apparatus CANNULA     Critical Called By LT RRT     Critical Called To DR. OCONNELL     Critical Call Time 1003     Critical Read Back Y     Site of Arterial Puncture Arterial Line     Artie's Test Negative    CBC and Auto Differential   Result Value Ref Range    WBC 24.4 (H) 4.4 - 11.3 x10*3/uL    nRBC 0.6 (H) 0.0 - 0.0 /100 WBCs    RBC 2.71 (L) 4.00 - 5.20 x10*6/uL    Hemoglobin 7.7 (L) 12.0 - 16.0 g/dL    Hematocrit 24.2 (L) 36.0 - 46.0 %    MCV 89 80 - 100 fL    MCH 28.4 26.0 - 34.0 pg    MCHC 31.8 (L) 32.0 - 36.0 g/dL    RDW 16.9 (H) 11.5 - 14.5 %    Platelets 132 (L) 150 - 450 x10*3/uL    Immature Granulocytes %, Automated 2.1 (H) 0.0 - 0.9 %    Immature Granulocytes Absolute, Automated 0.53 0.00 - 0.70 x10*3/uL   Creatine Kinase   Result Value Ref Range    Creatine Kinase 149 24 - 195 U/L   Lactate dehydrogenase   Result Value Ref Range     (H) 91 - 227 U/L   Haptoglobin   Result Value Ref Range    Haptoglobin 30 (L) 37 - 246 mg/dL   Manual Differential   Result Value Ref Range    Neutrophils %, Manual 83.0 40.0 - 80.0 %    Bands %, Manual 6.0 0.0 - 5.0 %    Lymphocytes %, Manual 6.0 13.0 - 44.0 %    Monocytes %, Manual 3.0 2.0 - 10.0 %    Eosinophils %, Manual 0.0 0.0 - 6.0 %    Basophils %, Manual 0.0 0.0 - 2.0 %    Atypical Lymphocytes %, Manual 1.0 0.0 - 2.0 %    Metamyelocytes %, Manual 1.0 0.0 - 0.0 %    Seg Neutrophils Absolute, Manual 20.25 (H) 1.20 - 7.00 x10*3/uL    Bands Absolute, Manual 1.46 (H) 0.00 - 0.70 x10*3/uL    Lymphocytes Absolute, Manual 1.46 1.20 - 4.80 x10*3/uL    Monocytes Absolute, Manual 0.73 0.10 - 1.00 x10*3/uL    Eosinophils Absolute, Manual 0.00 0.00 - 0.70 x10*3/uL    Basophils Absolute, Manual 0.00 0.00 - 0.10 x10*3/uL    Atypical Lymphs  Absolute, Manual 0.24 0.00 - 0.50 x10*3/uL    Metamyelocytes Absolute, Manual 0.24 0.00 - 0.00 x10*3/uL    Total Cells Counted 100     Neutrophils Absolute, Manual 21.71 (H) 1.20 - 7.70 x10*3/uL    Manual nRBC per 100 Cells 3.0 (H) 0.0 - 0.0 %    RBC Morphology See Below     Polychromasia Mild     RBC Fragments Few     Ovalocytes Few     Teardrop Cells Few     Welch Cells Many     Acanthocytes Few     Dohle Bodies Present     Toxic Granulation Present     Vacuolated Neutrophils Present    POCT GLUCOSE   Result Value Ref Range    POCT Glucose 115 (H) 74 - 99 mg/dL   Protime-INR   Result Value Ref Range    Protime 32.3 (H) 9.3 - 12.7 seconds    INR 3.3 (H) 0.9 - 1.2   Fibrinogen   Result Value Ref Range    Fibrinogen 392 200 - 400 mg/dL   D-dimer, Non VTE   Result Value Ref Range    D-Dimer Non VTE, Quant (mg/L FEU) 32.95 (H) 0.19 - 0.50 mg/L FEU   APTT   Result Value Ref Range    aPTT 97.9 (H) 22.0 - 32.5 seconds   Blood Gas Arterial Full Panel   Result Value Ref Range    POCT pH, Arterial 7.16 (LL) 7.38 - 7.42 pH    POCT pCO2, Arterial 22 (L) 38 - 42 mm Hg    POCT pO2, Arterial 382 (H) 85 - 95 mm Hg    POCT SO2, Arterial 99 94 - 100 %    POCT Oxy Hemoglobin, Arterial 98.0 94.0 - 98.0 %    POCT Hematocrit Calculated, Arterial 19.0 (L) 36.0 - 46.0 %    POCT Sodium, Arterial 119 (LL) 136 - 145 mmol/L    POCT Potassium, Arterial 6.4 (HH) 3.5 - 5.3 mmol/L    POCT Chloride, Arterial 98 98 - 107 mmol/L    POCT Ionized Calcium, Arterial 0.98 (L) 1.10 - 1.33 mmol/L    POCT Glucose, Arterial 153 (H) 74 - 99 mg/dL    POCT Lactate, Arterial 14.8 (HH) 0.4 - 2.0 mmol/L    POCT Base Excess, Arterial -19.2 (L) -2.0 - 3.0 mmol/L    POCT HCO3 Calculated, Arterial 7.8 (L) 22.0 - 26.0 mmol/L    POCT Hemoglobin, Arterial 6.4 (LL) 12.0 - 16.0 g/dL    POCT Anion Gap, Arterial 20 10 - 25 mmo/L    Patient Temperature 37.0 degrees Celsius    FiO2 100 %    Ventilator Mode      Ventilator Rate 20 bpm    Tidal Volume 420 mL    Peep CHM2O 5.0 cm  H2O    Critical Called By LT RRT     Critical Called To      Critical Call Time 1345     Critical Read Back Y     Site of Arterial Puncture Arterial Line     Artie's Test Negative    Prepare RBC: 2 Units   Result Value Ref Range    PRODUCT CODE U8330I29     Unit Number [id number]     Unit ABO A     Unit RH POS     XM INTEP COMP     Dispense Status XM     Blood Expiration Date 8/26/2024 11:59:00 PM EDT     PRODUCT BLOOD TYPE 6200     UNIT VOLUME 350     PRODUCT CODE E4601N68     Unit Number [id number]     Unit ABO A     Unit RH POS     XM INTEP COMP     Dispense Status IS     Blood Expiration Date 8/23/2024 11:59:00 PM EDT     PRODUCT BLOOD TYPE 6200     UNIT VOLUME 350    Type and screen   Result Value Ref Range    ABO TYPE A     Rh TYPE POS     ANTIBODY SCREEN NEG    Prepare Plasma: 2 Units   Result Value Ref Range    PRODUCT CODE F0506T82     Unit Number [id number]     Unit ABO A     Unit RH POS     Dispense Status IS     Blood Expiration Date 8/12/2024  3:50:00 PM EDT     PRODUCT BLOOD TYPE 6200     UNIT VOLUME 279    POCT GLUCOSE   Result Value Ref Range    POCT Glucose 193 (H) 74 - 99 mg/dL   CT angio abdomen pelvis w and or wo IV IV contrast    Result Date: 8/7/2024  Interpreted By:  Claude Portillo  and Navarro Joneshrab STUDY: CT ANGIO ABDOMEN PELVIS W AND/OR WO IV IV CONTRAST;  8/7/2024 12:05 pm   INDICATION: Signs/Symptoms:worsening lactic acidosis.   COMPARISON: CT abdomen pelvis 08/06/2024.   ACCESSION NUMBER(S): [id number]   ORDERING CLINICIAN: CHALINO HENRY   TECHNIQUE: Contiguous non-contrast axial images of the abdomen and pelvis were initially obtained.   Thin-section axial images of the abdomen and pelvis were obtained in the arterial phase after intravenous administration of 75 mL Omnipaque 350 contrast. Sagittal and coronal reformatted images were provided. MIP images were created on an independent workstation and reviewed.   FINDINGS: Images are degraded by motion  artifact.   VASCULATURE:   ABDOMINAL AORTA: No abdominal aortic aneurysm or dissection. Moderate-to-severe abdominal aortic atherosclerosis with areas of mild-to-moderate stenosis.   ABDOMINAL and PELVIC ARTERIES:   There is atherosclerosis of the proximal celiac artery without significant stenosis. There is irregular appearance of the splenic artery with multiple foci of stenosis along the course of the pancreatic body. Distal splenic artery appears to be patent.   Multifocal atherosclerosis of the superior mesenteric artery most prominently involving the origin with mild-to-moderate stenosis. Additional foci of moderate stenosis are also evident.   Inferior mesenteric artery has a diminutive appearance.   There is atherosclerosis of the proximal left renal artery with areas of mild-to-moderate stenosis. There is non-opacification of the mid segment of the left renal artery with reconstitution of the flow distally. However, overall the left renal artery has a diminutive appearance. Multifocal atherosclerosis of the right renal artery with significantly decreased opacification at the proximal to mid segment with reconstitution of the flow distally.   CT ABDOMEN/PELVIS:   LOWER CHEST: No acute abnormality. Coronary artery calcifications are partially visualized. An enteric tube courses through the esophagus with the tip in the stomach.   ABDOMINAL WALL: There is high attenuating density in the anterior subcutaneous abdominal wall measuring 4.9 cm likely representing hematoma.   LIVER: Liver has a nodular contour without focal lesions.   BILE DUCTS: No significant intrahepatic or extrahepatic dilatation.   GALLBLADDER: Surgically absent.   SPLEEN: No significant abnormality.   PANCREAS: No significant abnormality.   ADRENALS: No significant abnormality.   KIDNEYS, URETERS, BLADDER: There is heterogeneous renal parenchyma with multiple foci of mostly wedge-shaped hypoattenuation. No nephrolithiasis or hydronephrosis.  Diehl catheter in place with a decompressed bladder.   REPRODUCTIVE ORGANS: No significant abnormality.   VESSELS: (See above). There are a few tortuous vessels in the left pelvic region likely representing dilated venous structures. Right femoral approach central venous catheter tip projects over right external iliac artery.   LYMPH NODES/RETROPERITONEUM: There is a soft tissue density in the aortocaval region measuring 1.8 cm which might represent a lymph node versus a dilated venous structure. No acute retroperitoneal abnormality.   BOWEL/MESENTERY/PERITONEUM: There is hyperdense material layering in the stomach without significant changes between noncontrast and arterial phases, likely representing contrast from prior study. Stomach is unremarkable. There is no significant small bowel dilatation or wall thickening. There is circumferential wall thickening of the cecum extending to the transverse colon with submucosal edema, similar to the prior exam. There are air-fluid levels within the proximal colon. There is moderate stool burden within the distal colon. There is diverticulosis of the sigmoid colon. No definite evidence of surrounding inflammatory changes to suggest acute diverticulitis.  Normal appendix. There is a rectal tube in place.   Trace amount of free fluid in the perihepatic region. No loculated fluid collections. No free flow     OSSEOUS STRUCTURES: No acute osseous abnormality. Postsurgical changes of posterior fusion in the lower lumbar spine. There is a 9 mm anterolisthesis of L4 on L5. Unchanged chronic compression deformity T11 vertebral body and diffuse osteopenia.       1. Multifocal atherosclerotic disease of the abdominal aorta and its branches. There is segmental non-opacification/diminutive appearance of the bilateral mid segment renal arteries with heterogeneous appearance of the renal parenchyma and multiple foci of wedge-shaped hypoattenuation. However, evaluation is limited due to  motion artifact. Findings are concerning for an ischemic process. An infectious process is felt to be less likely. 2. There is circumferential wall thickening of the ascending colon extending to the transverse colon with submucosal edema, likely representing colitis. Findings in the setting of extensive atherosclerosis in the abdomen and pelvis may represent an ischemic process. An infectious process is felt to be less likely. 3. Cirrhotic appearance of the liver. 4. Round density in the aortocaval region as described above is nonspecific and may represent a dilated vascular structure versus an enlarged lymph node. 5. Additional findings as above.     I personally reviewed the images/study and I agree with the findings as stated by resident physician Dr. Jeffrey Posey . This study was interpreted at [place], [place].   MACRO: Jeffrey Posey discussed the significance and urgency of this critical finding by telephone with  CHALINO HENRY on 8/7/2024 at 1:54 pm.  (**-RCF-**) Findings:  See findings.   Signed by: Claude Poritllo 8/7/2024 2:33 PM Dictation workstation:   [id number]    XR abdomen 1 view    Result Date: 8/7/2024  Interpreted By:  Paulina Ferrari, STUDY: XR ABDOMEN 1 VIEW 8/7/2024 11:15 am   INDICATION: Signs/Symptoms:abdominal pain, increased lactic acid levels   COMPARISON: None available.   ACCESSION NUMBER(S): [id number]   ORDERING CLINICIAN: CHALINO HENRY   TECHNIQUE: Recumbent abdominal view   FINDINGS: Recumbent view of the abdomen presence of a nasogastric tube with the tip terminating in the gastric body. There is postoperative change from ventral hernia repair as well as posterior spinal fusion at the L4-5 level.   A right femoral CVP line terminates within the right external iliac vein.   The bowel gas pattern is nonspecific. There is some increased density within the gastric fundus. This may be the result of medication ingestion.       No  significant intra-abdominal abnormality.   Signed by: Paulina Ferrari 8/7/2024 12:02 PM Dictation workstation:   [id number]    XR chest 1 view    Result Date: 8/7/2024  Interpreted By:  Paulina Ferrari, STUDY: XR CHEST 1 VIEW; ;  8/7/2024 10:50 am   INDICATION: Signs/Symptoms:et tube placement.   COMPARISON: 08/06/2024   ACCESSION NUMBER(S): [id number]   ORDERING CLINICIAN: SELMA LOPEZ   TECHNIQUE: AP semi-erect view of the chest   FINDINGS: Endotracheal tube has been placed with tip positioned at the level of the robert. This should be retracted a distance of 2-3 cm.   Nasogastric tube is located within the stomach.   The heart is not enlarged. There is left basilar discoid atelectasis and right perihilar discoid atelectasis. No pleural abnormality is seen.         Tip of the endotracheal tube at robert and should be retracted 2-3 cm.   Left basilar discoid atelectasis with additional right perihilar discoid atelectasis.   Nasogastric tube terminating in gastric body.   Secure chat was read by the physician at 11:41 a.m..   MACRO: Paulina Ferrari discussed the significance and urgency of this critical finding by secure chat with Dr. Astorga On 8/7/2024 at 11:40 am. (**-RCF-**) Findings:  See findings.   Signed by: Paulina Ferrari 8/7/2024 11:41 AM Dictation workstation:   [id number]    XR chest 1 view    Result Date: 8/6/2024  Interpreted By:  Kyle Coyne, STUDY: Chest dated  8/6/2024.   INDICATION: Signs/Symptoms:Verify NG tube placement   COMPARISON: Chest dated 08/05/2024.   ACCESSION NUMBER(S): [id number]   ORDERING CLINICIAN: CHALINO HENRY   TECHNIQUE: One view of the chest.   FINDINGS: There is an enteric tube with the side port in the tip projecting over the left upper quadrant.  No pneumothorax or effusion is evident. The cardiomediastinal silhouette is  not enlarged.Degenerative change is seen of the spine and shoulders.       Enteric tube as above without acute cardiopulmonary process evident. Please see  same date CT for discussion findings on that examination.   MACRO: None   Signed by: Kyle Coyne 8/6/2024 8:46 PM Dictation workstation:   [id number]    CT abdomen pelvis wo IV contrast    Result Date: 8/6/2024  Interpreted By:  Pelon Florez, STUDY: CT ABDOMEN PELVIS WO IV CONTRAST;  8/6/2024 1:43 pm   INDICATION: Signs/Symptoms:lactic acidosis, abdominal pain.   COMPARISON: CT abdomen pelvis dated 08/05/2024.   ACCESSION NUMBER(S): [id number]   ORDERING CLINICIAN: LORI OCONNELL   TECHNIQUE: CT of the abdomen and pelvis was performed. Contiguous axial images were obtained through the abdomen and pelvis. Coronal and sagittal reconstructions at 3 mm slice thickness were performed.  No intravenous contrast was administered.   FINDINGS: Please note that the evaluation of vessels, lymph nodes and organs is limited without intravenous contrast.   LOWER CHEST: There is no acute abnormality in the lower chest. The heart is normal in size without evidence of pericardial effusion. No pleural effusion is present. Heavy coronary artery atherosclerotic calcifications are present.   ABDOMEN:   LIVER: The liver is nodular in contour compatible with cirrhosis similar to prior. There is a subcentimeter hypodensity in the medial left hepatic lobe similar to prior likely a hepatic cyst, axial image 35.   BILE DUCTS: The intrahepatic and extrahepatic ducts are not dilated.   GALLBLADDER: The gallbladder is surgically absent.   PANCREAS: The pancreas is not enlarged.   SPLEEN: Within normal limits.   ADRENAL GLANDS: Within normal limits.   KIDNEYS AND URETERS: The kidneys are normal in size and unremarkable in appearance.  No hydroureteronephrosis or nephroureterolithiasis is identified.   PELVIS:   BLADDER: The urinary bladder is incompletely decompressed by a Diehl catheter new from prior and contains gas likely due to recent instrumentation.   REPRODUCTIVE ORGANS: No pelvic masses. Status post hysterectomy.   BOWEL: The  stomach is partially opacified with dense ingested oral contrast and grossly normal. No bowel dilatation or obstruction. There is large amount of formed stool throughout the colon and there is wall thickening of the distal transverse and proximal descending colon suggestive of colitis. Appendix is normal.   VESSELS: The abdominal aorta is normal in caliber. There is a right common femoral central venous catheter terminating in the right external iliac vein, new from prior.   PERITONEUM/RETROPERITONEUM/LYMPH NODES: There is trace ascites in the pelvis, new from prior. There is no intraperitoneal free air. There is no lymphadenopathy by CT criteria.   ABDOMINAL WALL: The abdominal wall soft tissues appear normal.   BONES: No suspicious osseous lesions are identified. There is a moderate compression deformity of T11 vertebral body unchanged from 2022. Postsurgical change of L4-L5 spinal fusion with unchanged 0.8 cm degenerative anterolisthesis of L4 over L5. Osteopenia.       1.  Interval placement of right common femoral vein central venous catheter and interval placement of Diehl catheter into the urinary bladder. Large amount of formed stool throughout the colon, correlation with urinalysis recommended. There is thickening of the distal transverse and proximal descending colon suggestive of colitis. 2. Trace amount of pelvic ascites new from prior. Correlation with fluid volume status is recommended. Cirrhotic liver morphology similar to prior. 3. Unchanged chronic compression deformity of T11 vertebral body and osteopenia.     Signed by: Pelon Floerz 8/6/2024 2:25 PM Dictation workstation:   [id number]    Assessment/Plan   Principal Problem:    Acute kidney injury (CMS-HCC)  64 YOF with h/o CAD, HFpEF, Afib, HTN, asthma, seronegative RA, gout, psoriatic arthritis, L knee OA, fibromyalgia, chronic pain syndrome, GERD, liver cirrhosis, hypothyroidism, adrenal insufficiency, T2DM, anxiety, depression, peripheral  neuropathy, recent admission for UTI and hypercarbia after presenting with a syncopal episode, who now presents with generalized weakness to OSH ED. Workup revealed OLIVA, lactic acidosis, hyponatremia and hyperkalemia. Also hypotensive. Received 3L of IVF bolus, however, remained hypotensive, subsequently a femoral central line was placed and started on Levophed and then vasopressin. Now is being admitted to the ICU.      Neuro: chronic pain, now with severe metabolic encephalopathy, requiring intubation and MV       Propofol for sedation       Supportive measures       Continue Baclofen     CV: h/o CAD, HFpEF, Afib, asthma, patient with episode of bradycardia likely due to hyperkalemia, required Epi push in the ED. Currently in shock, hypovolemic vs septic requiring two pressors. BP initially improved and was taken off the Vasopressin, but now significantly worse, associated with significant worsening tachycardia       Continue Levophed and vasopressin wean off as tolerates       Volume resuscitation as indicated       Continue Plavix       Hold anti-hypertensive medications       Hold Bumex       Continue Atorvastatin     Respiratory: h/o asthma, mild hypoxia. But now with hypercarbic respiratory failure requiring intubation.        Continue /5/100%, wean off as tolerates       Continue inhalers     : OLIVA c/b hyperkalemia, initially improved, but now is worsening with significant worsening of lactic acidosis.         Continue volume resuscitation        Continue to trend lactic acid        Is/Os        Frequent RFP        Treat hyperkalemia as indicated.      GI: h/o GERD, now concern for GI bleed. On Pepcid and Protonix. Also concern for intra-abdominal pathology with ischemia given increasing LA       CT angio abdomen       Protonix BID       Hold home medications       NPO for now       Surgery consult for acute abdomen      Endo: h/o DM, adrenal insufficiency and hypothyroidism       Solu-cortef will  increase to 100 mg TID       Hold home Lantus, continue SSI       Continue home Synthroid       Monitor BS       Hypoglycemic protocol     Hem/Onc: patient with chronic anemia, now with significant drop of H/H. Also coagulopathy concerning for DIC.        Transfuse with 2 U of FFP       Transfuse with 2 U of PRBC       Continue to monitor with frequent CBC          ID: +e UTI        Follow up urine culture       Continue renally dosed Zosyn     MSK: h/o RA, psoriatic arthritis       Continue HCQ       Pain control     DVT prophylaxis: SCD, heparin     This was a critical care visit for shock and OLIVA. Total time 110 min.     Miguel Ángel Joya MD

## 2024-08-07 NOTE — PROGRESS NOTES
Patient not medically clear. Patient intubated on this day. At this time there is not a safe discharge plan in place. Will follow.      08/07/24 1203   Discharge Planning   Home or Post Acute Services Other (Comment)  (TBD)   Expected Discharge Disposition Other  (TBD)   Does the patient need discharge transport arranged? Yes   RoundTrip coordination needed? Yes

## 2024-08-07 NOTE — CONSULTS
Assessment/Plan     Inpatient consult to Acute Care Surgery  Consult performed by: Starla Quintana PA-C  Consult ordered by: MANSI Mccain-CNP  Reason for consult: Persistant lactic acidosis concerning for ischemic bowel  Assessment/Recommendations: This is a 65 yo female with history significant for CAD, HFpEF, Afib, HTN, GERD, liver cirrhosis, adrenal insufficiency, T2DM, recent admission for UTI and hypercarbia after presenting with a syncopal episode, who is now presenting to the hospital for generalized weakness starting one week ago. Initial labs showed leukocytosis and lactic acidosis. Lactic acidosis persists despite measures. XR abdomen pending, CT angio abdomen pending. Will continue to monitor, awaiting results. Consulted with Dr. White.         Subjective     This is a 65 yo female with history significant for CAD, HFpEF, Afib, HTN, GERD, liver cirrhosis, adrenal insufficiency, T2DM, recent admission for UTI and hypercarbia after presenting with a syncopal episode, who is now presenting to the hospital for generalized weakness starting one week ago. Initial labs showed OLIVA and lactic acidosis, increasing, currently at 16.3. Increasing leukocytosis of 24.4. Patient was hypotensive and initally c/o nausea and vomiting as well as constipation and abdominal pain. Patient is now in ICU, intubated, on pressors.         HPI limited as patient is intubated        Review of Systems  Review of Systems   Unable to perform ROS: Intubated       Objective     Vital signs for last 24 hours:  Temp:  [36 °C (96.8 °F)-36.7 °C (98.1 °F)] 36.5 °C (97.7 °F)  Heart Rate:  [] 123  Resp:  [6-45] 39  BP: ()/(50-77) 119/64  Arterial Line BP 1: ()/(44-68) 243/63  FiO2 (%):  [28 %] 28 %    Intake/Output this shift:  I/O this shift:  In: 499.5 [IV Piggyback:499.5]  Out: 271 [Urine:271]    Physical Exam  Physical Exam  Constitutional:       Appearance: She is ill-appearing.   HENT:      Head:  Normocephalic.      Nose: Nose normal.      Mouth/Throat:      Mouth: Mucous membranes are dry.   Cardiovascular:      Rate and Rhythm: Normal rate.   Pulmonary:      Comments: intubated  Abdominal:      General: There is distension.   Musculoskeletal:         General: Normal range of motion.   Skin:     General: Skin is warm and dry.   Neurological:      Comments: intubated   Psychiatric:      Comments: Intubated           Labs  Lab Results   Component Value Date    WBC 24.4 (H) 08/07/2024    HGB 7.7 (L) 08/07/2024    HCT 24.2 (L) 08/07/2024    MCV 89 08/07/2024     (L) 08/07/2024       Lab Results   Component Value Date    GLUCOSE 108 (H) 08/07/2024    CALCIUM 8.4 (L) 08/07/2024     (L) 08/07/2024    K 6.0 (H) 08/07/2024    CO2 9 (LL) 08/07/2024    CL 93 (L) 08/07/2024    BUN 35 (H) 08/07/2024    CREATININE 1.50 08/07/2024       === 08/05/24 ===    CT ABDOMEN PELVIS WO IV CONTRAST    - Impression -  1.  Interval placement of right common femoral vein central venous  catheter and interval placement of Diehl catheter into the urinary  bladder. Large amount of formed stool throughout the colon,  correlation with urinalysis recommended. There is thickening of the  distal transverse and proximal descending colon suggestive of colitis.  2. Trace amount of pelvic ascites new from prior. Correlation with  fluid volume status is recommended. Cirrhotic liver morphology  similar to prior.  3. Unchanged chronic compression deformity of T11 vertebral body and  osteopenia.      Signed by: Pelon Florez 8/6/2024 2:25 PM  Dictation workstation:   [id number]

## 2024-08-07 NOTE — PROGRESS NOTES
[facility]  TRAUMA ICU - PROGRESS NOTE    Patient Name: Daniela Saez  MRN: [id number]  Admit Date:   : 1960  AGE: 64 y.o.   GENDER: female  ============================================================================  ==============================================================================  TODAY'S ASSESSMENT AND PLAN OF CARE:  Daniela Saez is a 64 y.o. female in the ICU due to: medical history and hemodynamic monitoring    NEURO/PAIN/SEDATION: GCS 3T. No cough, gag or corneal response. No sedation or anaglesia needed at this time.   RESPIRATORY: #Acute Respiratory Failure.  Requiring intubation.  Current ventilator settings are AC/PC.  Respiratory rate 18, tidal volume 400, FiO2 of 100, PEEP 8. Not breathing over vent.   CARDIOVASC: #Septic shock. Norepinephrine, epinephrine and vasopressin drips. Hx of CAD, HFpEF, Afib and HTN. Holding home meds at this time.   GI: #Ischemic colitis s/p ex-lap. No resection occurred. Abdomen left open. Abthera in place. Bloody drainage in canister. Hx of liver cirrhosis.   : Diehl in place for strict I&O.  No urine output since admission to TSICU. Hx of adrenal insufficiency. No intervention at this time.   HEMATOLOGIC: #ABLA. S/P MTP in the OR. No longer providing blood products.   ENDOCRINE: #Hx of DM2 requiring insulin at home. No provision of insulin at this time.    MUSCULOSKELETAL/SKIN: Midline open surgical incision with abthera in place.   INFECTIOUS DISEASE: No abx appropriate at this time.   GI PROPHYLAXIS: NA  DVT PROPHYLAXIS: NA    DISPOSITION: ICU  ==============================================================================  CHIEF COMPLAINT / OVERNIGHT EVENTS / HPI:   Ms Saez is a 64 year old female with an extensive medical history including; CAD, heart failure, A-fib, hypertension, GERD, cirrhosis, adrenal insufficiency, and type 2 diabetes, with multiple recent admissions for deconditioning and hypercarbia. She  came into an ER from an outside hospital with reports of generalized weakness and feeling dizzy. She does state that she also has had some vague abdominal pain. Upon workup she was noted to have thickening of transverse colon concerning for ischemic colitis along with an elevated lactate, leukocytosis, and a metabolic acidosis with a bicarb of 9.  Prior to transfer she was intubated and placed on Levophed, vasopressin and received 2 units of PRBCs she was life flighted to [facility] and emergently taken to the OR for an ex lap.  Findings of the ex lap included ischemic, nonviable small intestine, beginning at about 50 cm from the ligament of Treitz all the way through the hepatic flexure.  There was a portion of the transverse colon that was viable, however the descending colon was also ischemic.  Within the small bowel there was patchy transmural necrosis.  At this point, because of the patient's comorbidities and significant burden of ischemia, the findings were determined to be incompatible with life and surgical resection would be futile.  Keith Aragon and Shaun contacted the patient's brother who is the healthcare proxy and notified him of the situation.  They also discussed the situation with Ms. Black daughter.  The plan is that the patient is a DNR.  No escalation of care, however we can utilize vasopressors in an effort to keep her alive until her son comes from [place].  Once the family is present, the plan is to withdraw care.    OTHER MEDICAL PROBLEMS:  CAD  HFpEF  Afib  HTN  GERD  Liver cirrhosis  Adrenal insufficiency  T2DM on insulin    MEDICAL HISTORY / ROS:  Admission history and ROS reviewed.   Past Medical History:   Diagnosis Date    Adrenal insufficiency (Multi)     Anemia     7.27.24 - H/H 9.4/31.6    Anxiety     Asthma (HHS-HCC)     Atrial fibrillation (Multi)     on Plavix - stop instructions in chart    Cervical radiculopathy     Chronic diastolic heart failure (Multi)     saw  cardiology 2024- f/u prn    Chronic pain syndrome     Chronic pancreatitis (Multi)     Coronary artery disease     old MI noted on cardiac testing, unknown when    Depression     Fibromyalgia     Gastroparesis     Gastroparesis    GERD (gastroesophageal reflux disease)     Gout     Hearing loss     Hepatic cirrhosis (Multi)     24: alk phs 182    Hypertension     Hypocalcemia     24: ca 8.3    Hyponatremia     24: Na 134    Hypothyroidism     Insomnia     Irritable bowel syndrome with diarrhea     Liver lesion     Lumbar stenosis     Migraine     Monoclonal gammopathy     follows with heme - h/o bone marrow bx    Occipital neuralgia     Osteoarthritis     Rheumatoid arthritis (Multi)     RLS (restless legs syndrome)     RSD (reflex sympathetic dystrophy)     Syncope     admitted -24 with syncopal event - dx with UTI on Bactrim    Thrombocytopenia (CMS-HCC)     24: plt 59    Thrombocytopenia (CMS-HCC)     24: plt 59    Tremor     voice, hands, legs at times    Type 2 diabetes mellitus (Multi)     24: A1C 7.1%    UC (ulcerative colitis) (Multi)       Past Surgical History:   Procedure Laterality Date    BONE MARROW BIOPSY      BUNIONECTOMY Right      SECTION, LOW TRANSVERSE      x 5    CHOLECYSTECTOMY      CT GUIDED PERCUTANEOUS BIOPSY BONE DEEP  2022    CT GUIDED PERCUTANEOUS BIOPSY BONE DEEP 3/9/2022 [facility]    OTHER SURGICAL HISTORY  2019    Uterine nerve ablation    OTHER SURGICAL HISTORY Right     Ovarian torsion repair    SINUS SURGERY      TONSILLECTOMY      Tonsillectomy with adenoidectomy    TUBAL LIGATION      UMBILICAL HERNIA REPAIR          PHYSICAL EXAM:  Heart Rate:  [103-138]   Temp:  [36 °C (96.8 °F)-37.7 °C (99.9 °F)]   Resp:  [13-45]   BP: ()/(18-71)   Weight:  [73.3 kg (161 lb 9.6 oz)]   SpO2:  [57 %-99 %]   Physical Exam    GCS 3T. No corneal, no gag, no cough.   Intubated. Lungs clear to auscultation bilaterally.   Sinus  tachycardia. Hypotensive requiring 3 pressors.   Abdomen obese. Open midline incision with abthera in place. Bloody drainage in canister.  Generalized edema. Weak peripheral pulses.     IMAGING SUMMARY:  (summary of new imaging findings, not a copy of dictation)  Colitis of the ascending, transverse colon wit submucosal edema. In setting of surrounding extensive atherosclerosis, concern for ischemic process. Cirrhosis of the liver.     LABS:  Results from last 7 days   Lab Units 08/07/24  1000 08/07/24  0352 08/06/24  0416 08/05/24  1734 08/05/24  0929   WBC AUTO x10*3/uL 24.4* 23.2* 15.5* 17.5* 16.0*   HEMOGLOBIN g/dL 7.7* 8.8* 9.8* 11.1* 13.3   HEMATOCRIT % 24.2* 26.7* 29.3* 33.0* 41.4   PLATELETS AUTO x10*3/uL 132* 140* 130* 168 189   NEUTROS PCT AUTO %  --  81.4  --  86.5 82.9   LYMPHO PCT MAN % 6.0  --  3.0  --   --    LYMPHS PCT AUTO %  --  5.3  --  4.3 7.0   MONO PCT MAN % 3.0  --  8.0  --   --    MONOS PCT AUTO %  --  9.7  --  7.8 7.7   EOSINO PCT MAN % 0.0  --  0.0  --   --    EOS PCT AUTO %  --  0.7  --  0.4 1.5     Results from last 7 days   Lab Units 08/07/24  1236 08/05/24  1734   APTT seconds 97.9* 58.0*   INR  3.3* 1.4*     Results from last 7 days   Lab Units 08/07/24  0352 08/06/24  1818 08/06/24  0913 08/05/24  2130 08/05/24  1734 08/05/24  1229 08/05/24  0929   SODIUM mmol/L 127* 125* 125*   < > 124*  --  117*   POTASSIUM mmol/L 6.0* 5.1 4.9   < > 6.8*   < > 7.6*   CHLORIDE mmol/L 93* 92* 90*   < > 92*  --  82*   CO2 mmol/L 9* 17* 18*   < > 17*  --  23   BUN mg/dL 35* 31* 32*   < > 51*  --  53*   CREATININE mg/dL 1.50 1.40 1.70*   < > 3.10*  --  3.79*   CALCIUM mg/dL 8.4* 8.7 8.7   < > 8.9  --  10.0   PROTEIN TOTAL g/dL  --   --   --   --  6.0  --  8.2   BILIRUBIN TOTAL mg/dL  --   --   --   --  1.0  --  1.2   ALK PHOS U/L  --   --   --   --  251*  --  233*   ALT U/L  --   --   --   --  29  --  26   AST U/L  --   --   --   --  62*  --  50*   GLUCOSE mg/dL 108* 172* 204*   < > 198*  --  73*    < >  = values in this interval not displayed.     Results from last 7 days   Lab Units 08/05/24  1734 08/05/24  0929   BILIRUBIN TOTAL mg/dL 1.0 1.2     Results from last 7 days   Lab Units 08/07/24  1340 08/07/24  1000 08/07/24  0447   POCT PH, ARTERIAL pH 7.16* 7.26* 7.32*   POCT PCO2, ARTERIAL mm Hg 22* 17* 17*   POCT PO2, ARTERIAL mm Hg 382* 93 89   POCT HCO3 CALCULATED, ARTERIAL mmol/L 7.8* 7.6* 8.8*   POCT BASE EXCESS, ARTERIAL mmol/L -19.2* -17.7* -14.6*     I have reviewed all medications, laboratory results, and imaging pertinent for today's encounter.

## 2024-08-07 NOTE — PROCEDURES
Indication(s):  -Metabolic encephalopathy     Induction agents:  40 mg Etomidate  50 mcg fentanyl  2 mg midazolam    Procedure:  BMV performed with 100% O2 by ambu.  Induction agents as above. Glidescope with Mac 3, grade I view. Atraumatic intubation with #7.5 ETT @ 22 cm @ lips. +EtCO2 color change. Breath sound equal bilaterally. Patient tolerated well. No complications.    Tolerance: good   Supervised by Dr. Joya

## 2024-08-08 ENCOUNTER — EDUCATION (OUTPATIENT)
Dept: ORTHOPEDIC SURGERY | Facility: CLINIC | Age: 64
End: 2024-08-08
Payer: MEDICAID

## 2024-08-08 ENCOUNTER — TELEPHONE (OUTPATIENT)
Dept: PRIMARY CARE | Facility: CLINIC | Age: 64
End: 2024-08-08
Payer: MEDICAID

## 2024-08-08 VITALS — HEART RATE: 124 BPM | RESPIRATION RATE: 28 BRPM | OXYGEN SATURATION: 50 % | TEMPERATURE: 96.1 F

## 2024-08-08 LAB
ATRIAL RATE: 127 BPM
BACTERIA UR CULT: ABNORMAL
BLOOD EXPIRATION DATE: NORMAL
DISPENSE STATUS: NORMAL
PR INTERVAL: 144 MS
PRODUCT BLOOD TYPE: 2800
PRODUCT BLOOD TYPE: 5100
PRODUCT BLOOD TYPE: 6200
PRODUCT BLOOD TYPE: 6200
PRODUCT BLOOD TYPE: 8400
PRODUCT CODE: NORMAL
Q ONSET: 216 MS
QRS COUNT: 21 BEATS
QRS DURATION: 86 MS
QT INTERVAL: 296 MS
QTC CALCULATION(BAZETT): 430 MS
QTC FREDERICIA: 380 MS
R AXIS: 254 DEGREES
T AXIS: 49 DEGREES
T OFFSET: 364 MS
UNIT ABO: NORMAL
UNIT NUMBER: NORMAL
UNIT RH: NORMAL
UNIT VOLUME: 199
UNIT VOLUME: 279
UNIT VOLUME: 312
UNIT VOLUME: 330
UNIT VOLUME: 350
VENTRICULAR RATE: 127 BPM
XM INTEP: NORMAL

## 2024-08-08 PROCEDURE — 2500000005 HC RX 250 GENERAL PHARMACY W/O HCPCS: Performed by: NURSE PRACTITIONER

## 2024-08-08 PROCEDURE — 99024 POSTOP FOLLOW-UP VISIT: CPT | Performed by: NURSE PRACTITIONER

## 2024-08-08 PROCEDURE — 2500000004 HC RX 250 GENERAL PHARMACY W/ HCPCS (ALT 636 FOR OP/ED): Performed by: NURSE PRACTITIONER

## 2024-08-08 PROCEDURE — 99024 POSTOP FOLLOW-UP VISIT: CPT | Performed by: STUDENT IN AN ORGANIZED HEALTH CARE EDUCATION/TRAINING PROGRAM

## 2024-08-08 ASSESSMENT — PAIN - FUNCTIONAL ASSESSMENT: PAIN_FUNCTIONAL_ASSESSMENT: CPOT (CRITICAL CARE PAIN OBSERVATION TOOL)

## 2024-08-08 NOTE — SIGNIFICANT EVENT
Time of Death Note    Called to see patient for unresponsiveness.    On exam the patient did not respond to verbal or physical stimuli.     Absent heart and breath sounds .   Absent peripheral pulses.   Pupils are fixed and dilated.   No cranial reflexes observed.    Patient pronounced dead at 0141 8/8/2024.     Next of kin/family notified.

## 2024-08-08 NOTE — DISCHARGE SUMMARY
Discharge Diagnosis  Ischemic colitis (Multi)    Issues Requiring Follow-Up  NA    Test Results Pending At Discharge  Pending Labs       Order Current Status    Basic Metabolic Panel Collected (08/07/24 1800)    CBC Collected (08/07/24 1800)    Hepatic Function Panel Collected (08/07/24 1800)    Phosphorus Collected (08/07/24 1800)            Hospital Course  Ms Saez is a 64 year old female with an extensive medical history including; CAD, heart failure, A-fib, hypertension, GERD, cirrhosis, adrenal insufficiency, and type 2 diabetes, with multiple recent admissions for deconditioning and hypercarbia. She came into an ER from an outside hospital with reports of generalized weakness and feeling dizzy. She does state that she also has had some vague abdominal pain. Upon workup she was noted to have thickening of transverse colon concerning for ischemic colitis along with an elevated lactate, leukocytosis, and a metabolic acidosis with a bicarb of 9.  Prior to transfer she was intubated and placed on Levophed, vasopressin and received 2 units of PRBCs she was life flighted to [facility] and emergently taken to the OR for an ex lap.  Findings of the ex lap included ischemic, nonviable small intestine, beginning at about 50 cm from the ligament of Treitz all the way through the hepatic flexure.  There was a portion of the transverse colon that was viable, however the descending colon was also ischemic.  Within the small bowel there was patchy transmural necrosis.  At this point, because of the patient's comorbidities and significant burden of ischemia, the findings were determined to be incompatible with life and surgical resection would be futile.  Keith Aragon and Shaun contacted the patient's brother who is the healthcare proxy and notified him of the situation.  They also discussed the situation with Ms. Black daughter.  The plan is that the patient is a DNR.  No escalation of care, however we can utilize  "vasopressors in an effort to keep her alive until her son comes from [place].  Once the family is present, the plan is to withdraw care.     Family at bedside and decided to continue with comfort care. Patient extubated and vasopressors turned off. At 0112. Patient time of death determined to be 0140.     Pertinent Physical Exam At Time of Discharge  Physical Exam-. Without heart or breath sounds.     Home Medications     Medication List      CONTINUE taking these medications     BD Saray 2nd Gen Pen Needle 32 gauge x \" needle; Generic drug: pen   needle, diabetic; Four times daily   Dexcom G7  misc; Generic drug: blood-glucose meter,continuous;   Use as instructed   Dexcom G7 Sensor device; Generic drug: blood-glucose sensor; For   continuous glucose monitoring.  Change every 10 days.     ASK your doctor about these medications     acetaminophen 325 mg tablet; Commonly known as: Tylenol   albuterol 90 mcg/actuation inhaler   alendronate 70 mg tablet; Commonly known as: Fosamax; Take 1 tablet (70   mg) by mouth every 7 days. Take in the morning with a full glass of water,   on an empty stomach, and do not take anything else by mouth or lie down   for the next 30 min.   allopurinol 100 mg tablet; Commonly known as: Zyloprim; TAKE ONE TABLET   BY MOUTH EVERY DAY   * amitriptyline 25 mg tablet; Commonly known as: Elavil   * amitriptyline 50 mg tablet; Commonly known as: Elavil   atorvastatin 40 mg tablet; Commonly known as: Lipitor; TAKE ONE TABLET   BY MOUTH EVERY DAY   azelastine 0.05 % ophthalmic solution; Commonly known as: Optivar   baclofen 10 mg tablet; Commonly known as: Lioresal   bumetanide 1 mg tablet; Commonly known as: Bumex; Take 1 tablet (1 mg)   by mouth once daily.   carvedilol 6.25 mg tablet; Commonly known as: Coreg; TAKE ONE TABLET BY   MOUTH TWICE DAILY   chlorhexidine 0.12 % solution; Commonly known as: Peridex; SWISH AND   SPIT 15ML FOR 30 SECONDS NIGHT PRIOR TO SURGERY " AND MORNING OF SURGERY   clopidogrel 75 mg tablet; Commonly known as: Plavix; TAKE ONE TABLET BY   MOUTH EVERY DAY   Creon 36,000-114,000- 180,000 unit capsule,delayed release(DR/EC)   capsule; Generic drug: pancrelipase (Lip-Prot-Amyl)   diphenoxylate-atropine 2.5-0.025 mg tablet; Commonly known as: Lomotil   eplerenone 50 mg tablet; Commonly known as: Inspra; Take 2 tablets (100   mg) by mouth 2 times a day.   famotidine 20 mg tablet; Commonly known as: Pepcid   Flonase Sensimist 27.5 mcg/actuation nasal spray; Generic drug:   fluticasone; Administer 2 sprays into each nostril once daily at bedtime.   fluocinonide 0.05 % cream; Commonly known as: Lidex   guaiFENesin 600 mg 12 hr tablet; Commonly known as: Mucinex; Take 2   tablets (1,200 mg) by mouth 2 times a day for 7 days. Do not crush, chew,   or split.; Ask about: Should I take this medication?   hydrocortisone 5 mg tablet; Commonly known as: Cortef; Take 1-2 tablets   (5-10 mg) by mouth 2 times a day.   hydroxychloroquine 200 mg tablet; Commonly known as: Plaquenil; take ONE   & ONE-HALF tablet BY MOUTH EVERY DAY   ipratropium 42 mcg (0.06 %) nasal spray; Commonly known as: Atrovent   Lantus Solostar U-100 Insulin 100 unit/mL (3 mL) pen; Generic drug:   insulin glargine; Inject 12 Units under the skin once daily at bedtime.   Take as directed per insulin instructions.   leflunomide 20 mg tablet; Commonly known as: Arava; TAKE ONE TABLET BY   MOUTH EVERY DAY   levothyroxine 88 mcg tablet; Commonly known as: Synthroid, Levoxyl; 1   tablet daily 6 days per week, 2 tablets one day per week   lidocaine 5 % patch; Commonly known as: Lidoderm   losartan 25 mg tablet; Commonly known as: Cozaar; TAKE ONE TABLET BY   MOUTH EVERY DAY   lovastatin 10 mg tablet; Commonly known as: Mevacor   miconazole 2 % powder; Commonly known as: Micotin   Miralax 17 gram/dose powder; Generic drug: polyethylene glycol   * morphine 30 mg tablet; Commonly known as: MSIR   * MORPHINE  SULFATE ER PO   * morphine CR 15 mg 12 hr tablet; Commonly known as: MS Contin   mupirocin 2 % ointment; Commonly known as: Bactroban   NovoLOG U-100 Insulin aspart 100 unit/mL injection; Generic drug:   insulin aspart   nystatin cream; Commonly known as: Mycostatin; APPLY TOPICALLY TWICE   DAILY   ondansetron ODT 4 mg disintegrating tablet; Commonly known as:   Zofran-ODT   pantoprazole 40 mg EC tablet; Commonly known as: ProtoNix   potassium chloride CR 20 mEq ER tablet; Commonly known as: Klor-Con M20;   Take 1 tablet (20 mEq) by mouth 2 times a day for 10 days. Do not crush or   chew.; Ask about: Should I take this medication?   Refresh Tears 0.5 % ophthalmic solution; Generic drug:   carboxymethylcellulose   sucralfate 1 gram tablet; Commonly known as: Carafate; Take 1 tablet (1   g) by mouth 4 times a day before meals.   sulfamethoxazole-trimethoprim 800-160 mg tablet; Commonly known as:   Bactrim DS; Take 1 tablet by mouth 2 times a day for 14 doses.; Ask about:   Should I take this medication?   SUMAtriptan 25 mg tablet; Commonly known as: Imitrex; Take 1 tablet (25   mg) by mouth if needed for migraine. May repeat in 2 hours if needed   triamcinolone 0.1 % cream; Commonly known as: Kenalog; APPLY ONE   APPLICATION EXTERNALLY TWICE DAILY AS NEEDED   Trulance tablet tablet; Generic drug: plecanatide  * This list has 5 medication(s) that are the same as other medications   prescribed for you. Read the directions carefully, and ask your doctor or   other care provider to review them with you.       Outpatient Follow-Up  Future Appointments   Date Time Provider Department [place]   8/12/2024  2:20 PM Marjorie Saha DO [id number] [facility]   8/21/2024  2:00 PM Rodney Tsang DO [id number] [facility]   8/23/2024  2:00 PM ANA MARÍA GoldsmithCNP [id number] [facility]   9/26/2024  2:30 PM Milo Grant MD [id number] [facility]   11/6/2024  1:20 PM Tony Phillips MD [id number] [facility]       MANSI Valentino-JERI

## 2024-08-08 NOTE — HOSPITAL COURSE
Ms Saez is a 64 year old female with an extensive medical history including; CAD, heart failure, A-fib, hypertension, GERD, cirrhosis, adrenal insufficiency, and type 2 diabetes, with multiple recent admissions for deconditioning and hypercarbia. She came into an ER from an outside hospital with reports of generalized weakness and feeling dizzy. She does state that she also has had some vague abdominal pain. Upon workup she was noted to have thickening of transverse colon concerning for ischemic colitis along with an elevated lactate, leukocytosis, and a metabolic acidosis with a bicarb of 9.  Prior to transfer she was intubated and placed on Levophed, vasopressin and received 2 units of PRBCs she was life flighted to [facility] and emergently taken to the OR for an ex lap.  Findings of the ex lap included ischemic, nonviable small intestine, beginning at about 50 cm from the ligament of Treitz all the way through the hepatic flexure.  There was a portion of the transverse colon that was viable, however the descending colon was also ischemic.  Within the small bowel there was patchy transmural necrosis.  At this point, because of the patient's comorbidities and significant burden of ischemia, the findings were determined to be incompatible with life and surgical resection would be futile.  Keith Aragon and Shaun contacted the patient's brother who is the healthcare proxy and notified him of the situation.  They also discussed the situation with Ms. Black daughter.  The plan is that the patient is a DNR.  No escalation of care, however we can utilize vasopressors in an effort to keep her alive until her son comes from [place].  Once the family is present, the plan is to withdraw care.     Family at bedside and decided to continue with comfort care. Patient extubated and vasopressors turned off.

## 2024-08-08 NOTE — OP NOTE
Exploration Laparotomy Operative Note     Date: 2024  OR Location: [facility] OR    Name: Daniela Saez, : 1960, Age: 64 y.o., MRN: [id number], Sex: female    Diagnosis  Pre-op Diagnosis      * Ischemic colitis (Multi) [K55.9] Post-op Diagnosis     * Ischemic colitis (Multi) [K55.9]     Procedures  Exploration Laparotomy  58923 - AK EXPLORATORY LAPAROTOMY CELIOTOMY W/WO BIOPSY SPX      Surgeons      * Dina Dunn - Primary    Resident/Fellow/Other Assistant:  Surgeons and Role:     * Sanford Aragon MD - Assisting    Procedure Summary  Anesthesia: General  ASA: IV  Anesthesia Staff: Anesthesiologist: Vanita Ferreira MD  CRNA: MANSI Lo-CRNA  Anesthesia Resident: Jimi Solis MD  Estimated Blood Loss: 100 mL  Intra-op Medications: Administrations occurring from 1635 to 1925 on 24:  * No intraprocedure medications in log *           Anesthesia Record               Intraprocedure I/O Totals          Intake    EMERGENCY PLASMA 300.00 mL    EMERGENCY PLATELETS 250.00 mL    RBC - EMERGENT 700.00 mL    NaCl 0.9 % bolus 2000.00 mL    Norepinephrine Drip 0.00 mL    The total shown is the total volume documented since Anesthesia Start was filed.    Epinephrine Drip 0.00 mL    The total shown is the total volume documented since Anesthesia Start was filed.    Vasopressin Drip 0.00 mL    The total shown is the total volume documented since Anesthesia Start was filed.    Total Intake 3250 mL       Output    Est. Blood Loss 200 mL    Total Output 200 mL       Net    Net Volume 3050 mL          Specimen: No specimens collected     Staff:   Circulator: Shantell  Scrub Person: Alyssa Voss Circulator: Narinder  Scrub Person: Fay         Drains and/or Catheters:   NG/OG/Feeding Tube NG - [place] sump 14 Fr Right nostril (Active)   Tube Status Low intermittent suction 24 1438   Placement Verification X-ray 24 0000   Site Assessment Clean;Dry;Intact 24 1438   Tube Securement  Taped to nostril center 08/07/24 0000   Intake - Flush (mL) 100 mL 08/07/24 0000       Urethral Catheter Latex 16 Fr. (Active)   Site Assessment Clean;Skin intact 08/07/24 2000   Collection Container Standard drainage bag 08/07/24 2000   Securement Method Securing device (Describe) 08/07/24 2000   Reason for Continuing Urinary Catheterization accurate hourly measurement of urine volume in a critically ill patient that cannot be assessed by other volumes and urine collection strategies 08/07/24 2000   Output (mL) 0 mL 08/07/24 2200       [REMOVED] Urethral Catheter 16 Fr. (Removed)       [REMOVED] External Urinary Catheter Female (Removed)       [REMOVED] External Urinary Catheter Female (Removed)       Date of Surgery 08/07/24  Pre-op diagnosis: Ischemic colitis, septic shock    Post-op diagnosis: Ischemic colitis, ischemia of the small bowel, septic shock    Procedure: Exploratory laparotomy, placement of temporary abdominal wound vac with Abthera    Attending: Sanford Aragon MD    Resident: Frankie Landrum MD and Ronda Richmond MD    Anesthesia: General    EBL: 100 mL    Specimens: None    Complications: None    Findings: Almost entire small bowel ischemic or necrotic. Entire colon ischemic except for transverse and proximal left colon.     Disposition: ICU.       Indications: 63 yo female with septic shock from intraabdominal source with concern for ischemic colitis transferred from OSH.  I discussed her care at the bedside with Dr. Dunn in the OR.  The patient had been transported directly to the OR.  Blood products were being transfused for her anemia and coagulopathy.  The anesthesia team was resuscitating the patient. I reviewed her CT scan.  She was requiring 3 vasopressors.  Initial time out and second time out were led by Dr. Dunn.  Consent had been obtained from family.  I scrubbed into the case given timing.      Procedure Details:   The patient was already on the OR table, in supine  position and with a prepped and draped abdomen.  Using electrocautery, an upper midline incision was made given what appears to be mesh and tacks in the mid to lower abdomen on the CT.  The peritoneum was entered here, and there was hemorrhagic ascites.  There were some omental adhesions to a thick sheet of mesh that were taken down bluntly.  The incision continued inferiorly into the pelvis.  The small bowel was eviscerated from the abdomen and was ischemic or necrotic except for a short ~ 30 cm proximal segment.  The colon was ischemic or necrotic except for the transverse colon and proximal left colon. We discussed these findings in the OR.  Given her shock and almost entire bowel being ischemic or necrotic, this was not deemed to be a survivable disease process.  Everyone in the OR agreed.  Hemostasis was achieved. An Abthera device was placed.     I was scrubbed and present for the entire operation.  All sponge and needle counts were correct.  I spoke to her daughter in the waiting area after surgery and her son via telephone.  Dr. Dunn spoke to her brother Narinder via telephone.

## 2024-08-09 NOTE — H&P
[facility]  TRAUMA SERVICE - HISTORY AND PHYSICAL / CONSULT    Patient Name: Daniela Saez  MRN: [id number]  Admit Date: 807  : 1960  AGE: 64 y.o.   GENDER: female  ==============================================================================  CHIEF COMPLAINT:   Concern for bowel ischemia.   Transfer from OSH in septic shock and multisystem organ failure.     Referring Facility Name (N/A for scene EMR run): [facility]       OTHER MEDICAL PROBLEMS:  Past Medical History:   Diagnosis Date    Adrenal insufficiency (Multi)     Anemia     24 - H/H 9.4.6    Anxiety     Asthma ([facility])     Atrial fibrillation (Multi)     on Plavix - stop instructions in chart    Cervical radiculopathy     Chronic diastolic heart failure (Multi)     saw cardiology 2024- f/u prn    Chronic pain syndrome     Chronic pancreatitis (Multi)     Coronary artery disease     old MI noted on cardiac testing, unknown when    Depression     Fibromyalgia     Gastroparesis     Gastroparesis    GERD (gastroesophageal reflux disease)     Gout     Hearing loss     Hepatic cirrhosis (Multi)     24: alk phs 182    Hypertension     Hypocalcemia     24: ca 8.3    Hyponatremia     24: Na 134    Hypothyroidism     Insomnia     Irritable bowel syndrome with diarrhea     Liver lesion     Lumbar stenosis     Migraine     Monoclonal gammopathy     follows with heme - h/o bone marrow bx    Occipital neuralgia     Osteoarthritis     Rheumatoid arthritis (Multi)     RLS (restless legs syndrome)     RSD (reflex sympathetic dystrophy)     Syncope     admitted -24 with syncopal event - dx with UTI on Bactrim    Thrombocytopenia (CMS-HCC)     24: plt 59    Thrombocytopenia (CMS-HCC)     24: plt 59    Tremor     voice, hands, legs at times    Type 2 diabetes mellitus (Multi)     24: A1C 7.1%    UC (ulcerative colitis) (Multi)           ==============================================================================  ADMISSION PLAN OF CARE:  OR for ex lap ad TICU after  Zosyn  FFP and RBC trasnfusions given severe coagulopathy and anemia  Pressor support  Ventilator management    ==============================================================================  PAST MEDICAL HISTORY:     Past Medical History:   Diagnosis Date    Adrenal insufficiency (Multi)     Anemia     24 - H/H 9.4/31.6    Anxiety     Asthma ([facility])     Atrial fibrillation (Multi)     on Plavix - stop instructions in chart    Cervical radiculopathy     Chronic diastolic heart failure ([facility])     saw cardiology 2024- f/u prn    Chronic pain syndrome     Chronic pancreatitis (Multi)     Coronary artery disease     old MI noted on cardiac testing, unknown when    Depression     Fibromyalgia     Gastroparesis     Gastroparesis    GERD (gastroesophageal reflux disease)     Gout     Hearing loss     Hepatic cirrhosis (Multi)     24: alk phs 182    Hypertension     Hypocalcemia     24: ca 8.3    Hyponatremia     24: Na 134    Hypothyroidism     Insomnia     Irritable bowel syndrome with diarrhea     Liver lesion     Lumbar stenosis     Migraine     Monoclonal gammopathy     follows with heme - h/o bone marrow bx    Occipital neuralgia     Osteoarthritis     Rheumatoid arthritis (Multi)     RLS (restless legs syndrome)     RSD (reflex sympathetic dystrophy)     Syncope     admitted -24 with syncopal event - dx with UTI on Bactrim    Thrombocytopenia (CMS-HCC)     24: plt 59    Thrombocytopenia (CMS-HCC)     24: plt 59    Tremor     voice, hands, legs at times    Type 2 diabetes mellitus (Multi)     24: A1C 7.1%    UC (ulcerative colitis) (Multi)      Past Surgical History:   Procedure Laterality Date    BONE MARROW BIOPSY      BUNIONECTOMY Right      SECTION, LOW TRANSVERSE      x 5    CHOLECYSTECTOMY      CT GUIDED PERCUTANEOUS  BIOPSY BONE DEEP  2022    CT GUIDED PERCUTANEOUS BIOPSY BONE DEEP 3/9/2022 [facility]    OTHER SURGICAL HISTORY  2019    Uterine nerve ablation    OTHER SURGICAL HISTORY Right     Ovarian torsion repair    SINUS SURGERY      TONSILLECTOMY      Tonsillectomy with adenoidectomy    TUBAL LIGATION      UMBILICAL HERNIA REPAIR         Family History   Problem Relation Name Age of Onset    Heart disease Mother      Hypertension Mother      Arthritis Mother      Diabetes Mother      Hyperlipidemia Mother      Hypertension Father      Brain cancer Father      Diabetes Sister      Diabetes Brother       SOCIAL HISTORY:  Social History     Tobacco Use   Smoking Status Former    Current packs/day: 0.00    Types: Cigarettes    Quit date: 2004    Years since quittin.1   Smokeless Tobacco Never   Tobacco Comments    1.5 PPD x 35 yrs        Alcohol:   Social History     Substance and Sexual Activity   Alcohol Use Not Currently       Drug use: yes per brother, hx opiat abuse    MEDICATIONS:  Prior to Admission medications    Medication Sig Start Date End Date Taking? Authorizing Provider   acetaminophen (Tylenol) 325 mg tablet Take 2 tablets (650 mg) by mouth every 4 hours if needed for mild pain (1 - 3). 21   Historical Provider, MD   albuterol 90 mcg/actuation inhaler Inhale 2 puffs 2 times a day. 2/15/20   Historical Provider, MD   amitriptyline (Elavil) 25 mg tablet Take 1 tablet (25 mg) by mouth as needed at bedtime for sleep. Can take in addition to 50 mg tablet. 50-75 mg nightly    Historical Provider, MD   amitriptyline (Elavil) 50 mg tablet Take 1 tablet (50 mg) by mouth once daily.    Historical Provider, MD   azelastine (Optivar) 0.05 % ophthalmic solution 1 drop 2 times a day.    Historical Provider, MD   baclofen (Lioresal) 10 mg tablet Take 2 tablets (20 mg) by mouth 2 times a day.    Historical Provider, MD   carboxymethylcellulose (Refresh Tears) 0.5 % ophthalmic solution 1-2 drops  each eye 3 times a day for 30 days 5/5/23   Historical Provider, MD   diphenoxylate-atropine (Lomotil) 2.5-0.025 mg tablet Take 1 tablet by mouth 4 times a day as needed for diarrhea.    Historical Provider, MD   famotidine (Pepcid) 20 mg tablet Take 1 tablet (20 mg) by mouth 2 times a day.    Historical Provider, MD   fluocinonide (Lidex) 0.05 % cream Apply 1 Application topically 2 times a day. To areas of psoriasis on trunk and extremities Monday thru Friday. Avoid face    Historical Provider, MD   fluticasone (Flonase Sensimist) 27.5 mcg/actuation nasal spray Administer 2 sprays into each nostril once daily at bedtime.  Patient not taking: Reported on 8/1/2024 12/11/23 3/20/24  Arlet Monte, APRN-CNP   guaiFENesin (Mucinex) 600 mg 12 hr tablet Take 2 tablets (1,200 mg) by mouth 2 times a day for 7 days. Do not crush, chew, or split. 7/27/24 8/3/24  Narinder Bergman,    insulin aspart (NovoLOG U-100 Insulin aspart) 100 unit/mL injection Inject 2-8 Units under the skin 3 times a day before meals. Take as directed per insulin instructions.    Historical Provider, MD   ipratropium (Atrovent) 42 mcg (0.06 %) nasal spray Administer 2 sprays into each nostril 2 times a day.    Historical Provider, MD   lidocaine (Lidoderm) 5 % patch Place 3 patches on the skin once daily. Remove & discard patch within 12 hours or as directed by MD.    Historical Provider, MD   lovastatin (Mevacor) 10 mg tablet Take 1 tablet (10 mg) by mouth once daily at bedtime.    Historical Provider, MD   miconazole (Micotin) 2 % powder Apply 1 Application topically 2 times a day as needed for itching.  To dry skin    Historical Provider, MD   morphine (MSIR) 30 mg tablet Take 1 tablet (30 mg) by mouth every 8 hours.    Historical Provider, MD   morphine CR (MS Contin) 15 mg 12 hr tablet Take 1 tablet (15 mg) by mouth every 8 hours. 4/13/22   Historical Provider, MD   MORPHINE SULFATE ER PO Take 30 mg by mouth every 8 hours if needed (for  "breakthrough Pain).    Historical Provider, MD   mupirocin (Bactroban) 2 % ointment Apply topically once daily as needed.    Historical Provider, MD   ondansetron ODT (Zofran-ODT) 4 mg disintegrating tablet Take 1 tablet (4 mg) by mouth every 8 hours if needed for nausea or vomiting.    Historical Provider, MD   pancrelipase, Lip-Prot-Amyl, (Creon) 36,000-114,000- 180,000 unit capsule,delayed release(DR/EC) capsule Take 2 capsules by mouth 3 times a day. With meals, 1 capsule with snacks. Max 8 capsules per day 12/20/19   Historical Provider, MD   pantoprazole (ProtoNix) 40 mg EC tablet Take 1 tablet (40 mg) by mouth once daily. 9/6/23   Historical Provider, MD   plecanatide (Trulance) tablet tablet Take 1 tablet (3 mg) by mouth every other day.    Historical Provider, MD   polyethylene glycol (Miralax) 17 gram/dose powder Take 17 g by mouth once daily as needed (constipation).    Historical Provider, MD   potassium chloride CR (Klor-Con M20) 20 mEq ER tablet Take 1 tablet (20 mEq) by mouth 2 times a day for 10 days. Do not crush or chew. 7/23/24 8/2/24  Arun Alvarado,    sulfamethoxazole-trimethoprim (Bactrim DS) 800-160 mg tablet Take 1 tablet by mouth 2 times a day for 14 doses. 7/27/24 8/3/24  Narinder Bergman DO   alendronate (Fosamax) 70 mg tablet Take 1 tablet (70 mg) by mouth every 7 days. Take in the morning with a full glass of water, on an empty stomach, and do not take anything else by mouth or lie down for the next 30 min. 4/10/24 8/8/24  Marjorie Saha DO   allopurinol (Zyloprim) 100 mg tablet TAKE ONE TABLET BY MOUTH EVERY DAY 7/15/24 8/8/24  Lazaro La MD   atorvastatin (Lipitor) 40 mg tablet TAKE ONE TABLET BY MOUTH EVERY DAY 5/30/24 8/8/24  Marjorie Saha DO   BD Saray 2nd Gen Pen Needle 32 gauge x 5/32\" needle Four times daily 3/20/24 8/8/24  Tony Phillips MD   bumetanide (Bumex) 1 mg tablet Take 1 tablet (1 mg) by mouth once daily. 1/8/24 8/8/24  Rodney Tsang DO   carvedilol (Coreg) " 6.25 mg tablet TAKE ONE TABLET BY MOUTH TWICE DAILY 1/15/24 8/8/24  Rodney Tsang DO   chlorhexidine (Peridex) 0.12 % solution SWISH AND SPIT 15ML FOR 30 SECONDS NIGHT PRIOR TO SURGERY AND MORNING OF SURGERY 8/1/24 8/8/24  Marychuy Garcia, APRN-CNP   clopidogrel (Plavix) 75 mg tablet TAKE ONE TABLET BY MOUTH EVERY DAY 5/8/24 8/8/24  Marjorie Saha DO   Dexcom G7  misc Use as instructed 3/20/24 8/8/24  Tony Phillips MD   Dexcom G7 Sensor device For continuous glucose monitoring.  Change every 10 days. 3/20/24 8/8/24  Tony Phillips MD   eplerenone (Inspra) 50 mg tablet Take 2 tablets (100 mg) by mouth 2 times a day.  Patient taking differently: Take 1 tablet (50 mg) by mouth 2 times a day. 7/23/24 8/8/24  Marjorie Saha,    hydrocortisone (Cortef) 5 mg tablet Take 1-2 tablets (5-10 mg) by mouth 2 times a day. 3/20/24 8/8/24  Tony Phillips MD   hydroxychloroquine (Plaquenil) 200 mg tablet take ONE & ONE-HALF tablet BY MOUTH EVERY DAY 3/21/24 8/8/24  Lazaro La MD   Lantus Solostar U-100 Insulin 100 unit/mL (3 mL) pen Inject 12 Units under the skin once daily at bedtime. Take as directed per insulin instructions.  Patient taking differently: Inject 7 Units under the skin once daily at bedtime. Take as directed per insulin instructions. 3/20/24 8/8/24  Tony Phillips MD   leflunomide (Arava) 20 mg tablet TAKE ONE TABLET BY MOUTH EVERY DAY  Patient taking differently: Take 1 tablet (20 mg) by mouth once daily at bedtime. 7/8/24 8/8/24  Lazaro La MD   levothyroxine (Synthroid, Levoxyl) 88 mcg tablet 1 tablet daily 6 days per week, 2 tablets one day per week 10/23/23 8/8/24  Tony Phillips MD   losartan (Cozaar) 25 mg tablet TAKE ONE TABLET BY MOUTH EVERY DAY  Patient taking differently: Take 2 tablets (50 mg) by mouth once daily. 7/15/24 8/8/24  Marjorie Saha,    morphine CR (MS Contin) 30 mg 12 hr tablet Take 1 tablet (30 mg) by mouth 2 times a day. Do not crush, chew, or split.  8/6/24   Historical Provider, MD   NovoLOG Flexpen U-100 Insulin 100 unit/mL (3 mL) pen Inject 2-6 Units under the skin 3 times a day before meals.  Patient taking differently: Inject 2-6 Units under the skin 3 times a day before meals. 100-150 2 units  150-200- 4 units  200-250- 6 units  if higher than that it increases 1 unit per every 50 per patient 3/20/24 8/5/24  Tony Phillips MD   nystatin (Mycostatin) cream APPLY TOPICALLY TWICE DAILY  Patient not taking: Reported on 8/1/2024 6/17/24 8/8/24  Marjorie Saha DO   Solu-CORTEF Act-O-Vial, PF, 100 mg/2 mL injection Inject 2 mL (100 mg) over 1 minutes into the muscle if needed for other (Adrenal crisis).  Patient not taking: Reported on 7/31/2024 7/15/24 8/7/24  Tony Phillips MD   sucralfate (Carafate) 1 gram tablet Take 1 tablet (1 g) by mouth 4 times a day before meals. 6/20/24 8/8/24  Marjorie Saha DO   SUMAtriptan (Imitrex) 25 mg tablet Take 1 tablet (25 mg) by mouth if needed for migraine. May repeat in 2 hours if needed 6/12/24 8/8/24  Marjorie Saha DO   triamcinolone (Kenalog) 0.1 % cream APPLY ONE APPLICATION EXTERNALLY TWICE DAILY AS NEEDED  Patient not taking: Reported on 8/1/2024 6/17/24 8/8/24  Marjorie Saha DO     ALLERGIES:   Allergies   Allergen Reactions    Metoclopramide Anaphylaxis and Other     Lockjaw    Reglan [Metoclopramide Hcl] Other     Lockjaw    Cefuroxime Axetil Diarrhea, Tinnitus, Headache, GI Upset and Nausea/vomiting    Fentanyl Rash     Fentanyl patch     Gabapentin Hallucinations     SUICIDAL THOUGHTS    Levetiracetam GI Upset    Metformin Unknown     Does not remember     Torsemide Unknown     Does not remember        REVIEW OF SYSTEMS:  Review of Systems  Cannot die as intubated and sedated  PHYSICAL EXAM:  Intubated  Mottled  Diffuse edama  Abdomen distended and firm  Dusky digits with thready pulses      LABS:  Results from last 7 days   Lab Units 08/07/24  1000 08/07/24  0352 08/06/24  0416 08/05/24  1734 08/05/24  0929   WBC  AUTO x10*3/uL 24.4* 23.2* 15.5* 17.5* 16.0*   HEMOGLOBIN g/dL 7.7* 8.8* 9.8* 11.1* 13.3   HEMATOCRIT % 24.2* 26.7* 29.3* 33.0* 41.4   PLATELETS AUTO x10*3/uL 132* 140* 130* 168 189   NEUTROS PCT AUTO %  --  81.4  --  86.5 82.9   LYMPHO PCT MAN % 6.0  --  3.0  --   --    LYMPHS PCT AUTO %  --  5.3  --  4.3 7.0   MONO PCT MAN % 3.0  --  8.0  --   --    MONOS PCT AUTO %  --  9.7  --  7.8 7.7   EOSINO PCT MAN % 0.0  --  0.0  --   --    EOS PCT AUTO %  --  0.7  --  0.4 1.5     Results from last 7 days   Lab Units 08/07/24  1800 08/07/24  1236 08/05/24  1734   APTT seconds 86* 97.9* 58.0*   INR  3.9* 3.3* 1.4*     Results from last 7 days   Lab Units 08/07/24  0352 08/06/24  1818 08/06/24  0913 08/05/24  2130 08/05/24  1734 08/05/24  1229 08/05/24  0929   SODIUM mmol/L 127* 125* 125*   < > 124*  --  117*   POTASSIUM mmol/L 6.0* 5.1 4.9   < > 6.8*   < > 7.6*   CHLORIDE mmol/L 93* 92* 90*   < > 92*  --  82*   CO2 mmol/L 9* 17* 18*   < > 17*  --  23   BUN mg/dL 35* 31* 32*   < > 51*  --  53*   CREATININE mg/dL 1.50 1.40 1.70*   < > 3.10*  --  3.79*   CALCIUM mg/dL 8.4* 8.7 8.7   < > 8.9  --  10.0   PROTEIN TOTAL g/dL  --   --   --   --  6.0  --  8.2   BILIRUBIN TOTAL mg/dL  --   --   --   --  1.0  --  1.2   ALK PHOS U/L  --   --   --   --  251*  --  233*   ALT U/L  --   --   --   --  29  --  26   AST U/L  --   --   --   --  62*  --  50*   GLUCOSE mg/dL 108* 172* 204*   < > 198*  --  73*    < > = values in this interval not displayed.     Results from last 7 days   Lab Units 08/05/24  1734 08/05/24  0929   BILIRUBIN TOTAL mg/dL 1.0 1.2     Results from last 7 days   Lab Units 08/07/24  1954 08/07/24  1902 08/07/24  1803   POCT PH, ARTERIAL pH 7.19* 7.18* 7.33*   POCT PCO2, ARTERIAL mm Hg 34* 35* 35*   POCT PO2, ARTERIAL mm Hg 146* 211* 184*   POCT HCO3 CALCULATED, ARTERIAL mmol/L 13.0* 13.1* 18.5*   POCT BASE EXCESS, ARTERIAL mmol/L -13.9* -14.1* -6.8*       I have reviewed all laboratory and imaging results ordered/pertinent  for this encounter.

## 2024-08-10 LAB
BACTERIA BLD CULT: NORMAL
BACTERIA BLD CULT: NORMAL
BLOOD EXPIRATION DATE: NORMAL
DISPENSE STATUS: NORMAL
PRODUCT BLOOD TYPE: 6200
PRODUCT CODE: NORMAL
UNIT ABO: NORMAL
UNIT NUMBER: NORMAL
UNIT RH: NORMAL
UNIT VOLUME: 350
XM INTEP: NORMAL

## 2024-08-10 PROCEDURE — 87186 SC STD MICRODIL/AGAR DIL: CPT | Performed by: PATHOLOGY

## 2024-08-10 PROCEDURE — 87070 CULTURE OTHR SPECIMN AEROBIC: CPT | Performed by: PATHOLOGY

## 2024-08-10 PROCEDURE — 36415 COLL VENOUS BLD VENIPUNCTURE: CPT | Performed by: PATHOLOGY

## 2024-08-11 LAB
BACTERIA BLD AEROBE CULT: ABNORMAL
BACTERIA BLD CULT: ABNORMAL
BACTERIA SPEC CULT: NORMAL
BLOOD EXPIRATION DATE: NORMAL
BLOOD EXPIRATION DATE: NORMAL
DISPENSE STATUS: NORMAL
DISPENSE STATUS: NORMAL
GRAM STN SPEC: ABNORMAL
GRAM STN SPEC: ABNORMAL
GRAM STN SPEC: NORMAL
GRAM STN SPEC: NORMAL
PRODUCT BLOOD TYPE: 6200
PRODUCT BLOOD TYPE: 6200
PRODUCT CODE: NORMAL
PRODUCT CODE: NORMAL
UNIT ABO: NORMAL
UNIT ABO: NORMAL
UNIT NUMBER: NORMAL
UNIT NUMBER: NORMAL
UNIT RH: NORMAL
UNIT RH: NORMAL
UNIT VOLUME: 350
UNIT VOLUME: 350
XM INTEP: NORMAL
XM INTEP: NORMAL

## 2024-08-12 ENCOUNTER — APPOINTMENT (OUTPATIENT)
Dept: PRIMARY CARE | Facility: CLINIC | Age: 64
End: 2024-08-12
Payer: MEDICAID

## 2024-08-12 ENCOUNTER — TELEPHONE (OUTPATIENT)
Dept: RHEUMATOLOGY | Facility: CLINIC | Age: 64
End: 2024-08-12

## 2024-08-12 LAB
BACTERIA SPEC CULT: NORMAL
GRAM STN SPEC: NORMAL
GRAM STN SPEC: NORMAL

## 2024-08-13 LAB
AUTOPSY REPORT: NORMAL
BACTERIA BLD AEROBE CULT: ABNORMAL
BACTERIA BLD AEROBE CULT: ABNORMAL
BACTERIA BLD CULT: ABNORMAL
BACTERIA BLD CULT: ABNORMAL
GRAM STN SPEC: ABNORMAL
GRAM STN SPEC: ABNORMAL
PATH REPORT.RELEVANT HX SPEC: NORMAL
PATH REPORT.TOTAL CANCER: NORMAL

## 2024-08-14 LAB — CORTIS F SERPL-MCNC: 4.31 UG/DL

## 2024-08-15 LAB
ATRIAL RATE: 127 BPM
PR INTERVAL: 144 MS
Q ONSET: 216 MS
QRS COUNT: 21 BEATS
QRS DURATION: 86 MS
QT INTERVAL: 296 MS
QTC CALCULATION(BAZETT): 430 MS
QTC FREDERICIA: 380 MS
R AXIS: 254 DEGREES
T AXIS: 49 DEGREES
T OFFSET: 364 MS
VENTRICULAR RATE: 127 BPM

## 2024-08-21 ENCOUNTER — APPOINTMENT (OUTPATIENT)
Dept: CARDIOLOGY | Facility: CLINIC | Age: 64
End: 2024-08-21
Payer: MEDICAID

## 2024-08-23 ENCOUNTER — APPOINTMENT (OUTPATIENT)
Dept: HEMATOLOGY/ONCOLOGY | Facility: HOSPITAL | Age: 64
End: 2024-08-23
Payer: MEDICAID

## 2024-08-29 LAB
LABORATORY COMMENT REPORT: NORMAL
PATH REPORT.FINAL DX SPEC: NORMAL
PATH REPORT.GROSS SPEC: NORMAL
PATH REPORT.MICROSCOPIC SPEC OTHER STN: NORMAL
PATH REPORT.RELEVANT HX SPEC: NORMAL
PATH REPORT.RELEVANT HX SPEC: NORMAL
PATH REPORT.TOTAL CANCER: NORMAL
RPT COMMENT SECTION: NORMAL

## 2024-09-10 LAB
LABORATORY COMMENT REPORT: NORMAL
PATH REPORT.ADDENDUM SPEC: NORMAL
PATH REPORT.FINAL DX SPEC: NORMAL
PATH REPORT.GROSS SPEC: NORMAL
PATH REPORT.MICROSCOPIC SPEC OTHER STN: NORMAL
PATH REPORT.RELEVANT HX SPEC: NORMAL
PATH REPORT.RELEVANT HX SPEC: NORMAL
PATH REPORT.TOTAL CANCER: NORMAL
RPT COMMENT SECTION: NORMAL

## 2024-09-26 ENCOUNTER — APPOINTMENT (OUTPATIENT)
Dept: ORTHOPEDIC SURGERY | Facility: CLINIC | Age: 64
End: 2024-09-26
Payer: MEDICAID

## 2024-11-06 ENCOUNTER — APPOINTMENT (OUTPATIENT)
Dept: ENDOCRINOLOGY | Facility: CLINIC | Age: 64
End: 2024-11-06
Payer: MEDICAID

## (undated) DEVICE — Device

## (undated) DEVICE — MANIFOLD, 4 PORT NEPTUNE STANDARD

## (undated) DEVICE — KIT, ABTHERA DRESSING W/SENSA TRAC

## (undated) DEVICE — LIGASURE IMPACT, 18CM

## (undated) DEVICE — DRAPE, FLUID WARMER

## (undated) DEVICE — WOUND VAC KIT, W/CANNISTER, 500ML, 5/PK